# Patient Record
Sex: FEMALE | Race: WHITE | NOT HISPANIC OR LATINO | Employment: OTHER | URBAN - NONMETROPOLITAN AREA
[De-identification: names, ages, dates, MRNs, and addresses within clinical notes are randomized per-mention and may not be internally consistent; named-entity substitution may affect disease eponyms.]

---

## 2017-01-20 ENCOUNTER — AMBULATORY - GICH (OUTPATIENT)
Dept: SCHEDULING | Facility: OTHER | Age: 67
End: 2017-01-20

## 2017-02-14 ENCOUNTER — HISTORY (OUTPATIENT)
Dept: FAMILY MEDICINE | Facility: OTHER | Age: 67
End: 2017-02-14

## 2017-02-14 ENCOUNTER — OFFICE VISIT - GICH (OUTPATIENT)
Dept: FAMILY MEDICINE | Facility: OTHER | Age: 67
End: 2017-02-14

## 2017-02-14 ENCOUNTER — HOSPITAL ENCOUNTER (OUTPATIENT)
Dept: RADIOLOGY | Facility: OTHER | Age: 67
End: 2017-02-14
Attending: FAMILY MEDICINE

## 2017-02-14 DIAGNOSIS — Z13.820 ENCOUNTER FOR SCREENING FOR OSTEOPOROSIS: ICD-10-CM

## 2017-02-14 DIAGNOSIS — E55.9 VITAMIN D DEFICIENCY: ICD-10-CM

## 2017-02-14 DIAGNOSIS — L20.9 ATOPIC DERMATITIS: ICD-10-CM

## 2017-02-14 DIAGNOSIS — Z12.31 ENCOUNTER FOR SCREENING MAMMOGRAM FOR MALIGNANT NEOPLASM OF BREAST: ICD-10-CM

## 2017-02-14 DIAGNOSIS — Z13.220 ENCOUNTER FOR SCREENING FOR LIPOID DISORDERS: ICD-10-CM

## 2017-02-14 DIAGNOSIS — L65.9 NONSCARRING HAIR LOSS: ICD-10-CM

## 2017-02-14 DIAGNOSIS — Z00.00 ENCOUNTER FOR GENERAL ADULT MEDICAL EXAMINATION WITHOUT ABNORMAL FINDINGS: ICD-10-CM

## 2017-02-14 LAB
ABSOLUTE BASOPHILS - HISTORICAL: 0.1 THOU/CU MM
ABSOLUTE EOSINOPHILS - HISTORICAL: 0.1 THOU/CU MM
ABSOLUTE LYMPHOCYTES - HISTORICAL: 1.1 THOU/CU MM (ref 0.9–2.9)
ABSOLUTE MONOCYTES - HISTORICAL: 0.4 THOU/CU MM
ABSOLUTE NEUTROPHILS - HISTORICAL: 2.9 THOU/CU MM (ref 1.7–7)
ANION GAP - HISTORICAL: 6 (ref 5–18)
BASOPHILS # BLD AUTO: 1.7 %
BUN SERPL-MCNC: 17 MG/DL (ref 7–25)
BUN/CREAT RATIO - HISTORICAL: 29
CALCIUM SERPL-MCNC: 9.6 MG/DL (ref 8.6–10.3)
CHLORIDE SERPLBLD-SCNC: 104 MMOL/L (ref 98–107)
CHOL/HDL RATIO - HISTORICAL: 2
CHOLESTEROL TOTAL: 188 MG/DL
CO2 SERPL-SCNC: 28 MMOL/L (ref 21–31)
CREAT SERPL-MCNC: 0.58 MG/DL (ref 0.7–1.3)
EOSINOPHIL NFR BLD AUTO: 1.1 %
ERYTHROCYTE [DISTWIDTH] IN BLOOD BY AUTOMATED COUNT: 12.2 % (ref 11.5–15.5)
GFR IF NOT AFRICAN AMERICAN - HISTORICAL: >60 ML/MIN/1.73M2
GLUCOSE SERPL-MCNC: 90 MG/DL (ref 70–105)
HCT VFR BLD AUTO: 40.4 % (ref 33–51)
HDLC SERPL-MCNC: 94 MG/DL (ref 23–92)
HEMOGLOBIN: 13.8 G/DL (ref 12–16)
LDLC SERPL CALC-MCNC: 83 MG/DL
LYMPHOCYTES NFR BLD AUTO: 23.9 % (ref 20–44)
MCH RBC QN AUTO: 33.4 PG (ref 26–34)
MCHC RBC AUTO-ENTMCNC: 34.3 G/DL (ref 32–36)
MCV RBC AUTO: 98 FL (ref 80–100)
MONOCYTES NFR BLD AUTO: 7.9 %
NEUTROPHILS NFR BLD AUTO: 65.4 % (ref 42–72)
NON-HDL CHOLESTEROL - HISTORICAL: 94 MG/DL
PATIENT STATUS - HISTORICAL: ABNORMAL
PLATELET # BLD AUTO: 282 THOU/CU MM (ref 140–440)
PMV BLD: 5 FL (ref 6.5–11)
POTASSIUM SERPL-SCNC: 4 MMOL/L (ref 3.5–5.1)
RED BLOOD COUNT - HISTORICAL: 4.14 MIL/CU MM (ref 4–5.2)
SODIUM SERPL-SCNC: 138 MMOL/L (ref 133–143)
TRIGL SERPL-MCNC: 55 MG/DL
TSH - HISTORICAL: 1.22 UIU/ML (ref 0.34–5.6)
VITAMIN D TOTAL - HISTORICAL: 32.1 NG/ML
WHITE BLOOD COUNT - HISTORICAL: 4.5 THOU/CU MM (ref 4.5–11)

## 2017-02-14 ASSESSMENT — ANXIETY QUESTIONNAIRES
4. TROUBLE RELAXING: NOT AT ALL
GAD7 TOTAL SCORE: 0
2. NOT BEING ABLE TO STOP OR CONTROL WORRYING: NOT AT ALL
6. BECOMING EASILY ANNOYED OR IRRITABLE: NOT AT ALL
7. FEELING AFRAID AS IF SOMETHING AWFUL MIGHT HAPPEN: NOT AT ALL
3. WORRYING TOO MUCH ABOUT DIFFERENT THINGS: NOT AT ALL
1. FEELING NERVOUS, ANXIOUS, OR ON EDGE: NOT AT ALL
5. BEING SO RESTLESS THAT IT IS HARD TO SIT STILL: NOT AT ALL

## 2017-02-14 ASSESSMENT — PATIENT HEALTH QUESTIONNAIRE - PHQ9: SUM OF ALL RESPONSES TO PHQ QUESTIONS 1-9: 0

## 2017-02-16 ENCOUNTER — HOSPITAL ENCOUNTER (OUTPATIENT)
Dept: RADIOLOGY | Facility: OTHER | Age: 67
End: 2017-02-16
Attending: FAMILY MEDICINE

## 2017-02-16 DIAGNOSIS — Z13.820 ENCOUNTER FOR SCREENING FOR OSTEOPOROSIS: ICD-10-CM

## 2017-05-10 ENCOUNTER — AMBULATORY - GICH (OUTPATIENT)
Dept: SCHEDULING | Facility: OTHER | Age: 67
End: 2017-05-10

## 2017-05-15 ENCOUNTER — AMBULATORY - GICH (OUTPATIENT)
Dept: FAMILY MEDICINE | Facility: OTHER | Age: 67
End: 2017-05-15

## 2017-05-15 ENCOUNTER — HISTORY (OUTPATIENT)
Dept: FAMILY MEDICINE | Facility: OTHER | Age: 67
End: 2017-05-15

## 2017-05-15 DIAGNOSIS — Z01.818 ENCOUNTER FOR OTHER PREPROCEDURAL EXAMINATION: ICD-10-CM

## 2017-05-15 DIAGNOSIS — H35.372 PUCKERING OF LEFT MACULA: ICD-10-CM

## 2017-05-15 ASSESSMENT — PATIENT HEALTH QUESTIONNAIRE - PHQ9: SUM OF ALL RESPONSES TO PHQ QUESTIONS 1-9: 0

## 2017-05-15 ASSESSMENT — ANXIETY QUESTIONNAIRES
GAD7 TOTAL SCORE: 0
7. FEELING AFRAID AS IF SOMETHING AWFUL MIGHT HAPPEN: NOT AT ALL
2. NOT BEING ABLE TO STOP OR CONTROL WORRYING: NOT AT ALL
4. TROUBLE RELAXING: NOT AT ALL
5. BEING SO RESTLESS THAT IT IS HARD TO SIT STILL: NOT AT ALL
1. FEELING NERVOUS, ANXIOUS, OR ON EDGE: NOT AT ALL
3. WORRYING TOO MUCH ABOUT DIFFERENT THINGS: NOT AT ALL
6. BECOMING EASILY ANNOYED OR IRRITABLE: NOT AT ALL

## 2017-06-07 ENCOUNTER — AMBULATORY - GICH (OUTPATIENT)
Dept: SCHEDULING | Facility: OTHER | Age: 67
End: 2017-06-07

## 2017-06-29 ENCOUNTER — AMBULATORY - GICH (OUTPATIENT)
Dept: SCHEDULING | Facility: OTHER | Age: 67
End: 2017-06-29

## 2017-07-12 ENCOUNTER — AMBULATORY - GICH (OUTPATIENT)
Dept: SCHEDULING | Facility: OTHER | Age: 67
End: 2017-07-12

## 2017-08-23 ENCOUNTER — OFFICE VISIT - GICH (OUTPATIENT)
Dept: FAMILY MEDICINE | Facility: OTHER | Age: 67
End: 2017-08-23

## 2017-08-23 ENCOUNTER — AMBULATORY - GICH (OUTPATIENT)
Dept: FAMILY MEDICINE | Facility: OTHER | Age: 67
End: 2017-08-23

## 2017-08-23 ENCOUNTER — HISTORY (OUTPATIENT)
Dept: FAMILY MEDICINE | Facility: OTHER | Age: 67
End: 2017-08-23

## 2017-08-23 DIAGNOSIS — Z01.818 ENCOUNTER FOR OTHER PREPROCEDURAL EXAMINATION: ICD-10-CM

## 2017-08-23 DIAGNOSIS — L94.0 LOCALIZED SCLERODERMA: ICD-10-CM

## 2017-08-23 DIAGNOSIS — H25.9 AGE-RELATED CATARACT: ICD-10-CM

## 2017-08-23 DIAGNOSIS — N95.2 POSTMENOPAUSAL ATROPHIC VAGINITIS: ICD-10-CM

## 2017-12-28 NOTE — ADDENDUM NOTE
Patient Information     Patient Name MRN Sex Daly Frias [id number] Female 1950      Addendum Note by Bryan Elena at 2017 11:14 AM     Author:  Bryan Elena Service:  (none) Author Type:  (none)     Filed:  2017 11:14 AM Encounter Date:  2017 Status:  Signed     :  Bryan Elena       Addended by: BRYAN ELENA on: 2017 11:14 AM        Modules accepted: Orders

## 2017-12-28 NOTE — PATIENT INSTRUCTIONS
Patient Information     Patient Name MRN Daly Au [id number] Female 1950      Patient Instructions by Esme Khalil MD at 2017 10:23 AM     Author:  Esme Khalil MD Service:  (none) Author Type:  Physician     Filed:  2017 10:23 AM Encounter Date:  2017 Status:  Signed     :  Esme Khalil MD (Physician)               Index Kiswahili Related topics   Cataract Surgery   ________________________________________________________________________  KEY POINTS    Cataract surgery is a procedure in which a provider removes a clouded lens from the eye and replaces it with an artificial lens. A cataract is a cloudy lens inside the eye behind the iris (the colored part of the eye). This cloudy area can cause trouble seeing.    Cataract surgery is recommended when a cloudy area in the lens of the eye causes vision problems.    Ask your provider how long it will take to recover and how to take care of yourself at home.    Make sure you know what symptoms or problems you should watch for and what to do if you have them.  ________________________________________________________________________  What is a cataract surgery?   Cataract surgery is a procedure in which a provider removes a clouded lens from the eye and replaces it with an artificial lens. A cataract is a cloudy area in the lens of the eye. The lens is located inside the eye behind the iris (the colored part of the eye). It helps focus light and images on the lining of the back of your eye. If the lens gets cloudy, it can cause trouble seeing.  When is it used?   Cataract surgery is recommended when a cloudy area in the lens of the eye causes vision problems. The benefit of this procedure is that you can regain clear vision if the rest of your eye is normal.  Ask your healthcare provider about your choices for treatment and the risks.  How do I prepare for this procedure?     Plan for your care and a ride home after  the procedure.    You may or may not need to take your regular medicines the day of the procedure. Tell your healthcare provider about all medicines and supplements that you take. Some products may increase your risk of side effects. Ask your healthcare provider if you need to avoid taking any medicine or supplements before the procedure.    Your healthcare provider will tell you when to stop eating and drinking before the procedure. This helps to keep you from vomiting during the procedure.    Do not wear eye makeup on the day of the surgery.    Follow any other instructions your healthcare provider gives you.    Ask any questions you have before the procedure. You should understand what your healthcare provider is going to do. You have the right to make decisions about your healthcare and to give permission for any tests or procedures.  What happens during the procedure?   You will be given a local or general anesthetic to keep you from feeling pain during the operation. A local anesthetic numbs your eye while you remain awake. The local anesthetic can be given to you with drops or ointment or with a shot of medicine behind the eye. General anesthesia relaxes your muscles and you will be asleep. Most surgery is done with local anesthesia and a sedative to help you relax.  There are different ways to break up the lens:    Phacoemulsification: The provider will make a small cut in your eye and use sound waves (ultrasound) to break the lens into small pieces. The small pieces of the old lens are then removed with a tiny vacuum    Femtosecond laser: The provider uses a special laser to make a small cut in your eye and help break the lens into small pieces. Ultrasound may still be used.    Nuclear expression: The lens is removed in one piece. This approach is used rarely, only if your cataract can't be broken up by phacoemulsification.  After the lens is removed, the provider will put an artificial lens in your eye.  The provider may put one or more stitches in your eye to close the cut. You will have a protective shield and a patch over the eye.  What happens after the procedure?   You will be in the recovery area after surgery until you are ready to go home. It's normal to feel itching, sticky eyelids, and mild discomfort for a short time after cataract surgery. After 1 to 2 days, the discomfort should stop. Some fluid discharge is also common.  You can read and watch TV almost right away, but your vision may be blurry at first. You can do simple tasks such as ride in a car, get dressed, cook, and visit friends. You should not drive a car the day of surgery. To protect your eye from injury, cover the eye at all times with sunglasses, glasses, or a special eye shield while your eye is healing.  You need to use eye drops or pills to help healing, prevent infection, or to control the pressure in your eye. Since you may have several different drops to use, be sure you have a written schedule to follow to avoid confusion.  Ask your healthcare provider:    How long it will take to recover    If there are activities you should avoid    How to take care of yourself at home and when you can return to your normal activities    What symptoms or problems you should watch for and what to do if you have them  Make sure you know when you should come back for a checkup. Keep all appointments for provider visits or tests.  What are the risks of this procedure?   Every procedure or treatment has risks. Some possible risks of this procedure include:    Problems with anesthesia    Infection, bleeding, or blood clots    Drooping eyelid    Double vision    Glaucoma (damage to the optic nerve usually caused by high pressure inside the eye)    Retinal tear or detachment    Need for additional surgery    Decreased or loss of vision (rare)    A cloudy film that may form on the covering of the plastic lens implant. This problem is easily fixed with a  quick, painless laser procedure that is usually done in your provider s office.  Ask your healthcare provider how these risks apply to you. Be sure to discuss any other questions or concerns that you may have.  Reviewed for medical accuracy by faculty at the [facility]. Web site: [email or web address]  Developed by [employer].  Adult Advisor 2016.3 published by [employer].  Last modified: 2015-09-22  Last reviewed: 2015-09-21  This content is reviewed periodically and is subject to change as new health information becomes available. The information is intended to inform and educate and is not a replacement for medical evaluation, advice, diagnosis or treatment by a healthcare professional.  References   Adult Advisor 2016.3 Index    Copyright   2016 [employer], a division of [employer] All rights reserved.

## 2017-12-28 NOTE — PROGRESS NOTES
Patient Information     Patient Name MRN Sex Daly Frias [id number] Female 1950      Progress Notes by Esme Khalil MD at 2017 10:08 AM     Author:  Esme Khalil MD Service:  (none) Author Type:  Physician     Filed:  2017 10:37 AM Encounter Date:  2017 Status:  Signed     :  Esme Khalil MD (Physician)              PREOPERATIVE CLEARANCE  Date of Exam: 2017    Nursing Notes:   Heaven Elena  2017 10:06 AM  Signed  This patient presents today for a Preoperative exam for this procedure: Left cataract   Date of Surgery: 17   Surgeon:  Dr. Lee  Facility:  [facility]  Fax:  [phone]  Mila Elena LPN ...... 2017 9:56 AM       HPI:  Daly Perry is a 66 y.o. Female here for preoperative exam for above noted procedure.  She has no significant chronic illnesses and is in good general health. She had a physical exam in February with all labs up-to-date.  Her depression is stabilized and she sees her psychiatrist regularly.      Problem List:   Patient Active Problem List     Diagnosis  Code     Generalized anxiety disorder F41.1     FIBROCYSTIC BREAST DISEASE N60.19     LICHEN SCLEROSIS ET ATROPHICUS L94.0     NECK PAIN, CHRONIC M54.2     VAGINITIS, ATROPHIC, POSTMENOPAUSAL N95.2     ONYCHOMYCOSIS, TOENAILS B35.1     DIVERTICULOSIS, MILD K57.30     Rosacea L71.9     Severe major depression with psychotic features (HC) F32.3     Epiretinal membrane (ERM) of left eye H35.372      Histories:  Past Medical History:     Diagnosis  Date     Ankle fracture     left      Anxiety      ENDOMETRIOSIS      Generalized anxiety disorder     Dysthymia, generalized anxiety       LICHEN SCLEROSIS ET ATROPHICUS     Vulvar LSEA       Ovarian cyst     Hx of right ovarian cyst.       Past Surgical History:      Procedure  Laterality Date     ANKLE FRACTURE TREATMENT Left     ORIF       BREAST BIOPSY Right 08    stereotactic, benign  result       CARPAL TUNNEL RELEASE Bilateral 2010     COLONOSCOPY SCREENING      Normal       COLONOSCOPY SCREENING  3/14/2011    F/U        DILATION AND CURETTAGE  2003    D&C with hysteroscopy and endometrial ablation       REMOVE  2011    removal of hardware L ankle       SALPINGO-OOPHORECTOMY      RSO, D&C        VITRECTOMY POSTERIOR 23 GAUGE SYSTEM, MEMBRANE PEEL, AIR FLUID EXCHANGE,  endoLASER  Left 2017    Dr. Collins       Social History     Social History        Marital status:       Spouse name: N/A     Number of children:  N/A     Years of education:  N/A     Occupational History      Not on file.     Social History Main Topics          Smoking status:   Never Smoker      Smokeless tobacco:   Never Used      Alcohol use   Yes      Comment:  minimal        Drug use:   No      Sexual activity:   Yes      Partners:  Male      Other Topics  Concern     Not on file      Social History Narrative     Patient  to Jimenez Orlando, a retired [place] anesthetist.      She is retired, previously taught art.      Two brothers and one sister    Jimenez  Spouse    Jeremias  Son    Regino   Son    Has 3 grand daughters.       Obstetric History       T2      L2     SAB0   TAB0   Ectopic0   Multiple0   Live Births0      Family History      Problem  Relation Age of Onset     Arthritis Mother      Heart Disease Mother      Hypertension Mother      Thyroid Disease Mother      Heart Disease Father      Psychiatric illness Father      Allergies Brother      Hypertension Brother      Hypertension Sister      Cancer-colon Paternal Grandmother      Cancer-breast No Family History       Allergies: Review of patient's allergies indicates no known allergies.   Latex Allergy: no    Current Medications:  Current Outpatient Rx       Medication  Sig Dispense Refill     acetaminophen (TYLENOL) 325 mg tablet Take 325 mg by mouth every 4 hours if needed. Max acetaminophen dose: 4000mg in 24 hrs.        ARIPiprazole (ABILIFY) 10 mg tablet Take 1 tablet by mouth at bedtime.  0     calcium carbonate-vitamin D3, 500 mg-400 units, (CALCIUM 500 + D) tablet Take 1 tablet by mouth once daily.  0     clobetasol 0.05% (TEMOVATE 0.05% OINTMENT) 0.05 % ointment Apply to affected area twice daily as needed for 2 weeks only. 30 g 3     erythromycin ophthalmic ointment 0.5% Apply into inside lower lid of left eye 4 times daily 3.5 g 0     hydrocortisone valerate (WESTCORT) 0.2 % ointment Apply  topically to affected area(s) 2 times daily. 45 g 3     ibuprofen (ADVIL; MOTRIN) 200 mg tablet Take 200 mg by mouth 4 times daily if needed.       prednisoLONE acetate 1% ophthalmic (ECONOPRED PLUS, PRED FORTE, OMNIPRED) suspension Place 1 drop into left eye 4 times daily. SHAKE WELL 5 mL 0     venlafaxine (EFFEXOR XR) 37.5 mg Extended-Release capsule Take 37.5 mg by mouth once daily with a meal.       venlafaxine (EFFEXOR XR) 75 mg cp24 Extended-Release capsule 1 capsule once daily with a meal.  0     vitamin a-vitamin c-vitamin e-minerals (OCUVITE) tablet Take 1 tablet by mouth once daily.  0     Medications have been reviewed by me and are current to the best of my knowledge and ability.    Recent use of: no recent use of aspirin (ASA), NSAIDS or steroids    HABITS:   Social History       Substance Use Topics         Smoking status:   Never Smoker     Smokeless tobacco:   Never Used     Alcohol use   Yes      Comment:  minimal     Tetanus up to date yes    Proposed anesthesia: Per surgeon  Anesthesia Complications: None  History of abnormal bleeding : None  History of Blood Transfusions: No    Health Care Directive or Living Will: no    REVIEW OF SYSTEMS:  Genitourinary - female: has vaginal issues and not using clobetasol. Discussed using topical estrogen and clobetasol.  Preoperative Evaluation: Obstructive Sleep Apnea screening    S: Snore -  Do you snore loudly? (louder than talking or loud enough to be heard through closed  "doors)(NO)  T: Tired - Do you often feel tired, fatigued, or sleepy during the daytime?(NO)  O: Observed - Has anyone ever observed you stop breathing during your sleep?(NO)  P: Pressure - Do you have or are you being treated for high blood pressure?(NO)  B: BMI - BMI greater than 35kg/m2?(NO)  A: Age - Age over 50 years old?(YES)  N: Neck - Neck circumference greater than 40 cm?(NO)  G: Gender - Gender: Male?(NO)    Total number of \"YES\" responses:  1    Scoring: Low risk of KB 0-2  At Risk of KB: >3 High Risk of KB: 5-8        EXAM:   /80  Pulse 58  Ht 1.69 m (5' 6.53\")  Wt 64.4 kg (142 lb)  BMI 22.55 kg/m2 Body mass index is 22.55 kg/(m^2).  General Appearance: Pleasant, alert, appropriate appearance for age. No acute distress  Ear Exam: Normal TM's bilaterally. Normal auditory canals and external ears. Non-tender.  Nose Exam: Normal external nose, mucus membranes, and septum.  OroPharynx Exam: Dental hygiene adequate. Normal buccal mucosa. Normal pharynx.  Neck Exam: Supple, no masses or nodes.  Thyroid Exam: No nodules or enlargement.  Chest/Respiratory Exam: Normal chest wall and respirations. Clear to auscultation.  Cardiovascular Exam: Regular rate and rhythm. S1, S2, no murmur, click, gallop, or rubs.  Skin: Normal. and no rash or abnormalities  Neurologic Exam: Normal.  Psychiatric Exam: Alert and oriented, appropriate affect.    DIAGNOSTICS:   1. EKG: EKG FINDINGS - Normal sinus rhythm and normal axis, normal intervals, no acute ST/T changes c/w ischemia  2. CXR: Not indicated  3. Labs: none indicated  4. Pre-op urine for pregnancy (for 12 yrs and older or menstruating): not applicable    IMPRESSION:   1. Preoperative examination    2. Age-related cataract of left eye, unspecified age-related cataract type    3. Atrophic vaginitis    4. LICHEN SCLEROSIS ET ATROPHICUS        For above listed surgery and anesthesia:   Patient is low risk for perioperative complications.    RECOMMENDATIONS: " proceed without further diagnostic evaluation and resume all medications post-operative or per surgeon    Electronically Signed by:    Esme Khalil MD  10:36 AM 8/23/2017

## 2017-12-29 NOTE — H&P
Patient Information     Patient Name MRN Sex Daly Frias [id number] Female 1950      H&P by Esme Khalil MD at 2017  9:45 AM     Author:  Esme Khalil MD Service:  (none) Author Type:  Physician     Filed:  2017 10:37 AM Encounter Date:  2017 Status:  Signed     :  Esme Khalil MD (Physician)              PREOPERATIVE CLEARANCE  Date of Exam: 2017    Nursing Notes:   Heaven Elena  2017 10:06 AM  Signed  This patient presents today for a Preoperative exam for this procedure: Left cataract   Date of Surgery: 17   Surgeon:  Dr. Lee  Facility:  [facility]  Fax:  [phone]  Mila Elena LPN ...... 2017 9:56 AM       HPI:  Daly Perry is a 66 y.o. Female here for preoperative exam for above noted procedure.  She has no significant chronic illnesses and is in good general health. She had a physical exam in February with all labs up-to-date.  Her depression is stabilized and she sees her psychiatrist regularly.      Problem List:   Patient Active Problem List     Diagnosis  Code     Generalized anxiety disorder F41.1     FIBROCYSTIC BREAST DISEASE N60.19     LICHEN SCLEROSIS ET ATROPHICUS L94.0     NECK PAIN, CHRONIC M54.2     VAGINITIS, ATROPHIC, POSTMENOPAUSAL N95.2     ONYCHOMYCOSIS, TOENAILS B35.1     DIVERTICULOSIS, MILD K57.30     Rosacea L71.9     Severe major depression with psychotic features (HC) F32.3     Epiretinal membrane (ERM) of left eye H35.372      Histories:  Past Medical History:     Diagnosis  Date     Ankle fracture     left      Anxiety      ENDOMETRIOSIS      Generalized anxiety disorder     Dysthymia, generalized anxiety       LICHEN SCLEROSIS ET ATROPHICUS     Vulvar LSEA       Ovarian cyst     Hx of right ovarian cyst.       Past Surgical History:      Procedure  Laterality Date     ANKLE FRACTURE TREATMENT Left     ORIF       BREAST BIOPSY Right 08    stereotactic, benign result        CARPAL TUNNEL RELEASE Bilateral 2010     COLONOSCOPY SCREENING  2003    Normal       COLONOSCOPY SCREENING  3/14/2011    F/U        DILATION AND CURETTAGE  2003    D&C with hysteroscopy and endometrial ablation       REMOVE  2011    removal of hardware L ankle       SALPINGO-OOPHORECTOMY      RSO, D&C        VITRECTOMY POSTERIOR 23 GAUGE SYSTEM, MEMBRANE PEEL, AIR FLUID EXCHANGE,  endoLASER  Left 2017    Dr. Collins       Social History     Social History        Marital status:       Spouse name: N/A     Number of children:  N/A     Years of education:  N/A     Occupational History      Not on file.     Social History Main Topics          Smoking status:   Never Smoker      Smokeless tobacco:   Never Used      Alcohol use   Yes      Comment:  minimal        Drug use:   No      Sexual activity:   Yes      Partners:  Male      Other Topics  Concern     Not on file      Social History Narrative     Patient  to Jimenez Orlando, a retired [place] anesthetist.      She is retired, previously taught art.      Two brothers and one sister    Jimenez  Spouse    Jeremias  Son    Regino   Son    Has 3 grand daughters.       Obstetric History       T2      L2     SAB0   TAB0   Ectopic0   Multiple0   Live Births0      Family History      Problem  Relation Age of Onset     Arthritis Mother      Heart Disease Mother      Hypertension Mother      Thyroid Disease Mother      Heart Disease Father      Psychiatric illness Father      Allergies Brother      Hypertension Brother      Hypertension Sister      Cancer-colon Paternal Grandmother      Cancer-breast No Family History       Allergies: Review of patient's allergies indicates no known allergies.   Latex Allergy: no    Current Medications:  Current Outpatient Rx       Medication  Sig Dispense Refill     acetaminophen (TYLENOL) 325 mg tablet Take 325 mg by mouth every 4 hours if needed. Max acetaminophen dose: 4000mg in 24 hrs.        ARIPiprazole (ABILIFY) 10 mg tablet Take 1 tablet by mouth at bedtime.  0     calcium carbonate-vitamin D3, 500 mg-400 units, (CALCIUM 500 + D) tablet Take 1 tablet by mouth once daily.  0     clobetasol 0.05% (TEMOVATE 0.05% OINTMENT) 0.05 % ointment Apply to affected area twice daily as needed for 2 weeks only. 30 g 3     erythromycin ophthalmic ointment 0.5% Apply into inside lower lid of left eye 4 times daily 3.5 g 0     hydrocortisone valerate (WESTCORT) 0.2 % ointment Apply  topically to affected area(s) 2 times daily. 45 g 3     ibuprofen (ADVIL; MOTRIN) 200 mg tablet Take 200 mg by mouth 4 times daily if needed.       prednisoLONE acetate 1% ophthalmic (ECONOPRED PLUS, PRED FORTE, OMNIPRED) suspension Place 1 drop into left eye 4 times daily. SHAKE WELL 5 mL 0     venlafaxine (EFFEXOR XR) 37.5 mg Extended-Release capsule Take 37.5 mg by mouth once daily with a meal.       venlafaxine (EFFEXOR XR) 75 mg cp24 Extended-Release capsule 1 capsule once daily with a meal.  0     vitamin a-vitamin c-vitamin e-minerals (OCUVITE) tablet Take 1 tablet by mouth once daily.  0     Medications have been reviewed by me and are current to the best of my knowledge and ability.    Recent use of: no recent use of aspirin (ASA), NSAIDS or steroids    HABITS:   Social History       Substance Use Topics         Smoking status:   Never Smoker     Smokeless tobacco:   Never Used     Alcohol use   Yes      Comment:  minimal     Tetanus up to date yes    Proposed anesthesia: Per surgeon  Anesthesia Complications: None  History of abnormal bleeding : None  History of Blood Transfusions: No    Health Care Directive or Living Will: no    REVIEW OF SYSTEMS:  Genitourinary - female: has vaginal issues and not using clobetasol. Discussed using topical estrogen and clobetasol.  Preoperative Evaluation: Obstructive Sleep Apnea screening    S: Snore -  Do you snore loudly? (louder than talking or loud enough to be heard through closed  "doors)(NO)  T: Tired - Do you often feel tired, fatigued, or sleepy during the daytime?(NO)  O: Observed - Has anyone ever observed you stop breathing during your sleep?(NO)  P: Pressure - Do you have or are you being treated for high blood pressure?(NO)  B: BMI - BMI greater than 35kg/m2?(NO)  A: Age - Age over 50 years old?(YES)  N: Neck - Neck circumference greater than 40 cm?(NO)  G: Gender - Gender: Male?(NO)    Total number of \"YES\" responses:  1    Scoring: Low risk of KB 0-2  At Risk of KB: >3 High Risk of KB: 5-8        EXAM:   /80  Pulse 58  Ht 1.69 m (5' 6.53\")  Wt 64.4 kg (142 lb)  BMI 22.55 kg/m2 Body mass index is 22.55 kg/(m^2).  General Appearance: Pleasant, alert, appropriate appearance for age. No acute distress  Ear Exam: Normal TM's bilaterally. Normal auditory canals and external ears. Non-tender.  Nose Exam: Normal external nose, mucus membranes, and septum.  OroPharynx Exam: Dental hygiene adequate. Normal buccal mucosa. Normal pharynx.  Neck Exam: Supple, no masses or nodes.  Thyroid Exam: No nodules or enlargement.  Chest/Respiratory Exam: Normal chest wall and respirations. Clear to auscultation.  Cardiovascular Exam: Regular rate and rhythm. S1, S2, no murmur, click, gallop, or rubs.  Skin: Normal. and no rash or abnormalities  Neurologic Exam: Normal.  Psychiatric Exam: Alert and oriented, appropriate affect.    DIAGNOSTICS:   1. EKG: EKG FINDINGS - Normal sinus rhythm and normal axis, normal intervals, no acute ST/T changes c/w ischemia  2. CXR: Not indicated  3. Labs: none indicated  4. Pre-op urine for pregnancy (for 12 yrs and older or menstruating): not applicable    IMPRESSION:   1. Preoperative examination    2. Age-related cataract of left eye, unspecified age-related cataract type    3. Atrophic vaginitis    4. LICHEN SCLEROSIS ET ATROPHICUS        For above listed surgery and anesthesia:   Patient is low risk for perioperative complications.    RECOMMENDATIONS: " proceed without further diagnostic evaluation and resume all medications post-operative or per surgeon    Electronically Signed by:    Esme Khalil MD  10:36 AM 8/23/2017

## 2017-12-30 NOTE — NURSING NOTE
Patient Information     Patient Name MRN Sex Daly Frias [id number] Female 1950      Nursing Note by Heaven Elena at 2017  9:45 AM     Author:  Heaven Elena Service:  (none) Author Type:  (none)     Filed:  2017 10:06 AM Encounter Date:  2017 Status:  Signed     :  Heaven Elena            This patient presents today for a Preoperative exam for this procedure: Left cataract   Date of Surgery: 17   Surgeon:  Dr. Lee  Facility:  [facility]  Fax:  [phone]  Mila Elena LPN ...... 2017 9:56 AM

## 2018-01-03 NOTE — PROGRESS NOTES
Patient Information     Patient Name MRN Sex Daly Frias [id number] Female 1950      Progress Notes by Taniya Graves DO at 2017  8:15 AM     Author:  Taniya Graves DO Service:  (none) Author Type:  PHYS- Osteopathic     Filed:  2017 11:28 AM Encounter Date:  2017 Status:  Signed     :  Taniya Graves DO (PHYS- Osteopathic)              ANNUAL PHYSICAL - FEMALE    HPI: Daly Perry is a 66 y.o. female who presents for a yearly exam.  Concerns include:  1. Rash.  Red patchy.  On arms and legs.  No trigger that she is aware of.  NO new soaps, lotions, clothing.   is starting to have it also.  Hasn't tried anything besides plain lotion on it.  2. Thinning hair.  Worse after she had her kids, but noticing a lot now also.  Washes her hair 1-2 times per week; notices the most hair loss then.  No other skin/hair/nail changes.  3. Depression/Anxiety.  Hospitalized from April to August in [place], [place]; and then in the [place] until 2016. Since that time she has been following with Dr. Barroso every 3 months for medications, getting ECT done every 3 weeks in [place] and continuing on Abilify and Effexor.  Reports she is stable.  Planning a vacation in the near future; and will start to wean down the ECT treatments after that.      No LMP recorded. Patient has had an ablation.   Contraception: NA, postmenopausal  Risk for STI?: No,  in a monogamous relationship for 44 years.  Last pap: 2015, negative  Any hx of abnormal paps:  No  FH of early CA?: No  Cholesterol/DM concerns/screening: Due today.  Tobacco?: No  Calcium intake: Yes  DEXA: , normal.  Due next year.  Last mammo: 2017   Colonoscopy: 3/14/2011, normal.  Due   Immunizations: Tdap 2011; Flu 10/4/2016; Shingles 2013.  Due for pneumonia vaccines next year.    Patient Active Problem List       Diagnosis  Date Noted     Severe major depression with psychotic features (HC)  10/25/2016      Macular degeneration of both eyes  01/26/2016     Early        Rosacea  01/04/2013     ONYCHOMYCOSIS, TOENAILS  06/16/2011     VAGINITIS, ATROPHIC, POSTMENOPAUSAL       DIVERTICULOSIS, MILD  03/14/2011     Diffuse          NECK PAIN, CHRONIC       Improved after FDC        LICHEN SCLEROSIS ET ATROPHICUS       Vulvar LSEA          Generalized anxiety disorder       Dysthymia, generalized anxiety          FIBROCYSTIC BREAST DISEASE       Nonproliferative breast disease          Past Medical History      Diagnosis   Date     Ankle fracture       left      ENDOMETRIOSIS       Generalized anxiety disorder       Dysthymia, generalized anxiety       LICHEN SCLEROSIS ET ATROPHICUS       Vulvar LSEA       Ovarian cyst       Hx of right ovarian cyst.      Past Surgical History       Procedure   Laterality Date     Salpingo-oophorectomy   2/97     RSO, D&C        Ankle fracture treatment  Left 2002     ORIF       Dilation and curettage   2003     D&C with hysteroscopy and endometrial ablation       Colonoscopy screening   2003     Normal       Breast biopsy  Right 7/07/08     stereotactic, benign result       Carpal tunnel release  Bilateral 2010     Colonoscopy screening   3/14/2011     F/U 2021       Remove   06/27/2011     removal of hardware L ankle         Social History     Social History        Marital status:       Spouse name: N/A     Number of children:  N/A     Years of education:  N/A     Occupational History      Not on file.     Social History Main Topics         Smoking status:   Never Smoker     Smokeless tobacco:   Never Used     Alcohol use   No      Comment: not anymore 9/4/13      Drug use:   No     Sexual activity:   Yes     Partners:  Male     Other Topics  Concern     Not on file      Social History Narrative     Patient  to Jimenez Perry, a retired [facility] anesthetist.  She is retired, previously taught art.      Two brothers and one sister    Jimenez  Spouse    Jeremias  Son    Regino   Son  "    Family History      Problem  Relation Age of Onset     Arthritis Mother      Heart Disease Mother      Hypertension Mother      Thyroid Disease Mother      Heart Disease Father      Psychiatric illness Father      Allergies Brother      Hypertension Brother      Hypertension Sister      Cancer-colon Paternal Grandmother      Cancer-breast No Family History      Current Outpatient Prescriptions       Medication  Sig Dispense Refill     ARIPiprazole (ABILIFY) 10 mg tablet Take 1 tablet by mouth at bedtime.  0     calcium carbonate-vitamin D3, 500 mg-400 units, (CALCIUM 500 + D) tablet Take 1 tablet by mouth once daily.  0     clobetasol 0.05% (TEMOVATE 0.05% OINTMENT) 0.05 % ointment Apply to affected area twice daily as needed for 2 weeks only. 30 g 3     hydrocortisone valerate (WESTCORT) 0.2 % ointment Apply  topically to affected area(s) 2 times daily. 45 g 3     omega-3 fatty acids-vitamin E (FISH OIL) 1,000 mg cap Take  by mouth once daily.       venlafaxine (EFFEXOR XR) 37.5 mg Extended-Release capsule Take 37.5 mg by mouth once daily with a meal.       venlafaxine (EFFEXOR XR) 75 mg cp24 Extended-Release capsule 1 capsule once daily with a meal.  0     vitamin a-vitamin c-vitamin e-minerals (OCUVITE) tablet Take 1 tablet by mouth once daily.  0     No current facility-administered medications for this visit.      Medications have been reviewed by me and are current to the best of my knowledge and ability.     REVIEW OF SYSTEMS:  Refer to HPI; all other systems reviewed and negative.    PHYSICAL EXAM:  Visit Vitals       /84     Pulse 88     Ht 1.689 m (5' 6.5\")     Wt 61.2 kg (135 lb)     BMI 21.46 kg/m2     CONSTITUTIONAL:  Alert, cooperative, NAD.  EYES: No scleral icterus.  PERRLA.  Conjunctiva clear.  ENT/MOUTH: External ears and nose normal.  TMs normal.  Moist mucous membranes. Oropharynx clear.    ENDO: No thyromegaly or thyroid nodules.  LYMPH:  No cervical or supraclavicular LA.    BREASTS: " No skin abnormalities, no erythema.  No discrete masses.  No nipple discharge, no axillary, supra- or infraclavicular LA.   CARDIOVASCULAR: Regular, S1, S2.  No S3 or S4.  No murmur/gallop/rub.  No peripheral edema.  RESPIRATORY: CTA bilaterally, no wheezes, rhonchi or rales.  GI: Bowel sounds wnl.  Soft, nontender, nondistended.  No masses or HSM.  No rebound or guarding.  : Vulva: some white plaque type appearance around clitoris and at perineum  Urethral meatus: normal size and location, no lesions or discharge  Urethra: no tenderness or masses  Bladder: no fullness or tenderness  Vagina: normal appearance, no abnormal discharge, no lesions.  No evidence of cystocele or rectocele.  Uterus: normal size, retroverted, mobile, non-tender  Adnexa: no palpable masses bilaterally. No cervical motion tenderness.  Pap smear obtained: No  MSKEL: Grossly normal ROM.  No clubbing.  INTEGUMENTARY:  Warm, dry.  Erythematous, mildly indurated patches on inner R forearm.  Similar lesions on R ankle/calf.  NEUROLOGIC: Facies symmetric.  Grossly normal movement and tone.  No tremor.  PSYCHIATRIC: Affect normal.  Speech fluent.      PHQ Depression Screening 10/25/2016 2/14/2017   Date of PHQ exam (doc flow) 10/25/2016 2/14/2017   1. Lack of interest/pleasure 1 - Several days 0 - Not at all   2. Feeling down/depressed 1 - Several days 0 - Not at all   PHQ-2 TOTAL SCORE 2 0   3. Trouble sleeping 1 - Several days 0 - Not at all   4. Decreased energy 1 - Several days 0 - Not at all   5. Appetite change 1 - Several days 0 - Not at all   6. Feelings of failure 1 - Several days 0 - Not at all   7. Trouble concentrating 1 - Several days 0 - Not at all   8. Activity level 1 - Several days 0 - Not at all   9. Hurting yourself 0 - Not at all 0 - Not at all   PHQ-9 TOTAL SCORE 8 0   PHQ-9 Severity Level mild none   Functional Impairment very difficult -     ERICA-7 ANXIETY SCREENING 10/25/2016 2/14/2017   ERICA date (doc flow) 10/25/2016  2/14/2017   Nervous, anxious 1 0   Cannot stop worrying 2 0   Worry about different things 2 0   Cannot relax 2 0   Feeling restless 2 0   Easily annoyed/irritated 2 0   Afraid of awful event 2 0   Score 13 0   Severity moderate anxiety none     ASSESSMENT/PLAN:    ICD-10-CM    1. Annual physical exam Z00.00 CBC AND DIFFERENTIAL      BASIC METABOLIC PANEL      CBC AND DIFFERENTIAL      BASIC METABOLIC PANEL      CBC WITH AUTO DIFFERENTIAL   2. Vitamin D deficiency E55.9 VITAMIN D 25 (DEFICIENCY)      VITAMIN D 25 (DEFICIENCY)   3. Lipid screening Z13.220 LIPID PANEL      LIPID PANEL   4. Osteoporosis screening Z13.820 XR DXA BONE DENSITY 2 SITES AXIAL   5. Atopic dermatitis, unspecified type L20.9    6. Thinning hair L65.9 TSH      TSH     Relevant cancer screening discussed.    Counseled on healthy diet, Calcium and vitamin D intake, and exercise.  Labs obtained as above.     Atopic dermatitis: recommended topical steroid cream BID x 2 weeks on; 1 week off.    Dexa screening due next year with mammogram.  Pap smear due next year, and will likely be last one.      ROMAN MUELLER, DO

## 2018-01-03 NOTE — PATIENT INSTRUCTIONS
Patient Information     Patient Name MRN Daly Au [id number] Female 1950      Patient Instructions by Taniya Graves DO at 2017  8:15 AM     Author:  Taniya Graves DO Service:  (none) Author Type:  PHYS- Osteopathic     Filed:  2017  8:41 AM Encounter Date:  2017 Status:  Signed     :  Taniya Graves DO (PHYS- Osteopathic)               Index [place] Related topics   Routine Healthcare for Women   ________________________________________________________________________  KEY POINTS    Routine checkups can find health problems early and help prevent more serious problems. How often you should have checkups and tests depends on your age, your health problems, and your family health history.    Health checks may include weight, blood pressure, cholesterol, blood sugar, tests for sexually transmitted disease, breast, cervical, or colon and rectal cancer, and checks of your eyes, ears, skin, and mouth health.    Your healthcare provider may also recommend shots to protect against flu, tetanus, or other diseases.    If you are having any symptoms that you think may mean a problem, don t wait for your next regular checkup to see your healthcare provider. Get it taken care of right away.  ________________________________________________________________________  Routine checkups can find health problems early and help prevent more serious problems. How often you should have checkups and tests depends on your age, your health problems, and your family health history. Be sure to talk to your healthcare provider about how often you should have a physical exam and how often you need screening tests.  If you are having any symptoms that you think may mean a problem, do not wait for your next regular checkup to see your healthcare provider. Get it taken care of right away.  What needs to be checked and how often?  If you are feeling healthy and not having any symptoms of illness,  the recommended health checks include:    Weight: At least once a year, preferably each time you visit your provider    Blood pressure measurement: At least once a year for all women    Clinical breast exam by your provider: At least every 3 years if you are 20 to 39 years old. Get an exam every year if you are 40 or older.    Mammogram: As often as your healthcare provider recommends based on your personal and family history for breast cancer. Medical organizations do not agree on how often you should have a mammogram. The US Preventive Services Task Force recommends a mammogram every 2 years for women 50 to 74 years old. The American Cancer Society recommends a mammogram every year for women 40 and older. Talk to your healthcare provider about when you should start having mammograms and how often you should have them.    Pelvic exam: Every year starting when you are 21 years old. You may need a pelvic exam at other times if you are having problems.    Pap and HPV tests: As often as your provider recommends if you are 21 or older. A Pap test is usually done as part of a pelvic exam.    You should have a Pap test to check for cervical cancer and precancer changes at least every 3 years until you are 65.    If you are 30 years old or older, an HPV test may be done at the same time as the Pap test. HPV can cause abnormal cells and lead to cervical cancer. If your HPV test is negative, you may be able to have your Pap tests every 5 years.    You may need more frequent tests if there are things that put you at a higher risk for cervical cancer or if you have had abnormal test results. Ask your healthcare provider about this. If you are over 65 years old, ask your provider if you can stop having Pap and HPV tests.    Cholesterol test: At least every 5 years. You will need more frequent testing if you have abnormal results or a health problem such as diabetes or heart problem.    Blood sugar test for type 2 diabetes: You  should have this test once a year if your blood pressure, blood lipids (cholesterol), or weight are high or you have a family history of type 2 diabetes.    Colon and rectal cancer screening: You should have 1 of these 3 methods of screening if you are 50 to 75 years old and have an average risk of colon cancer:    A fecal occult blood test once a year to check for hidden blood in your bowel movements    A sigmoidoscopy exam every 5 years and fecal occult blood testing at least every 3 years between the 5-year exams    A colonoscopy every 10 years  You may need to start colorectal cancer screening earlier or be tested more often if you have a higher risk of colon cancer from a medical condition or a family history of colon cancer. Talk to your healthcare provider about this.    Chlamydia test: Every year if you are sexually active and under 25 or if you have a high risk of sexually transmitted infections (STIs). You have a higher risk if you have a history of STIs, a new sex partner, or more than 1 sex partner.    Gonorrhea and syphilis tests: You may need to be tested for these infections if you are at high risk for these sexually transmitted infections (STIs). You have a higher risk if you have a history of STIs, a new sex partner, or more than 1 sex partner.    HIV test for the AIDS virus If you are 15 to 65 years old, your healthcare provider may recommend that you be tested for HIV. Younger teens and older adults who are at increased risk should also be screened.    Tuberculosis (TB) test: Every year if you have a high risk of TB; for example, because:    You are a healthcare worker, drug user, or immigrant    You have diabetes, HIV, or another condition that weakens your immune system    You have close contact with someone infected with TB    Bone density test for osteoporosis: At age 65 years if your risk is normal and at 60 if you have a high risk (for example, because you smoke or do not get regular  exercise). Osteoporosis is a disease that thins and weakens bones to the point where they break easily.    Hearing test: If you are 65 or older, or sooner if you have hearing problems.    Eye exam: Everyone should have regular eye exams. Even if you don t have problems with your vision or other eye symptoms, you should have your eyes checked by an eye doctor:    At least once during your 20s, and twice during your 30s    Every 2 to 4 years if you are age 41 to 64    Every 1 to 2 years if you are age 65 or older    Skin: Every year, if you:    Have any moles or abnormal areas of skin    Have or have had a lot of sun exposure    Have had a sunburn with blisters    Have used tanning salons    Mouth: Every year for routine dental exam and cleaning, more often for sores of the gums, especially if you smoke, chew tobacco, wear dentures, or have a medical condition such as diabetes or heart disease that can be made worse by problems with your teeth or gums.  You may need other tests as well. You and your healthcare provider need to talk about what you may need based on your symptoms and your personal and family medical history.  What shots do I need?  Get the shots your healthcare provider recommends for you. They may include vaccines against:    Tetanus, diphtheria, and pertussis. Protecting yourself against being a pertussis (whooping cough) carrier helps protects infants and others around you.    Flu    HPV (human papillomavirus) through age 26    Measles, mumps, and rubella (MMR)    Hepatitis A    Hepatitis B    Pneumococcal disease    Chickenpox    Shingles  If you are planning to travel outside the [place], check with your healthcare provider at least 2 months before you travel to find out what other shots you might need.  What else can I do to stay healthy?    Ask about breast self-exams. Ask your healthcare provider about doing breast self-exams. These exams help you be more familiar with your body. They could help you  notice changes that need to be checked for breast cancer.    Follow a healthy lifestyle. Eat a healthy diet. Try to keep a healthy weight. If you are overweight, lose weight. Stay fit with the right kind of exercise for you. Try to get at least 7 to 9 hours of sleep each night. Limit caffeine. Learn to manage stress. Try deep breathing exercises when you feel stressed. If you smoke, try to quit. Talk to your healthcare provider about ways to quit smoking. If you want to drink alcohol, ask your healthcare provider how much is safe for you to drink.   If you are trying to get pregnant or are at risk for getting pregnant, you should take a supplement that contains folic acid every day. Folic acid is very important for spinal cord development of the baby.    Take care of your teeth. Visit your dentist regularly. Brush your teeth with fluoride toothpaste twice a day. Also floss your teeth daily. Healthy gums help prevent heart disease.    Practice safe sex. Use latex or polyurethane condoms during foreplay and every time you have vaginal, oral, or anal sex. Have just 1 sexual partner who is not having sex with anyone else.    Ask about hormone use. During or after menopause, discuss the risks and benefits of use of hormone replacement therapy with your healthcare provider.    Keep all appointments for provider visits or tests.    Contact your healthcare provider if you have new or worsening symptoms.  Developed by [employer].  Adult Advisor 2016.3 published by [employer].  Last modified: 2016-04-01  Last reviewed: 2016-03-31  This content is reviewed periodically and is subject to change as new health information becomes available. The information is intended to inform and educate and is not a replacement for medical evaluation, advice, diagnosis or treatment by a healthcare professional.  References   Adult Advisor 2016.3 Index    Copyright   2016 [employer], a division of [employer] All rights  reserved.

## 2018-01-05 NOTE — H&P
Patient Information     Patient Name MRN Daly Au [id number] Female 1950      H&P by Marshal Lima MD at 5/15/2017  8:15 AM     Author:  Marshal Lima MD Service:  (none) Author Type:  Physician     Filed:  2017 12:42 PM Encounter Date:  5/15/2017 Status:  Signed     :  Marshal Lima MD (Physician)            SUBJECTIVE:    Daly Perry is a 66 y.o. female who presents for preoperative    HPI Comments: Patient arrives here for a preop visit. She'll be undergoing surgery to her left eye. Patient states that she'll have a epiretinal membrane In preparation for cataract surgery. Patient reports she has a lot of distortion with her vision involving cataracts. Surgery is scheduled with  at the [facility].      No Known Allergies,   Family History      Problem  Relation Age of Onset     Arthritis Mother      Heart Disease Mother      Hypertension Mother      Thyroid Disease Mother      Heart Disease Father      Psychiatric illness Father      Allergies Brother      Hypertension Brother      Hypertension Sister      Cancer-colon Paternal Grandmother      Cancer-breast No Family History    ,   Current Outpatient Prescriptions on File Prior to Visit       Medication  Sig Dispense Refill     ARIPiprazole (ABILIFY) 10 mg tablet Take 1 tablet by mouth at bedtime.  0     calcium carbonate-vitamin D3, 500 mg-400 units, (CALCIUM 500 + D) tablet Take 1 tablet by mouth once daily.  0     clobetasol 0.05% (TEMOVATE 0.05% OINTMENT) 0.05 % ointment Apply to affected area twice daily as needed for 2 weeks only. 30 g 3     hydrocortisone valerate (WESTCORT) 0.2 % ointment Apply  topically to affected area(s) 2 times daily. 45 g 3     omega-3 fatty acids-vitamin E (FISH OIL) 1,000 mg cap Take  by mouth once daily.       venlafaxine (EFFEXOR XR) 37.5 mg Extended-Release capsule Take 37.5 mg by mouth once daily with a meal.       venlafaxine (EFFEXOR XR) 75 mg cp24  Extended-Release capsule 1 capsule once daily with a meal.  0     vitamin a-vitamin c-vitamin e-minerals (OCUVITE) tablet Take 1 tablet by mouth once daily.  0     No current facility-administered medications on file prior to visit.    ,   Past Surgical History:      Procedure  Laterality Date     ANKLE FRACTURE TREATMENT Left 2002    ORIF       BREAST BIOPSY Right 7/07/08    stereotactic, benign result       CARPAL TUNNEL RELEASE Bilateral 2010     COLONOSCOPY SCREENING  2003    Normal       COLONOSCOPY SCREENING  3/14/2011    F/U 2021       DILATION AND CURETTAGE  2003    D&C with hysteroscopy and endometrial ablation       REMOVE  06/27/2011    removal of hardware L ankle       SALPINGO-OOPHORECTOMY  2/97    RSO, D&C      ,   Social History       Substance Use Topics         Smoking status:   Never Smoker     Smokeless tobacco:   Never Used     Alcohol use   Yes      Comment:  minimal      and   Social History       Substance Use Topics         Smoking status:   Never Smoker     Smokeless tobacco:   Never Used     Alcohol use   Yes      Comment:  minimal         REVIEW OF SYSTEMS:  Review of Systems   Constitutional: Negative for chills and fever.        Patient reports night sweats   Psychiatric/Behavioral: Positive for depression.   All other systems reviewed and are negative.      OBJECTIVE:  /88  Pulse 60  Wt 62.8 kg (138 lb 6.4 oz)  BMI 22 kg/m2    EXAM:   Physical Exam   Constitutional: She is well-developed, well-nourished, and in no distress.   HENT:   Head: Normocephalic and atraumatic.   Right Ear: External ear normal.   Left Ear: External ear normal.   Eyes: Pupils are equal, round, and reactive to light.   Neck: Normal range of motion.   Cardiovascular: Normal rate, regular rhythm and normal heart sounds.    No murmur heard.  Pulmonary/Chest: Effort normal and breath sounds normal.   Abdominal: Soft.   Musculoskeletal: Normal range of motion.   Neurological: She is alert.   Psychiatric:  Affect normal.       ASSESSMENT/PLAN:    ICD-10-CM    1. Preop examination Z01.818    2. Epiretinal membrane (ERM) of left eye H35.372         Plan:  Satisfactory physical. Patient is medically cleared to proceed with surgery and anesthesia. Form signed. We'll also send down her preop history and physical form that she brings.

## 2018-01-05 NOTE — PROGRESS NOTES
Patient Information     Patient Name MRN Daly Au [id number] Female 1950      Progress Notes by Marshal Lima MD at 5/15/2017  8:15 AM     Author:  Marshal Lima MD Service:  (none) Author Type:  Physician     Filed:  2017 12:42 PM Encounter Date:  5/15/2017 Status:  Signed     :  Marshal Lima MD (Physician)            SUBJECTIVE:    Daly Perry is a 66 y.o. female who presents for preoperative    HPI Comments: Patient arrives here for a preop visit. She'll be undergoing surgery to her left eye. Patient states that she'll have a epiretinal membrane In preparation for cataract surgery. Patient reports she has a lot of distortion with her vision involving cataracts. Surgery is scheduled with  at the [facility] [place].      No Known Allergies,   Family History      Problem  Relation Age of Onset     Arthritis Mother      Heart Disease Mother      Hypertension Mother      Thyroid Disease Mother      Heart Disease Father      Psychiatric illness Father      Allergies Brother      Hypertension Brother      Hypertension Sister      Cancer-colon Paternal Grandmother      Cancer-breast No Family History    ,   Current Outpatient Prescriptions on File Prior to Visit       Medication  Sig Dispense Refill     ARIPiprazole (ABILIFY) 10 mg tablet Take 1 tablet by mouth at bedtime.  0     calcium carbonate-vitamin D3, 500 mg-400 units, (CALCIUM 500 + D) tablet Take 1 tablet by mouth once daily.  0     clobetasol 0.05% (TEMOVATE 0.05% OINTMENT) 0.05 % ointment Apply to affected area twice daily as needed for 2 weeks only. 30 g 3     hydrocortisone valerate (WESTCORT) 0.2 % ointment Apply  topically to affected area(s) 2 times daily. 45 g 3     omega-3 fatty acids-vitamin E (FISH OIL) 1,000 mg cap Take  by mouth once daily.       venlafaxine (EFFEXOR XR) 37.5 mg Extended-Release capsule Take 37.5 mg by mouth once daily with a meal.       venlafaxine (EFFEXOR XR) 75 mg cp24  Extended-Release capsule 1 capsule once daily with a meal.  0     vitamin a-vitamin c-vitamin e-minerals (OCUVITE) tablet Take 1 tablet by mouth once daily.  0     No current facility-administered medications on file prior to visit.    ,   Past Surgical History:      Procedure  Laterality Date     ANKLE FRACTURE TREATMENT Left 2002    ORIF       BREAST BIOPSY Right 7/07/08    stereotactic, benign result       CARPAL TUNNEL RELEASE Bilateral 2010     COLONOSCOPY SCREENING  2003    Normal       COLONOSCOPY SCREENING  3/14/2011    F/U 2021       DILATION AND CURETTAGE  2003    D&C with hysteroscopy and endometrial ablation       REMOVE  06/27/2011    removal of hardware L ankle       SALPINGO-OOPHORECTOMY  2/97    RSO, D&C      ,   Social History       Substance Use Topics         Smoking status:   Never Smoker     Smokeless tobacco:   Never Used     Alcohol use   Yes      Comment:  minimal      and   Social History       Substance Use Topics         Smoking status:   Never Smoker     Smokeless tobacco:   Never Used     Alcohol use   Yes      Comment:  minimal         REVIEW OF SYSTEMS:  Review of Systems   Constitutional: Negative for chills and fever.        Patient reports night sweats   Psychiatric/Behavioral: Positive for depression.   All other systems reviewed and are negative.      OBJECTIVE:  /88  Pulse 60  Wt 62.8 kg (138 lb 6.4 oz)  BMI 22 kg/m2    EXAM:   Physical Exam   Constitutional: She is well-developed, well-nourished, and in no distress.   HENT:   Head: Normocephalic and atraumatic.   Right Ear: External ear normal.   Left Ear: External ear normal.   Eyes: Pupils are equal, round, and reactive to light.   Neck: Normal range of motion.   Cardiovascular: Normal rate, regular rhythm and normal heart sounds.    No murmur heard.  Pulmonary/Chest: Effort normal and breath sounds normal.   Abdominal: Soft.   Musculoskeletal: Normal range of motion.   Neurological: She is alert.   Psychiatric:  Affect normal.       ASSESSMENT/PLAN:    ICD-10-CM    1. Preop examination Z01.818    2. Epiretinal membrane (ERM) of left eye H35.372         Plan:  Satisfactory physical. Patient is medically cleared to proceed with surgery and anesthesia. Form signed. We'll also send down her preop history and physical form that she brings.

## 2018-01-05 NOTE — NURSING NOTE
Patient Information     Patient Name MRN Daly Au [id number] Female 1950      Nursing Note by Nuvia Sanchez at 5/15/2017  8:15 AM     Author:  Nuvia Sanchez Service:  (none) Author Type:  (none)     Filed:  5/15/2017  8:17 AM Encounter Date:  5/15/2017 Status:  Signed     :  Nuvia Sanchez            Patient here for preop  Exam for eye surgery on 17 at the [facility] in [facility]. No concerns today.Tdap up to date. Nuvia Sanchez LPN .......................5/15/2017  8:13 AM

## 2018-01-23 ENCOUNTER — OFFICE VISIT - GICH (OUTPATIENT)
Dept: FAMILY MEDICINE | Facility: OTHER | Age: 68
End: 2018-01-23

## 2018-01-23 ENCOUNTER — HISTORY (OUTPATIENT)
Dept: FAMILY MEDICINE | Facility: OTHER | Age: 68
End: 2018-01-23

## 2018-01-23 DIAGNOSIS — M54.10 RADICULOPATHY: ICD-10-CM

## 2018-01-23 DIAGNOSIS — F41.1 GENERALIZED ANXIETY DISORDER: ICD-10-CM

## 2018-01-23 DIAGNOSIS — M54.2 CERVICALGIA: ICD-10-CM

## 2018-01-23 DIAGNOSIS — Z12.4 ENCOUNTER FOR SCREENING FOR MALIGNANT NEOPLASM OF CERVIX: ICD-10-CM

## 2018-01-23 DIAGNOSIS — F32.3 MAJOR DEPRESSIVE DISORDER, SINGLE EPISODE, SEVERE WITH PSYCHOTIC FEATURES (H): ICD-10-CM

## 2018-01-23 DIAGNOSIS — L94.0 LOCALIZED SCLERODERMA: ICD-10-CM

## 2018-01-23 DIAGNOSIS — R25.1 TREMOR: ICD-10-CM

## 2018-01-23 DIAGNOSIS — R29.898 OTHER SYMPTOMS AND SIGNS INVOLVING THE MUSCULOSKELETAL SYSTEM: ICD-10-CM

## 2018-01-23 DIAGNOSIS — Z13.220 ENCOUNTER FOR SCREENING FOR LIPOID DISORDERS: ICD-10-CM

## 2018-01-23 DIAGNOSIS — E55.9 VITAMIN D DEFICIENCY: ICD-10-CM

## 2018-01-23 LAB
A/G RATIO - HISTORICAL: 1.3 (ref 1–2)
ABSOLUTE BASOPHILS - HISTORICAL: 0 THOU/CU MM
ABSOLUTE EOSINOPHILS - HISTORICAL: 0 THOU/CU MM
ABSOLUTE IMMATURE GRANULOCYTES(METAS,MYELOS,PROS) - HISTORICAL: 0 THOU/CU MM
ABSOLUTE LYMPHOCYTES - HISTORICAL: 1.3 THOU/CU MM (ref 0.9–2.9)
ABSOLUTE MONOCYTES - HISTORICAL: 0.3 THOU/CU MM
ABSOLUTE NEUTROPHILS - HISTORICAL: 3.7 THOU/CU MM (ref 1.7–7)
ALBUMIN SERPL-MCNC: 4.1 G/DL (ref 3.5–5.7)
ALP SERPL-CCNC: 65 IU/L (ref 34–104)
ALT (SGPT) - HISTORICAL: 11 IU/L (ref 7–52)
ANION GAP - HISTORICAL: 10 (ref 5–18)
AST SERPL-CCNC: 16 IU/L (ref 13–39)
BASOPHILS # BLD AUTO: 0.6 %
BILIRUB SERPL-MCNC: 0.6 MG/DL (ref 0.3–1)
BUN SERPL-MCNC: 20 MG/DL (ref 7–25)
BUN/CREAT RATIO - HISTORICAL: 32
CALCIUM SERPL-MCNC: 9 MG/DL (ref 8.6–10.3)
CHLORIDE SERPLBLD-SCNC: 103 MMOL/L (ref 98–107)
CO2 SERPL-SCNC: 28 MMOL/L (ref 21–31)
CREAT SERPL-MCNC: 0.62 MG/DL (ref 0.7–1.3)
EOSINOPHIL NFR BLD AUTO: 0.2 %
ERYTHROCYTE [DISTWIDTH] IN BLOOD BY AUTOMATED COUNT: 12.2 % (ref 11.5–15.5)
FOLATE: >24.8 NG/ML
GFR IF NOT AFRICAN AMERICAN - HISTORICAL: >60 ML/MIN/1.73M2
GLOBULIN - HISTORICAL: 3.1 G/DL (ref 2–3.7)
GLUCOSE SERPL-MCNC: 97 MG/DL (ref 70–105)
HCT VFR BLD AUTO: 42.3 % (ref 33–51)
HEMOGLOBIN: 14.3 G/DL (ref 12–16)
IMMATURE GRANULOCYTES(METAS,MYELOS,PROS) - HISTORICAL: 0.2 %
LYMPHOCYTES NFR BLD AUTO: 24.7 % (ref 20–44)
MAGNESIUM SERPL-MCNC: 1.9 MG/DL (ref 1.9–2.7)
MCH RBC QN AUTO: 31.9 PG (ref 26–34)
MCHC RBC AUTO-ENTMCNC: 33.8 G/DL (ref 32–36)
MCV RBC AUTO: 94 FL (ref 80–100)
MONOCYTES NFR BLD AUTO: 4.8 %
NEUTROPHILS NFR BLD AUTO: 69.5 % (ref 42–72)
PLATELET # BLD AUTO: 350 THOU/CU MM (ref 140–440)
PMV BLD: 8 FL (ref 6.5–11)
POTASSIUM SERPL-SCNC: 4.2 MMOL/L (ref 3.5–5.1)
PROT SERPL-MCNC: 7.2 G/DL (ref 6.4–8.9)
RED BLOOD COUNT - HISTORICAL: 4.48 MIL/CU MM (ref 4–5.2)
SODIUM SERPL-SCNC: 141 MMOL/L (ref 133–143)
TSH - HISTORICAL: 1.53 UIU/ML (ref 0.34–5.6)
VIT B12 SERPL-MCNC: 1079 PG/ML (ref 180–914)
WHITE BLOOD COUNT - HISTORICAL: 5.3 THOU/CU MM (ref 4.5–11)

## 2018-01-23 ASSESSMENT — ANXIETY QUESTIONNAIRES
1. FEELING NERVOUS, ANXIOUS, OR ON EDGE: NOT AT ALL
5. BEING SO RESTLESS THAT IT IS HARD TO SIT STILL: NOT AT ALL
6. BECOMING EASILY ANNOYED OR IRRITABLE: NOT AT ALL
2. NOT BEING ABLE TO STOP OR CONTROL WORRYING: NOT AT ALL
GAD7 TOTAL SCORE: 0
3. WORRYING TOO MUCH ABOUT DIFFERENT THINGS: NOT AT ALL
4. TROUBLE RELAXING: NOT AT ALL
7. FEELING AFRAID AS IF SOMETHING AWFUL MIGHT HAPPEN: NOT AT ALL

## 2018-01-23 ASSESSMENT — PATIENT HEALTH QUESTIONNAIRE - PHQ9: SUM OF ALL RESPONSES TO PHQ QUESTIONS 1-9: 0

## 2018-01-26 VITALS
DIASTOLIC BLOOD PRESSURE: 84 MMHG | BODY MASS INDEX: 22 KG/M2 | HEART RATE: 88 BPM | WEIGHT: 138.4 LBS | HEART RATE: 60 BPM | WEIGHT: 135 LBS | BODY MASS INDEX: 21.19 KG/M2 | SYSTOLIC BLOOD PRESSURE: 118 MMHG | SYSTOLIC BLOOD PRESSURE: 128 MMHG | HEIGHT: 67 IN | DIASTOLIC BLOOD PRESSURE: 88 MMHG

## 2018-01-26 VITALS
DIASTOLIC BLOOD PRESSURE: 80 MMHG | WEIGHT: 142 LBS | BODY MASS INDEX: 22.29 KG/M2 | HEIGHT: 67 IN | SYSTOLIC BLOOD PRESSURE: 118 MMHG | HEART RATE: 58 BPM

## 2018-01-30 ENCOUNTER — DOCUMENTATION ONLY (OUTPATIENT)
Dept: FAMILY MEDICINE | Facility: OTHER | Age: 68
End: 2018-01-30

## 2018-01-30 PROBLEM — N60.19 FIBROCYSTIC BREAST DISEASE: Status: ACTIVE | Noted: 2018-01-30

## 2018-01-30 PROBLEM — H35.372 EPIRETINAL MEMBRANE (ERM) OF LEFT EYE: Status: ACTIVE | Noted: 2017-05-16

## 2018-01-30 PROBLEM — G89.29 NECK PAIN, CHRONIC: Status: ACTIVE | Noted: 2018-01-30

## 2018-01-30 PROBLEM — F41.1 GENERALIZED ANXIETY DISORDER: Status: ACTIVE | Noted: 2018-01-30

## 2018-01-30 PROBLEM — L94.0 CIRCUMSCRIBED SCLERODERMA: Status: ACTIVE | Noted: 2018-01-30

## 2018-01-30 PROBLEM — M54.2 NECK PAIN, CHRONIC: Status: ACTIVE | Noted: 2018-01-30

## 2018-01-30 PROBLEM — N95.2 ATROPHIC VAGINITIS: Status: ACTIVE | Noted: 2018-01-30

## 2018-01-30 RX ORDER — PREDNISOLONE ACETATE 10 MG/ML
1 SUSPENSION/ DROPS OPHTHALMIC 4 TIMES DAILY
COMMUNITY
Start: 2017-05-22 | End: 2019-07-26

## 2018-01-30 RX ORDER — IBUPROFEN 200 MG
200 TABLET ORAL 4 TIMES DAILY PRN
Status: ON HOLD | COMMUNITY
End: 2024-04-03

## 2018-01-30 RX ORDER — ARIPIPRAZOLE 10 MG/1
7.5 TABLET ORAL AT BEDTIME
COMMUNITY
Start: 2016-10-25 | End: 2018-05-09

## 2018-01-30 RX ORDER — VENLAFAXINE HYDROCHLORIDE 75 MG/1
75 CAPSULE, EXTENDED RELEASE ORAL 2 TIMES DAILY
COMMUNITY
Start: 2016-10-25

## 2018-01-30 RX ORDER — ERYTHROMYCIN 5 MG/G
OINTMENT OPHTHALMIC
COMMUNITY
Start: 2017-05-22 | End: 2019-07-26

## 2018-01-30 RX ORDER — ACETAMINOPHEN 325 MG/1
325 TABLET ORAL EVERY 4 HOURS PRN
COMMUNITY

## 2018-01-30 RX ORDER — VENLAFAXINE HYDROCHLORIDE 37.5 MG/1
37.5 CAPSULE, EXTENDED RELEASE ORAL
COMMUNITY
Start: 2017-01-24 | End: 2021-09-20

## 2018-01-30 RX ORDER — CLOBETASOL PROPIONATE 0.5 MG/G
OINTMENT TOPICAL
COMMUNITY
Start: 2017-08-23 | End: 2019-07-26

## 2018-01-31 ASSESSMENT — ANXIETY QUESTIONNAIRES
GAD7 TOTAL SCORE: 0
GAD7 TOTAL SCORE: 0

## 2018-01-31 ASSESSMENT — PATIENT HEALTH QUESTIONNAIRE - PHQ9
SUM OF ALL RESPONSES TO PHQ QUESTIONS 1-9: 0
SUM OF ALL RESPONSES TO PHQ QUESTIONS 1-9: 0

## 2018-02-01 LAB — HPV RESULTS - HISTORICAL: NEGATIVE

## 2018-02-05 ENCOUNTER — COMMUNICATION - GICH (OUTPATIENT)
Dept: FAMILY MEDICINE | Facility: OTHER | Age: 68
End: 2018-02-05

## 2018-02-06 ENCOUNTER — COMMUNICATION - GICH (OUTPATIENT)
Dept: FAMILY MEDICINE | Facility: OTHER | Age: 68
End: 2018-02-06

## 2018-02-08 ENCOUNTER — DOCUMENTATION ONLY (OUTPATIENT)
Dept: FAMILY MEDICINE | Facility: OTHER | Age: 68
End: 2018-02-08

## 2018-02-09 VITALS
BODY MASS INDEX: 22.56 KG/M2 | WEIGHT: 140.4 LBS | SYSTOLIC BLOOD PRESSURE: 120 MMHG | DIASTOLIC BLOOD PRESSURE: 80 MMHG | HEART RATE: 72 BPM | HEIGHT: 66 IN

## 2018-02-10 ASSESSMENT — ANXIETY QUESTIONNAIRES: GAD7 TOTAL SCORE: 0

## 2018-02-10 ASSESSMENT — PATIENT HEALTH QUESTIONNAIRE - PHQ9: SUM OF ALL RESPONSES TO PHQ QUESTIONS 1-9: 0

## 2018-02-13 NOTE — PATIENT INSTRUCTIONS
Patient Information     Patient Name MRN Daly Au [id number] Female 1950      Patient Instructions by Aicha Driscoll MD at 2018 12:08 PM     Author:  Aicha Driscoll MD  Service:  (none) Author Type:  Physician     Filed:  2018  8:52 PM  Encounter Date:  2018 Status:  Addendum     :  Aicha Driscoll MD (Physician)        Related Notes: Original Note by Aicha Driscoll MD (Physician) filed at 2018  8:46 PM              PATRICIA talk: Daniella Palmer: How to make stress your friend       Labs will be mailed to your home.   Work on following  a mediterranean diet; Use monosaturated fats ( olive , canola and peanut   oil; nuts, avocados), abundance of plant foods which are minimally processed, fish (salmon).  Exercise 30 minutes every day     You will need mammogram in February ;        Try to get 7-8 hours sleep each night.  Rest is important!      Please consider the following general health recommendations:    Schedule a mammogram annually. Come for a general exam once yearly.    Eat a quality diet (generally, low in simple sugars, starches, cholesterol and saturated fat.)    Please get 1500 mg of calcium in divided doses with vitamin D in your diet daily.      Stay physically active.  Regular walking or other exercise is one of the best ways to minimize pain of arthritis; maintain independence and mobility; maintain bone strength; maintain conditioning of your heart.  Find something you enjoy and a friend to do it with you.    Maintain ideal weight. Your Body mass index is 22.66 kg/(m^2).. Generally a BMI of 20-25 is considered ideal. Overweight is defined as 25-30, Obese is 30-35 and markedly obese is greater than 35.    Apply sun block (SPF 25 or greater) on exposed skin anytime you are out in the sun to prevent skin cancer.      Wear a seatbelt whenever you are in a car.    Consider a bone density study every 2-5 years to see if  you are at increased risk for fracture. If you have osteoporosis, medicine to strengthen your bones can significantly reduce your risk of fracture, back pain, and loss of height.    Colonoscopy (an exam of the colon) is recommended every 10 years after the age of 50 to screen for colon cancer.  (More often if you are at increased risk.)    Obtain a flu shot every fall.    Receive a pneumonia shot once after the age of 65 (repeat in 5 years  if you have other risk factors).  This does not prevent all types of pneumonia, but reduces the risk of the worst bacterial cause of pneumonia.    You should have a tetanus booster at least once every 10 years.    A vaccine to reduce your chances of getting shingles is available if you are over 60. Information about the vaccine is available through the clinic or at:  (http://www.nlm.nih.gov/medlineplus/druginfo/medmaster/r591386.html)       Immunization History     Administered  Date(s) Administered     AMB Influenza, IIV3 (Age >=3 years)(Flu Clinic Only) 11/13/2012, 11/06/2013     AMB Influenza, IIV4 PF (=>6 mos Flulaval;=>3 yrs Fluzone Fluarix)(Flu Clinic Only) 10/29/2014, 10/02/2015     Herpes-zoster Vaccine 01/04/2013     Influenza Virus, Unspecified 10/29/2010, 10/05/2011     Tdap 06/16/2011       Check blood sugar annually.  Cholesterol annually unless you have had a normal level when last checked within 5 years.      I recommend that you have a general physical exam every year.You should have a pap  every 3 years between the ages of 30 and 70 unless you have had previous abnormal pap smear, (in these cases the exams and PAP's should be done yearly). If you have had hysterectomy in the past, your future Pap plan may be different.         Index   Essential Tremor   ________________________________________________________________________  KEY POINTS    Essential tremor is usually a mild movement disorder that causes rhythmic shaking of your hands, arms, or head. It is not  caused by a serious medical problem.    You may not need treatment unless the tremors keep you from doing daily activities or are upsetting to you. Sometimes medicines can help control the symptoms if the tremor is severe or disabling.    Ask your healthcare provider any questions you have about how to take care of yourself at home.  ________________________________________________________________________  What is essential tremor?  Essential tremor is usually a mild movement disorder that causes rhythmic shaking of your hands, arms, or head. It is not caused by a medical problem such as Parkinson s disease or a stroke. It is also called benign essential tremor because usually it is not a serious health risk.  What is the cause?  The cause of essential tremor is not known. Tremors may result from problems with the way your brain and nerves send signals to your muscles. You are more likely to have essential tremor if one of your parents had it.  What are the symptoms?  Symptoms may include shaking of your head, hands, or arms, or a voice that sounds shaky. Unless an essential tremor is very severe, your hand or arm does not shake when it is resting. However, your hand or arm does shake when you try to do something such as eating, drinking, writing, or shaving.  The following may make your symptoms worse:    A fever    Certain medicines, such as stimulants or those used to treat depression or anxiety    Food or drinks that have caffeine in them    Exercise    Fear or anxiety    Smoking or chewing tobacco    Drugs or alcohol  How is it diagnosed?  Your healthcare provider will ask about your symptoms and medical history and examine you. Your provider will ask about the medicines you take, because some medicines can cause tremors.  There are no tests that can confirm the diagnosis. You may have tests or scans to check for other possible causes of your symptoms. Your provider may refer you to a specialist for tests and  treatment.  How is it treated?  You may not need treatment unless the tremors keep you from doing daily activities or are upsetting to you.  Your provider may prescribe medicines to reduce the tremor.  If the tremor is severe and disabling, you may have brain surgery called deep brain stimulation. In this surgery, a very thin wire (electrode) is placed into the part of your brain causing symptoms. It is attached by a wire that runs under your skin to a very small device placed under the skin in your upper chest. The device sends small electrical signals to block nerve signals that cause tremors. Surgery is not a cure and is usually only done if medicines do not help.  How can I take care of myself?  Follow the full course of treatment prescribed by your healthcare provider. In addition:    Pay attention to what makes your tremor better or worse. Are there times of day when your tremor gets worse? Does it seem to be affected by any medicines you take? Does your tremor get worse when you are under stress or when you are tired? This will help you know what you can do to help reduce your tremor, for example:    Use both hands to do tasks whenever possible. Use one hand to steady the other.    To help control head tremors, try bending your chin toward your chest or holding your head to one side.    If your tremor seems to be caused or made worse by a medicine, talk to healthcare provider. Do not change or stop medicines on your own. Don t take any medicines, including nonprescription products, without first checking with your healthcare provider.    Avoid caffeine.    Use tools to help with everyday activities, as needed, such as:    Wear clothing with Velcro instead of zipper or buttons so that it is easy to get on and off.    Use cooking or eating utensils that have a larger handle.    Use lids and straws with drinks.    Wear slip-on shoes and use shoehorns.    Avoid using power tools or other devices that can cause  injury    Take care of your health. Try to get at least 7 to 9 hours of sleep each night. If you smoke, try to quit. If you want to drink alcohol, ask your healthcare provider how much is safe for you to drink. Learn ways to manage stress. Stay physically active as advised by your provider.  Ask your healthcare provider:    How and when you will get your test results    If there are activities you should avoid and when you can return to your normal activities    How to take care of yourself at home    What symptoms or problems you should watch for and what to do if you have them  Contact your healthcare provider or therapist if you have any questions or your symptoms seem to be getting worse.  Make sure you know when you should come back for a checkup. Keep all appointments for provider visits or tests.  You can get more information from:    The International Essential Tremor Foundation (IETF)  893.890.6729  http://www.essentialtremor.org/  Developed by [employer].  Adult Advisor 2017.2 published by [employer].  Last modified: 2017-01-26  Last reviewed: 2016-06-28  This content is reviewed periodically and is subject to change as new health information becomes available. The information is intended to inform and educate and is not a replacement for medical evaluation, advice, diagnosis or treatment by a healthcare professional.  References   Adult Advisor 2017.2 Index    Copyright   2017 [employer], a division of [employer] All rights reserved.        Index Welsh   Muscle Electromyogram Test (EMG)   What is an electromyogram?   An electromyogram, or EMG, is a test that checks how your muscles respond to messages sent by your nerves. The test measures the activity of your muscles in response to small amounts of electricity passed through a needle to your muscles.  When is it used?   An electromyogram can help diagnose problems such as:    Nerve damage or injury caused by a compressed disk in your  neck or back    Nerve problems in your hand from carpal tunnel syndrome    Muscle or movement problems, such as muscle twitching that you cannot control    Muscle weakness from nerve disorders or diseases, such as muscular dystrophy or another genetic condition  How do I prepare for this procedure?     You may or may not need to take your regular medicines the day of the procedure. Tell your healthcare provider about all medicines and supplements that you take. Some products may increase your child s risk of side effects. Ask your healthcare provider if you need to avoid taking any medicine or supplements before the procedure.    Tell your healthcare provider if you have any food, medicine, or other allergies such as latex, or if you have a pacemaker.    Ask if there is anything you should avoid before the test, like smoking cigarettes or any food or drink containing caffeine.    On the day of the test, take a shower to remove oil from your skin. Don t put any lotions or creams on your skin before the test.    Follow any other instructions your healthcare provider gives you.    Ask any questions you have before the procedure. You should understand what your healthcare provider is going to do. You have the right to make decisions about your healthcare and to give permission for any tests or procedures.  What happens during the procedure?   An EMG can be done at a hospital or in a testing center. First you may be given a sedative to relax you. Your healthcare provider will insert several small needles attached to wires through your skin into the muscles where you are having symptoms. The wires record the electrical activity of the muscles. Your muscles may be tested when they are resting, when ciro gently, and when ciro forcefully. The test can be slightly painful when the electricity passes through the wires to the muscles.   The test takes 30 to 60 minutes, depending on how many of your muscles are  being tested.  What happens after the procedure?   Your muscles may feel tender or bruised for a few days after the test.  Ask your healthcare provider:    How and when you will get your test results    How long it will take to recover    If there are activities you should avoid and when you can return to your normal activities    How to take care of yourself at home    What symptoms or problems you should watch for and what to do if you have them  Make sure you know when you should come back for a checkup. Keep all appointments for provider visits or tests.  Developed by [employer].  Adult Advisor 2017.2 published by [employer].  Last modified: 2016-03-23  Last reviewed: 2015-02-17  This content is reviewed periodically and is subject to change as new health information becomes available. The information is intended to inform and educate and is not a replacement for medical evaluation, advice, diagnosis or treatment by a healthcare professional.  References   Adult Advisor 2017.2 Index    Copyright   2017 [employer], a division of [employer] All rights reserved.     [facility]: Stress Management     General Information  Stress is your body s fight-or-flight response. You may feel energy surge through your body if you are in an emergency, or if you are worried or anxious about something.  Stress can be found at home, work, school, or in traffic. A situation you find threatening may trigger stress. For example, you may feel stress in the following situations:           a co-worker who gets a promotion you thought should have been yours           divorce          financial problems           marriage          job loss or change           moving          having a baby           a disagreement with a child           a serious illness within the family           being stuck in traffic when you are running late           standing in a long line at the grocery store with a fussy child.  What Stress  Can Do  Stress can give you health problems or make a current problem worse. It can increase your breathing, heart rate and blood pressure. Feelings of anger may turn into chronic (long-lasting) irritation and feelings of fear may become anxiety. Long-term stress can cause:           depression          anxiety disorders           ulcers          high blood pressure           phobias          disturbed sleep patterns           tension headaches.  Stress affects everyone but reactions to stress vary from person to person. You cannot make stress go away, but you can manage it.  How to Manage Your Stress  To manage stress, start by learning about yourself. What do you like? What do you hate? What calms you down? What stresses you out? Know that, and you re on your way to managing stress. You can use the following tips to manage your stress.           Maintain good health habits. Eat well-balanced meals and avoid caffeine, alcohol and nicotine. A healthy body tolerates stress more effectively.           Get regular exercise. Physical activities often relieve the body of unnecessary tensions. Strenuous exercise is not necessary because even moderate exercise has health benefits.           Get plenty of rest. Your body and mind need to  re-energize  each night. Most adults do not get enough sleep each night.           Structure daily activities. Plan out your activities to make the best use of your time. Make sure to include personal time for yourself and do something you enjoy.           Set realistic goals. Ask for help if you need it.           Don t worry about things you can t change.           Identify what causes you stress and avoid those situations if possible.           Talk about stress. Talking with a close friend, spouse or doctor may help you relax.  How to Relax           Find a comfortable position on the floor. Use pillows to support your head and legs.           Keep all of your joints flexed and  supported. Do not rest one body part on another.           Take a deep breath and relax.           Contract the muscles of your forehead. Release.           Focus your eyes. Release.           Clench your teeth. Release.           Contract your jaw. Release.           Draw your shoulders up toward your ears. Release.           Make fists and straighten your elbows. Release           Take a deep breath, expand your chest and hold. Release.           Tighten your abdominal muscles. Release.           Squeeze your buttocks together. Release.           Tighten your pelvic muscles. Release.           Tighten your thighs. Release.           Tighten your calves. Release.           Point your toes toward your nose. Release.           Contract everything. Release.           Let your entire body relax.           Breathe deeply in a rhythm.           Rest this way for a few minutes.           Get up slowly and gently.  When to Get Help  Talk with your doctor if stress interferes with your everyday life or if you are having physical problems.    [employer]  AND THE [employer] ARE REGISTERED TRADEMARKS OF [employer]  OTHER TRADEMARKS USED ARE OWNED BY THEIR RESPECTIVE OWNERS  [id number] (2/03)

## 2018-02-13 NOTE — TELEPHONE ENCOUNTER
Patient Information     Patient Name MRN Daly Au [id number] Female 1950      Telephone Encounter by Juliann Adair at 2018 11:50 AM     Author:  Juliann Adair Service:  (none) Author Type:  (none)     Filed:  2018 11:51 AM Encounter Date:  2018 Status:  Signed     :  Juliann Adair            Patient tells me that Dr. Dan is no longer at the [facility]. She says she tried calling Dr. Barroso as well but has not heard an answer yet.   Juliann Adair LPN...................2018   11:51 AM

## 2018-02-13 NOTE — NURSING NOTE
Patient Information     Patient Name MRN Daly Au [id number] Female 1950      Nursing Note by Letty Gómez at 2018 10:15 AM     Author:  Letty Gómez Service:  (none) Author Type:  (none)     Filed:  2018 12:21 PM Encounter Date:  2018 Status:  Signed     :  Letty Gómez            Patient presents today for WELCOME to MEDICARE physical.      1. Have you fallen in the past 12 months? no  2. Do you currently drive an automobile? YES  3. If yes, have you had any accidents in the past 12 months? no    {Rooming staff please:           1) Do visual acuity for Welcome visits ONLY (use phrase smart-phrase below-delete if not needed.)       2) Use the PHQ Doc Flowsheet for Welcome and Annual visits       3) Use the Mini-Cog questionnaire  for Annual Wellness Visits ONLY     USE . WELLNESS FOR ANNUAL VISIT        Visual Acuity Screening - Snellen Chart   Visual acuity OD (right eye): 20/ 20   Visual acuity OS (left eye): 20/ 35   Visual acuity OU (both eyes): 20/ 20   Corrective lenses worn: Yes   Monocular vision: No    Red/green color test: PASS       Letty Gómez LPN .............2018  12:13 PM

## 2018-02-13 NOTE — ADDENDUM NOTE
Patient Information     Patient Name MRN Sex Dlay Frias [id number] Female 1950      Addendum Note by Aicha Driscoll MD at 2018  9:19 PM     Author:  Aicha Driscoll MD Service:  (none) Author Type:  Physician     Filed:  2018  9:19 PM Encounter Date:  2018 Status:  Signed     :  Aicha Driscoll MD (Physician)       Addended by: AICHA DRISCOLL on: 2018 09:19 PM        Modules accepted: Orders

## 2018-02-13 NOTE — TELEPHONE ENCOUNTER
Patient Information     Patient Name MRN Daly Au [id number] Female 1950      Telephone Encounter by Juliann Adair at 2018 10:19 AM     Author:  Juliann Adair Service:  (none) Author Type:  (none)     Filed:  2018 10:26 AM Encounter Date:  2018 Status:  Signed     :  Juliann Adair            Patient saw you 18 and had been having left hand tremors. Tells me that she called and spoke with her NP Ari Dan and it was suggested that she try Cogentin for this. Patient is wondering if you will rx this for her? Or needs to see you?   Juliann Adair LPN...................2018   10:25 AM

## 2018-02-13 NOTE — ADDENDUM NOTE
Patient Information     Patient Name MRN Sex Daly Frias [id number] Female 1950      Addendum Note by Sussy Mendoza at 2018 12:08 PM     Author:  Sussy Mendoza Service:  (none) Author Type:  (none)     Filed:  2018 12:08 PM Encounter Date:  2018 Status:  Signed     :  Sussy Mendoza       Addended by: SUSSY MENDOZA on: 2018 12:08 PM        Modules accepted: Orders

## 2018-02-13 NOTE — TELEPHONE ENCOUNTER
Patient Information     Patient Name MRN Sex Daly Frias [id number] Female 1950      Telephone Encounter by Aicha Driscoll MD at 2018 11:22 AM     Author:  Aicha Driscoll MD Service:  (none) Author Type:  Physician     Filed:  2018 11:22 AM Encounter Date:  2018 Status:  Signed     :  Aicha Driscoll MD (Physician)            The NP who recommended this will need to write for this medication.

## 2018-02-13 NOTE — NURSING NOTE
Patient Information     Patient Name MRN Daly Au [id number] Female 1950      Nursing Note by Letty Gómez at 2018 10:15 AM     Author:  Letty Gómez Service:  (none) Author Type:  (none)     Filed:  2018 11:14 AM Encounter Date:  2018 Status:  Signed     :  Letty Gómez            Patient presents in the clinic for an annual physical and medication review.  Letty Gómez LPN............................ 2018 10:58 AM

## 2018-02-13 NOTE — PROGRESS NOTES
Patient Information     Patient Name MRN Sex Daly Frias [id number] Female 1950      Progress Notes by Aicha Driscoll MD at 2018 10:15 AM     Author:  Aicha Driscoll MD  Service:  (none) Author Type:  Physician     Filed:  2018  9:19 PM  Encounter Date:  2018 Status:  Addendum     :  Aicha Driscoll MD (Physician)        Related Notes: Original Note by Aicha Driscoll MD (Physician) filed at 2018  8:57 PM            Nursing Notes:   Letty Gómez  2018 11:14 AM  Signed  Patient presents in the clinic for an annual physical and medication review.  Letty Gómez LPN............................ 2018 10:58 AM      Letty Gómez  2018 12:21 PM  Signed  Patient presents today for WELCOME to MEDICARE physical.      1. Have you fallen in the past 12 months? no  2. Do you currently drive an automobile? YES  3. If yes, have you had any accidents in the past 12 months? no    {Rooming staff please:           1) Do visual acuity for Welcome visits ONLY (use phrase smart-phrase below-delete if not needed.)       2) Use the PHQ Doc Flowsheet for Welcome and Annual visits       3) Use the Mini-Cog questionnaire  for Annual Wellness Visits ONLY     USE . WELLNESS FOR ANNUAL VISIT        Visual Acuity Screening - Snellen Chart   Visual acuity OD (right eye): 20/ 20   Visual acuity OS (left eye): 20/ 35   Visual acuity OU (both eyes): 20/ 20   Corrective lenses worn: Yes   Monocular vision: No    Red/green color test: PASS       Letty Gómez LPN .............2018  12:13 PM        Subjective:  Daly Perry is a 67 y.o. female who presents for  Physical/ medicare     Anxiety    Had a nervous breakdown in April   2 years ago.  Hospitalized for 6 months ( [facility])   .   She ECT ( 20 )  treatments;  She continues on abilify and effexor;  ;   She believes she is doing relatively well on these medications.   She denies  SI, HI or hallucinations.   Feeling well    PHQ Depression Screen  Date of PHQ exam: 18  Over the last 2 weeks, how often have you been bothered by any of the following problems?  1. Little interest or pleasure in doing things: 0 - Not at all  2. Feeling down, depressed, or hopeless: 0 - Not at all  3. Trouble falling or staying asleep, or sleeping too much: 0 - Not at all  4. Feeling tired or having little energy: 0 - Not at all  5. Poor appetite or overeatin - Not at all  6. Feeling bad about yourself - or that you are a failure or have let yourself or your family down: 0 - Not at all  7. Trouble concentrating on things, such as reading the newspaper or watching television: 0 - Not at all  8. Moving or speaking so slowly that other people could have noticed. Or the opposite - being so fidgety or restless that you have been moving around a lot more than usual: 0 - Not at all  9. Thoughts that you would be better off dead, or of hurting yourself in some way: 0 - Not at all    PHQ-9 TOTAL SCORE: 0  Depression Severity Level: none          ERICA-7 Anxiety Screening  Date of ERICA exam: 18  Over the last 2 weeks, how often have you been bothered by the following problems:  feeling nervous, anxious or on edge?: not at all  not being able to stop or control worrying?: not at all  worrying about different things?: not at all  trouble relaxing?: not at all  being so restless it is hard to sit still?: not at all  becoming easily annoyed or irritable?: not at all  feeling afraid that something awful might happen?: not at all  ERICA-7 SCORE: 0  ANXIETY SEVERITY LEVEL: none      Tremor  She notes she now has a tremor in her left hand.  It started 5 months ago but now more noticible. She denies excessive  Use of caffeine. Previous    Denies excessive thirst hunger or urination.  Tremor is worse when she is brushing her teeth or using her cell phone.     GYN  Patient has been using premarin cream;  Uses 1/3  applicator every other week.   No vaginal bleeding.  History of ablationshe is a .  Due for pap smear.        No Known Allergies  Current Outpatient Prescriptions on File Prior to Visit       Medication  Sig Dispense Refill     acetaminophen (TYLENOL) 325 mg tablet Take 325 mg by mouth every 4 hours if needed. Max acetaminophen dose: 4000mg in 24 hrs.       ARIPiprazole (ABILIFY) 10 mg tablet Take 1 tablet by mouth at bedtime.  0     clobetasol 0.05% (TEMOVATE 0.05% OINTMENT) 0.05 % ointment Apply to affected area daily as needed. 45 g 3     estrogens, conjugated (PREMARIN) 0.625 mg/gram vaginal cream Apply topically to external genital area at bedtime 3-4 times a week. 1 Tube 2     ibuprofen (ADVIL; MOTRIN) 200 mg tablet Take 200 mg by mouth 4 times daily if needed.       venlafaxine (EFFEXOR XR) 37.5 mg Extended-Release capsule Take 37.5 mg by mouth once daily with a meal.       venlafaxine (EFFEXOR XR) 75 mg cp24 Extended-Release capsule 1 capsule once daily with a meal.  0     vitamin a-vitamin c-vitamin e-minerals (OCUVITE) tablet Take 1 tablet by mouth once daily.  0     No current facility-administered medications on file prior to visit.           Patient Active Problem List     Diagnosis  Code     Generalized anxiety disorder F41.1     FIBROCYSTIC BREAST DISEASE N60.19     LICHEN SCLEROSIS ET ATROPHICUS L94.0     NECK PAIN, CHRONIC M54.2     VAGINITIS, ATROPHIC, POSTMENOPAUSAL N95.2     ONYCHOMYCOSIS, TOENAILS B35.1     DIVERTICULOSIS, MILD K57.30     Rosacea L71.9     Severe major depression with psychotic features (HC) F32.3     Epiretinal membrane (ERM) of left eye H35.372         Social Hx:  Social History       Substance Use Topics         Smoking status:   Never Smoker     Smokeless tobacco:   Never Used     Alcohol use   Yes      Comment:  minimal       Social History Narrative    Patient  to Jimenez Orlando, 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      She is retired, previously taught art.      Two  "brothers and one sister    Jimenez  Spouse    Jeremias  Son    Regino   Son    Has 3 grand daughters.         Family Hx:   Family History      Problem  Relation Age of Onset     Arthritis Mother      Heart Disease Mother      Hypertension Mother      Thyroid Disease Mother      Heart Disease Father      Psychiatric illness Father      Allergies Brother      Hypertension Brother      Hypertension Sister      Cancer-colon Paternal Grandmother      Cancer-breast No Family History        Objective:  /80 (Cuff Site: Right Arm, Position: Sitting, Cuff Size: Adult Regular)  Pulse 72  Ht 1.676 m (5' 6\")  Wt 63.7 kg (140 lb 6.4 oz)  Breastfeeding? No  BMI 22.66 kg/m2   Patient appears well, alert and oriented x 3, pleasant, cooperative. Mildly anxious   BRETT. TM's clear, Oral pharynx with good dentition, without lesion, erythema or exudate. Moist mucous membranes. Neck supple and free of adenopathy, or masses. No thyromegaly. Lungs are clear, without wheezes, rhonchi or rales. Heart sounds are normal, no murmurs, clicks, gallops or rubs. Abdomen is soft, no tenderness, masses or organomegaly. No CVAT.  Extremities are without edema. Peripheral pulses are normal. Screening neurological exam  Cranial nerves 2-12 are intact.  She has tremor noted in left hand more prominently than right.  Resting tremor.   No asterexsis.  Some cogwheel rigidity in left hand with persistent movement.   Upper and lower extremities with symmetric sensation and strength.  Neck with full ROM  Skin is normal without suspicious lesions noted.  Pap obtained using cytobrush.  Mild estrogen deficiency hairloss pattern.  Cervix appears normal.  Normal size uterus.  Adnexa not palpable     Results for orders placed or performed in visit on 01/23/18      COMP METABOLIC PANEL      Result  Value Ref Range    SODIUM 141 133 - 143 mmol/L    POTASSIUM 4.2 3.5 - 5.1 mmol/L    CHLORIDE 103 98 - 107 mmol/L    CO2,TOTAL 28 21 - 31 mmol/L    ANION GAP 10 5 - 18  "                   GLUCOSE 97 70 - 105 mg/dL    CALCIUM 9.0 8.6 - 10.3 mg/dL    BUN 20 7 - 25 mg/dL    CREATININE 0.62 (L) 0.70 - 1.30 mg/dL    BUN/CREAT RATIO           32                    GFR if African American >60 >60 ml/min/1.73m2    GFR if not African American >60 >60 ml/min/1.73m2    ALBUMIN 4.1 3.5 - 5.7 g/dL    PROTEIN,TOTAL 7.2 6.4 - 8.9 g/dL    GLOBULIN                  3.1 2.0 - 3.7 g/dL    A/G RATIO 1.3 1.0 - 2.0                    BILIRUBIN,TOTAL 0.6 0.3 - 1.0 mg/dL    ALK PHOSPHATASE 65 34 - 104 IU/L    ALT (SGPT) 11 7 - 52 IU/L    AST (SGOT) 16 13 - 39 IU/L   VITAMIN B12      Result  Value Ref Range    VITAMIN B12 1079 (H) 180 - 914 pg/mL   FOLIC ACID      Result  Value Ref Range    FOLIC ACID >24.8 >=5.2 ng/mL   MAGNESIUM      Result  Value Ref Range    MAGNESIUM 1.9 1.9 - 2.7 mg/dL   TSH      Result  Value Ref Range    TSH 1.53 0.34 - 5.60 uIU/mL   CBC WITH AUTO DIFFERENTIAL      Result  Value Ref Range    WHITE BLOOD COUNT         5.3 4.5 - 11.0 thou/cu mm    RED BLOOD COUNT           4.48 4.00 - 5.20 mil/cu mm    HEMOGLOBIN                14.3 12.0 - 16.0 g/dL    HEMATOCRIT                42.3 33.0 - 51.0 %    MCV                       94 80 - 100 fL    MCH                       31.9 26.0 - 34.0 pg    MCHC                      33.8 32.0 - 36.0 g/dL    RDW                       12.2 11.5 - 15.5 %    PLATELET COUNT            350 140 - 440 thou/cu mm    MPV                       8.0 6.5 - 11.0 fL    NEUTROPHILS               69.5 42.0 - 72.0 %    LYMPHOCYTES               24.7 20.0 - 44.0 %    MONOCYTES                 4.8 <12.0 %    EOSINOPHILS               0.2 <8.0 %    BASOPHILS                 0.6 <3.0 %    IMMATURE GRANULOCYTES(METAS,MYELOS,PROS) 0.2 %    ABSOLUTE NEUTROPHILS      3.7 1.7 - 7.0 thou/cu mm    ABSOLUTE LYMPHOCYTES      1.3 0.9 - 2.9 thou/cu mm    ABSOLUTE MONOCYTES        0.3 <0.9 thou/cu mm    ABSOLUTE EOSINOPHILS      0.0 <0.5 thou/cu mm    ABSOLUTE BASOPHILS        0.0 <0.3  thou/cu mm    ABSOLUTE IMMATURE GRANULOCYTES(METAS,MYELOS,PROS) 0.0 <=0.3 thou/cu mm               Assessment:    ICD-10-CM    1. Tremor R25.1 COMP METABOLIC PANEL      CBC AND DIFFERENTIAL      VITAMIN B12      FOLIC ACID      MAGNESIUM      TSH      COMP METABOLIC PANEL      CBC AND DIFFERENTIAL      VITAMIN B12      FOLIC ACID      MAGNESIUM      TSH      CBC WITH AUTO DIFFERENTIAL   2. Generalized anxiety disorder F41.1    3. LICHEN SCLEROSIS ET ATROPHICUS L94.0    4. NECK PAIN, CHRONIC M54.2    5. Vitamin D deficiency E55.9    6. Screening cholesterol level Z13.220 CANCELED: LIPID PANEL   7. Screening for cervical cancer Z12.4 GYN THIN PREP PAP SCREEN IMAGED      GYN THIN PREP PAP SCREEN IMAGED     Ms. Perry's Body mass index is 22.66 kg/(m^2). This is within the normal range for a 67 y.o. Normal range for ages 18+ is between 18.5 and 24.9.  Given patient's cogwheel rigidity and new tremor in left hand, and history of multiple ECT treatment will refer to neurology to see if possible early Parkinson's.  Patient prefers Dr. Barraza.      Plan:   -- Expected clinical course discussed   -- Medications and their side effects discussed  Patient Instructions       PATRICIA talk: Daniella Palmer: How to make stress your friend         Labs will be mailed to your home.   Work on following  a mediterranean diet; Use monosaturated fats ( olive , canola and peanut   oil; nuts, avocados), abundance of plant foods which are minimally processed, fish (salmon).  Exercise 30 minutes every day     You will need mammogram in February ;        Try to get 7-8 hours sleep each night.  Rest is important!      Please consider the following general health recommendations:    Schedule a mammogram annually. Come for a general exam once yearly.    Eat a quality diet (generally, low in simple sugars, starches, cholesterol and saturated fat.)    Please get 1500 mg of calcium in divided doses with vitamin D in your diet daily.      Stay  physically active.  Regular walking or other exercise is one of the best ways to minimize pain of arthritis; maintain independence and mobility; maintain bone strength; maintain conditioning of your heart.  Find something you enjoy and a friend to do it with you.    Maintain ideal weight. Your Body mass index is 22.66 kg/(m^2).. Generally a BMI of 20-25 is considered ideal. Overweight is defined as 25-30, Obese is 30-35 and markedly obese is greater than 35.    Apply sun block (SPF 25 or greater) on exposed skin anytime you are out in the sun to prevent skin cancer.      Wear a seatbelt whenever you are in a car.    Consider a bone density study every 2-5 years to see if you are at increased risk for fracture. If you have osteoporosis, medicine to strengthen your bones can significantly reduce your risk of fracture, back pain, and loss of height.    Colonoscopy (an exam of the colon) is recommended every 10 years after the age of 50 to screen for colon cancer.  (More often if you are at increased risk.)    Obtain a flu shot every fall.    Receive a pneumonia shot once after the age of 65 (repeat in 5 years  if you have other risk factors).  This does not prevent all types of pneumonia, but reduces the risk of the worst bacterial cause of pneumonia.    You should have a tetanus booster at least once every 10 years.    A vaccine to reduce your chances of getting shingles is available if you are over 60. Information about the vaccine is available through the clinic or at:  (http://www.nlm.nih.gov/medlineplus/druginfo/medmaster/f753262.html)       Immunization History     Administered  Date(s) Administered     AMB Influenza, IIV3 (Age >=3 years)(Flu Clinic Only) 11/13/2012, 11/06/2013     AMB Influenza, IIV4 PF (=>6 mos Flulaval;=>3 yrs Fluzone Fluarix)(Flu Clinic Only) 10/29/2014, 10/02/2015     Herpes-zoster Vaccine 01/04/2013     Influenza Virus, Unspecified 10/29/2010, 10/05/2011     Tdap 06/16/2011       Check  blood sugar annually.  Cholesterol annually unless you have had a normal level when last checked within 5 years.      I recommend that you have a general physical exam every year.You should have a pap  every 3 years between the ages of 30 and 70 unless you have had previous abnormal pap smear, (in these cases the exams and PAP's should be done yearly). If you have had hysterectomy in the past, your future Pap plan may be different.         Index   Essential Tremor   ________________________________________________________________________  KEY POINTS    Essential tremor is usually a mild movement disorder that causes rhythmic shaking of your hands, arms, or head. It is not caused by a serious medical problem.    You may not need treatment unless the tremors keep you from doing daily activities or are upsetting to you. Sometimes medicines can help control the symptoms if the tremor is severe or disabling.    Ask your healthcare provider any questions you have about how to take care of yourself at home.  ________________________________________________________________________  What is essential tremor?  Essential tremor is usually a mild movement disorder that causes rhythmic shaking of your hands, arms, or head. It is not caused by a medical problem such as Parkinson s disease or a stroke. It is also called benign essential tremor because usually it is not a serious health risk.  What is the cause?  The cause of essential tremor is not known. Tremors may result from problems with the way your brain and nerves send signals to your muscles. You are more likely to have essential tremor if one of your parents had it.  What are the symptoms?  Symptoms may include shaking of your head, hands, or arms, or a voice that sounds shaky. Unless an essential tremor is very severe, your hand or arm does not shake when it is resting. However, your hand or arm does shake when you try to do something such as eating, drinking, writing,  or shaving.  The following may make your symptoms worse:    A fever    Certain medicines, such as stimulants or those used to treat depression or anxiety    Food or drinks that have caffeine in them    Exercise    Fear or anxiety    Smoking or chewing tobacco    Drugs or alcohol  How is it diagnosed?  Your healthcare provider will ask about your symptoms and medical history and examine you. Your provider will ask about the medicines you take, because some medicines can cause tremors.  There are no tests that can confirm the diagnosis. You may have tests or scans to check for other possible causes of your symptoms. Your provider may refer you to a specialist for tests and treatment.  How is it treated?  You may not need treatment unless the tremors keep you from doing daily activities or are upsetting to you.  Your provider may prescribe medicines to reduce the tremor.  If the tremor is severe and disabling, you may have brain surgery called deep brain stimulation. In this surgery, a very thin wire (electrode) is placed into the part of your brain causing symptoms. It is attached by a wire that runs under your skin to a very small device placed under the skin in your upper chest. The device sends small electrical signals to block nerve signals that cause tremors. Surgery is not a cure and is usually only done if medicines do not help.  How can I take care of myself?  Follow the full course of treatment prescribed by your healthcare provider. In addition:    Pay attention to what makes your tremor better or worse. Are there times of day when your tremor gets worse? Does it seem to be affected by any medicines you take? Does your tremor get worse when you are under stress or when you are tired? This will help you know what you can do to help reduce your tremor, for example:    Use both hands to do tasks whenever possible. Use one hand to steady the other.    To help control head tremors, try bending your chin toward  your chest or holding your head to one side.    If your tremor seems to be caused or made worse by a medicine, talk to healthcare provider. Do not change or stop medicines on your own. Don t take any medicines, including nonprescription products, without first checking with your healthcare provider.    Avoid caffeine.    Use tools to help with everyday activities, as needed, such as:    Wear clothing with Velcro instead of zipper or buttons so that it is easy to get on and off.    Use cooking or eating utensils that have a larger handle.    Use lids and straws with drinks.    Wear slip-on shoes and use shoehorns.    Avoid using power tools or other devices that can cause injury    Take care of your health. Try to get at least 7 to 9 hours of sleep each night. If you smoke, try to quit. If you want to drink alcohol, ask your healthcare provider how much is safe for you to drink. Learn ways to manage stress. Stay physically active as advised by your provider.  Ask your healthcare provider:    How and when you will get your test results    If there are activities you should avoid and when you can return to your normal activities    How to take care of yourself at home    What symptoms or problems you should watch for and what to do if you have them  Contact your healthcare provider or therapist if you have any questions or your symptoms seem to be getting worse.  Make sure you know when you should come back for a checkup. Keep all appointments for provider visits or tests.  You can get more information from:    The International Essential Tremor Foundation (IETF)  484.742.8656  http://www.essentialtremor.org/  Developed by [employer].  Adult Advisor 2017.2 published by [employer].  Last modified: 2017-01-26  Last reviewed: 2016-06-28  This content is reviewed periodically and is subject to change as new health information becomes available. The information is intended to inform and educate and is not a replacement  for medical evaluation, advice, diagnosis or treatment by a healthcare professional.  References   Adult Advisor 2017.2 Index    Copyright   2017 [employer], a division of [employer] All rights reserved.        Index Korean   Muscle Electromyogram Test (EMG)   What is an electromyogram?   An electromyogram, or EMG, is a test that checks how your muscles respond to messages sent by your nerves. The test measures the activity of your muscles in response to small amounts of electricity passed through a needle to your muscles.  When is it used?   An electromyogram can help diagnose problems such as:    Nerve damage or injury caused by a compressed disk in your neck or back    Nerve problems in your hand from carpal tunnel syndrome    Muscle or movement problems, such as muscle twitching that you cannot control    Muscle weakness from nerve disorders or diseases, such as muscular dystrophy or another genetic condition  How do I prepare for this procedure?     You may or may not need to take your regular medicines the day of the procedure. Tell your healthcare provider about all medicines and supplements that you take. Some products may increase your child s risk of side effects. Ask your healthcare provider if you need to avoid taking any medicine or supplements before the procedure.    Tell your healthcare provider if you have any food, medicine, or other allergies such as latex, or if you have a pacemaker.    Ask if there is anything you should avoid before the test, like smoking cigarettes or any food or drink containing caffeine.    On the day of the test, take a shower to remove oil from your skin. Don t put any lotions or creams on your skin before the test.    Follow any other instructions your healthcare provider gives you.    Ask any questions you have before the procedure. You should understand what your healthcare provider is going to do. You have the right to make decisions about your  healthcare and to give permission for any tests or procedures.  What happens during the procedure?   An EMG can be done at a hospital or in a testing center. First you may be given a sedative to relax you. Your healthcare provider will insert several small needles attached to wires through your skin into the muscles where you are having symptoms. The wires record the electrical activity of the muscles. Your muscles may be tested when they are resting, when ciro gently, and when ciro forcefully. The test can be slightly painful when the electricity passes through the wires to the muscles.   The test takes 30 to 60 minutes, depending on how many of your muscles are being tested.  What happens after the procedure?   Your muscles may feel tender or bruised for a few days after the test.  Ask your healthcare provider:    How and when you will get your test results    How long it will take to recover    If there are activities you should avoid and when you can return to your normal activities    How to take care of yourself at home    What symptoms or problems you should watch for and what to do if you have them  Make sure you know when you should come back for a checkup. Keep all appointments for provider visits or tests.  Developed by [employer].  Adult Advisor 2017.2 published by [employer].  Last modified: 2016-03-23  Last reviewed: 2015-02-17  This content is reviewed periodically and is subject to change as new health information becomes available. The information is intended to inform and educate and is not a replacement for medical evaluation, advice, diagnosis or treatment by a healthcare professional.  References   Adult Advisor 2017.2 Index    Copyright   2017 [employer], a division of [employer] All rights reserved.     [facility]: Stress Management     General Information  Stress is your body s fight-or-flight response. You may feel energy surge through your body if you are  in an emergency, or if you are worried or anxious about something.  Stress can be found at home, work, school, or in traffic. A situation you find threatening may trigger stress. For example, you may feel stress in the following situations:           a co-worker who gets a promotion you thought should have been yours           divorce          financial problems           marriage          job loss or change           moving          having a baby           a disagreement with a child           a serious illness within the family           being stuck in traffic when you are running late           standing in a long line at the grocery store with a fussy child.  What Stress Can Do  Stress can give you health problems or make a current problem worse. It can increase your breathing, heart rate and blood pressure. Feelings of anger may turn into chronic (long-lasting) irritation and feelings of fear may become anxiety. Long-term stress can cause:           depression          anxiety disorders           ulcers          high blood pressure           phobias          disturbed sleep patterns           tension headaches.  Stress affects everyone but reactions to stress vary from person to person. You cannot make stress go away, but you can manage it.  How to Manage Your Stress  To manage stress, start by learning about yourself. What do you like? What do you hate? What calms you down? What stresses you out? Know that, and you re on your way to managing stress. You can use the following tips to manage your stress.           Maintain good health habits. Eat well-balanced meals and avoid caffeine, alcohol and nicotine. A healthy body tolerates stress more effectively.           Get regular exercise. Physical activities often relieve the body of unnecessary tensions. Strenuous exercise is not necessary because even moderate exercise has health benefits.           Get plenty of rest. Your body and mind need to  re-energize   each night. Most adults do not get enough sleep each night.           Structure daily activities. Plan out your activities to make the best use of your time. Make sure to include personal time for yourself and do something you enjoy.           Set realistic goals. Ask for help if you need it.           Don t worry about things you can t change.           Identify what causes you stress and avoid those situations if possible.           Talk about stress. Talking with a close friend, spouse or doctor may help you relax.  How to Relax           Find a comfortable position on the floor. Use pillows to support your head and legs.           Keep all of your joints flexed and supported. Do not rest one body part on another.           Take a deep breath and relax.           Contract the muscles of your forehead. Release.           Focus your eyes. Release.           Clench your teeth. Release.           Contract your jaw. Release.           Draw your shoulders up toward your ears. Release.           Make fists and straighten your elbows. Release           Take a deep breath, expand your chest and hold. Release.           Tighten your abdominal muscles. Release.           Squeeze your buttocks together. Release.           Tighten your pelvic muscles. Release.           Tighten your thighs. Release.           Tighten your calves. Release.           Point your toes toward your nose. Release.           Contract everything. Release.           Let your entire body relax.           Breathe deeply in a rhythm.           Rest this way for a few minutes.           Get up slowly and gently.  When to Get Help  Talk with your doctor if stress interferes with your everyday life or if you are having physical problems.    [employer]  AND THE [employer] ARE REGISTERED TR[employer]ARKS OF [employer]  OTHER TRADEMARKS USED ARE OWNED BY THEIR RESPECTIVE OWNERS  [id number] (2/03)        Electronically signed by  Aicha Driscoll MD

## 2018-02-15 ENCOUNTER — HOSPITAL ENCOUNTER (OUTPATIENT)
Dept: MAMMOGRAPHY | Facility: OTHER | Age: 68
Discharge: HOME | End: 2018-02-15
Attending: FAMILY MEDICINE | Admitting: FAMILY MEDICINE
Payer: MEDICARE

## 2018-02-15 DIAGNOSIS — Z12.31 VISIT FOR SCREENING MAMMOGRAM: ICD-10-CM

## 2018-02-15 PROCEDURE — 77067 SCR MAMMO BI INCL CAD: CPT

## 2018-05-09 ENCOUNTER — OFFICE VISIT (OUTPATIENT)
Dept: FAMILY MEDICINE | Facility: OTHER | Age: 68
End: 2018-05-09
Attending: FAMILY MEDICINE
Payer: MEDICARE

## 2018-05-09 VITALS
HEIGHT: 66 IN | RESPIRATION RATE: 16 BRPM | TEMPERATURE: 97 F | BODY MASS INDEX: 22.6 KG/M2 | HEART RATE: 80 BPM | OXYGEN SATURATION: 98 % | SYSTOLIC BLOOD PRESSURE: 122 MMHG | DIASTOLIC BLOOD PRESSURE: 84 MMHG | WEIGHT: 140.6 LBS

## 2018-05-09 DIAGNOSIS — Z23 NEED FOR VACCINATION WITH 13-POLYVALENT PNEUMOCOCCAL CONJUGATE VACCINE: ICD-10-CM

## 2018-05-09 DIAGNOSIS — Z01.818 PREOP GENERAL PHYSICAL EXAM: Primary | ICD-10-CM

## 2018-05-09 DIAGNOSIS — F32.3 SEVERE MAJOR DEPRESSION WITH PSYCHOTIC FEATURES (H): ICD-10-CM

## 2018-05-09 DIAGNOSIS — Z13.220 SCREENING CHOLESTEROL LEVEL: ICD-10-CM

## 2018-05-09 LAB
CHOLEST SERPL-MCNC: 212 MG/DL
HDLC SERPL-MCNC: 93 MG/DL (ref 23–92)
LDLC SERPL CALC-MCNC: 109 MG/DL
NONHDLC SERPL-MCNC: 119 MG/DL
TRIGL SERPL-MCNC: 52 MG/DL

## 2018-05-09 PROCEDURE — 36415 COLL VENOUS BLD VENIPUNCTURE: CPT | Performed by: FAMILY MEDICINE

## 2018-05-09 PROCEDURE — G0463 HOSPITAL OUTPT CLINIC VISIT: HCPCS

## 2018-05-09 PROCEDURE — G0009 ADMIN PNEUMOCOCCAL VACCINE: HCPCS

## 2018-05-09 PROCEDURE — 80061 LIPID PANEL: CPT | Performed by: FAMILY MEDICINE

## 2018-05-09 PROCEDURE — 99213 OFFICE O/P EST LOW 20 MIN: CPT | Performed by: FAMILY MEDICINE

## 2018-05-09 PROCEDURE — G0463 HOSPITAL OUTPT CLINIC VISIT: HCPCS | Mod: 25

## 2018-05-09 PROCEDURE — 90670 PCV13 VACCINE IM: CPT | Performed by: FAMILY MEDICINE

## 2018-05-09 RX ORDER — ARIPIPRAZOLE 10 MG/1
5 TABLET ORAL AT BEDTIME
Qty: 30 TABLET | Refills: 1 | Status: SHIPPED | OUTPATIENT
Start: 2018-05-09 | End: 2023-02-02

## 2018-05-09 ASSESSMENT — PAIN SCALES - GENERAL: PAINLEVEL: NO PAIN (0)

## 2018-05-09 ASSESSMENT — ANXIETY QUESTIONNAIRES
2. NOT BEING ABLE TO STOP OR CONTROL WORRYING: NOT AT ALL
6. BECOMING EASILY ANNOYED OR IRRITABLE: NOT AT ALL
1. FEELING NERVOUS, ANXIOUS, OR ON EDGE: NOT AT ALL
7. FEELING AFRAID AS IF SOMETHING AWFUL MIGHT HAPPEN: NOT AT ALL
GAD7 TOTAL SCORE: 0
5. BEING SO RESTLESS THAT IT IS HARD TO SIT STILL: NOT AT ALL
IF YOU CHECKED OFF ANY PROBLEMS ON THIS QUESTIONNAIRE, HOW DIFFICULT HAVE THESE PROBLEMS MADE IT FOR YOU TO DO YOUR WORK, TAKE CARE OF THINGS AT HOME, OR GET ALONG WITH OTHER PEOPLE: NOT DIFFICULT AT ALL
3. WORRYING TOO MUCH ABOUT DIFFERENT THINGS: NOT AT ALL

## 2018-05-09 ASSESSMENT — PATIENT HEALTH QUESTIONNAIRE - PHQ9: 5. POOR APPETITE OR OVEREATING: NOT AT ALL

## 2018-05-09 NOTE — PROGRESS NOTES
[facility] [place]  [address]  [place] [address]  [phone]    PRE-OP EVALUATION:  Today's date: 2018    Daly Perry (: 1950) presents for pre-operative evaluation assessment as requested by Dr. Lee.  She requires evaluation and anesthesia risk assessment prior to undergoing surgery/procedure for treatment of right eye cataract surgery  .    Proposed Surgery/ Procedure: Right eye cataract surgery   Date of Surgery/ Procedure: 18  Time of Surgery/ Procedure: [facility]  Hospital/Surgical Facility:[facility]  Fax number for surgical facility: na  Primary Physician: Aicha Driscoll  Type of Anesthesia Anticipated: to be determined    Patient has a Health Care Directive or Living Will:  NO    1. NO - Do you have a history of heart attack, stroke, stent, bypass or surgery on an artery in the head, neck, heart or legs?  2. NO - Do you ever have any pain or discomfort in your chest?  3. NO - Do you have a history of  Heart Failure?  4. NO - Are you troubled by shortness of breath when: walking on the level, up a slight hill or at night?  5. NO - Do you currently have a cold, bronchitis or other respiratory infection?  6. NO - Do you have a cough, shortness of breath or wheezing?  7. NO - Do you sometimes get pains in the calves of your legs when you walk?  8. NO - Do you or anyone in your family have previous history of blood clots?  9. NO - Do you or does anyone in your family have a serious bleeding problem such as prolonged bleeding following surgeries or cuts?  10. NO - Have you ever had problems with anemia or been told to take iron pills?  11. NO - Have you had any abnormal blood loss such as black, tarry or bloody stools, or abnormal vaginal bleeding?  12. NO - Have you ever had a blood transfusion?  13. NO - Have you or any of your relatives ever had problems with anesthesia?  14. NO - Do you have sleep apnea, excessive snoring or daytime  "drowsiness?  15. NO - Do you have any prosthetic heart valves?  16. NO - Do you have prosthetic joints?  17. NO - Is there any chance that you may be pregnant?      PREOPERATIVE Anesthesia Medical Evaluation  Date of Exam: 5/9/2018  Nursing Notes:   Liane Hendrix LPN  5/9/2018 11:42 AM  Unsigned  Proposed Surgery/ Procedure: Right eye cataract surgery   Date of Surgery/ Procedure: 5/30/18  Time of Surgery/ Procedure: [facility]  Hospital/Surgical Facility:[facility]  Fax number for surgical facility: na  Primary Physician: Aicha Driscoll  Type of Anesthesia Anticipated: to be determined      Fever/Chills or other infectious symptoms in past month: no  >10lb weight loss in past two months: no  O2 SAT: 98    Health Care Directive/Code status:  no  Hx of blood transfusions:   no  Td up to date:  yes  History of VRE/MRSA:  no Date: none    Preoperative Evaluation: Obstructive Sleep Apnea screening    S: Snore -  Do you snore loudly? (louder than talking or loud enough to be heard through closed doors)no  T: Tired - Do you often feel tired, fatigued, or sleepy during the daytime?no  O: Observed - Has anyone ever observed you stop breathing during your sleep?no  P: Pressure - Do you have or are you being treated for high blood pressure?no  B: BMI - BMI greater than 35kg/m2?no  A: Age - Age over 50 years old?yes  N: Neck - Neck circumference greater than 40 cm?no  G: Gender - Gender: Male?no    Total number of \"YES\" responses:  1      Scoring: Low risk of KB 0-2  At Risk of KB: >3 High Risk of KB: 5-8    Ileana Mack 5/9/2018 11:51 AM    Liane Hendrix      HPI:  Patient is here at the request of Dr. Lee prior to undergoing right cataract surgery on date either May 16 th ( waiting list ) or May 30th  .  He has had her left eye done previously.  Understands the risks of surgery and would like to have this completed.          Severe major depression with psychotic features (H)  Patient with history of " severe major depression with psychotic features recent decrease in her Abilify from 10 mg to 5 mg which is reduced her tremor.  Is also taking Cogentin and Benadryl.  She has dry mouth from time to time.  She denies any chest pain or palpitations no shortness of breath dyspnea on exertion.  No changes in bowel or bladder.  No homicidal or suicidal ideation.  No history of hallucinations.  Tolerating medications well.            Healthcare maintenance  Patient is requesting lipid panel.  She has had some changes in her diet.  Previous elevated HDL              Proposed anesthesia: local and sedation   Anesthesia Complications:  None   History of abnormal bleeding : none   History of Blood Transfusions: none          Cards risk factors:   History   Smoking Status     Never Smoker   Smokeless Tobacco     Never Used   ,   LDL Cholesterol Calculated   Date Value Ref Range Status   02/14/2017 83 <100 mg/dL    ],   No results found for: CHOL]  BP Readings from Last 1 Encounters:   05/09/18 122/84           Patient has no early heart history in family.    CPR: Yes    No Known Allergies     Latex Allergy: No      Immunization History   Administered Date(s) Administered     FLU 6-35 months 10/19/2017     Flu, Unspecified 10/15/2009, 10/29/2010, 10/05/2011     Influenza (H1N1) 11/24/2009     Influenza (IIV3) PF 11/09/2006, 10/19/2007, 10/24/2008, 10/05/2011, 11/13/2012, 11/06/2013, 10/19/2017     Influenza Vaccine IM 3yrs+ 4 Valent IIV4 10/29/2014, 10/02/2015, 10/04/2016     TDAP Vaccine (Boostrix) 06/16/2011     Zoster vaccine, live 01/04/2013       Problem List:   Patient Active Problem List   Diagnosis     Diverticulosis of large intestine     Epiretinal membrane (ERM) of left eye     Fibrocystic breast disease     Generalized anxiety disorder     Circumscribed scleroderma     Neck pain, chronic     Dermatophytosis of nail     Rosacea     Severe major depression with psychotic features (H)     Atrophic vaginitis       Histories:  Past Medical History:   Diagnosis Date     Anxiety disorder     No Comments Provided     Cyst of ovary     Hx of right ovarian cyst.     Endometriosis     No Comments Provided     Generalized anxiety disorder     Dysthymia, generalized anxiety     Localized scleroderma     Vulvar LSEA     Other fracture of unspecified lower leg, initial encounter for closed fracture     left      Past Surgical History:   Procedure Laterality Date     BIOPSY BREAST      7/07/08,stereotactic, benign result     CLOSED REDUCTION ANKLE      2002,ORIF     COLONOSCOPY      2003,Normal     COLONOSCOPY      3/14/2011,F/U 2021     DILATION AND CURETTAGE      2003,D&C with hysteroscopy and endometrial ablation     OTHER SURGICAL HISTORY      06/27/2011,774376,REMOVE,removal of hardware L ankle     OTHER SURGICAL HISTORY      05/22/2017,[id number],OTHER,Left,Dr. Collins     RELEASE CARPAL TUNNEL      2010     SALPINGO-OOPHORECTOMY BILATERAL      2/97,RSO, D&C     Social History     Social History     Marital status:      Spouse name: N/A     Number of children: N/A     Years of education: N/A     Occupational History     Not on file.     Social History Main Topics     Smoking status: Never Smoker     Smokeless tobacco: Never Used     Alcohol use Yes      Comment: Alcoholic Drinks/day:  minimal     Drug use: No     Sexual activity: Yes     Partners: Male     Other Topics Concern     Not on file     Social History Narrative    Patient  to Jimenez Orlando, a retired [place] anesthetist.    She is retired, previously taught art.    Two brothers and one sister  Jimenez  Spouse  Jeremias  Son  Regino   Son  Has 3 grand daughters.         Family History   Problem Relation Age of Onset     Arthritis Mother      Arthritis     HEART DISEASE Mother      Heart Disease     Hypertension Mother      Hypertension     Thyroid Disease Mother      Thyroid Disease     HEART DISEASE Father      Heart Disease     Other - See Comments Father       Psychiatric illness     Colon Cancer Paternal Grandmother      Cancer-colon     Allergy (Severe) Brother      Allergies     Hypertension Brother      Hypertension     Hypertension Sister      Hypertension     Breast Cancer No family hx of      Cancer-breast        Current Medications:  Current Outpatient Rx   Medication Sig Dispense Refill     acetaminophen (TYLENOL) 325 MG tablet Take 325 mg by mouth every 4 hours as needed Max acetaminophen dose: 4000 mg in 24 hours       ARIPiprazole (ABILIFY) 10 MG tablet Take 0.5 tablets (5 mg) by mouth At Bedtime 30 tablet 1     BENZTROPINE MESYLATE PO Take 0.5 mg by mouth 2 times daily       calcium carbonate-vitamin D (SM CALCIUM 500/VITAMIN D3) 500-400 MG-UNIT TABS per tablet Take 1 tablet by mouth daily       clobetasol (TEMOVATE) 0.05 % ointment Apply to affected area daily as needed.       conjugated estrogens (PREMARIN) cream Apply topically to external genital area at bedtime 3-4 times a week.       erythromycin (ROMYCIN) ophthalmic ointment Apply into inside lower lid of left eye 4 times daily       ibuprofen (ADVIL/MOTRIN) 200 MG tablet Take 200 mg by mouth 4 times daily as needed       Multiple Vitamins-Minerals (OCUVITE PO) Take 1 tablet by mouth daily       prednisoLONE acetate (PRED FORTE) 1 % ophthalmic susp Place 1 drop Into the left eye 4 times daily SHAKE WELL       venlafaxine (EFFEXOR-XR) 37.5 MG 24 hr capsule Take 37.5 mg by mouth daily with food       venlafaxine (EFFEXOR-XR) 75 MG 24 hr capsule Take 75 mg by mouth daily with food       [DISCONTINUED] ARIPiprazole (ABILIFY) 10 MG tablet Take 7.5 mg by mouth At Bedtime        Recent use of: no use of ibuprofen, flax seed, omega three or aleve or aspirin     HABITS:     Narcotic agreement:  No    Health Care Directive or Living Will: No      REVIEW OF SYSTEMS:  Fever/Chills or other infectious symptoms in past month:  No    Wt Readings from Last 3 Encounters:   05/09/18 140 lb 9.6 oz (63.8 kg)   01/23/18  "140 lb 6.4 oz (63.7 kg)   08/23/17 142 lb (64.4 kg)         EXAM:   /84 (BP Location: Right arm, Patient Position: Chair, Cuff Size: Adult Regular)  Pulse 80  Temp 97  F (36.1  C) (Tympanic)  Resp 16  Ht 5' 6\" (1.676 m)  Wt 140 lb 9.6 oz (63.8 kg)  SpO2 98%  Breastfeeding? No  BMI 22.69 kg/m2 Body mass index is 22.69 kg/(m^2).     Patient appears well, alert and oriented x 3, pleasant, cooperative.  Well groomed .  Articulate.  Eye contact is greater than 90%.  Judgment and insight are intact.  Denies SI HI or BRETT. TM's clear, Oral pharynx with good dentition, without lesion, erythema or exudate. Moist mucous membranes.  Neck supple and free of adenopathy, or masses. No thyromegaly. Lungs are clear, without wheezes, rhonchi or rales. Heart sounds are normal, no murmurs, clicks, gallops or rubs. Abdomen is soft, no tenderness, masses or organomegaly. No CVAT.  Extremities are without edema. Peripheral pulses are normal. Screening neurological exam is normal without focal findings.no tremor noted today  Skin is normal without suspicious lesions noted.      DIAGNOSTICS:   1. EKG:      Ordered    Impression   Notes Recorded by Kenneth Glover MD on 8/23/2017 at 4:56 PM Normal EKG with a rate of 70.  No previous for comparison Kenneth Glover MD       2. CXR:  Not indicated     3. LABS:  Results for orders placed or performed during the hospital encounter of 02/15/18   *MA Screening Digital Bilateral    Narrative    EXAM: MA SCREENING DIGITAL BILATERAL, 2/15/2018 11:26 AM    COMPARISONS: 2/14/2017, 1/13/2016, 1/12/2015    HISTORY: ; Visit for screening mammogram    BREAST DENSITY: Heterogeneously dense.   There is no dominant mass, developing asymmetry, or suspicious  clusters of microcalcifications.      Impression    IMPRESSION: BI-RADS CATEGORY: 2 - Benign.    RECOMMENDED FOLLOW-UP: Annual Mammography.      SIDDHARTH LIMON MD            IMPRESSION:   1. Satisfactory Preoperative Anesthesia Medical " Evaluation;    For above listed surgery and anesthesia:   Patient is lowrisk for perioperative complications.  Patient has no obstructive sleep apnea.  Patient is on chronic pain medications  No    Patient is on antiplatlet/anticoagulation No    Other medications that need adjustment perioperatively   No        Patient may proceed with surgery with appropriateanesthesia.  Labs/ Ekg to be faxed to surgeon's office.   Patient informed NPO after midnight night prior to surgery, to avoid NSAIDS, ASA, fish oil, and flax seed  over the counter ten days prior to surgery.   If  Febrile illness or productive cough, please contact the surgeon's office.       I appreciate in advance compassionate care of this patient by  and surgical staff.          2. Severe major depression with psychotic features (H)     - ARIPiprazole (ABILIFY) 10 MG tablet; Take 0.5 tablets (5 mg) by mouth At Bedtime  Dispense: 30 tablet; Refill: 1    3. Screening cholesterol level     - Lipid Panel        ElectronicallySigned by:   BERNA OWENS MD

## 2018-05-09 NOTE — MR AVS SNAPSHOT
After Visit Summary   5/9/2018    Daly Perry    MRN: [id number]           Patient Information     Date Of Birth          1950        Visit Information        Provider Department      5/9/2018 11:30 AM Aicha Driscoll MD [facility]        Today's Diagnoses     Preop general physical exam    -  1    Severe major depression with psychotic features (H)        Screening cholesterol level          Care Instructions    RECOMMENDATIONS:    Nothing to eat after midnight night prior to surgery.  Avoid Aspirin,  NSAIDS (nonsteroidal anti-inflammatory medications) like ibuprofen or aleve over the counter ten days prior to surgery.   It is okay to use tylenol with maximum tylenol use 3 grams in 24 hours.  If you have a febrile illness or productive cough, please contact your surgeon's office.    Hold benztropine one week before and after cataract surgery; resume when Dr. Lee says okay ;     Continue eye drops per Dr. Lee; change pillow case, new eye make up     Okay to use tylenol         Before Small-Incision Cataract Surgery    Like any operation, small-incision cataract surgery requires preparation.  Your health history  Your eye healthcare provider will review your health history. Based on that, he or she may order tests or talk to your other healthcare providers. Tell your eye healthcare provider about any recent health conditions and which medicines you take. That includes over-the-counter medicine, such as aspirin and any vitamins or supplements.   Your eye exam  You will have a complete eye exam. Your eye healthcare provider or a technician will use devices that measure the length and curve of your eye. These measurements let your healthcare provider select the proper new lens (IOL or intraocular lens) for you.  The night before surgery  Don t eat or drink anything after midnight the night before your surgery. This includes water, coffee, chewing gum, and mints. If you  have been told to continue your daily medicine, take it only with small sips of water. Make sure you follow any other instructions your healthcare provider gives you.  The day of surgery  Have someone you know drive you to and from the outpatient surgery clinic or hospital. Plan to be there for about 2 to 3 hours. When you arrive, you ll sign a consent form if you haven t done so already. This form explains the risks of surgery. Be sure to get answers to any questions you have before signing the consent form. Just before surgery, your healthcare provider will give you medicine that will relax you and keep you from feeling pain. You may sleep lightly.  Risks and complications    Your healthcare provider may have to shift from a small incision to a larger incision.    There is a small chance of bleeding, infection, or swelling.   Date Last Reviewed: 11/1/2017 2000-2017 [employer]. [address] [place] [address]. All rights reserved. This information is not intended as a substitute for professional medical care. Always follow your healthcare professional's instructions.        After Small-Incision Cataract Surgery: The First 24 Hours     Follow your healthcare provider's instructions for using eye drops.     After surgery, you ll rest in a recovery area for about an hour. Even though you may feel fine, you should take it easy. Your healthcare provider will let you know what you should and shouldn t do once you get home. You may need to wear eye protection the first day. Also remember to take any eye drops or other medicine your healthcare provider prescribes.  Back at home  Spend your first day relaxing at home. Be careful to:    Not rub your eye    Not lift anything that makes you strain    Not drink alcohol within the first 24 hours    Do not bend from the waist to  objects  What to expect  It s normal for your eye to be bruised or bloodshot at first. You may also feel itching or  mild discomfort. You may have some short-term (temporary) fluid discharge. These won t last long.   Getting back in action  You may be able to get back to much of your routine on the first day. But with some tasks, your healthcare provider may ask you to wait. Always follow your healthcare provider's recommendations.  Here are some general guidelines:    You will have to use eye drops after surgery. Be sure to follow your healthcare provider's directions for using these drops.     Avoid getting soap or water directly in the eye. Do not rub or press on your eye.     Your ophthalmologist may ask you to wear eyeglasses or a shield to protect your eye.     You may need to wear a protective eye shield when you sleep.    Your ophthalmologist will talk with you about how active you can be soon after surgery. He or she will tell you when you can safely. exercise, drive, or do other activities.    When to call your healthcare provider  Call your healthcare provider if:    Your pain is not relieved by over-the-counter medicine.    You have nausea or vomiting.    Your vision suddenly becomes worse.   Date Last Reviewed: 11/1/2017 2000-2017 [employer]. [address] [place] [address]. All rights reserved. This information is not intended as a substitute for professional medical care. Always follow your healthcare professional's instructions.          Before Your Surgery      Call your surgeon if there is any change in your health. This includes signs of a cold or flu (such as a sore throat, runny nose, cough, rash or fever).    Do not smoke, drink alcohol or take over the counter medicine (unless your surgeon or primary care doctor tells you to) for the 24 hours before and after surgery.    If you take prescribed drugs: Follow your doctor s orders about which medicines to take and which to stop until after surgery.    Eating and drinking prior to surgery: follow the instructions from your  "surgeon    Take a shower or bath the night before surgery. Use the soap your surgeon gave you to gently clean your skin. If you do not have soap from your surgeon, use your regular soap. Do not shave or scrub the surgery site.  Wear clean pajamas and have clean sheets on your bed.           Follow-ups after your visit        Who to contact     If you have questions or need follow up information about today's clinic visit or your schedule please contact [facility] directly at [phone].  Normal or non-critical lab and imaging results will be communicated to you by Unbxdhart, letter or phone within 4 business days after the clinic has received the results. If you do not hear from us within 7 days, please contact the clinic through [employer] or phone. If you have a critical or abnormal lab result, we will notify you by phone as soon as possible.  Submit refill requests through [employer] or call your pharmacy and they will forward the refill request to us. Please allow 3 business days for your refill to be completed.          Additional Information About Your Visit        Active Circle Information     Active Circle lets you send messages to your doctor, view your test results, renew your prescriptions, schedule appointments and more. To sign up, go to [email or web address] . Click on \"Log in\" on the left side of the screen, which will take you to the Welcome page. Then click on \"Sign up Now\" on the right side of the page.     You will be asked to enter the access code listed below, as well as some personal information. Please follow the directions to create your username and password.     Your access code is: [email or web address]  Expires: 2018 12:23 PM     Your access code will  in 90 days. If you need help or a new code, please call your [facility] clinic or [phone].        Care EveryWhere ID     This is your Care EveryWhere ID. This could be used by other organizations to access your [facility] medical " "records  [id number]        Your Vitals Were     Pulse Temperature Respirations Height Pulse Oximetry Breastfeeding?    80 97  F (36.1  C) (Tympanic) 16 5' 6\" (1.676 m) 98% No    BMI (Body Mass Index)                   22.69 kg/m2            Blood Pressure from Last 3 Encounters:   05/09/18 122/84   01/23/18 120/80   08/23/17 118/80    Weight from Last 3 Encounters:   05/09/18 140 lb 9.6 oz (63.8 kg)   01/23/18 140 lb 6.4 oz (63.7 kg)   08/23/17 142 lb (64.4 kg)              We Performed the Following     Lipid Panel          Today's Medication Changes          These changes are accurate as of 5/9/18 12:23 PM.  If you have any questions, ask your nurse or doctor.               These medicines have changed or have updated prescriptions.        Dose/Directions    ARIPiprazole 10 MG tablet   Commonly known as:  ABILIFY   This may have changed:  how much to take   Used for:  Severe major depression with psychotic features (H)   Changed by:  Aicha Driscoll MD        Dose:  5 mg   Take 0.5 tablets (5 mg) by mouth At Bedtime   Quantity:  30 tablet   Refills:  1            Where to get your medicines      Some of these will need a paper prescription and others can be bought over the counter.  Ask your nurse if you have questions.     Bring a paper prescription for each of these medications     ARIPiprazole 10 MG tablet                Primary Care Provider Office Phone # Fax #    Aicha Driscoll [phone]       [address] [facility] [address]        Equal Access to Services     [facility]: Hadii christopher grimaldoo Sonicholasali, waaxda luqadaha, qaybta kaalmada adeegyada, waxay idiin hayaan adeeg kharash la'aan . So [facility] [phone].    ATENCIÓN: Si habla español, tiene a vazquez disposición servicios gratuitos de asistencia lingüística. Llame al [phone].    We comply with applicable federal civil rights laws and [place] laws. We do not discriminate on the basis of race, color, national " origin, age, disability, sex, sexual orientation, or gender identity.            Thank you!     Thank you for choosing [facility]  for your care. Our goal is always to provide you with excellent care. Hearing back from our patients is one way we can continue to improve our services. Please take a few minutes to complete the written survey that you may receive in the mail after your visit with us. Thank you!             Your Updated Medication List - Protect others around you: Learn how to safely use, store and throw away your medicines at www.disposemymeds.org.          This list is accurate as of 5/9/18 12:23 PM.  Always use your most recent med list.                   Brand Name Dispense Instructions for use Diagnosis    acetaminophen 325 MG tablet    TYLENOL     Take 325 mg by mouth every 4 hours as needed Max acetaminophen dose: 4000 mg in 24 hours        ARIPiprazole 10 MG tablet    ABILIFY    30 tablet    Take 0.5 tablets (5 mg) by mouth At Bedtime    Severe major depression with psychotic features (H)       BENZTROPINE MESYLATE PO      Take 0.5 mg by mouth 2 times daily        clobetasol 0.05 % ointment    TEMOVATE     Apply to affected area daily as needed.        conjugated estrogens cream    PREMARIN     Apply topically to external genital area at bedtime 3-4 times a week.        erythromycin ophthalmic ointment    ROMYCIN     Apply into inside lower lid of left eye 4 times daily        ibuprofen 200 MG tablet    ADVIL/MOTRIN     Take 200 mg by mouth 4 times daily as needed        OCUVITE PO      Take 1 tablet by mouth daily        prednisoLONE acetate 1 % ophthalmic susp    PRED FORTE     Place 1 drop Into the left eye 4 times daily SHAKE WELL        SM CALCIUM 500/VITAMIN D3 500-400 MG-UNIT Tabs per tablet   Generic drug:  calcium carbonate-vitamin D      Take 1 tablet by mouth daily        * venlafaxine 75 MG 24 hr capsule    EFFEXOR-XR     Take 75 mg by mouth daily with food         * venlafaxine 37.5 MG 24 hr capsule    EFFEXOR-XR     Take 37.5 mg by mouth daily with food        * Notice:  This list has 2 medication(s) that are the same as other medications prescribed for you. Read the directions carefully, and ask your doctor or other care provider to review them with you.

## 2018-05-09 NOTE — NURSING NOTE
"Proposed Surgery/ Procedure: Right eye cataract surgery   Date of Surgery/ Procedure: 5/30/18  Time of Surgery/ Procedure: [facility]  Hospital/Surgical Facility:[facility]  Fax number for surgical facility: na  Primary Physician: Aicha Driscoll  Type of Anesthesia Anticipated: to be determined      Fever/Chills or other infectious symptoms in past month: no  >10lb weight loss in past two months: no  O2 SAT: 98    Health Care Directive/Code status:  no  Hx of blood transfusions:   no  Td up to date:  yes  History of VRE/MRSA:  no Date:      Preoperative Evaluation: Obstructive Sleep Apnea screening    S: Snore -  Do you snore loudly? (louder than talking or loud enough to be heard through closed doors)no  T: Tired - Do you often feel tired, fatigued, or sleepy during the daytime?no  O: Observed - Has anyone ever observed you stop breathing during your sleep?no  P: Pressure - Do you have or are you being treated for high blood pressure?no  B: BMI - BMI greater than 35kg/m2?no  A: Age - Age over 50 years old?yes  N: Neck - Neck circumference greater than 40 cm?no  G: Gender - Gender: Male?no    Total number of \"YES\" responses:  1      Scoring: Low risk of KB 0-2  At Risk of KB: >3 High Risk of KB: 5-8    Ileana Mack 5/9/2018 11:51 AM    Liane Hendrix    "

## 2018-05-09 NOTE — PATIENT INSTRUCTIONS
RECOMMENDATIONS:    Nothing to eat after midnight night prior to surgery.  Avoid Aspirin,  NSAIDS (nonsteroidal anti-inflammatory medications) like ibuprofen or aleve over the counter ten days prior to surgery.   It is okay to use tylenol with maximum tylenol use 3 grams in 24 hours.  If you have a febrile illness or productive cough, please contact your surgeon's office.    Hold benztropine one week before and after cataract surgery; resume when Dr. Lee says okay ;     Continue eye drops per Dr. Lee; change pillow case, new eye make up     Okay to use tylenol         Before Small-Incision Cataract Surgery    Like any operation, small-incision cataract surgery requires preparation.  Your health history  Your eye healthcare provider will review your health history. Based on that, he or she may order tests or talk to your other healthcare providers. Tell your eye healthcare provider about any recent health conditions and which medicines you take. That includes over-the-counter medicine, such as aspirin and any vitamins or supplements.   Your eye exam  You will have a complete eye exam. Your eye healthcare provider or a technician will use devices that measure the length and curve of your eye. These measurements let your healthcare provider select the proper new lens (IOL or intraocular lens) for you.  The night before surgery  Don t eat or drink anything after midnight the night before your surgery. This includes water, coffee, chewing gum, and mints. If you have been told to continue your daily medicine, take it only with small sips of water. Make sure you follow any other instructions your healthcare provider gives you.  The day of surgery  Have someone you know drive you to and from the outpatient surgery clinic or hospital. Plan to be there for about 2 to 3 hours. When you arrive, you ll sign a consent form if you haven t done so already. This form explains the risks of surgery. Be sure to get answers to  any questions you have before signing the consent form. Just before surgery, your healthcare provider will give you medicine that will relax you and keep you from feeling pain. You may sleep lightly.  Risks and complications    Your healthcare provider may have to shift from a small incision to a larger incision.    There is a small chance of bleeding, infection, or swelling.   Date Last Reviewed: 11/1/2017 2000-2017 [employer]. [address] [place] [address], [place] [address]. All rights reserved. This information is not intended as a substitute for professional medical care. Always follow your healthcare professional's instructions.        After Small-Incision Cataract Surgery: The First 24 Hours     Follow your healthcare provider's instructions for using eye drops.     After surgery, you ll rest in a recovery area for about an hour. Even though you may feel fine, you should take it easy. Your healthcare provider will let you know what you should and shouldn t do once you get home. You may need to wear eye protection the first day. Also remember to take any eye drops or other medicine your healthcare provider prescribes.  Back at home  Spend your first day relaxing at home. Be careful to:    Not rub your eye    Not lift anything that makes you strain    Not drink alcohol within the first 24 hours    Do not bend from the waist to  objects  What to expect  It s normal for your eye to be bruised or bloodshot at first. You may also feel itching or mild discomfort. You may have some short-term (temporary) fluid discharge. These won t last long.   Getting back in action  You may be able to get back to much of your routine on the first day. But with some tasks, your healthcare provider may ask you to wait. Always follow your healthcare provider's recommendations.  Here are some general guidelines:    You will have to use eye drops after surgery. Be sure to follow your healthcare provider's directions  for using these drops.     Avoid getting soap or water directly in the eye. Do not rub or press on your eye.     Your ophthalmologist may ask you to wear eyeglasses or a shield to protect your eye.     You may need to wear a protective eye shield when you sleep.    Your ophthalmologist will talk with you about how active you can be soon after surgery. He or she will tell you when you can safely. exercise, drive, or do other activities.    When to call your healthcare provider  Call your healthcare provider if:    Your pain is not relieved by over-the-counter medicine.    You have nausea or vomiting.    Your vision suddenly becomes worse.   Date Last Reviewed: 11/1/2017 2000-2017 [employer]. [address] [place] [address], [place] [address]. All rights reserved. This information is not intended as a substitute for professional medical care. Always follow your healthcare professional's instructions.          Before Your Surgery      Call your surgeon if there is any change in your health. This includes signs of a cold or flu (such as a sore throat, runny nose, cough, rash or fever).    Do not smoke, drink alcohol or take over the counter medicine (unless your surgeon or primary care doctor tells you to) for the 24 hours before and after surgery.    If you take prescribed drugs: Follow your doctor s orders about which medicines to take and which to stop until after surgery.    Eating and drinking prior to surgery: follow the instructions from your surgeon    Take a shower or bath the night before surgery. Use the soap your surgeon gave you to gently clean your skin. If you do not have soap from your surgeon, use your regular soap. Do not shave or scrub the surgery site.  Wear clean pajamas and have clean sheets on your bed.

## 2018-05-09 NOTE — LETTER
May 9, 2018      Daly [place]  [address] [facility] [address]        Dear ,     I am  writing to inform you of your test results.  Your cholesterol is mildly elevated as your good cholesterol or HDL is very high and considered protective.   You do not need your cholesterol checked for another 5 years.          Resulted Orders   Lipid Panel   Result Value Ref Range    Cholesterol 212 (H) <200 mg/dL    Triglycerides 52 <150 mg/dL    HDL Cholesterol 93 (H) 23 - 92 mg/dL    LDL Cholesterol Calculated 109 (H) <100 mg/dL      Comment:      Above desirable:  100-129 mg/dl  Borderline High:  130-159 mg/dL  High:             160-189 mg/dL  Very high:       >189 mg/dl      Non HDL Cholesterol 119 <130 mg/dL       If you have any questions or concerns, please call the clinic at the number listed above. I wish you good health and much luiz !      Sincerely,        BERNA OWENS MD

## 2018-05-10 ASSESSMENT — ANXIETY QUESTIONNAIRES: GAD7 TOTAL SCORE: 0

## 2018-05-10 ASSESSMENT — PATIENT HEALTH QUESTIONNAIRE - PHQ9: SUM OF ALL RESPONSES TO PHQ QUESTIONS 1-9: 0

## 2018-07-23 NOTE — PROGRESS NOTES
Patient Information     Patient Name  Daly Perry MRN  [id number] Sex  Female   1950      Letter by Jatin Collins MD at      Author:  Jatin Collins MD Service:  (none) Author Type:  (none)    Filed:   Date of Service:   Status:  (Other)       [facility]  [address]  [facility] [address]  [phone]         Daly Perry   [address] [facility] [address]      2017  Date of Breast Imagin2017  8:12 AM    Dear Ms. Perry:    We are pleased to inform you that the result of your recent breast imaging examination is normal/benign (not cancer).    A report of your results was sent to your health care provider(s).    Your images will become part of your medical file here at [facility] and will be available for your continuing care. You are responsible for informing any new health care provider or breast imaging facility of the date and location of this examination.    Although mammography is the most accurate method for early detection, not all cancers are found through mammography. If you notice any new changes in your breast(s) please inform your health care provider without delay.    Thank you for choosing [facility] to participate in your healthcare needs.         [facility] Recommendations for Early Breast Cancer Detection   in Women without Symptoms  When to start having mammograms to screen for breast cancer, and how often to have them, is a personal decision. It should be based on your preferences, your values and your risk for developing breast cancer. [facility] recommends that you and your health care provider together determine when mammograms are right for you.    [facility] recommends the following guidelines for women who have an average risk for breast cancer, based on American Cancer Society guidelines:    Age 40 to 44:  Mammograms are optional.     Age 45 to 54: Have a mammogram every year.           Age 55 and older: Have a mammogram every year, or transition to having one every 2 years. Continue to have mammograms as long as your health is good.    If you have a higher than average risk for breast cancer, your health care provider may recommend a different schedule.

## 2018-07-23 NOTE — PROGRESS NOTES
Patient Information     Patient Name  Daly Perry MRN  [id number] Sex  Female   1950      Letter by Aicha Driscoll MD at      Author:  Aicha Driscoll MD Service:  (none) Author Type:  (none)    Filed:   Encounter Date:  2018 Status:  (Other)           Daly Perry  [address] [facility] [address]          2018    Dear Ms. Perry:    The result from the Pap and HPV (Human Papilloma Virus) test(s) you had done at your recent clinic visit came back as normal.     We recommend that you have an adult physical exam each year.  You will not need a Pap test at these visits for 5 years.      If you have any further questions or concerns, please call [phone]. You may also contact us by using medical messaging if you have MyChart.    Thank you for choosing [facility] to participate in your healthcare needs.    Sincerely,    Aicha Driscoll MD          The [facility] is a statewide comprehensive breast and cervical cancer screening program that provides free screening and follow-up services (including colposcopy) to uninsured and underinsured women. For more information call toll free [phone] ([phone])

## 2018-07-23 NOTE — PROGRESS NOTES
Patient Information     Patient Name  Daly Perry MRN  [id number] Sex  Female   1950      Letter by Taniya Graves DO at      Author:  Taniya Graves DO Service:  (none) Author Type:  (none)    Filed:   Encounter Date:  2017 Status:  (Other)       Remember to activate your account quickly -   Your activation code expires in 45 days!    2017     Daly Perry  [address] [facility] [address]    Dear Ms. Perry,    Welcome to YourPOV[employer]TV!        With YourPOV.TV, you can quickly and easily view your health information and appointments at your clinic at any time and anywhere you have Internet access.  To learn more about YourPOV[employer], please go to [email or web address]    To get started, you will need:     the YourPOV[employer]TV web site ([email or web address])    your YourPOV[employer]TV activation code:  [email or web address]    Expires: 3/31/2017  8:56 AM    the last 4 digits of your Social Security number (SSN)    your date of birth.      Step-by-step instructions on how to set up your YourPOV.TV account are shown on the following page.  If you requested access to your child/dependent s YourPOV.TV account or you have been granted access to another adult s YourPOV[employer]TV account, instructions for viewing their information are also on the following page.     In N(i)Â²                                    Instructions For Activating Your YourPOV.TV Account    Activation Code: [email or web address]  Expires: 3/31/2017  8:56 AM  Following are step-by-step instructions for setting up your YourPOV.TV log-in ID and personal password.  In the event you have technical difficulties, please call YourPOV[employer]TV Services at [phone].    Step 1:   Go to the YourPOV.TV web page by typing [email or web address] in your Internet browser.      Step 2:   Click on the Enter your Activation Code link on the right side of the page under the  New User?  heading.     Step 3:   Enter your activation code (provided  above), the last four digits of your Social Security Number (SSN) and date of birth.  This information is only required once.  The next time you log in to [employer], you will only need your [id number] ID and the password that you will create as part of this activation process.    Step 4:   The [email or web address] ID you used previously will be listed as the MyCBright Thingst ID.   Please make a note of your MyCBright Thingst ID. If you forget it, you will need to call [employer] to go through the reactivation process again or click on the Forgot MyCBright Thingst ID link on the [employer] web page and follow the instructions.       Step 5:   Next, choose a password that is easy for you to remember, but hard for others to guess.  Your [employer] password must be a minimum of 7 characters and must contain at least one number and one letter.  No symbols are permitted.  Please keep in mind that only you will know your password and ID; [employer] Services will not have access to either of them.      Step 6:    Choose a security question. This will allow you to reset your password if you forget it or want to change it for some reason.    Step 7:    Enter the answer to your security question.  Please keep in mind that youranswer cannot be the same as your [employer] password.    Step 8:  Enter your e-mail address.  Your e-mail address is a required field.  You will receive an e-mail notification when new information is available in your [employer] account.  You are now ready to start using [employer]!    Instructions for Child/Dependent Access  To view your child s record, click on your child's name under the My Family s Records  heading.   Instructions for Adult Proxy Access  To view your adult proxy record, click on the adult's name under the My Family s   Records heading.     In the event you have technical difficulties, please call   [employer] at [phone].

## 2018-07-23 NOTE — PROGRESS NOTES
Patient Information     Patient Name  Daly Perry MRN  [id number] Sex  Female   1950      Letter by Aicha Driscoll MD at      Author:  Aicha Driscoll MD Service:  (none) Author Type:  (none)    Filed:   Encounter Date:  2018 Status:  (Other)           Daly Perry  [address] [facility] [address]          2018    Dear Ms. Perry:    Following are the tests completed during your last clinic visit.  The results of these tests are normal and require no further attention unless otherwise noted.    Results for orders placed or performed in visit on 18      COMP METABOLIC PANEL      Result  Value Ref Range    SODIUM 141 133 - 143 mmol/L    POTASSIUM 4.2 3.5 - 5.1 mmol/L    CHLORIDE 103 98 - 107 mmol/L    CO2,TOTAL 28 21 - 31 mmol/L    ANION GAP 10 5 - 18                    GLUCOSE 97 70 - 105 mg/dL    CALCIUM 9.0 8.6 - 10.3 mg/dL    BUN 20 7 - 25 mg/dL    CREATININE 0.62 (L) 0.70 - 1.30 mg/dL    BUN/CREAT RATIO           32                    GFR if African American >60 >60 ml/min/1.73m2    GFR if not African American >60 >60 ml/min/1.73m2    ALBUMIN 4.1 3.5 - 5.7 g/dL    PROTEIN,TOTAL 7.2 6.4 - 8.9 g/dL    GLOBULIN                  3.1 2.0 - 3.7 g/dL    A/G RATIO 1.3 1.0 - 2.0                    BILIRUBIN,TOTAL 0.6 0.3 - 1.0 mg/dL    ALK PHOSPHATASE 65 34 - 104 IU/L    ALT (SGPT) 11 7 - 52 IU/L    AST (SGOT) 16 13 - 39 IU/L   VITAMIN B12      Result  Value Ref Range    VITAMIN B12 1079 (H) 180 - 914 pg/mL   FOLIC ACID      Result  Value Ref Range    FOLIC ACID >24.8 >=5.2 ng/mL   MAGNESIUM      Result  Value Ref Range    MAGNESIUM 1.9 1.9 - 2.7 mg/dL   TSH      Result  Value Ref Range    TSH 1.53 0.34 - 5.60 uIU/mL   CBC WITH AUTO DIFFERENTIAL      Result  Value Ref Range    WHITE BLOOD COUNT         5.3 4.5 - 11.0 thou/cu mm    RED BLOOD COUNT           4.48 4.00 - 5.20 mil/cu mm    HEMOGLOBIN                14.3 12.0 - 16.0 g/dL    HEMATOCRIT                 42.3 33.0 - 51.0 %    MCV                       94 80 - 100 fL    MCH                       31.9 26.0 - 34.0 pg    MCHC                      33.8 32.0 - 36.0 g/dL    RDW                       12.2 11.5 - 15.5 %    PLATELET COUNT            350 140 - 440 thou/cu mm    MPV                       8.0 6.5 - 11.0 fL    NEUTROPHILS               69.5 42.0 - 72.0 %    LYMPHOCYTES               24.7 20.0 - 44.0 %    MONOCYTES                 4.8 <12.0 %    EOSINOPHILS               0.2 <8.0 %    BASOPHILS                 0.6 <3.0 %    IMMATURE GRANULOCYTES(METAS,MYELOS,PROS) 0.2 %    ABSOLUTE NEUTROPHILS      3.7 1.7 - 7.0 thou/cu mm    ABSOLUTE LYMPHOCYTES      1.3 0.9 - 2.9 thou/cu mm    ABSOLUTE MONOCYTES        0.3 <0.9 thou/cu mm    ABSOLUTE EOSINOPHILS      0.0 <0.5 thou/cu mm    ABSOLUTE BASOPHILS        0.0 <0.3 thou/cu mm    ABSOLUTE IMMATURE GRANULOCYTES(METAS,MYELOS,PROS) 0.0 <=0.3 thou/cu mm         If you have any further questions or problems contact my office at the above number.  I trust this finds you in good health and spirits.      Sincerely,         Electronically signed by Aicha Driscoll MD

## 2018-07-24 NOTE — PROGRESS NOTES
Patient Information     Patient Name  Daly Perry MRN  [id number] Sex  Female   1950      Letter by Taniya Graves DO at      Author:  Taniya Graves DO Service:  (none) Author Type:  (none)    Filed:   Encounter Date:  2017 Status:  (Other)           Daly Perry  [address] [facility] [address]          2017    Dear Ms. Perry:    Following are the tests completed during your last clinic visit.  The results of these tests are normal and require no further attention unless otherwise noted.    Your Bone Density testing also returned with mild thinning of the bones, called osteopenia.  However your 10 year risk of any fracture is less than 20% so treatment is not recommended besides continuing calcium and vitamin D supplements.  Weight bearing exercise can also increase your bone mass slightly, and is recommended.    Results for orders placed or performed in visit on 17      BASIC METABOLIC PANEL      Result  Value Ref Range    SODIUM 138 133 - 143 mmol/L    POTASSIUM 4.0 3.5 - 5.1 mmol/L    CHLORIDE 104 98 - 107 mmol/L    CO2,TOTAL 28 21 - 31 mmol/L    ANION GAP 6 5 - 18                    GLUCOSE 90 70 - 105 mg/dL    CALCIUM 9.6 8.6 - 10.3 mg/dL    BUN 17 7 - 25 mg/dL    CREATININE 0.58 (L) 0.70 - 1.30 mg/dL    BUN/CREAT RATIO           29                    GFR if African American >60 >60 ml/min/1.73m2    GFR if not African American >60 >60 ml/min/1.73m2   LIPID PANEL      Result  Value Ref Range    CHOLESTEROL,TOTAL 188 <200 mg/dL    TRIGLYCERIDES 55 <150 mg/dL    HDL CHOLESTEROL 94 (H) 23 - 92 mg/dL    NON-HDL CHOLESTEROL 94 <145 mg/dl    CHOL/HDL RATIO            2.00 <4.50                    LDL CHOLESTEROL 83 <100 mg/dL    PATIENT STATUS            NOT GIVEN                   VITAMIN D 25 (DEFICIENCY)      Result  Value Ref Range    VITAMIN D TOTAL AFL 32.1   ng/mL   TSH      Result  Value Ref Range    TSH 1.22 0.34 - 5.60 uIU/mL   CBC WITH AUTO DIFFERENTIAL       Result  Value Ref Range    WHITE BLOOD COUNT         4.5 4.5 - 11.0 thou/cu mm    RED BLOOD COUNT           4.14 4.00 - 5.20 mil/cu mm    HEMOGLOBIN                13.8 12.0 - 16.0 g/dL    HEMATOCRIT                40.4 33.0 - 51.0 %    MCV                       98 80 - 100 fL    MCH                       33.4 26.0 - 34.0 pg    MCHC                      34.3 32.0 - 36.0 g/dL    RDW                       12.2 11.5 - 15.5 %    PLATELET COUNT            282 140 - 440 thou/cu mm    MPV                       5.0 (L) 6.5 - 11.0 fL    NEUTROPHILS               65.4 42.0 - 72.0 %    LYMPHOCYTES               23.9 20.0 - 44.0 %    MONOCYTES                 7.9 <12.0 %    EOSINOPHILS               1.1 <8.0 %    BASOPHILS                 1.7 <3.0 %    ABSOLUTE NEUTROPHILS      2.9 1.7 - 7.0 thou/cu mm    ABSOLUTE LYMPHOCYTES      1.1 0.9 - 2.9 thou/cu mm    ABSOLUTE MONOCYTES        0.4 <0.9 thou/cu mm    ABSOLUTE EOSINOPHILS      0.1 <0.5 thou/cu mm    ABSOLUTE BASOPHILS        0.1 <0.3 thou/cu mm       If you have any further questions or problems contact my office at  [phone].    Thank you,    ROMAN MUELLER, DO

## 2018-10-08 ENCOUNTER — ALLIED HEALTH/NURSE VISIT (OUTPATIENT)
Dept: FAMILY MEDICINE | Facility: OTHER | Age: 68
End: 2018-10-08
Attending: FAMILY MEDICINE
Payer: COMMERCIAL

## 2018-10-08 VITALS — DIASTOLIC BLOOD PRESSURE: 80 MMHG | HEART RATE: 102 BPM | SYSTOLIC BLOOD PRESSURE: 122 MMHG

## 2018-10-08 DIAGNOSIS — Z01.30 BLOOD PRESSURE CHECK: Primary | ICD-10-CM

## 2018-10-08 PROCEDURE — 99207 ZZC NO CHARGE NURSE ONLY: CPT

## 2018-10-08 NOTE — MR AVS SNAPSHOT
"              After Visit Summary   10/8/2018    Daly Perry    MRN: [id number]           Patient Information     Date Of Birth          1950        Visit Information        Provider Department      10/8/2018 11:15 AM Nurse, Efrem Pond [facility]        Today's Diagnoses     Blood pressure check    -  1       Follow-ups after your visit        Who to contact     If you have questions or need follow up information about today's clinic visit or your schedule please contact [facility] directly at [phone].  Normal or non-critical lab and imaging results will be communicated to you by Sensorflare PChart, letter or phone within 4 business days after the clinic has received the results. If you do not hear from us within 7 days, please contact the clinic through Sensorflare PChart or phone. If you have a critical or abnormal lab result, we will notify you by phone as soon as possible.  Submit refill requests through KOTURA or call your pharmacy and they will forward the refill request to us. Please allow 3 business days for your refill to be completed.          Additional Information About Your Visit        MyChart Information     KOTURA lets you send messages to your doctor, view your test results, renew your prescriptions, schedule appointments and more. To sign up, go to [email or web address] . Click on \"Log in\" on the left side of the screen, which will take you to the Welcome page. Then click on \"Sign up Now\" on the right side of the page.     You will be asked to enter the access code listed below, as well as some personal information. Please follow the directions to create your username and password.     Your access code is: [email or web address]  Expires: 2019 11:23 AM     Your access code will  in 90 days. If you need help or a new code, please call your [place] clinic or [phone].        Care EveryWhere ID     This is your Care EveryWhere ID. This could be used by other organizations to " access your [place] medical records  [id number]        Your Vitals Were     Pulse                   102            Blood Pressure from Last 3 Encounters:   10/08/18 122/80   05/09/18 122/84   01/23/18 120/80    Weight from Last 3 Encounters:   05/09/18 140 lb 9.6 oz (63.8 kg)   01/23/18 140 lb 6.4 oz (63.7 kg)   08/23/17 142 lb (64.4 kg)              Today, you had the following     No orders found for display       Primary Care Provider Fax #    Physician No Ref-Primary [phone]       No address on file        Equal Access to Services     [facility] : Hadii christopher Elizabeth, francisca jaimes, alena allen, saeid rogers . [facility] [phone].    ATENCIÓN: Si habla español, tiene a vazquez disposición servicios gratuitos de asistencia lingüística. Llame al [phone].    We comply with applicable federal civil rights laws and [place] laws. We do not discriminate on the basis of race, color, national origin, age, disability, sex, sexual orientation, or gender identity.            Thank you!     Thank you for choosing [facility]  for your care. Our goal is always to provide you with excellent care. Hearing back from our patients is one way we can continue to improve our services. Please take a few minutes to complete the written survey that you may receive in the mail after your visit with us. Thank you!             Your Updated Medication List - Protect others around you: Learn how to safely use, store and throw away your medicines at www.disposemymeds.org.          This list is accurate as of 10/8/18 11:23 AM.  Always use your most recent med list.                   Brand Name Dispense Instructions for use Diagnosis    acetaminophen 325 MG tablet    TYLENOL     Take 325 mg by mouth every 4 hours as needed Max acetaminophen dose: 4000 mg in 24 hours        ARIPiprazole 10 MG tablet    ABILIFY    30 tablet    Take 0.5 tablets (5 mg) by mouth At  Bedtime    Severe major depression with psychotic features (H)       BENZTROPINE MESYLATE PO      Take 0.5 mg by mouth 2 times daily        clobetasol 0.05 % ointment    TEMOVATE     Apply to affected area daily as needed.        conjugated estrogens cream    PREMARIN     Apply topically to external genital area at bedtime 3-4 times a week.        erythromycin ophthalmic ointment    ROMYCIN     Apply into inside lower lid of left eye 4 times daily        ibuprofen 200 MG tablet    ADVIL/MOTRIN     Take 200 mg by mouth 4 times daily as needed        OCUVITE PO      Take 1 tablet by mouth daily        prednisoLONE acetate 1 % ophthalmic susp    PRED FORTE     Place 1 drop Into the left eye 4 times daily SHAKE WELL        SM CALCIUM 500/VITAMIN D3 500-400 MG-UNIT Tabs per tablet   Generic drug:  calcium carbonate-vitamin D      Take 1 tablet by mouth daily        * venlafaxine 75 MG 24 hr capsule    EFFEXOR-XR     Take 75 mg by mouth daily with food        * venlafaxine 37.5 MG 24 hr capsule    EFFEXOR-XR     Take 37.5 mg by mouth daily with food        * Notice:  This list has 2 medication(s) that are the same as other medications prescribed for you. Read the directions carefully, and ask your doctor or other care provider to review them with you.

## 2019-04-23 ENCOUNTER — HOSPITAL ENCOUNTER (OUTPATIENT)
Dept: MAMMOGRAPHY | Facility: OTHER | Age: 69
Discharge: HOME | End: 2019-04-23
Attending: INTERNAL MEDICINE | Admitting: INTERNAL MEDICINE
Payer: MEDICARE

## 2019-04-23 DIAGNOSIS — Z12.39 SCREENING BREAST EXAMINATION: ICD-10-CM

## 2019-04-23 PROCEDURE — 77063 BREAST TOMOSYNTHESIS BI: CPT

## 2019-05-29 ENCOUNTER — TELEPHONE (OUTPATIENT)
Dept: FAMILY MEDICINE | Facility: OTHER | Age: 69
End: 2019-05-29

## 2019-05-29 ENCOUNTER — OFFICE VISIT (OUTPATIENT)
Dept: FAMILY MEDICINE | Facility: OTHER | Age: 69
End: 2019-05-29
Attending: NURSE PRACTITIONER
Payer: COMMERCIAL

## 2019-05-29 VITALS
HEART RATE: 64 BPM | SYSTOLIC BLOOD PRESSURE: 122 MMHG | WEIGHT: 143 LBS | BODY MASS INDEX: 22.44 KG/M2 | TEMPERATURE: 97 F | HEIGHT: 67 IN | DIASTOLIC BLOOD PRESSURE: 82 MMHG | RESPIRATION RATE: 16 BRPM

## 2019-05-29 DIAGNOSIS — G47.09 OTHER INSOMNIA: ICD-10-CM

## 2019-05-29 DIAGNOSIS — G47.33 OSA (OBSTRUCTIVE SLEEP APNEA): Primary | ICD-10-CM

## 2019-05-29 PROCEDURE — G0463 HOSPITAL OUTPT CLINIC VISIT: HCPCS

## 2019-05-29 PROCEDURE — 99213 OFFICE O/P EST LOW 20 MIN: CPT | Performed by: NURSE PRACTITIONER

## 2019-05-29 RX ORDER — ZOLPIDEM TARTRATE 5 MG/1
5 TABLET ORAL
Qty: 2 TABLET | Refills: 0 | Status: SHIPPED | OUTPATIENT
Start: 2019-05-29 | End: 2019-07-26

## 2019-05-29 ASSESSMENT — ANXIETY QUESTIONNAIRES
3. WORRYING TOO MUCH ABOUT DIFFERENT THINGS: NOT AT ALL
1. FEELING NERVOUS, ANXIOUS, OR ON EDGE: NOT AT ALL
7. FEELING AFRAID AS IF SOMETHING AWFUL MIGHT HAPPEN: NOT AT ALL
5. BEING SO RESTLESS THAT IT IS HARD TO SIT STILL: NOT AT ALL
6. BECOMING EASILY ANNOYED OR IRRITABLE: NOT AT ALL
2. NOT BEING ABLE TO STOP OR CONTROL WORRYING: NOT AT ALL
GAD7 TOTAL SCORE: 0
IF YOU CHECKED OFF ANY PROBLEMS ON THIS QUESTIONNAIRE, HOW DIFFICULT HAVE THESE PROBLEMS MADE IT FOR YOU TO DO YOUR WORK, TAKE CARE OF THINGS AT HOME, OR GET ALONG WITH OTHER PEOPLE: NOT DIFFICULT AT ALL

## 2019-05-29 ASSESSMENT — PATIENT HEALTH QUESTIONNAIRE - PHQ9
SUM OF ALL RESPONSES TO PHQ QUESTIONS 1-9: 0
5. POOR APPETITE OR OVEREATING: NOT AT ALL

## 2019-05-29 ASSESSMENT — MIFFLIN-ST. JEOR: SCORE: 1220.14

## 2019-05-29 ASSESSMENT — PAIN SCALES - GENERAL: PAINLEVEL: NO PAIN (0)

## 2019-05-29 NOTE — NURSING NOTE
"Patient presents to the clinic today for a referral for a sleep study.  Charlotte Reno LPN 5/29/2019   8:58 AM    Chief Complaint   Patient presents with     Referral       Initial /82 (BP Location: Right arm, Patient Position: Sitting, Cuff Size: Adult Regular)   Pulse 64   Temp 97  F (36.1  C) (Tympanic)   Resp 16   Ht 1.7 m (5' 6.93\")   Wt 64.9 kg (143 lb)   Breastfeeding? No   BMI 22.44 kg/m   Estimated body mass index is 22.44 kg/m  as calculated from the following:    Height as of this encounter: 1.7 m (5' 6.93\").    Weight as of this encounter: 64.9 kg (143 lb).  Medication Reconciliation: complete    Charlotte Reno LPN    "

## 2019-05-29 NOTE — TELEPHONE ENCOUNTER
Spoke to the sleep study at [place].  They would like the referring provider to prescribe the medication and have the patient bring that with them.  Charlotte Reno LPN 5/29/2019   9:56 AM

## 2019-05-29 NOTE — PROGRESS NOTES
SUBJECTIVE:   Daly Perry is a 68 year old female who presents to clinic today for the following health issues:    HPI  Patient presents for evaluation of sleep study referral. Her spouse requests her to be evaluated as he reports she is obstructing during sleep. (He is a retired anesthesiologist.) She has been sleeping in the guest bedroom, because her  is not able to sleep next to her due to her snoring. She feels rested in the morning, but becomes more fatigued throughout the day. She had a previous sleep study in 2013, but it was not conclusive because she was never fell into a deep sleep. She was given a CPAP mask, but it never fit properly and she stopped using the device. She is requesting to be seen in [place] as the wait list here in [place] is full until July. She was told to have sleeping pill available as needed.     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     Priority: Medium     Overview:   Vulvar LSEA       Neck pain, chronic 01/30/2018     Priority: Medium     Overview:   Improved after [facility]       Atrophic vaginitis 01/30/2018     Priority: Medium     Epiretinal membrane (ERM) of left eye 05/16/2017     Priority: Medium     Severe major depression with psychotic features (H) 10/25/2016     Priority: Medium     Rosacea 01/04/2013     Priority: Medium     Dermatophytosis of nail 06/16/2011     Priority: Medium     Diverticulosis of large intestine 03/14/2011     Priority: Medium     Overview:   Diffuse       Past Medical History:   Diagnosis Date     Anxiety disorder     No Comments Provided     Cyst of ovary     Hx of right ovarian cyst.     Endometriosis     No Comments Provided     Generalized anxiety  "disorder     Dysthymia, generalized anxiety     Localized scleroderma     Vulvar LSEA     Other fracture of unspecified lower leg, initial encounter for closed fracture     left      Past Surgical History:   Procedure Laterality Date     BIOPSY BREAST      7/07/08,stereotactic, benign result     CLOSED REDUCTION ANKLE      2002,ORIF     COLONOSCOPY      2003,Normal     COLONOSCOPY  03/14/2011    3/14/2011,F/U 2021     DILATION AND CURETTAGE      2003,D&C with hysteroscopy and endometrial ablation     OTHER SURGICAL HISTORY      06/27/2011,536829,REMOVE,removal of hardware L ankle     OTHER SURGICAL HISTORY      05/22/2017,974037,OTHER,Left,Dr. Collins     RELEASE CARPAL TUNNEL      2010     SALPINGO-OOPHORECTOMY BILATERAL      2/97,RSO, D&C       Review of Systems   Positive for sleep disturbance    OBJECTIVE:     /82 (BP Location: Right arm, Patient Position: Sitting, Cuff Size: Adult Regular)   Pulse 64   Temp 97  F (36.1  C) (Tympanic)   Resp 16   Ht 1.7 m (5' 6.93\")   Wt 64.9 kg (143 lb)   Breastfeeding? No   BMI 22.44 kg/m    Body mass index is 22.44 kg/m .  Physical Exam   Constitutional: She is oriented to person, place, and time. She appears well-developed and well-nourished. No distress.   HENT:   Head: Normocephalic.   Mouth/Throat: Oropharynx is clear and moist.   Cardiovascular: Regular rhythm and normal heart sounds.   Pulmonary/Chest: Effort normal and breath sounds normal.   Neurological: She is alert and oriented to person, place, and time.   Psychiatric: She has a normal mood and affect.     Diagnostic Test Results:  none     ASSESSMENT/PLAN:   1. KB (obstructive sleep apnea)  We will have her evaluated in [place] for sleep study. We discussed briefly complications of undiagnosed sleep apnea.   - SLEEP EVALUATION & MANAGEMENT REFERRAL - ADULT -Other (Respond in commments); Future    2. Other insomnia  Plan to give Ambien (Qty. 2 doses) to be used for sleep study as needed. She will bring " prescription with her to the study once it is scheduled.   - zolpidem (AMBIEN) 5 MG tablet; Take 1 tablet (5 mg) by mouth nightly as needed for sleep (to be used prior to sleep study.)  Dispense: 2 tablet; Refill: 0    Sabina Pradhan [facility]

## 2019-05-30 ASSESSMENT — ANXIETY QUESTIONNAIRES: GAD7 TOTAL SCORE: 0

## 2019-07-26 ENCOUNTER — OFFICE VISIT (OUTPATIENT)
Dept: INTERNAL MEDICINE | Facility: OTHER | Age: 69
End: 2019-07-26
Attending: INTERNAL MEDICINE
Payer: COMMERCIAL

## 2019-07-26 VITALS
OXYGEN SATURATION: 99 % | DIASTOLIC BLOOD PRESSURE: 60 MMHG | RESPIRATION RATE: 18 BRPM | TEMPERATURE: 96.2 F | WEIGHT: 142.2 LBS | HEART RATE: 71 BPM | HEIGHT: 66 IN | BODY MASS INDEX: 22.85 KG/M2 | SYSTOLIC BLOOD PRESSURE: 112 MMHG

## 2019-07-26 DIAGNOSIS — Z23 NEED FOR PNEUMOCOCCAL VACCINATION: ICD-10-CM

## 2019-07-26 DIAGNOSIS — Z23 NEED FOR SHINGLES VACCINE: ICD-10-CM

## 2019-07-26 DIAGNOSIS — N95.2 ATROPHIC VAGINITIS: ICD-10-CM

## 2019-07-26 DIAGNOSIS — F32.3 SEVERE MAJOR DEPRESSION WITH PSYCHOTIC FEATURES (H): ICD-10-CM

## 2019-07-26 DIAGNOSIS — L90.0 LICHEN SCLEROSUS: Primary | ICD-10-CM

## 2019-07-26 PROBLEM — H35.372 EPIRETINAL MEMBRANE (ERM) OF LEFT EYE: Status: RESOLVED | Noted: 2017-05-16 | Resolved: 2019-07-26

## 2019-07-26 PROBLEM — M54.2 NECK PAIN, CHRONIC: Status: RESOLVED | Noted: 2018-01-30 | Resolved: 2019-07-26

## 2019-07-26 PROBLEM — L94.0 CIRCUMSCRIBED SCLERODERMA: Status: RESOLVED | Noted: 2018-01-30 | Resolved: 2019-07-26

## 2019-07-26 PROBLEM — G89.29 NECK PAIN, CHRONIC: Status: RESOLVED | Noted: 2018-01-30 | Resolved: 2019-07-26

## 2019-07-26 PROCEDURE — 99214 OFFICE O/P EST MOD 30 MIN: CPT | Performed by: INTERNAL MEDICINE

## 2019-07-26 PROCEDURE — G0463 HOSPITAL OUTPT CLINIC VISIT: HCPCS | Mod: 25 | Performed by: INTERNAL MEDICINE

## 2019-07-26 PROCEDURE — 90732 PPSV23 VACC 2 YRS+ SUBQ/IM: CPT

## 2019-07-26 PROCEDURE — G0009 ADMIN PNEUMOCOCCAL VACCINE: HCPCS | Performed by: INTERNAL MEDICINE

## 2019-07-26 RX ORDER — CLOBETASOL PROPIONATE 0.5 MG/G
OINTMENT TOPICAL 2 TIMES DAILY
Qty: 30 G | Refills: 3 | Status: SHIPPED | OUTPATIENT
Start: 2019-07-26 | End: 2022-01-19

## 2019-07-26 ASSESSMENT — PAIN SCALES - GENERAL: PAINLEVEL: NO PAIN (0)

## 2019-07-26 ASSESSMENT — MIFFLIN-ST. JEOR: SCORE: 1201.76

## 2019-07-26 NOTE — PROGRESS NOTES
Chief Complaint   Patient presents with     [facility]       HPI: Ms. Perry is a 68 year old female who presents today to [facility].  She overall is feeling good.      She does need a couple of her topical medications renewed.  She uses Premarin cream for atrophic vaginitis.  She also uses clobetasol for lichen sclerosus.  She has not had any recent symptoms from these.  She does report a decreased interest in sexual activity which we discussed is likely related to menopause and medications.  She was given some ideas on how to help this.    She has a history of depression.  She was hospitalized for this for 3 months in 2016.  She does follow with mental health.  She is on Effexor and Abilify.  She feels at this time that things are stable although does have some off-and-on depression throughout the year.  [place].    She is up-to-date on mammogram, colonoscopy, Pap smear.  She is due for pneumonia vaccine.  She is due for shingles vaccine.    History is discussed on 7/26/2019 with patient and reviewed in previous available records by myself.  It is current to the best of my knowledge as below.    Past Medical History:   Diagnosis Date     Closed left ankle fracture      Cyst of ovary     Hx of right ovarian cyst.     Diverticulosis of large intestine 03/14/2011     Endometriosis     growth on ovary s/p oophrectomy     Fibrocystic breast disease 01/30/2018     Generalized anxiety disorder     Dysthymia, generalized anxiety     Lichen sclerosus     Vulvar LSEA; asymptomatic     Neck pain, chronic 01/30/2018     Improved after group home     Rosacea 1/4/2013     Severe major depression with psychotic features (H) 10/25/2016    inpatient for several months; she does not have complete memory of that time        Past Surgical History:   Procedure Laterality Date     BIOPSY BREAST Right 07/07/2008    stereotactic, benign result     CATARACT IOL, RT/LT Bilateral      2017, 2018     COLONOSCOPY  03/14/2011    Normal; F/U 2021     DILATION AND CURETTAGE  2003    D&C with hysteroscopy and endometrial ablation     OOPHORECTOMY Right 02/1997     OPEN REDUCTION INTERNAL FIXATION ANKLE Left 2002     RELEASE CARPAL TUNNEL  2010     REMOVE HARDWARE ANKLE Left 06/27/2011     VITRECTOMY ANTERIOR Left 05/22/2017    Dr. Collins         Current Outpatient Medications   Medication Sig Dispense Refill     acetaminophen (TYLENOL) 325 MG tablet Take 325 mg by mouth every 4 hours as needed Max acetaminophen dose: 4000 mg in 24 hours       ARIPiprazole (ABILIFY) 10 MG tablet Take 0.5 tablets (5 mg) by mouth At Bedtime (Patient taking differently: Take 5 mg by mouth every evening ) 30 tablet 1     BENZTROPINE MESYLATE PO Take 0.5 mg by mouth 2 times daily       calcium carbonate-vitamin D (SM CALCIUM 500/VITAMIN D3) 500-400 MG-UNIT TABS per tablet Take 1 tablet by mouth daily       clobetasol (TEMOVATE) 0.05 % external ointment Apply topically 2 times daily 30 g 3     conjugated estrogens (PREMARIN) 0.625 MG/GM vaginal cream Use 0.5g nightly for 1-2 weeks and then twice weekly ongoing 30 g 3     ibuprofen (ADVIL/MOTRIN) 200 MG tablet Take 200 mg by mouth 4 times daily as needed       Multiple Vitamins-Minerals (OCUVITE PO) Take 1 tablet by mouth daily       venlafaxine (EFFEXOR-XR) 37.5 MG 24 hr capsule Take 37.5 mg by mouth daily with food       venlafaxine (EFFEXOR-XR) 75 MG 24 hr capsule Take 75 mg by mouth daily with food       zoster vaccine recombinant adjuvanted (SHINGRIX) injection Inject 0.5 mLs into the muscle once for 1 dose Repeat in 6 months 0.5 mL 0        No Known Allergies     Family History   Problem Relation Age of Onset     Arthritis Mother      Heart Disease Mother      Hypertension Mother      Thyroid Disease Mother      Dementia Mother      Pulmonary Embolism Mother      Heart Disease Father      Other - See Comments Father         Psychiatric illness     Chronic Obstructive  "Pulmonary Disease Father      Dementia Brother      Colon Cancer Paternal Grandmother         Cancer-colon     No Known Problems Brother      Hypertension Sister      Allergies Sister         food     Breast Cancer No family hx of         Cancer-breast       Family Status   Relation Name Status     Mo   at age 87        Dementia     Fa   at age 80        COPD     PGFa          Colon cancer; lived into 80's     Bro Riley Alive     PGMo  (Not Specified)     Bro Landen Alive     Sis Aicha Alive     Neg  (Not Specified)         Social History     Tobacco Use     Smoking status: Never Smoker     Smokeless tobacco: Never Used   Substance Use Topics     Alcohol use: Yes     Comment: couple weekly       Social History     Social History Narrative    Patient  to Jimenez Perry, 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  2 sons, Shiraz Perry ([place]); Regino ([place])  3 grand daughters; 1 on the way due in 2019            ROS  GEN:-fevers/-chills/-night sweats/-significant wt change  NEURO: -headaches/-vision changes  EARS: -hearing changes/-tinnitus  NOSE: -drainage/-congestion  MOUTH/THROAT: - sore throat/-dysphagia/-sores  LUNGS: -sob/-cough  CARDIOVASCULAR: -cp/-palpitations  GI: -pain/-change in bowels/-bloody stools  : -change in bladder/-vaginal discharge or bleeding  ENDOCRINE: -skin or hair changes  HEMATOLOGIC/LYMPHATIC: -swollen nodes  SKIN: -rashes/-lesions  MSK/RHEUM: +rare joint pain/-swelling  NEURO: -weakness/+occasional parasthesias  PSYCH:-anhedonia/+depression/+anxiety       EXAM:   /60 (BP Location: Right arm, Patient Position: Sitting, Cuff Size: Adult Regular)   Pulse 71   Temp 96.2  F (35.7  C) (Tympanic)   Resp 18   Ht 1.676 m (5' 6\")   Wt 64.5 kg (142 lb 3.2 oz)   SpO2 99%   Breastfeeding? No   BMI 22.95 kg/m      Estimated body mass index is 22.95 kg/m  as calculated from the following:    Height as of this encounter: 1.676 m " "(5' 6\").    Weight as of this encounter: 64.5 kg (142 lb 3.2 oz).   GEN: Vitals reviewed.  Healthy appearing. Patient isin no acute distress. Cooperative with exam.  HEENT: Normocephalic atraumatic.  Normal external eye, conjunctiva, lids, cornea with no scleral icterus or conjunctival erythema. Pupils equally round. Oropharynx with no erythema or exudates. Dentition adequate.    NECK: Supple; no thyromegaly or masses noted.  No cervical or supraclavicular lymphadenopathy.  CV:Heart regular in rate and rhythm with no murmur.    LUNGS: Lungs clear to auscultation bilaterally.  Chest rise equal bilaterally.  No accessory muscle use.  ABD:  Soft, nontender, and nondistended.  No rebound. Bowel sounds positive.  SKIN: Warm and dry to touch.  No rash on face, arms and legs.  EXT: No clubbing or cyanosis.  No peripheral edema.  NEURO: Alert and oriented to person, place, and time.  Cranial nerves II-XII grossly intact with no focal or lateralizing deficits.  Muscle tone normal.  Gait normal. No tremor.   MSK: ROM of upper and lower ext symmetric and full.  PSYCH: Mood is good. Affect appropriate. Speech fluent. Answers questions appropriately and thought process normal.       ASSESSMENT AND PLAN:    Lichen sclerosus  -Medication updated at the time.  If she has any changes she is to let us know and we may need to consider rebiopsy.  - clobetasol (TEMOVATE) 0.05 % external ointment  Dispense: 30 g; Refill: 3    Atrophic vaginitis  -We discussed use of this medication.  She will try this along with coconut oil to help with vaginal dryness.  - conjugated estrogens (PREMARIN) 0.625 MG/GM vaginal cream  Dispense: 30 g; Refill: 3    Severe major depression with psychotic features (H)  Continue to follow with mental health and current medication.  She is to call with any issues.  She was encouraged to increase her light exposure in the winter.    Need for shingles vaccine  Rx given  - zoster vaccine recombinant adjuvanted " (SHINGRIX) injection  Dispense: 0.5 mL; Refill: 0    Need for pneumococcal vaccination  Vaccine given today.  - [facility] IMM-  PNEUMOCOCCAL VACCINE,ADULT,SQ OR IM    Follow-up in 8 to 12 months for Medicare annual wellness visit.      BANDAR HELLER DO   7/26/2019 8:46 AM    This document was prepared using voice generated softwear. While every attempt was made for accuracy, grammatical errors may exist.

## 2019-07-26 NOTE — NURSING NOTE
Immunization Documentation    Prior to Immunization administration, verified patients identity using patient's name and date of birth. Please see IMMUNIZATIONS  and order for additional information.  Patient / Parent instructed to remain in clinic for 15 minutes and report any adverse reaction to staff immediately.    Was entire vial of medication used? Yes  Vial/Syringe: Single dose vial    Ngoc Quiroz LPN  7/26/2019   9:05 AM

## 2019-07-26 NOTE — NURSING NOTE
Patient presents to the clinic to establish care.     Medication Reconciliation: complete   Ngoc Quiroz LPN............. July 26, 2019 8:01 AM

## 2019-08-12 ENCOUNTER — OFFICE VISIT (OUTPATIENT)
Dept: PULMONOLOGY | Facility: OTHER | Age: 69
End: 2019-08-12
Attending: INTERNAL MEDICINE
Payer: MEDICARE

## 2019-08-12 VITALS
HEIGHT: 66 IN | HEART RATE: 64 BPM | DIASTOLIC BLOOD PRESSURE: 82 MMHG | BODY MASS INDEX: 23.27 KG/M2 | WEIGHT: 144.8 LBS | SYSTOLIC BLOOD PRESSURE: 126 MMHG

## 2019-08-12 DIAGNOSIS — G47.33 OSA ON CPAP: Primary | ICD-10-CM

## 2019-08-12 PROCEDURE — G0463 HOSPITAL OUTPT CLINIC VISIT: HCPCS

## 2019-08-12 ASSESSMENT — MIFFLIN-ST. JEOR: SCORE: 1205.62

## 2019-08-12 NOTE — PROGRESS NOTES
Sleep Medicine Progress Note  Daly Perry  August 12, 2019  [id number]    Chief Complaint: CPAP follow-up visit    History of Present Illness: Daly Perry is a 68 year old female presenting for above complaint.  He has a history of mild sleep apnea diagnosed September 9, 2013.  At that time her apnea hypopnea index is 5.5/h with a respiratory disturbance index of 12.7/h.  She continues to wear her CPAP regularly.  From June 5 through August 1 her download shows she wore it 66 out of 68 nights and averages 8 hours 19 minutes.  Wiht auto titrate mode 6.2 to 10.2 cm water pressure her 95th percentile pressure is 10.1.  Her apnea hypopnea index is 2.5/h with 1.3 central apneas per hour.  She is pleased with how she is doing.  She is scoring only 5 on the [place] Sleepiness Scale.  Has an S9 CPAP machine which is working well for her.  She would like to get a new mask of a different style.  She uses a nasal pillow mask currently.  On this her 95th percentile leak is only 8.3 L/min.        Past Medical History:  Past Medical History:   Diagnosis Date     Closed left ankle fracture      Cyst of ovary     Hx of right ovarian cyst.     Diverticulosis of large intestine 03/14/2011     Endometriosis     growth on ovary s/p oophrectomy     Fibrocystic breast disease 01/30/2018     Generalized anxiety disorder     Dysthymia, generalized anxiety     Lichen sclerosus     Vulvar LSEA; asymptomatic     Neck pain, chronic 01/30/2018     Improved after USP     Rosacea 1/4/2013     Severe major depression with psychotic features (H) 10/25/2016    inpatient for several months; she does not have complete memory of that time       Medications:  Current Outpatient Medications   Medication     acetaminophen (TYLENOL) 325 MG tablet     ARIPiprazole (ABILIFY) 10 MG tablet     BENZTROPINE MESYLATE PO     calcium carbonate-vitamin D (SM CALCIUM 500/VITAMIN D3) 500-400 MG-UNIT TABS per tablet     clobetasol (TEMOVATE) 0.05 % external  "ointment     conjugated estrogens (PREMARIN) 0.625 MG/GM vaginal cream     ibuprofen (ADVIL/MOTRIN) 200 MG tablet     Multiple Vitamins-Minerals (OCUVITE PO)     venlafaxine (EFFEXOR-XR) 37.5 MG 24 hr capsule     venlafaxine (EFFEXOR-XR) 75 MG 24 hr capsule     No current facility-administered medications for this visit.        Physical Exam:  /82   Pulse 64   Ht 5' 5.5\" (1.664 m)   Wt 144 lb 12.8 oz (65.7 kg)   BMI 23.73 kg/m    limitted to vitals with neck circumference 13-1/2 inches    Assessment and Plan:  68 year old female presenting for mild sleep apnea.  She is compliant with CPAP.  She is benefiting from CPAP.  The plan is to continue CPAP.  An order will be written for new CPAP mask tubing and supplies.  We will continue with current settings.  Durable medical equipment options were discussed with her.  Plan will be to order supplies at [facility] in [place].  Greater than 18 minutes of total time was spent with greater than 2% of time spent counseling and coordinating care.  We reviewed and discussed her data card download in detail.    CC: [facility]    Her CPAP unit is wearing out. An order will be sent for a replacement unit set from 6-10.2 cm water pressure.  "

## 2019-08-12 NOTE — NURSING NOTE
"Chief Complaint   Patient presents with     RECHECK     Sleep study          Initial /82   Pulse 64   Ht 5' 5.5\" (1.664 m)   Wt 144 lb 12.8 oz (65.7 kg)   BMI 23.73 kg/m   Estimated body mass index is 23.73 kg/m  as calculated from the following:    Height as of this encounter: 5' 5.5\" (1.664 m).    Weight as of this encounter: 144 lb 12.8 oz (65.7 kg).    Medication Reconciliation: complete      Norma J. Gosselin, LPN  "

## 2019-09-05 DIAGNOSIS — G47.33 OSA ON CPAP: Primary | ICD-10-CM

## 2019-09-20 ENCOUNTER — DOCUMENTATION ONLY (OUTPATIENT)
Dept: SLEEP MEDICINE | Facility: HOSPITAL | Age: 69
End: 2019-09-20

## 2019-09-23 NOTE — PROGRESS NOTES
Patient was offered choice of vendor and chose [facility].  Patient Daly Perry was set up at [place] on September 20, 2019. Patient received a Resmed AirSense 10 Auto. Pressures were set at 6-10.2 cm H2O.   Patient s ramp is  Off and FLEX/EPR is 3.  Patient received a [employer] Respironics Mask name: Dreamwear  Nasal mask size Small, heated tubing and heated humidifier.  Patient is not enrolled in the STM Program and does need to meet compliance. Patient has a follow up on 11/12/2019 with Dr. Barreto.    Aram Amin

## 2019-10-23 ENCOUNTER — OFFICE VISIT (OUTPATIENT)
Dept: PULMONOLOGY | Facility: OTHER | Age: 69
End: 2019-10-23
Attending: INTERNAL MEDICINE
Payer: MEDICARE

## 2019-10-23 VITALS
WEIGHT: 149.2 LBS | HEIGHT: 66 IN | SYSTOLIC BLOOD PRESSURE: 128 MMHG | BODY MASS INDEX: 23.98 KG/M2 | DIASTOLIC BLOOD PRESSURE: 82 MMHG | HEART RATE: 82 BPM

## 2019-10-23 DIAGNOSIS — G47.33 OSA ON CPAP: Primary | ICD-10-CM

## 2019-10-23 PROCEDURE — G0463 HOSPITAL OUTPT CLINIC VISIT: HCPCS

## 2019-10-23 ASSESSMENT — PAIN SCALES - GENERAL: PAINLEVEL: NO PAIN (0)

## 2019-10-23 ASSESSMENT — MIFFLIN-ST. JEOR: SCORE: 1233.52

## 2019-10-23 NOTE — PROGRESS NOTES
Sleep Medicine Progress Note  Daly Perry  October 23, 2019  [id number]    Chief Complaint: Compliance visit for a new CPAP machine    History of Present Illness: Daly Perry is a 68 year old female presenting for above complaint.  She has a history of mild sleep apnea diagnosed in 2013.  An order was written for new supplies and CPAP machine this summer.  She has her new CPAP machine.  She likes it.she wears it every night.  In the last 30 nights she wore it 29 out of 30 nights more than 4 hours averaging 7 hours 49 minutes.  On this her obstructive events are mostly resolved.  Her apnea hypopnea index is 4.8/h with 3.6 central apneas per hour.  She likes the CPAP machine.  Its working well for her.  She is scoring 7 on the [place] Sleepiness Scale.         Past Medical History:  Past Medical History:   Diagnosis Date     Closed left ankle fracture      Cyst of ovary     Hx of right ovarian cyst.     Diverticulosis of large intestine 03/14/2011     Endometriosis     growth on ovary s/p oophrectomy     Fibrocystic breast disease 01/30/2018     Generalized anxiety disorder     Dysthymia, generalized anxiety     Lichen sclerosus     Vulvar LSEA; asymptomatic     Neck pain, chronic 01/30/2018     Improved after care home     Rosacea 1/4/2013     Severe major depression with psychotic features (H) 10/25/2016    inpatient for several months; she does not have complete memory of that time       Medications:  Current Outpatient Medications   Medication     acetaminophen (TYLENOL) 325 MG tablet     ARIPiprazole (ABILIFY) 10 MG tablet     BENZTROPINE MESYLATE PO     calcium carbonate-vitamin D (SM CALCIUM 500/VITAMIN D3) 500-400 MG-UNIT TABS per tablet     clobetasol (TEMOVATE) 0.05 % external ointment     conjugated estrogens (PREMARIN) 0.625 MG/GM vaginal cream     ibuprofen (ADVIL/MOTRIN) 200 MG tablet     Multiple Vitamins-Minerals (OCUVITE PO)     venlafaxine (EFFEXOR-XR) 37.5 MG 24 hr capsule     venlafaxine  "(EFFEXOR-XR) 75 MG 24 hr capsule     No current facility-administered medications for this visit.        Physical Exam:  /82 (BP Location: Right arm)   Pulse 82   Ht 5' 6\" (1.676 m)   Wt 149 lb 3.2 oz (67.7 kg)   BMI 24.08 kg/m    Neck circumference 13-1/2 inches, exam was limited to vitals    Assessment and Plan:  68 year old female presenting forCPAP compliance visit.  She is compliant with CPAP.  She is benefiting from CPAP.  The plan is to continue CPAP.  Sleep follow-up will be on a as needed a yearly visit..    CC: [facility]  "

## 2020-05-05 ENCOUNTER — HOSPITAL ENCOUNTER (OUTPATIENT)
Dept: MAMMOGRAPHY | Facility: OTHER | Age: 70
Discharge: HOME | End: 2020-05-05
Attending: INTERNAL MEDICINE | Admitting: INTERNAL MEDICINE
Payer: MEDICARE

## 2020-05-05 DIAGNOSIS — Z12.31 VISIT FOR SCREENING MAMMOGRAM: ICD-10-CM

## 2020-05-05 PROCEDURE — 77063 BREAST TOMOSYNTHESIS BI: CPT

## 2020-09-02 ENCOUNTER — OFFICE VISIT (OUTPATIENT)
Dept: PEDIATRICS | Facility: OTHER | Age: 70
End: 2020-09-02
Attending: INTERNAL MEDICINE
Payer: COMMERCIAL

## 2020-09-02 VITALS
HEIGHT: 66 IN | WEIGHT: 153 LBS | SYSTOLIC BLOOD PRESSURE: 136 MMHG | TEMPERATURE: 97.1 F | DIASTOLIC BLOOD PRESSURE: 80 MMHG | RESPIRATION RATE: 16 BRPM | HEART RATE: 77 BPM | OXYGEN SATURATION: 96 % | BODY MASS INDEX: 24.59 KG/M2

## 2020-09-02 DIAGNOSIS — M54.41 CHRONIC BILATERAL LOW BACK PAIN WITH BILATERAL SCIATICA: Primary | ICD-10-CM

## 2020-09-02 DIAGNOSIS — G89.29 CHRONIC BILATERAL LOW BACK PAIN WITH BILATERAL SCIATICA: Primary | ICD-10-CM

## 2020-09-02 DIAGNOSIS — M54.42 CHRONIC BILATERAL LOW BACK PAIN WITH BILATERAL SCIATICA: Primary | ICD-10-CM

## 2020-09-02 PROCEDURE — G0463 HOSPITAL OUTPT CLINIC VISIT: HCPCS

## 2020-09-02 PROCEDURE — 99213 OFFICE O/P EST LOW 20 MIN: CPT | Performed by: INTERNAL MEDICINE

## 2020-09-02 ASSESSMENT — ANXIETY QUESTIONNAIRES
2. NOT BEING ABLE TO STOP OR CONTROL WORRYING: NOT AT ALL
1. FEELING NERVOUS, ANXIOUS, OR ON EDGE: NOT AT ALL
7. FEELING AFRAID AS IF SOMETHING AWFUL MIGHT HAPPEN: NOT AT ALL
5. BEING SO RESTLESS THAT IT IS HARD TO SIT STILL: NOT AT ALL
GAD7 TOTAL SCORE: 1
6. BECOMING EASILY ANNOYED OR IRRITABLE: NOT AT ALL
3. WORRYING TOO MUCH ABOUT DIFFERENT THINGS: NOT AT ALL
IF YOU CHECKED OFF ANY PROBLEMS ON THIS QUESTIONNAIRE, HOW DIFFICULT HAVE THESE PROBLEMS MADE IT FOR YOU TO DO YOUR WORK, TAKE CARE OF THINGS AT HOME, OR GET ALONG WITH OTHER PEOPLE: NOT DIFFICULT AT ALL

## 2020-09-02 ASSESSMENT — MIFFLIN-ST. JEOR: SCORE: 1237.81

## 2020-09-02 ASSESSMENT — PATIENT HEALTH QUESTIONNAIRE - PHQ9
SUM OF ALL RESPONSES TO PHQ QUESTIONS 1-9: 1
5. POOR APPETITE OR OVEREATING: SEVERAL DAYS

## 2020-09-02 NOTE — PROGRESS NOTES
Subjective  Daly Perry is a 69 year old female who presents for low back pain.  Is been bothering her about a year and a half or so.  She first noticed it when she was doing yoga.  It was difficult for her to hold the downward dog pose.  She would feel pain behind the right buttock.  Lately she is noticed the symptoms have accelerated to being present behind the buttocks bilaterally rating down the back of the thighs.  It is been exacerbated by playing pickle ball recently.  It feels like a nerve based pain.  No foot drop.  No saddle anesthesia.  No fecal or urinary incontinence.  No trauma or injury.  No lower extremity edema.  She has not seen physical therapy or chiropractor.    Problem List/PMH: reviewed in EMR, and made relevant updates today.  Medications: reviewed in EMR, and made relevant updates today.  Allergies: reviewed in EMR, and made relevant updates today.    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  2 sons, Shiraz Perry (); Regino ([place])  3 grand daughters; 1 on the way due in Sept 2019     I reviewed social history and made relevant updates today.    Family Hx:   Family History   Problem Relation Age of Onset     Arthritis Mother      Heart Disease Mother      Hypertension Mother      Thyroid Disease Mother      Dementia Mother      Pulmonary Embolism Mother      Heart Disease Father      Other - See Comments Father         Psychiatric illness     Chronic Obstructive Pulmonary Disease Father      Dementia Brother      Colon Cancer Paternal Grandmother         Cancer-colon     No Known Problems Brother      Hypertension Sister      Allergies Sister         food     Breast Cancer No  "family hx of         Cancer-breast       Objective  Vitals: reviewed in EMR.  /80 (BP Location: Right arm, Patient Position: Sitting, Cuff Size: Adult Regular)   Pulse 77   Temp 97.1  F (36.2  C) (Tympanic)   Resp 16   Ht 1.664 m (5' 5.5\")   Wt 69.4 kg (153 lb)   LMP  (LMP Unknown)   SpO2 96%   Breastfeeding No   BMI 25.07 kg/m      Gen: Pleasant female, NAD.  HEENT: MMM  Neck: Supple  Pulm: Breathing easily  Neuro: Grossly intact  Skin: No concerning lesions.  Psychiatric: Normal affect and insight. Does not appear anxious or depressed.  Back: No midline tenderness to palpation or percussion.  No tenderness over piriformis areas.  Negative straight leg raise bilaterally.  Negative Cornell sign bilaterally.  Musculoskeletal: Negative logroll bilaterally.  No pain elicited in hip with internal or external rotation.    Assessment    ICD-10-CM    1. Chronic bilateral low back pain with bilateral sciatica  M54.42 PHYSICAL THERAPY REFERRAL    M54.41     G89.29      Orders Placed This Encounter   Procedures     PHYSICAL THERAPY REFERRAL       Differential diagnosis includes lumbar disc disease, lumbar foraminal stenoses, lumbar facet arthropathy, lumbar central spinal canal stenosis, lumbar paraspinous muscle strain or sprain, lumbar compression fracture, occult malignancy, others.  We had a lengthy discussion today with regards to this differential.  A conservative approach was taken.  We will initiate physical therapy and have a low threshold for imaging and expert consultation if symptoms persist or worsen.    Plan   -- Expected clinical course discussed   -- Medications and their side effects discussed  Patient Instructions   Back Pain    Modifiable Risk Factors    Maintain a healthy weight. Losing 5% can be very helpful.  A loss of 10 lbs, will result in 80 lbs of force lifted off of your back.    Core muscle strength. Keeping up with a physical therapy home exercise program, and/or regular yoga practice " keeps your back healthy.    Don't use tobacco products.  People that smoke have more bulging discs, and they take longer to heal.    Try not to injure it. Be smart when doing activities that require bending, twisting and lifting such as gardening.     Treatments   -- Ibuprofen 600 mg (3 tablets) 3 times per day for 7 days, then as needed   Shortest duration of NSAIDs is best as they can hurt your stomach and kidneys.   Take ibuprofen with food, as can be hard on the stomach   -- Rest   -- Ice/heat whichever feels better   -- Topicals: Aspercreme/IcyHot, lidocaine, capsaicin   -- Schedule visit for physical therapy   -- Call or come back if concerns, else as needed   -- Monitor for warning signs including foot drop, groin numbness, new incontinence or other concerns and return same day for evaluation   -- Otherwise return if you are not improving        Relieving Back Pain  Back pain is a common problem. You can strain back muscles by lifting too much weight or just by moving the wrong way. Back strain can be uncomfortable, even painful. And it can take weeks or months to improve. To help yourself feel better and prevent future back strains, try these tips.  Important: Don't give aspirin to children or teens without first discussing it with your child's healthcare provider.  Ice    Ice reduces muscle pain and swelling. It helps most during the first 24 to 48 hours after an injury.    Wrap an ice pack or a bag of frozen peas in a thin towel. Never put ice directly on your skin.    Place the ice where your back hurts the most.    Don t ice for more than 20 minutes at a time.    You can use ice several times a day.  Medicines  Over-the-counter pain relievers include acetaminophen and anti-inflammatory medicines, which includes aspirin, naproxen, or ibuprofen. They can help ease discomfort. Some also reduce swelling.    Tell your healthcare provider about any medicines you are already taking.    Take medicines only as  directed.  Manipulation and massage  Having manipulation by an osteopathic doctor or chiropractor may be helpful. Getting a massage also may help.   Heat  After the first 48 hours, heat can relax sore muscles and improve blood flow.    Try a warm bath or shower. Or use a heating pad set on low. To prevent a burn, keep a cloth between you and the heating pad.    Don t use a heating pad for more than 15 minutes at a time. Never sleep on a heating pad.  Date Last Reviewed: 6/1/2018 2000-2019 [employer]. [address] [place] [address]. All rights reserved. This information is not intended as a substitute for professional medical care. Always follow your healthcare professional's instructions.                Return if symptoms worsen or fail to improve.    Signed, Ken Marsh MD, FAAP, FACP  Internal Medicine & Pediatrics

## 2020-09-02 NOTE — PATIENT INSTRUCTIONS
Back Pain    Modifiable Risk Factors    Maintain a healthy weight. Losing 5% can be very helpful.  A loss of 10 lbs, will result in 80 lbs of force lifted off of your back.    Core muscle strength. Keeping up with a physical therapy home exercise program, and/or regular yoga practice keeps your back healthy.    Don't use tobacco products.  People that smoke have more bulging discs, and they take longer to heal.    Try not to injure it. Be smart when doing activities that require bending, twisting and lifting such as gardening.     Treatments   -- Ibuprofen 600 mg (3 tablets) 3 times per day for 7 days, then as needed   Shortest duration of NSAIDs is best as they can hurt your stomach and kidneys.   Take ibuprofen with food, as can be hard on the stomach   -- Rest   -- Ice/heat whichever feels better   -- Topicals: Aspercreme/IcyHot, lidocaine, capsaicin   -- Schedule visit for physical therapy   -- Call or come back if concerns, else as needed   -- Monitor for warning signs including foot drop, groin numbness, new incontinence or other concerns and return same day for evaluation   -- Otherwise return if you are not improving        Relieving Back Pain  Back pain is a common problem. You can strain back muscles by lifting too much weight or just by moving the wrong way. Back strain can be uncomfortable, even painful. And it can take weeks or months to improve. To help yourself feel better and prevent future back strains, try these tips.  Important: Don't give aspirin to children or teens without first discussing it with your child's healthcare provider.  Ice    Ice reduces muscle pain and swelling. It helps most during the first 24 to 48 hours after an injury.    Wrap an ice pack or a bag of frozen peas in a thin towel. Never put ice directly on your skin.    Place the ice where your back hurts the most.    Don t ice for more than 20 minutes at a time.    You can use ice several times a  day.  Medicines  Over-the-counter pain relievers include acetaminophen and anti-inflammatory medicines, which includes aspirin, naproxen, or ibuprofen. They can help ease discomfort. Some also reduce swelling.    Tell your healthcare provider about any medicines you are already taking.    Take medicines only as directed.  Manipulation and massage  Having manipulation by an osteopathic doctor or chiropractor may be helpful. Getting a massage also may help.   Heat  After the first 48 hours, heat can relax sore muscles and improve blood flow.    Try a warm bath or shower. Or use a heating pad set on low. To prevent a burn, keep a cloth between you and the heating pad.    Don t use a heating pad for more than 15 minutes at a time. Never sleep on a heating pad.  Date Last Reviewed: 6/1/2018 2000-2019 [employer]. [address] [place] [address], [place] [address]. All rights reserved. This information is not intended as a substitute for professional medical care. Always follow your healthcare professional's instructions.

## 2020-09-02 NOTE — NURSING NOTE
"Chief Complaint   Patient presents with     Back Pain     Patient is here for low back pain. Pain sometimes radiates down right leg. She noticed it about 1 year ago while doing yoga. It has been worsening recently. She has tried Tylenol and Ibuprofen without relief.     Initial /80 (BP Location: Right arm, Patient Position: Sitting, Cuff Size: Adult Regular)   Pulse 77   Temp 97.1  F (36.2  C) (Tympanic)   Resp 16   Ht 1.664 m (5' 5.5\")   Wt 69.4 kg (153 lb)   LMP  (LMP Unknown)   SpO2 96%   Breastfeeding No   BMI 25.07 kg/m   Estimated body mass index is 25.07 kg/m  as calculated from the following:    Height as of this encounter: 1.664 m (5' 5.5\").    Weight as of this encounter: 69.4 kg (153 lb).  Medication Reconciliation: complete    Janey Meadows MA  "

## 2020-09-03 ASSESSMENT — ANXIETY QUESTIONNAIRES: GAD7 TOTAL SCORE: 1

## 2020-09-29 ENCOUNTER — HOSPITAL ENCOUNTER (OUTPATIENT)
Dept: PHYSICAL THERAPY | Facility: OTHER | Age: 70
Setting detail: THERAPIES SERIES
End: 2020-09-29
Attending: INTERNAL MEDICINE
Payer: MEDICARE

## 2020-09-29 DIAGNOSIS — M54.41 CHRONIC BILATERAL LOW BACK PAIN WITH BILATERAL SCIATICA: ICD-10-CM

## 2020-09-29 DIAGNOSIS — M54.42 CHRONIC BILATERAL LOW BACK PAIN WITH BILATERAL SCIATICA: ICD-10-CM

## 2020-09-29 DIAGNOSIS — G89.29 CHRONIC BILATERAL LOW BACK PAIN WITH BILATERAL SCIATICA: ICD-10-CM

## 2020-09-29 PROCEDURE — 97161 PT EVAL LOW COMPLEX 20 MIN: CPT | Mod: GP | Performed by: PHYSICAL THERAPIST

## 2020-09-29 PROCEDURE — 97110 THERAPEUTIC EXERCISES: CPT | Mod: GP | Performed by: PHYSICAL THERAPIST

## 2020-09-29 NOTE — INITIAL ASSESSMENTS
09/29/20 1600   General Information   Type of Visit Initial OP Ortho PT Evaluation   Start of Care Date 09/29/20   Referring Physician Ken Marsh   Patient/Family Goals Statement exercise with less pain, morning pain less.   Orders Evaluate and Treat   Certification Required? Yes   Medical Diagnosis Chronic bilateral low back pain with bilateral sciatica M54.42, M54.41, G89.29    Surgical/Medical history reviewed Yes   Body Part(s)   Body Part(s) Lumbar Spine/SI   Presentation and Etiology   Pertinent history of current problem (include personal factors and/or comorbidities that impact the POC) 1.5 years ago has started to notice some scitic type buttock pain during yoga downward dog stretch, now more recently has increased knee pain with pickleball, and has anterior thigh pain radicular type pain. worse pain is in the morning, stiffness and radicualr increased. states that she feels weaker in her legs that she used to, as well as weaker core.    Impairments A. Pain;J. Burning   Functional Limitations perform activities of daily living;perform desired leisure / sports activities   Symptom Location bialteral anterior thigh nerve pain, bialteral knee pain lateral/inner knee. and at times lumbar spine stiffenss   Chronicity Chronic   Pain rating (0-10 point scale) Best (/10);Worst (/10)   Best (/10) 0   Worst (/10) 6   Pain quality E. Shooting;C. Aching;D. Burning   Frequency of pain/symptoms B. Intermittent   Pain/symptoms are: Worse in the morning   Pain/symptoms exacerbated by B. Walking;G. Certain positions   Pain/symptoms eased by E. Changing positions;C. Rest;G. Heat;H. Cold   Progression of symptoms since onset: Unchanged   Prior Level of Function   Prior Level of Function-Mobility no limites prior to injury, no issues with bending and upright sitting, and bending.   Prior Level of Function-ADLs no issues prior, now has trouble bending and showering in the morning due to pain.   Current Level of Function    Current Community Support Family/friend caregiver   Patient role/employment history F. Retired   Living environment House/[facility]   Fall Risk Screen   Fall screen completed by PT   Is patient a fall risk? No   Abuse Screen (yes response referral indicated)   Feels Unsafe at Home or Work/School no   Feels Threatened by Someone no   Does Anyone Try to Keep You From Having Contact with Others or Doing Things Outside Your Home? no   Physical Signs of Abuse Present no   Lumbar Spine/SI Objective Findings   Posture good   Balance/Proprioception (Single Leg Stance) wobbly single leg balance, but able to sustain 10+ sec.   Flexion ROM good ROM   Extension ROM fair to good   Right Side Bending ROM fair   Left Side Bending ROM fair   Repeated Extension-Standing ROM neg   Repeated Flexion-Standing ROM neg   Pelvic Screen clear   Hip Screen slight sore with hip scour and YAA, bilaterally   Transversus Abdominus Strength (Nain Leg Lowering-deg) fair   Hip Flexion (L2) Strength 4-   Hip Abduction Strength 4   Hip Adduction Strength 4+   Hip Extension Strength 4   Knee Flexion Strength 4+   Knee Extension (L3) Strength 4   Ankle Dorsiflexion (L4) Strength 4+   Great Toe Extension (L5) Strength 4+   Ankle Plantar Flexion (S1) Strength 4+   Hamstring Flexibility mild tigh   Hip Flexor Flexibility mild   Quadricep Flexibility moderate tigh   Obers Flexibility mild tight   Piriformis Flexibility mild tight   SLR neg   Stalin Test nerg   Prone Instability Test neg   Crossover SLR neg   Repeated Extension Prone neg   Slump Test pain/radic with sitting upright   Segmental Mobility good motion   Sensation Testing intact   Palpation mild tight paraspinals/ longisismus   Planned Therapy Interventions   Planned Therapy Interventions joint mobilization;manual therapy;neuromuscular re-education;ROM;strengthening;stretching   Planned Modality Interventions   Planned Modality Interventions Cryotherapy;Electrical stimulation;Hot  packs;Ultrasound;Traction   Planned Modality Interventions Comments per PT judgement.   Clinical Impression   Criteria for Skilled Therapeutic Interventions Met yes, treatment indicated   PT Diagnosis DDD/DJD of spine casuing nerve irritation, knee OA lateral knee pain   Functional limitations due to impairments sitting/standing, recreation activities. bending some ADLs limited   Clinical Presentation Evolving/Changing   Clinical Decision Making (Complexity) Low complexity   Predicted Duration of Therapy Intervention (days/wks) 1-2x/wk 8-12 weeks   Risk & Benefits of therapy have been explained Yes   Patient, Family & other staff in agreement with plan of care Yes   Clinical Impression Comments need to improve core and hip strength to support lumbar spine and knees.    Total Evaluation Time   PT Nikhil, Low Complexity Minutes (09541) 30   Therapy Certification   Certification date from 09/29/20   Certification date to 11/27/20   Medical Diagnosis Chronic bilateral low back pain with bilateral sciatica M54.42, M54.41, G89.29

## 2020-09-29 NOTE — PROGRESS NOTES
[facility]          OUTPATIENT PHYSICAL THERAPY ORTHOPEDIC EVALUATION  PLAN OF TREATMENT FOR OUTPATIENT REHABILITATION  (COMPLETE FOR INITIAL CLAIMS ONLY)  Patient's Last Name, First Name, M.I.  YOB: 1950  Daly Perry    Provider s Name:  [facility]   Medical Record No.  [id number]   Start of Care Date:  09/29/20   Onset Date:      Type:     _X__PT   ___OT   ___SLP Medical Diagnosis:  Chronic bilateral low back pain with bilateral sciatica M54.42, M54.41, G89.29      PT Diagnosis:  DDD/DJD of spine casuing nerve irritation, knee OA lateral knee pain   Visits from SOC:  1      _________________________________________________________________________________  Plan of Treatment/Functional Goals:  joint mobilization, manual therapy, neuromuscular re-education, ROM, strengthening, stretching     Cryotherapy, Electrical stimulation, Hot packs, Ultrasound, Traction  per PT judgement.  Goals                          Therapy Frequency:     Predicted Duration of Therapy Intervention:  1-2x/wk 8-12 weeks    Thai Roberson, PT                 I CERTIFY THE NEED FOR THESE SERVICES FURNISHED UNDER        THIS PLAN OF TREATMENT AND WHILE UNDER MY CARE     (Physician co-signature of this document indicates review and certification of the therapy plan).                       Certification Date From:  09/29/20   Certification Date To:  11/27/20    Referring Provider:  Ken Marsh    Initial Assessment        See Epic Evaluation Start of Care Date: 09/29/20

## 2020-10-06 ENCOUNTER — HOSPITAL ENCOUNTER (OUTPATIENT)
Dept: PHYSICAL THERAPY | Facility: OTHER | Age: 70
Setting detail: THERAPIES SERIES
End: 2020-10-06
Attending: INTERNAL MEDICINE
Payer: MEDICARE

## 2020-10-06 PROCEDURE — 97110 THERAPEUTIC EXERCISES: CPT | Mod: GP | Performed by: PHYSICAL THERAPIST

## 2020-10-06 PROCEDURE — 97140 MANUAL THERAPY 1/> REGIONS: CPT | Mod: GP | Performed by: PHYSICAL THERAPIST

## 2020-10-07 ENCOUNTER — HOSPITAL ENCOUNTER (OUTPATIENT)
Dept: GENERAL RADIOLOGY | Facility: OTHER | Age: 70
End: 2020-10-07
Attending: INTERNAL MEDICINE
Payer: MEDICARE

## 2020-10-07 ENCOUNTER — OFFICE VISIT (OUTPATIENT)
Dept: INTERNAL MEDICINE | Facility: OTHER | Age: 70
End: 2020-10-07
Attending: INTERNAL MEDICINE
Payer: COMMERCIAL

## 2020-10-07 VITALS
RESPIRATION RATE: 16 BRPM | HEIGHT: 66 IN | DIASTOLIC BLOOD PRESSURE: 80 MMHG | HEART RATE: 72 BPM | WEIGHT: 157 LBS | SYSTOLIC BLOOD PRESSURE: 118 MMHG | BODY MASS INDEX: 25.23 KG/M2 | TEMPERATURE: 97.3 F | OXYGEN SATURATION: 98 %

## 2020-10-07 DIAGNOSIS — Z13.6 SCREENING FOR AAA (ABDOMINAL AORTIC ANEURYSM): ICD-10-CM

## 2020-10-07 DIAGNOSIS — R29.898 WEAKNESS OF BOTH LOWER EXTREMITIES: ICD-10-CM

## 2020-10-07 DIAGNOSIS — Z23 NEED FOR INFLUENZA VACCINATION: ICD-10-CM

## 2020-10-07 DIAGNOSIS — Z00.00 ENCOUNTER FOR MEDICARE ANNUAL WELLNESS EXAM: Primary | ICD-10-CM

## 2020-10-07 DIAGNOSIS — Z13.220 SCREENING FOR HYPERLIPIDEMIA: ICD-10-CM

## 2020-10-07 DIAGNOSIS — Z13.1 SCREENING FOR DIABETES MELLITUS: ICD-10-CM

## 2020-10-07 DIAGNOSIS — R20.2 PARESTHESIA OF BOTH LOWER EXTREMITIES: ICD-10-CM

## 2020-10-07 DIAGNOSIS — R10.32 LEFT LOWER QUADRANT PAIN: ICD-10-CM

## 2020-10-07 LAB
ALBUMIN SERPL-MCNC: 4.3 G/DL (ref 3.5–5.7)
ALP SERPL-CCNC: 67 U/L (ref 34–104)
ALT SERPL W P-5'-P-CCNC: 19 U/L (ref 7–52)
ANION GAP SERPL CALCULATED.3IONS-SCNC: 5 MMOL/L (ref 3–14)
AST SERPL W P-5'-P-CCNC: 22 U/L (ref 13–39)
BILIRUB SERPL-MCNC: 0.4 MG/DL (ref 0.3–1)
BUN SERPL-MCNC: 23 MG/DL (ref 7–25)
CALCIUM SERPL-MCNC: 9.6 MG/DL (ref 8.6–10.3)
CHLORIDE SERPL-SCNC: 103 MMOL/L (ref 98–107)
CHOLEST SERPL-MCNC: 228 MG/DL
CO2 SERPL-SCNC: 31 MMOL/L (ref 21–31)
CREAT SERPL-MCNC: 0.67 MG/DL (ref 0.6–1.2)
ERYTHROCYTE [DISTWIDTH] IN BLOOD BY AUTOMATED COUNT: 12.8 % (ref 10–15)
GFR SERPL CREATININE-BSD FRML MDRD: 88 ML/MIN/{1.73_M2}
GLUCOSE SERPL-MCNC: 95 MG/DL (ref 70–105)
HCT VFR BLD AUTO: 41.1 % (ref 35–47)
HDLC SERPL-MCNC: 104 MG/DL (ref 23–92)
HGB BLD-MCNC: 13.3 G/DL (ref 11.7–15.7)
LDLC SERPL CALC-MCNC: 115 MG/DL
MCH RBC QN AUTO: 31.4 PG (ref 26.5–33)
MCHC RBC AUTO-ENTMCNC: 32.4 G/DL (ref 31.5–36.5)
MCV RBC AUTO: 97 FL (ref 78–100)
NONHDLC SERPL-MCNC: 124 MG/DL
PLATELET # BLD AUTO: 270 10E9/L (ref 150–450)
POTASSIUM SERPL-SCNC: 4 MMOL/L (ref 3.5–5.1)
PROT SERPL-MCNC: 6.9 G/DL (ref 6.4–8.9)
RBC # BLD AUTO: 4.23 10E12/L (ref 3.8–5.2)
SODIUM SERPL-SCNC: 139 MMOL/L (ref 134–144)
TRIGL SERPL-MCNC: 44 MG/DL
TSH SERPL DL<=0.05 MIU/L-ACNC: 1.32 IU/ML (ref 0.34–5.6)
VIT B12 SERPL-MCNC: 614 PG/ML (ref 180–914)
WBC # BLD AUTO: 4.6 10E9/L (ref 4–11)

## 2020-10-07 PROCEDURE — 36415 COLL VENOUS BLD VENIPUNCTURE: CPT | Mod: ZL | Performed by: INTERNAL MEDICINE

## 2020-10-07 PROCEDURE — 80053 COMPREHEN METABOLIC PANEL: CPT | Mod: ZL | Performed by: INTERNAL MEDICINE

## 2020-10-07 PROCEDURE — 85027 COMPLETE CBC AUTOMATED: CPT | Mod: ZL | Performed by: INTERNAL MEDICINE

## 2020-10-07 PROCEDURE — G0439 PPPS, SUBSEQ VISIT: HCPCS | Performed by: INTERNAL MEDICINE

## 2020-10-07 PROCEDURE — 72100 X-RAY EXAM L-S SPINE 2/3 VWS: CPT

## 2020-10-07 PROCEDURE — G0463 HOSPITAL OUTPT CLINIC VISIT: HCPCS | Mod: 25 | Performed by: INTERNAL MEDICINE

## 2020-10-07 PROCEDURE — 80061 LIPID PANEL: CPT | Mod: ZL | Performed by: INTERNAL MEDICINE

## 2020-10-07 PROCEDURE — 99214 OFFICE O/P EST MOD 30 MIN: CPT | Mod: 25 | Performed by: INTERNAL MEDICINE

## 2020-10-07 PROCEDURE — G0463 HOSPITAL OUTPT CLINIC VISIT: HCPCS | Performed by: INTERNAL MEDICINE

## 2020-10-07 PROCEDURE — 86039 ANTINUCLEAR ANTIBODIES (ANA): CPT | Mod: ZL | Performed by: INTERNAL MEDICINE

## 2020-10-07 PROCEDURE — 86038 ANTINUCLEAR ANTIBODIES: CPT | Mod: ZL | Performed by: INTERNAL MEDICINE

## 2020-10-07 PROCEDURE — 84443 ASSAY THYROID STIM HORMONE: CPT | Mod: ZL | Performed by: INTERNAL MEDICINE

## 2020-10-07 PROCEDURE — 82607 VITAMIN B-12: CPT | Mod: ZL | Performed by: INTERNAL MEDICINE

## 2020-10-07 RX ORDER — IBUPROFEN 200 MG
1 CAPSULE ORAL DAILY
COMMUNITY
End: 2022-01-19 | Stop reason: ALTCHOICE

## 2020-10-07 RX ORDER — LUTEIN/ZEAXANTHIN 25 MG-5 MG
1 CAPSULE ORAL DAILY
Status: ON HOLD | COMMUNITY
End: 2024-04-02

## 2020-10-07 SDOH — HEALTH STABILITY: MENTAL HEALTH: HOW OFTEN DO YOU HAVE 6 OR MORE DRINKS ON ONE OCCASION?: NOT ASKED

## 2020-10-07 SDOH — HEALTH STABILITY: MENTAL HEALTH: HOW OFTEN DO YOU HAVE A DRINK CONTAINING ALCOHOL?: NOT ASKED

## 2020-10-07 SDOH — HEALTH STABILITY: MENTAL HEALTH: HOW MANY STANDARD DRINKS CONTAINING ALCOHOL DO YOU HAVE ON A TYPICAL DAY?: NOT ASKED

## 2020-10-07 ASSESSMENT — PAIN SCALES - GENERAL: PAINLEVEL: MODERATE PAIN (5)

## 2020-10-07 ASSESSMENT — PATIENT HEALTH QUESTIONNAIRE - PHQ9: SUM OF ALL RESPONSES TO PHQ QUESTIONS 1-9: 1

## 2020-10-07 ASSESSMENT — MIFFLIN-ST. JEOR: SCORE: 1265.49

## 2020-10-07 NOTE — PROGRESS NOTES
"Medicare Wellness Visit   Ms. Perry is a 69 year old female who presents today who presents for Preventive Visit.      Are you in the first 12 months of your Medicare coverage?  No    Health Risk Assessment     Do you feel safe in your environment? Yes    Physical Health:    In general, how would you rate your overall physical health? good    Outside of work, how many days during the week do you exercise? 6-7 days/week    Outside of work, approximately how many minutes a day do you exercise?greater than 60 minutes    If you drink alcohol do you typically have >3 drinks per day or >7 drinks per week? No    Do you usually eat at least 4 servings of fruit and vegetables a day, include whole grains & fiber and avoid regularly eating high fat or \"junk\" foods? Yes    Do you have any problems taking medications regularly?  No    Do you have any side effects from medications? none    Needs assistance for the following daily activities: no assistance needed    Which of the following safety concerns are present in your home?  throw rugs in the hallway and lack of grab bars in the bathroom     Hearing impairment: Yes, Difficulty following a conversation in a noisy restaurant or crowded room.    Feel that people are mumbling or not speaking clearly.    Difficulty following dialogue in the theater.    Need to ask people to speak up or repeat themselves.    Difficulty understanding soft or whispered speech.    In the past 6 months, have you been bothered by leaking of urine? no      Screening     Fall risk  Fallen 2 or more times in the past year?: No  Any fall with injury in the past year?: No    Cognitive Screening   1) Repeat 3 items (Leader, Season, Table)    2) Clock draw: ABNORMAL/Borderline  3) 3 item recall: Recalls 3 objects  Results: 3 items recalled: COGNITIVE IMPAIRMENT LESS LIKELY    Mini-CogTM Copyright S [employer]. Licensed by the author for use in [facility]; reprinted with permission (rishi@.[facility]). All " "rights reserved.      Do you have sleep apnea, excessive snoring or daytime drowsiness?: yes, has a CPAP    Breast cancer screenin20    Regular dental visits: annually, due in January    Eye exam with in 2 years: annually, done in 2020      End of Life Planning     Have you ever done Advance Care Planning? (For example, a Health Directive, POLST, or a discussion with a medical provider or your loved ones about your wishes): No, advance care planning information given to patient to review.  Advanced care planning was discussed at today's visit.      End of Life Planning:  Patient currently has an advanced directive: No.  I have verified the patient's ablity to prepare an advanced directive/make health care decisions.  Literature was provided to assist patient in preparing an advanced directive.        Medical Record Update     Reviewed and updated as needed this visit by clinical staff  Tobacco  Allergies  Meds  Med Hx  Surg Hx  Fam Hx  Soc Hx      Reviewed and updated as needed this visit by Provider  Tobacco  Allergies  Meds  Problems  Med Hx  Surg Hx  Fam Hx            Current providers sharing in care for this patient include:   Patient Care Team:  Nidhi Baer DO as PCP - General (Internal Medicine)  Ken Marsh MD as Assigned PCP     Subjective:   She has been having some pain in the right buttock which was noted 1.5 years ago with yoga.  Over the summer she feels this worsened over the summer.  The pain she has had is bilateral and fairly symmetric.  She also has had some \"shocking\" sensations in the bilateral legs from hip through knee posterior and anterior.  She noted this about 6 weeks ago.  She feels she has had some decreased strength in legs.  She denies any saddle anesthesia or bowel or bladder changes.  She has been using ice which hurts.  No arm symptoms and no specific injury.  She was seen in early September for this.  She was set up with physical therapy " "although is unsure how much this is helping.  She feels that her symptoms are gradually worsened overall.     She has had some left lower abdominal pain.  This seems to come on episodically.  And his significant pain that it keeps her from doing other activities.   She reports that the pain overall feels somewhat like menstrual cramps.  She has a history of ovarian cysts.  She had a previous right oophorectomy but believes the left ovary is in place.      She is due for several screening studies.      Review of Systems     GEN: -fevers/-chills/+occasional night sweats, better in the past year/+wt change - 10 -15 lb gain  NEURO: + chronic headaches/-vision changes  EARS: +gradual hearing changes/-tinnitus  NOSE: -drainage/-congestion  MOUTH/THROAT: - sore throat/-dysphagia/-sores  LUNGS: -sob/-cough  CARDIOVASCULAR: -cp/-palpitations  ABD: -pain/-change in bowels/-bloody stools  : -change in bladder/-sores/-vaginal discharge or bleeding  HEMATOLOGIC/LYMPHATIC: -swollen nodes  SKIN: -rashes/-lesions  MSK/RHEUM: -joint pain/-joint swelling  NEURO: +weakness/+parasthesias  PSYCH: +depression/+anxiety - controlled        OBJECTIVE:     Vitals:    10/07/20 0837   BP: 118/80   BP Location: Right arm   Patient Position: Sitting   Cuff Size: Adult Regular   Pulse: 72   Resp: 16   Temp: 97.3  F (36.3  C)   TempSrc: Tympanic   SpO2: 98%   Weight: 71.2 kg (157 lb)   Height: 1.679 m (5' 6.1\")        Estimated body mass index is 25.26 kg/m  as calculated from the following:    Height as of this encounter: 1.679 m (5' 6.1\").    Weight as of this encounter: 71.2 kg (157 lb).     Physical Exam  GEN: Vitals reviewed. Healthy appearing. Patient is in no acute distress. Cooperative with exam.  HEENT: Normocephalic atraumatic.  Eyes grossly normal to inspection.  No discharge or erythema, or obvious scleral/conjunctival abnormalities. EACs clear bilaterally, TM gray with normal landmarks.  NECK: Supple; no thyromegaly or masses noted. "  No cervical or supraclavicular lymphadenopathy.  CV: Heart regular in rate and rhythm with no murmur.    LUNGS: No audible wheeze, cough, or visible cyanosis.  No visible retractions or increased work of breathing.  Lungs clear to auscultation bilaterally.    ABD:  Soft, nontender at thist lisset, and nondistended.  No rebound. Bowel sounds positive.  SKIN: Warm and dry to touch.  Visible skin clear. No significant rash, abnormal pigmentation or lesions.  EXT: No clubbing or cyanosis.  No peripheral edema.  NEURO: Alert and oriented to person, place, and time.    Cranial nerves: Pupils equal, round, reactive to light and accomodation. Visual fields full. Extraocular muscles full. Facial sensation to light touch normal. Facial strength symmetric. Hearing normal. Shoulder shrug normal and symmetric.  SCM muscle tone normal.   Motor: Tone and strength normal and symmetric in distal and proximal BUE and BLE. No tremor.    Sensory: Normal and symmetric to light touch.  Cerebellar: Rapid alternating movements of hands and heel-tap intact are normal. Normal gait.   PSYCH: Mood is good.  Mentation appears normal, affect normal/bright, judgement and insight intact, normal speech and appearance well-groomed.      Labs reviewed in EPIC    ASSESSMENT / PLAN:     Weakness of both lower extremities  - Exam is normal today however symptoms are concerning for neurologic involvement.  Basic labs are obtained and show no significant abnormality.  PATRICIA is borderline abnormal however likely a normal variant.  X-ray shows degenerative disease and scoliosis.  At this time given her symptoms and progression it is recommended that we pursue MRI.  She was encouraged to be cautious and possibly hold physical therapy in the meantime.  If MRI does not show any obvious cause of her symptoms it would be recommended to consider EMG followed by possible additional imaging or referral to neurology.  - MR Lumbar Spine w/o Contrast  - XR Lumbar Spine  2/3 Views  - Anti Nuclear Esther IgG by IFA with Reflex  - Vitamin B12  - TSH Reflex GH  - CBC with platelets    Paresthesia of both lower extremities  See above  - MR Lumbar Spine w/o Contrast  - XR Lumbar Spine 2/3 Views  - Anti Nuclear Esther IgG by IFA with Reflex  - Vitamin B12  - TSH Reflex GH  - CBC with platelets    Screening for diabetes mellitus  - Comprehensive metabolic panel    Screening for hyperlipidemia  - Lipid Profile    Screening for AAA (abdominal aortic aneurysm)  - US Aorta Medicare AAA Screening    Left lower quadrant pain  -Etiology is unknown however with possible compressive symptoms in her legs, history of ovarian cysts and current pain we will pursue ultrasound of the abdomen.    - US Pelvic Complete with Transvaginal    Encounter for Medicare annual wellness exam        Preventative Care Guidelines and Health Counseling     COUNSELING:  Reviewed preventive health counseling  Special attention given to:   Preventative screening  Regular exercise  Healthy diet/nutrition  Bladder control   Immunizations   Reviewed preventive health counseling, as reflected in patient instructions    118/80       Body mass index is 25.26 kg/m .         Appropriate preventive services were discussed with this patient, including applicable screening as appropriate for cardiovascular disease, diabetes, osteopenia/osteoporosis, and glaucoma.  As appropriate for age/gender, discussed screening for colorectal cancer, prostate cancer, breast cancer, and cervical cancer. Checklist reviewing preventive services available has been given to the patient.    Reviewed patients plan of care and provided an AVS. The Basic Care Plan (routine screening as documented in Health Maintenance) for patient meets the Care Plan requirement. This Care Plan has been established and reviewed with the Patient and any available family members.    Counseling Resources:  ATP IV Guidelines  Pooled Cohorts Equation Calculator  Breast Cancer Risk  Calculator  FRAX Risk Assessment  ICSI Preventive Guidelines  Dietary Guidelines for Americans, 2010  USDA's MyPlate  ASA Prophylaxis  Lung CA Screening    Nidhi Baer DO  10/7/2020  8:39 AM  [facility]

## 2020-10-07 NOTE — PATIENT INSTRUCTIONS
Patient Education   Personalized Prevention Plan  You are due for the preventive services outlined below.  Your care team is available to assist you in scheduling these services.  If you have already completed any of these items, please share that information with your care team to update in your medical record.  Health Maintenance Due   Topic Date Due     Depression Action Plan  1950     Annual Wellness Visit  11/10/2017     Flu Vaccine (1) 09/01/2020

## 2020-10-07 NOTE — NURSING NOTE
Patient presents to clinic today for annual Medicare visit and would like to discuss possible back X-ray. She is doing PT for bilateral leg pain.  Medication reconciliation completed.  Sabina Chaudhry CMA([facility])..................10/7/2020   8:33 AM

## 2020-10-08 ENCOUNTER — HOSPITAL ENCOUNTER (OUTPATIENT)
Dept: PHYSICAL THERAPY | Facility: OTHER | Age: 70
Setting detail: THERAPIES SERIES
End: 2020-10-08
Attending: INTERNAL MEDICINE
Payer: MEDICARE

## 2020-10-08 LAB
ANA PAT SER IF-IMP: ABNORMAL
ANA SER QL IF: ABNORMAL
ANA TITR SER IF: ABNORMAL {TITER}

## 2020-10-08 PROCEDURE — 97110 THERAPEUTIC EXERCISES: CPT | Mod: GP | Performed by: PHYSICAL THERAPIST

## 2020-10-08 PROCEDURE — 97140 MANUAL THERAPY 1/> REGIONS: CPT | Mod: GP | Performed by: PHYSICAL THERAPIST

## 2020-10-09 NOTE — RESULT ENCOUNTER NOTE
Please let patient know her results have returned. Xray from earlier this week shows scoliosis with arthritis/degenerative changes.  The MRI will be more revealing and I will contact her with results when available.  Call if she has any questions.

## 2020-10-13 ENCOUNTER — HOSPITAL ENCOUNTER (OUTPATIENT)
Dept: MRI IMAGING | Facility: OTHER | Age: 70
Discharge: HOME | End: 2020-10-13
Attending: INTERNAL MEDICINE | Admitting: INTERNAL MEDICINE
Payer: MEDICARE

## 2020-10-13 ENCOUNTER — HOSPITAL ENCOUNTER (OUTPATIENT)
Dept: PHYSICAL THERAPY | Facility: OTHER | Age: 70
Setting detail: THERAPIES SERIES
End: 2020-10-13
Attending: INTERNAL MEDICINE
Payer: MEDICARE

## 2020-10-13 DIAGNOSIS — R20.2 PARESTHESIA OF BOTH LOWER EXTREMITIES: ICD-10-CM

## 2020-10-13 DIAGNOSIS — R29.898 WEAKNESS OF BOTH LOWER EXTREMITIES: ICD-10-CM

## 2020-10-13 PROCEDURE — 97140 MANUAL THERAPY 1/> REGIONS: CPT | Mod: GP | Performed by: PHYSICAL THERAPIST

## 2020-10-13 PROCEDURE — 97110 THERAPEUTIC EXERCISES: CPT | Mod: GP | Performed by: PHYSICAL THERAPIST

## 2020-10-13 PROCEDURE — 72148 MRI LUMBAR SPINE W/O DYE: CPT

## 2020-10-16 ENCOUNTER — HOSPITAL ENCOUNTER (OUTPATIENT)
Dept: ULTRASOUND IMAGING | Facility: OTHER | Age: 70
End: 2020-10-16
Attending: INTERNAL MEDICINE
Payer: MEDICARE

## 2020-10-16 DIAGNOSIS — R10.32 LEFT LOWER QUADRANT PAIN: ICD-10-CM

## 2020-10-16 DIAGNOSIS — Z13.6 SCREENING FOR AAA (ABDOMINAL AORTIC ANEURYSM): ICD-10-CM

## 2020-10-16 PROCEDURE — 76856 US EXAM PELVIC COMPLETE: CPT

## 2020-10-16 PROCEDURE — 76706 US ABDL AORTA SCREEN AAA: CPT

## 2020-10-19 ENCOUNTER — TELEPHONE (OUTPATIENT)
Dept: INTERNAL MEDICINE | Facility: OTHER | Age: 70
End: 2020-10-19

## 2020-10-19 DIAGNOSIS — R29.898 WEAKNESS OF BOTH LOWER EXTREMITIES: Primary | ICD-10-CM

## 2020-10-19 DIAGNOSIS — R20.2 PARESTHESIA OF BOTH LOWER EXTREMITIES: ICD-10-CM

## 2020-10-19 NOTE — TELEPHONE ENCOUNTER
Please call patient with results.    US of the pelvis is negative for any concerns with left ovary or uterus.  Pain/discomfort is likely musculoskeletal and if she has ongoing issues/concerns she should let us know.    US of the aorta does not show any aneurysm which is reassuring.  No further follow up is needed.    I talked with radiology about her MRI lumbar spine and it shows multiple areas of degenerative disease (arthritis),  Scoliosis and multiple areas of nerve root compression along with area of spinal stenosis.  Because of these findings and her symptoms I would recommend a referral to a neurosurgeon in [place] or the [facility].  She should let me know if she has a preference.

## 2020-10-20 ENCOUNTER — HOSPITAL ENCOUNTER (OUTPATIENT)
Dept: PHYSICAL THERAPY | Facility: OTHER | Age: 70
Setting detail: THERAPIES SERIES
End: 2020-10-20
Attending: INTERNAL MEDICINE
Payer: MEDICARE

## 2020-10-20 PROCEDURE — 97110 THERAPEUTIC EXERCISES: CPT | Mod: GP | Performed by: PHYSICAL THERAPIST

## 2020-10-20 PROCEDURE — 97140 MANUAL THERAPY 1/> REGIONS: CPT | Mod: GP | Performed by: PHYSICAL THERAPIST

## 2020-10-21 NOTE — TELEPHONE ENCOUNTER
Please attempt to call patient again with results.  She is scheduled to follow-up with me in early November and we can discuss all of it then further if wanted.

## 2020-10-21 NOTE — TELEPHONE ENCOUNTER
She may rather go to [place] versus the [facility]. Please place referral. She will write down any and all questions from now until she sees you.  Sabina Chaudhry CMA([facility])..................10/21/2020   3:39 PM

## 2020-10-22 ENCOUNTER — HOSPITAL ENCOUNTER (OUTPATIENT)
Dept: PHYSICAL THERAPY | Facility: OTHER | Age: 70
Setting detail: THERAPIES SERIES
End: 2020-10-22
Attending: INTERNAL MEDICINE
Payer: MEDICARE

## 2020-10-22 PROCEDURE — 97140 MANUAL THERAPY 1/> REGIONS: CPT | Mod: GP | Performed by: PHYSICAL THERAPIST

## 2020-10-22 PROCEDURE — 97110 THERAPEUTIC EXERCISES: CPT | Mod: GP | Performed by: PHYSICAL THERAPIST

## 2020-10-26 ENCOUNTER — THERAPY VISIT (OUTPATIENT)
Dept: CHIROPRACTIC MEDICINE | Facility: OTHER | Age: 70
End: 2020-10-26
Attending: CHIROPRACTOR
Payer: COMMERCIAL

## 2020-10-26 DIAGNOSIS — M99.03 SEGMENTAL AND SOMATIC DYSFUNCTION OF LUMBAR REGION: Primary | ICD-10-CM

## 2020-10-26 DIAGNOSIS — M51.369 DDD (DEGENERATIVE DISC DISEASE), LUMBAR: ICD-10-CM

## 2020-10-26 DIAGNOSIS — M99.05 SEGMENTAL AND SOMATIC DYSFUNCTION OF PELVIC REGION: ICD-10-CM

## 2020-10-26 DIAGNOSIS — M47.27 LUMBOSACRAL RADICULOPATHY DUE TO DEGENERATIVE JOINT DISEASE OF SPINE: ICD-10-CM

## 2020-10-26 PROCEDURE — G0463 HOSPITAL OUTPT CLINIC VISIT: HCPCS

## 2020-10-26 PROCEDURE — 99213 OFFICE O/P EST LOW 20 MIN: CPT | Mod: GY | Performed by: CHIROPRACTOR

## 2020-10-26 PROCEDURE — 98940 CHIROPRACT MANJ 1-2 REGIONS: CPT | Performed by: CHIROPRACTOR

## 2020-10-26 NOTE — PROGRESS NOTES
"Oct 26, 2020  PATIENT:  Daly Perry is a 69 year old  female presenting for chiropractic evaluation for: Lumbar spine DDD/DJD with bilateral leg pain.  Referred by: Thai Roberson Physical Therapist    Date of Initial Visit for this Episode:  Oct 26, 2020   Visit #1/8-12    SUBJECTIVE / HPI:   Primary complaint: Low back and bilateral leg pain    This Summer she started having low back pain and \"nerve\" shooting pains from hips to knees. Describes an achey band across lower lumbar and fatigued by midafternoon.  Sharp pains are mostly above knees, but sometimes radiate to outer aspects of both legs.  Patient notes weakness, Hanging onto rail to help ascend stairs and pushing up with arms to stand from sitting in a chair.   She is an avid  and has difficulty bending gardening due to symptoms. She slowed down from exercise routine of doing yoga this past spring and summer with cancellation of classes due to covid19 pandemic.  She has gained 10-15 lbs with less exercise.  She has been walking 2 miles about 2xweek and pain or weakness does not worsen nor improve with walking.  She has had xray, MRI, and pelvic and abdominal ultrasound imaging.  Multilevel lumbar disc and joint degeneration with multilevel nerve compression seen on MRI.  See details in objective.    She took ibuprofen and tylenol q 4-6 hrs. And topical voltaren cream and didn't help. She was referred to Physical Therapy with Thai Roberson, PT for past month has given her exercises to strengthen and she applies ice. She has noticed some improvement.  Therapist discussed her case with my colleague, Shad Yeboah D.C. and both agreed patient could likely benefit from other conservative chiropractic treatments, specifically manual flexion/distraction technique to decompress discs and restore lumbar spine motion.     She has been referred to see a Neurosurgeon scheduled for November.     Description and onset: gradual, months  Location: lumbar "   Radiation of pain: yes, bilateral anterior thighs, occasional bilateral outer legs to ankles  Quality: frequent diffuse ache/fatigued;  intermittent sharp shooting  Pain rated currently:  4/10  Pain rated at it's worst: 6/10  Worse with:  stairs and bending  Improved by:  Ice  Previous treatment: ice, Physical Therapy, ibuprofen and tylenol q 4-6 hrs. didn't help, voltaren cream.    Other Health Care Providers seen for this: PCP, PT, Radiology  Previous injury:  No trauma; patient worked many years as a     See flowsheets in chart for details.  Oswestry (KERRY) Questionnaire    OSWESTRY DISABILITY INDEX 10/26/2020   Count 10   Sum 11   Oswestry Score (%) 22   Some recent data might be hidden      Functional limitations:  Lifting/bending, travel    ROS:  The patient denies any fevers, chills, nausea, vomiting, diarrhea, constipation,dysuria, hematuria, or urinary hesitancy or incontinence.  No shortness of breath, chest pain, or rashes.    Patient Active Problem List   Diagnosis     Diverticulosis of large intestine     Fibrocystic breast disease     Generalized anxiety disorder     Rosacea     Severe major depression with psychotic features (H)       ALLERGIES:  No Known Allergies    CURRENT MEDICATIONS:  Current Outpatient Medications   Medication     acetaminophen (TYLENOL) 325 MG tablet     ARIPiprazole (ABILIFY) 10 MG tablet     BENZTROPINE MESYLATE PO     calcium 600 MG tablet     clobetasol (TEMOVATE) 0.05 % external ointment     conjugated estrogens (PREMARIN) 0.625 MG/GM vaginal cream     ibuprofen (ADVIL/MOTRIN) 200 MG tablet     Lutein-Zeaxanthin (OCUVITE LUTEIN 25) 25-5 MG CAPS     Multiple Vitamins-Minerals (OCUVITE PO)     venlafaxine (EFFEXOR-XR) 37.5 MG 24 hr capsule     venlafaxine (EFFEXOR-XR) 75 MG 24 hr capsule     No current facility-administered medications for this visit.        PAST MEDICAL HISTORY:  Past Medical History:   Diagnosis Date     Closed left ankle fracture      Cyst of  ovary     Hx of right ovarian cyst.     Diverticulosis of large intestine 03/14/2011     Endometriosis     growth on ovary s/p oophrectomy     Fibrocystic breast disease 01/30/2018     Generalized anxiety disorder     Dysthymia, generalized anxiety     Lichen sclerosus     Vulvar LSEA; asymptomatic     Neck pain, chronic 01/30/2018     Improved after senior care     Rosacea 1/4/2013     Severe major depression with psychotic features (H) 10/25/2016    inpatient for several months; she does not have complete memory of that time       PAST SURGICAL HISTORY:  Past Surgical History:   Procedure Laterality Date     BIOPSY BREAST Right 07/07/2008    stereotactic, benign result     CATARACT IOL, RT/LT Bilateral     2017, 2018     COLONOSCOPY  03/14/2011    Normal; F/U 2021     DILATION AND CURETTAGE  2003    D&C with hysteroscopy and endometrial ablation     OOPHORECTOMY Right 02/1997     OPEN REDUCTION INTERNAL FIXATION ANKLE Left 2002     RELEASE CARPAL TUNNEL  2010     REMOVE HARDWARE ANKLE Left 06/27/2011     VITRECTOMY ANTERIOR Left 05/22/2017    Dr. Collins       FAMILY HISTORY:  Family History   Problem Relation Age of Onset     Arthritis Mother      Heart Disease Mother      Hypertension Mother      Thyroid Disease Mother      Dementia Mother      Pulmonary Embolism Mother      Heart Disease Father      Other - See Comments Father         Psychiatric illness     Chronic Obstructive Pulmonary Disease Father      Dementia Brother      Colon Cancer Paternal Grandmother         Cancer-colon     No Known Problems Brother      Hypertension Sister      Allergies Sister         food     Breast Cancer No family hx of         Cancer-breast       SOCIAL HISTORY:    Social History     Social History Narrative    Patient  to Jimenez Perry, 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 She is retired, previously taught art.  2 sons, Shirazmanpreet Perry ([place]); Regino ([place])  4 grand daughters       OBJECTIVE:    DIAGNOSTIC TESTS:  Reviewed  current spinal imaging taken.   Recent Results (from the past 744 hour(s))   XR Lumbar Spine 2/3 Views    Narrative    PROCEDURE: XR LUMBAR SPINE 2-3 VIEWS 10/7/2020 9:45 AM    HISTORY: low back pain, paresthesia, leg pain; Weakness of both lower  extremities; Paresthesia of both lower extremities    COMPARISONS: None.    TECHNIQUE: 3 views.    FINDINGS: There is a fairly severe right convex scoliosis with grade 1  anterolisthesis of L4 on L5 likely due to facet degenerative change.  No definite compression fracture is seen but there is multilevel  degenerative disc disease with narrowing most severe along the cavity  of the scoliosis. There is some sparing of the L5-S1 level.    Sacroiliac joints appear patent.         Impression    IMPRESSION: Right convex scoliosis with multilevel degenerative  change.    MARIAM GONZALES MD   MR Lumbar Spine w/o Contrast    Narrative    PROCEDURE: MR LUMBAR SPINE W/O CONTRAST 10/13/2020 7:39 AM    HISTORY: Back pain, > 6wks conservative tx, persistent sx; subjective  weakness in legs, nerve sensation and pain into bilateral legs;  Weakness of both lower extremities; Paresthesia of both lower  extremities    COMPARISONS: Plain film dated 10/7/2020.    Meds/Dose Given: None.    TECHNIQUE: Sagittal T1, T2 and STIR sequences and axial T2-weighted  images.    FINDINGS: There is a mild right convex scoliosis with grade 1  anterolisthesis of L4 on L5 secondary to facet degenerative change.  Marrow signal intensity is somewhat heterogeneous with endplate  reactive changes at multiple levels. There is no acute compression  fracture or focal destructive lesion.    At the L5-S1 level there is a central and right paramedian disc  protrusion with elevation and some compression of the right S1 nerve  root. Disc protrudes into the right neural foramina and there is mild  mass effect on the undersurface of the L5 nerve root ganglion. Small  bilateral facet effusions are seen.    At the L4-5  level there is grade 1 anterolisthesis secondary to severe  facet degenerative change. There is some hypertrophy of ligamentum  flavum and there are bilateral facet effusions. There is some minimal  bulging of the disc with high signal intensity annular fissure  dorsally. There is moderate central and lateral recess stenosis with  crowding of nerve roots and some effacement of CSF. This protrudes  into the inferior neural foramina bilaterally and on the left there is  at least moderate compression of the L4 nerve root ganglion.    At the L3-4 level there is moderate narrowing and dehydration of the  disc with a bilobed disc protrusion which causes mild mass effect on  L4 nerve roots. There is mild facet degenerative change with bilateral  facet effusions. Disc protrudes into the inferior neural foramina but  there is not significant nerve root ganglion compression.    At the L2-3 level there is moderate narrowing and dehydration of the  disc with a broad-based disc bulge eccentric to the right. This  protrudes into the inferior neural foramina on the right but there is  not significant nerve root ganglion compression. There is facet  degenerative change with bilateral facet effusions.    At the L1-2 level there is a mild broad-based disc bulge.    There is either bilateral enlargement of the intrarenal collecting  systems or there or peripelvic cysts. Cortical cyst is seen  posteriorly in the right kidney as well.         Impression    IMPRESSION:   1. Multilevel degenerative disc disease with scoliosis and grade 1  anterolisthesis of L4 on L5.  2. Moderate central and lateral recess stenosis at L4-5 with at least  moderate left L4 nerve root ganglion compression.  3. Central and right paramedian disc protrusion at L5-S1 with  elevation and compression of the right S1 nerve root.    MARIAM GONAZLES MD    Aorta Medicare AAA Screening    Narrative    PROCEDURE:  AORTA MEDICARE AAA SCREENING 10/16/2020 8:09  "AM    HISTORY: Screening for AAA (abdominal aortic aneurysm)    COMPARISONS: None.    TECHNIQUE: Routine ultrasound of the abdominal aorta.    FINDINGS: There is no aneurysm of the abdominal aorta. Proximal aorta  measures 2.2 x 2.0 cm, mid aorta 2.0 x 1.9 cm and distal aorta 1.7 x  2.2 cm.    Right iliac artery measures 10 x 12 mm and the left 10 x 12 mm.         Impression    IMPRESSION: No abdominal aortic aneurysm.    MARIAM GONZALES MD   US Pelvic Complete with Transvaginal    Narrative    US PELVIC COMPLETE WITH TRANSVAGINAL    HISTORY: 69 years Female Left lower quadrant pain    TECHNIQUE: Transabdominal and transvaginal grayscale and color duplex  ultrasonography of the pelvis was performed.    COMPARISON: None    FINDINGS:    The uterus measures 4.8 x 2.2 x 3.7 cm. The uterus is retroverted.    Endometrial thickness is 2 mm.    The right ovary is surgically absent.    The left ovary is not visualized.    No adnexal masses or abnormal fluid collections are seen.        Impression    IMPRESSION: Negative study. No pelvic mass or abnormal fluid  collection is seen.    DENG VALDOVINOS MD         PHYSICAL EXAM:     GENERAL APPEARANCE: healthy, alert, no distress, cooperative and \"affect normal/bright\"  SKIN: no suspicious lesions or rashes  NEURO: cranial nerves 2-12 intact, normal gait.    lower extremity DTRs equal and hyporeflexive   normal strength, able to toe raise and heel walk   Intact light touch sensory      MUSCULOSKELETAL:   Posture and Gait: mild forward drawn at hips, not antalgic    Lumbar/Pelvic  PSIS/Iliac Crests:  Low L- Posterior inferior ilium    AROM:   halting arc of motion   Flexion and extension within normal range    RLF mild restriction,  +right low back pain, not radiating       PROM: passive extension +low back pain    + Kemps: right and left lumbar, posterior pelvic  +Gillets: +L upper sacroiliac, R lower sacroiliac motion restriction.  +Trendelenbergs: mild right hip drop with " "left bent knee raising  - Straight leg raise: limited AROM less than 60 degrees, focal low back \"pulling\"   - YAA:    Prone, patient noted audible and palpable release with negative orthopedic testing during Nachlas, Yeomans, Gui and Elys testing.    Tenderness: Left lumbosacral, left sacroiliac, right lumbar  Muscle spasm:  Right QL, bilateral extensor paraspinals, gluteals  Motion restriction: lumbar and thoracolumbar into flexion, L sacroiliac into anterior and superior and R sacroiliac into posterior and inferior    +Joint asymmetry and restriction: lumbar, L sacroiliac, R sacroiliac       ASSESSMENT: Daly Perry with physical exam findings of lumbar and pelvic segmental and somatic dysfunction. MRI imaging reads multi-levels of Disc degeneration and joint degeneration with nerve compression.  She does report symptoms of nerve compression with shooting pain and weakness. She has diminished patella and achilles reflexes, yet symmetric.  Today, we cannot reproduce radiating pain into either leg with nerve compressive or stretching maneuvers. Her primary symptoms are focal to lumbar and pelvis. Findings, clinical impression and treatment options were discussed with patient and patient agreed to proceed with conservative manual flexion/distraction of the lumbar and lumbosacral spine to improve motion and reduce muscle hypertonicity.        Segmental and somatic dysfunction of lumbar region  Multilevel degenerative disc disease  Segmental and somatic dysfunction of pelvic region  Bilateral low back pain with right-sided sciatica  Bilateral low back pain with left-sided sciatica    PLAN    Evaluation and Management    Modalities:  None performed this visit    CMT:  14579 Chiropractic manipulative treatment 1-2 regions performed   Lumbar: Vásquez Distraction technique with intersegmental flexion and lateral flexion, L1, L2, L3, L4, L5, Prone  Pelvis: Mobilization, PSIS Left , PSIS Right , Prone, patient noted audible " "and palpable release with orthopedic testing during Nachlas, Yeomans, Gui and Elys testing.    Therapeutic procedures:  None    Response to Treatment:  \"it felt good, more movement\"  Prognosis: Guarded    Oct 26, 2020 Chiropractic Plan of Care:   Impression, treatment options, risks and benefits discussed and patient agreeable to plan of care.     Chiropractic Care including Spinal Adjustments, physiotherapy modalities (Manual Therapy and  Manual or Instrument Soft tissue massage techniques, Ultrasound, Electronic Muscle Stimulation), Neuromuscular Re-education, Self care and Active care instruction(in-office exercise focused toward patient establishing a progressive home based exercise program).  Plan of care includes shared decision making with patient to meet their individual subjective and established criteria of rehabilitative objective goals to reduce symptoms affecting function.       Frequency: 2xweek   Length: 4 weeks  Progress Evaluation (approx date):  at 2 - 4 weeks         Oct 26, 2020 Goals:   short term  2 - 4 weeks     Patient will demonstrate an improved ability to complete Activities of Daily Living  as shown by a reported 10% reduced score on OAT.      Long term  4 - 6  weeks   Patient will demonstrate an improved ability to complete Activities of Daily Living  as shown by a reported 10-30% reduced score on neck and/or back index.    Patient will demonstrate improved ROM to be able to do household and outdoor work without severe increase in symptoms.     Patient will report reduced symptoms by 2 points on 0-10 pt. Verbal or Visual rating.       INSTRUCTIONS     Patient Instructions   Ice  Continue home exercises  Return 2x weekly for up to 4 weeks, Re-eval 11/23/20.      Return in about 4 days (around 10/30/2020) for Active Care.     Disclaimer: This note consists of symbols derived from keyboarding, dictation and/or voice recognition software. As a result, there may be errors in the script that " have gone undetected. Please consider this when interpreting information found in this chart.

## 2020-10-29 ENCOUNTER — HOSPITAL ENCOUNTER (OUTPATIENT)
Dept: PHYSICAL THERAPY | Facility: OTHER | Age: 70
Setting detail: THERAPIES SERIES
End: 2020-10-29
Attending: INTERNAL MEDICINE
Payer: MEDICARE

## 2020-10-29 PROCEDURE — 97110 THERAPEUTIC EXERCISES: CPT | Mod: GP | Performed by: PHYSICAL THERAPIST

## 2020-10-29 PROCEDURE — 97140 MANUAL THERAPY 1/> REGIONS: CPT | Mod: GP | Performed by: PHYSICAL THERAPIST

## 2020-10-30 ENCOUNTER — THERAPY VISIT (OUTPATIENT)
Dept: CHIROPRACTIC MEDICINE | Facility: OTHER | Age: 70
End: 2020-10-30
Attending: CHIROPRACTOR
Payer: COMMERCIAL

## 2020-10-30 DIAGNOSIS — M47.27 LUMBOSACRAL RADICULOPATHY DUE TO DEGENERATIVE JOINT DISEASE OF SPINE: ICD-10-CM

## 2020-10-30 DIAGNOSIS — M99.03 SEGMENTAL AND SOMATIC DYSFUNCTION OF LUMBAR REGION: Primary | ICD-10-CM

## 2020-10-30 DIAGNOSIS — M51.369 DDD (DEGENERATIVE DISC DISEASE), LUMBAR: ICD-10-CM

## 2020-10-30 DIAGNOSIS — M99.05 SEGMENTAL AND SOMATIC DYSFUNCTION OF PELVIC REGION: ICD-10-CM

## 2020-10-30 PROCEDURE — 98940 CHIROPRACT MANJ 1-2 REGIONS: CPT | Performed by: CHIROPRACTOR

## 2020-10-30 PROCEDURE — G0463 HOSPITAL OUTPT CLINIC VISIT: HCPCS

## 2020-10-30 NOTE — PROGRESS NOTES
"  Oct 30, 2020  Visit #:  2 of 8-12, based on treatment plan for this episode from Initial Evaluation.    Subjective:  Daly Perry is a 69 year old female who is seen in f/u up for:    Segmental and somatic dysfunction of lumbar region  Lumbosacral radiculopathy due to degenerative joint disease of spine  Segmental and somatic dysfunction of pelvic region  DDD (degenerative disc disease), lumbar.     Since last visit on 10/26/2020,  Daly Perry reports the following changes:  After initial treatment she reported feeler looser and less achey pain in lumbar.  However she reports she did experience \"shocky\" pain, lasting about 5 min.  She would have rated pain at that time at 8/10.  This was like she has had during some exercise and with physical Therapy.   She has not had this usual \"shocking pain\" that starts lateral hips to knees and back up and less frequently goes down lateral legs to outer ankles since last visit.  She feels care helped.     Today describes pain as \"fatigued\" and \"you know it's there\". It is intermittent and increases with certain movements, turning or twisting. Trying to log roll to turn over at night.   Using heat and found both ice and heat helpful..    Lumbar :  Symptoms are graded at 2/10.   The quality is stiff, achey, dull.  A few twinges into thighs, not below knees.    Motion has increased, but is still not normal.      Objective:        P: palpatory tenderness:  Gluteal and Lumbar erector spine and sacroiliacs Bilaterally    A: static palpation demonstrates intersegmental asymmetry lumbar, pelvis    R: Motion restriction: lumbar and thoracolumbar into flexion, L sacroiliac into anterior and superior and R sacroiliac into posterior and inferior    T: Muscle spasm:  Right QL, bilateral extensor paraspinals, gluteals      S: +Joint asymmetry and restriction: lumbar, L sacroiliac, R sacroiliac    Assessment:  A positive initial response to conservative manual technique. Continue " plan.  Diagnoses:      1. Segmental and somatic dysfunction of lumbar region    2. Lumbosacral radiculopathy due to degenerative joint disease of spine    3. Segmental and somatic dysfunction of pelvic region    4. DDD (degenerative disc disease), lumbar        Patient's condition:  Patient had restrictions pre-manipulation and Patient is noticing a decrease in their symptoms      Procedures:    CMT: 57056 Chiropractic manipulative treatment 1-2 regions performed   Lumbar: Vásquez Distraction technique with intersegmental flexion and lateral flexion, L1, L2, L3, L4, L5, Prone  Pelvis: Mobilization, PSIS Left , PSIS Right , Prone     Modalities: 30239: MSTM:  To Gluteal and Lumbar erector spine  for 5 min    Therapeutic procedures: None    Treatment effectiveness today following care:  Post manipulation there is better intersegmental movement, Patient claims to feel looser post manipulation and Symptoms appear to be decreasing    Response to Treatment: Patient reports feeling looser    Progress towards Goals: Patient is making progress towards the goal      Patient Instructions   Ice or heat as needed.   Continue home exercises per PT.  Return 2x weekly for up to 4 weeks, Re-eval 11/23/20.    Justina Sung DC

## 2020-10-30 NOTE — PATIENT INSTRUCTIONS
Ice or heat as needed.   Continue home exercises per PT.  Return 2x weekly for up to 4 weeks, Re-eval 11/23/20.

## 2020-11-02 ENCOUNTER — THERAPY VISIT (OUTPATIENT)
Dept: CHIROPRACTIC MEDICINE | Facility: OTHER | Age: 70
End: 2020-11-02
Attending: CHIROPRACTOR
Payer: COMMERCIAL

## 2020-11-02 DIAGNOSIS — M53.9 MULTILEVEL DEGENERATIVE DISC DISEASE: ICD-10-CM

## 2020-11-02 DIAGNOSIS — M99.02 SEGMENTAL AND SOMATIC DYSFUNCTION OF THORACIC REGION: ICD-10-CM

## 2020-11-02 DIAGNOSIS — M47.27 LUMBOSACRAL RADICULOPATHY DUE TO DEGENERATIVE JOINT DISEASE OF SPINE: ICD-10-CM

## 2020-11-02 DIAGNOSIS — M99.03 SEGMENTAL AND SOMATIC DYSFUNCTION OF LUMBAR REGION: Primary | ICD-10-CM

## 2020-11-02 DIAGNOSIS — M99.05 SEGMENTAL AND SOMATIC DYSFUNCTION OF PELVIC REGION: ICD-10-CM

## 2020-11-02 PROCEDURE — G0463 HOSPITAL OUTPT CLINIC VISIT: HCPCS

## 2020-11-02 PROCEDURE — 98941 CHIROPRACT MANJ 3-4 REGIONS: CPT | Performed by: CHIROPRACTOR

## 2020-11-02 NOTE — PATIENT INSTRUCTIONS
Continue plan, you have shown great progress with Lumbar Flexion Distraction technique and I think we can defer use of  Mechanical traction since responding so well.   Discussed best bending over body mechanics for gardening.  Ask PT re: Pelvic Clock exercise instruction.  Patient Education     Back Safety: Bending  Bending can strain or even injure your back. Follow the tips below to move safely and protect your back as you perform everyday activities.  Bending over      Keep your feet shoulder-width apart.    Move your whole body as one unit.    Bend at your hips and knees, not at your waist.    Flatten your stomach and tighten your leg muscles.    To keep your spine straight, let your buttocks move out behind you. Don t try to tuck them under.    If you need to, place one hand on a sturdy object for support.  Bending to the floor    Lower yourself to one knee. If you can, rest one hand on a sturdy object to help lower yourself.    Rest one arm on your raised knee.    Don t bend at the waist.    Do not hunch your back or neck to reach to the floor. Instead, bend more at your hips and knees to get closer.  Shareholder [employer] last reviewed this educational content on 1/1/2018 2000-2020 [employer]. [address] [place] [address]. All rights reserved. This information is not intended as a substitute for professional medical care. Always follow your healthcare professional's instructions.           Patient Education     Back Safety: Lifting  Lifting can strain or even injure your back. Follow these tips to keep your back safe while you bend, lift, and carry.      Protect Your Back While Lifting       Step 1:    Face the object.    With your back straight, get down on one knee.    If you can, tilt the object so one side lifts off the ground.    Keep the object close to you. Step 2:    Tighten your stomach muscles.    Use your legs, arms, and buttocks to lift, not your back.    Avoid twisting.    Lift  the object to your knee.    Grasp the object firmly. Step 3:    Lift with your arms and legs, not your back.    Move quickly to help make this easier.   To carry an object:    Hold it close to your body.    Bend your knees slightly as you walk. The heavier the object, the more you should bend your knees.    Get help with heavy or unbalanced objects.  [employer] last reviewed this educational content on 1/1/2018 2000-2020 [employer]. [address] [place] [address]. All rights reserved. This information is not intended as a substitute for professional medical care. Always follow your healthcare professional's instructions.           Patient Education     Back Safety: Sitting  Sitting can strain your back if you don t do it properly. Learn the right moves to protect your back.      Sitting down  Follow these steps to sit down. Reverse them to get back up.    Make sure the chair is behind you.    Place one foot slightly behind the other.    Tighten your stomach muscles. Bend forward from the hips, keeping your back straight.    Hold the armrests or sides of the seat for support.    Bend your knees. Use your leg muscles to lower yourself onto the seat.    Scoot back in the seat until you are comfortable.    Sitting safely    Keep your feet flat. Don t cross your legs.    A low footrest (no higher than 8 inches) may help.    A support behind your lower back or at your shoulder blades can help make you more comfortable.    When sitting for long periods, change your position from time to time. Also, get up every half hour and move around.    [employer] last reviewed this educational content on 10/1/2017    6407-8646 [employer]. [address] [place] [address]. All rights reserved. This information is not intended as a substitute for professional medical care. Always follow your healthcare professional's instructions.

## 2020-11-02 NOTE — PROGRESS NOTES
"Nov 2, 2020  Visit #:  3 of 8-12, based on treatment plan for this episode from Initial Evaluation.    Subjective:  Since last visit on 10/30/2020,  Daly Perry reports the following changes:  Happy with improvement.     Lumbar :  Symptoms are graded at 1/10. Bilateral lower lumbar. Describes quality as \"there\". When twisting wrong feels brief twinge in anterior thigh(s).  Not having shocking or shooting pain traveling into legs otherwise.  Motion has improved. She is compliant with PT HEP Stretching and walking. Using cold and heat.  She does feel she needs more strengthening of her core as she is using railing to help pull herself ascending stairs and chair armrests to push up from sitting.     Scheduled for Neurosurgeon consult on Nov. 19. Next Physical Therapy on 11/4 and fup with dr. Baer, PCP on 11/5.       Objective:    P: palpatory tenderness:  Gluteal and Lumbar erector spinea     A: static palpation demonstrates intersegmental asymmetry thoracic, lumbar, pelvis     R: Motion restriction: lumbar into flexion, thoracolumbar T12 into right rotation and into flexion  Minimal restriction L sacroiliac into anterior and superior and R sacroiliac into posterior and inferior     T: Muscle spasm:  mid to moderate, reducing:  Right QL, bilateral extensor paraspinals, gluteals      S: +Joint asymmetry and restriction: T12 L, lumbar, L sacroiliac, R sacroiliac    Assessment:  Responding better than anticipated with improved motion and reduced radiating symptoms. Discussed case with PT. Coaching beneficial for core stabilization.  Patient Education on safe bending to garden provided today. She will see PT tomorrow and Primary the next day before next chiropractic visit.    Diagnoses:      1. Segmental and somatic dysfunction of lumbar region    2. Lumbosacral radiculopathy due to degenerative joint disease of spine    3. Multilevel degenerative disc disease    4. Segmental and somatic dysfunction of pelvic region    5. " Segmental and somatic dysfunction of thoracic region        Patient's condition:  Patient had restrictions pre-manipulation and Patient is noticing a decrease in their symptoms    Procedures:  CMT: 32378 Chiropractic manipulative treatment 3-4 regions performed   Thoracic: Diversified, T11, Prone  Lumbar: Vásquez Distraction technique with intersegmental flexion and lateral flexion, L1, L2, L3, L4, L5, Prone  Pelvis: Mobilization, PSIS Left , PSIS Right , Prone     Modalities: 73511: MSTM:  To Gluteal and Lumbar erector spine  for 5 min     Therapeutic procedures: None     Treatment effectiveness today following care:  Post manipulation there is better intersegmental movement, Patient claims to feel looser post manipulation and Symptoms appear to be decreasing     Response to Treatment: Patient reports feeling looser     Progress towards Goals: Patient is making progress towards the goal        Patient Instructions     Continue plan, you have shown great progress with Lumbar Flexion Distraction technique and I think we can defer use of  Mechanical traction since responding so well.   Discussed best bending over body mechanics for gardening.  Ask PT re: Pelvic Clock exercise instruction.  Patient Education     Back Safety: Bending  Bending can strain or even injure your back. Follow the tips below to move safely and protect your back as you perform everyday activities.  Bending over      Keep your feet shoulder-width apart.    Move your whole body as one unit.    Bend at your hips and knees, not at your waist.    Flatten your stomach and tighten your leg muscles.    To keep your spine straight, let your buttocks move out behind you. Don t try to tuck them under.    If you need to, place one hand on a sturdy object for support.  Bending to the floor    Lower yourself to one knee. If you can, rest one hand on a sturdy object to help lower yourself.    Rest one arm on your raised knee.    Don t bend at the waist.    Do not  hunch your back or neck to reach to the floor. Instead, bend more at your hips and knees to get closer.  [employer] last reviewed this educational content on 1/1/2018 2000-2020 [employer]. [address] [place] [address]. All rights reserved. This information is not intended as a substitute for professional medical care. Always follow your healthcare professional's instructions.           Patient Education     Back Safety: Lifting  Lifting can strain or even injure your back. Follow these tips to keep your back safe while you bend, lift, and carry.      Protect Your Back While Lifting       Step 1:    Face the object.    With your back straight, get down on one knee.    If you can, tilt the object so one side lifts off the ground.    Keep the object close to you. Step 2:    Tighten your stomach muscles.    Use your legs, arms, and buttocks to lift, not your back.    Avoid twisting.    Lift the object to your knee.    Grasp the object firmly. Step 3:    Lift with your arms and legs, not your back.    Move quickly to help make this easier.   To carry an object:    Hold it close to your body.    Bend your knees slightly as you walk. The heavier the object, the more you should bend your knees.    Get help with heavy or unbalanced objects.  [employer] last reviewed this educational content on 1/1/2018 2000-2020 [employer]. [address] [place] [address]. All rights reserved. This information is not intended as a substitute for professional medical care. Always follow your healthcare professional's instructions.           Patient Education     Back Safety: Sitting  Sitting can strain your back if you don t do it properly. Learn the right moves to protect your back.      Sitting down  Follow these steps to sit down. Reverse them to get back up.    Make sure the chair is behind you.    Place one foot slightly behind the other.    Tighten your stomach muscles. Bend forward  from the hips, keeping your back straight.    Hold the armrests or sides of the seat for support.    Bend your knees. Use your leg muscles to lower yourself onto the seat.    Scoot back in the seat until you are comfortable.    Sitting safely    Keep your feet flat. Don t cross your legs.    A low footrest (no higher than 8 inches) may help.    A support behind your lower back or at your shoulder blades can help make you more comfortable.    When sitting for long periods, change your position from time to time. Also, get up every half hour and move around.    [employer] last reviewed this educational content on 10/1/2017    9024-1618 [employer] [address] [place] [address], [place] [address]. All rights reserved. This information is not intended as a substitute for professional medical care. Always follow your healthcare professional's instructions.             Justina Sung DC on 11/2/2020

## 2020-11-04 ENCOUNTER — HOSPITAL ENCOUNTER (OUTPATIENT)
Dept: PHYSICAL THERAPY | Facility: OTHER | Age: 70
Setting detail: THERAPIES SERIES
End: 2020-11-04
Attending: PHYSICAL THERAPIST
Payer: MEDICARE

## 2020-11-04 PROCEDURE — 97110 THERAPEUTIC EXERCISES: CPT | Mod: GP | Performed by: PHYSICAL THERAPIST

## 2020-11-05 ENCOUNTER — OFFICE VISIT (OUTPATIENT)
Dept: INTERNAL MEDICINE | Facility: OTHER | Age: 70
End: 2020-11-05
Attending: INTERNAL MEDICINE
Payer: COMMERCIAL

## 2020-11-05 VITALS
TEMPERATURE: 97.1 F | HEART RATE: 85 BPM | OXYGEN SATURATION: 97 % | SYSTOLIC BLOOD PRESSURE: 124 MMHG | BODY MASS INDEX: 25.55 KG/M2 | RESPIRATION RATE: 14 BRPM | DIASTOLIC BLOOD PRESSURE: 76 MMHG | WEIGHT: 158.8 LBS

## 2020-11-05 DIAGNOSIS — M54.50 BILATERAL LOW BACK PAIN WITHOUT SCIATICA, UNSPECIFIED CHRONICITY: ICD-10-CM

## 2020-11-05 DIAGNOSIS — M65.331 TRIGGER MIDDLE FINGER OF RIGHT HAND: ICD-10-CM

## 2020-11-05 DIAGNOSIS — R76.8 ELEVATED ANTINUCLEAR ANTIBODY (ANA) LEVEL: Primary | ICD-10-CM

## 2020-11-05 PROCEDURE — 99214 OFFICE O/P EST MOD 30 MIN: CPT | Performed by: INTERNAL MEDICINE

## 2020-11-05 PROCEDURE — G0463 HOSPITAL OUTPT CLINIC VISIT: HCPCS | Performed by: INTERNAL MEDICINE

## 2020-11-05 ASSESSMENT — PAIN SCALES - GENERAL: PAINLEVEL: NO PAIN (0)

## 2020-11-05 NOTE — PROGRESS NOTES
Chief Complaint   Patient presents with     RECHECK         HPI: Ms. Perry is a 69 year old female who presents today for follow up of recent testing.    She was noted on testing to have a borderline PATRICIA.  Her titer was 1: 80.  We discussed the significance of this and the pros and cons of additional testing.  Other blood work done in October was normal other than a borderline elevated cholesterol.    She has been seen physical therapy and stated chiropractic treatment.  She has found that this has been beneficial.  Imaging of her low back that was done in October showed multiple levels of degenerative disease, scoliosis and some nerve root compression.  She is scheduled to have a visit with neurosurgery coming up in November.    She has been having some issues with trigger finger on her right hand.  It is progressively worsened.  She is not sure at this time that she is interested in any significant interventions but wanted to mention it.    She  reports that she has never smoked. She has never used smokeless tobacco.    Past medical history reviewed as below:     Past Medical History:   Diagnosis Date     Closed left ankle fracture      Cyst of ovary     Hx of right ovarian cyst.     Diverticulosis of large intestine 03/14/2011     Endometriosis     growth on ovary s/p oophrectomy     Fibrocystic breast disease 01/30/2018     Generalized anxiety disorder     Dysthymia, generalized anxiety     Lichen sclerosus     Vulvar LSEA; asymptomatic     Neck pain, chronic 01/30/2018     Improved after snf     Rosacea 1/4/2013     Severe major depression with psychotic features (H) 10/25/2016    inpatient for several months; she does not have complete memory of that time   .      ROS  Pertinent ROS was performed and was negative, including for fever, chills, chest pain, shortness of breath, increased lower extremity edema, changes in bowel or bladder, blood in the stool, difficulty swallowing, sores in the mouth. No  "other concerns, with exception of HPI above.      EXAM:   /76 (BP Location: Right arm, Patient Position: Sitting, Cuff Size: Adult Regular)   Pulse 85   Temp 97.1  F (36.2  C) (Tympanic)   Resp 14   Wt 72 kg (158 lb 12.8 oz)   SpO2 97%   BMI 25.55 kg/m      Estimated body mass index is 25.55 kg/m  as calculated from the following:    Height as of 10/7/20: 1.679 m (5' 6.1\").    Weight as of this encounter: 72 kg (158 lb 12.8 oz).      GEN: Vitals reviewed. Healthy appearing. Patient is in no acute distress. Cooperative with exam.  HEENT: Normocephalic atraumatic.  Eyes grossly normal to inspection.  No discharge or erythema, or obvious scleral/conjunctival abnormalities.   LUNGS: No audible wheeze, cough, or visible cyanosis.  No visible retractions or increased work of breathing.   ABD:  nondistended  SKIN: Warm and dry to touch.  Visible skin clear. No significant rash, abnormal pigmentation or lesions.  PSYCH: Mood is good.  Mentation appears normal, affect normal/bright, judgement and insight intact, normal speech and appearance well-groomed.     ASSESSMENT AND PLAN:    Elevated antinuclear antibody (PATRICIA) level  -At this time given the low level of elevation and no further evaluation is recommended however she is to let me know should she have any significant changes in symptoms or new symptoms develop.  She was offered referral to rheumatology and can let me know if at any point she is interested in this.    Trigger middle finger of right hand  Offered referral for injections versus orthopedics for surgical intervention.  She is to let me know at what point she would like these done.    Bilateral low back pain without sciatica, unspecified chronicity  Encouraged to continue with chiropractics and physical therapy.  She was encouraged to keep her appointment with neurosurgery just to see what they would say about her MRI.  She is to call with any new or changing symptoms.      Follow-up as " needed.    A total of 33 minutes spent with in face-to-face consultation of this patient with greater than 50% spent in counseling and care coordination of above listed medical problems including diagnosis, treatment options with emphasis on risks and benefits of each,prognosis and importance of compliance for each.      BANDAR HELLER DO   11/5/2020 2:49 PM    This document was prepared using voice generated softwear. While every attempt was made for accuracy, grammatical errors may exist.

## 2020-11-05 NOTE — NURSING NOTE
Patient presents to clinic today for follow up on tests, and other questions. She is seeing the neurosurgeon in [place] on 11/9/20.  Medication reconciliation completed.  Sabina Chaudhry CMA([facility])..................11/5/2020   2:21 PM

## 2020-11-06 ENCOUNTER — THERAPY VISIT (OUTPATIENT)
Dept: CHIROPRACTIC MEDICINE | Facility: OTHER | Age: 70
End: 2020-11-06
Attending: CHIROPRACTOR
Payer: COMMERCIAL

## 2020-11-06 DIAGNOSIS — M51.369 DDD (DEGENERATIVE DISC DISEASE), LUMBAR: ICD-10-CM

## 2020-11-06 DIAGNOSIS — M47.27 LUMBOSACRAL RADICULOPATHY DUE TO DEGENERATIVE JOINT DISEASE OF SPINE: ICD-10-CM

## 2020-11-06 DIAGNOSIS — M99.03 SEGMENTAL AND SOMATIC DYSFUNCTION OF LUMBAR REGION: Primary | ICD-10-CM

## 2020-11-06 PROCEDURE — 98940 CHIROPRACT MANJ 1-2 REGIONS: CPT | Performed by: CHIROPRACTOR

## 2020-11-06 PROCEDURE — G0463 HOSPITAL OUTPT CLINIC VISIT: HCPCS

## 2020-11-13 ENCOUNTER — THERAPY VISIT (OUTPATIENT)
Dept: CHIROPRACTIC MEDICINE | Facility: OTHER | Age: 70
End: 2020-11-13
Attending: CHIROPRACTOR
Payer: COMMERCIAL

## 2020-11-13 DIAGNOSIS — M47.27 LUMBOSACRAL RADICULOPATHY DUE TO DEGENERATIVE JOINT DISEASE OF SPINE: ICD-10-CM

## 2020-11-13 DIAGNOSIS — M51.369 DDD (DEGENERATIVE DISC DISEASE), LUMBAR: ICD-10-CM

## 2020-11-13 DIAGNOSIS — M99.03 SEGMENTAL AND SOMATIC DYSFUNCTION OF LUMBAR REGION: Primary | ICD-10-CM

## 2020-11-13 DIAGNOSIS — M53.9 MULTILEVEL DEGENERATIVE DISC DISEASE: ICD-10-CM

## 2020-11-13 DIAGNOSIS — M99.05 SEGMENTAL AND SOMATIC DYSFUNCTION OF PELVIC REGION: ICD-10-CM

## 2020-11-13 PROCEDURE — 98940 CHIROPRACT MANJ 1-2 REGIONS: CPT | Performed by: CHIROPRACTOR

## 2020-11-13 PROCEDURE — G0463 HOSPITAL OUTPT CLINIC VISIT: HCPCS

## 2020-11-13 NOTE — PROGRESS NOTES
"Nov 13, 2020  Visit #:  5 of 8-12, based on treatment plan for this episode from Initial Evaluation.    Subjective:  Since last visit on 11/6/2020,  Daly Perry reports the following changes:  reported improved Oswestry score from 10/26/20 initial 22% to 11% today.   Able to work in her garden and paint remodeling without symptoms worsening and really happy with progress. Easier going both up and downstairs. She does need to pull with her arms ascending stairs and lean against wall to balance putting on pants.  Occasional \"nerve pain about to take off\" with some movements and standing. Notes it with extending back in \"cow\" stretch.    Lumbar :  Symptoms are graded at 1/10.     Discharged from scheduled visits with Physical Therapy.  Scheduled for Neurosurgeon consult on Nov. 19 to establish care.     Oswestry (KERRY) Questionnaire    OSWESTRY DISABILITY INDEX 11/13/2020   Count 9   Sum 5   Oswestry Score (%) 11.11   Some recent data might be hidden        Objective:    P: palpatory tenderness:  left glute medius, no spine/pelvis     A: static palpation demonstrates intersegmental asymmetry  lumbar, pelvis     R: Motion restriction: lumbar into flexion  Minimal restriction L sacroiliac into anterior and superior and R sacroiliac into posterior and inferior     T: Muscle spasm:  mild bilateral extensor paraspinals, mild to moderate left gluteals      S: +Joint asymmetry and restriction:  lumbar, L sacroiliac, R sacroiliac    Assessment:  Spinal dysfunction and soft tissue spasm responding better than anticipated with ADLs. Patient fully compliant with conservative care recommendations.  Interested to see what Neurosurgery advises after November 19 consultation. Follow up in 1 week.    Diagnoses:      1. Segmental and somatic dysfunction of lumbar region    2. Lumbosacral radiculopathy due to degenerative joint disease of spine    3. DDD (degenerative disc disease), lumbar    4. Multilevel degenerative disc disease    5. " Segmental and somatic dysfunction of pelvic region        Patient's condition:  Patient had restrictions pre-manipulation, reduced muscle spasm and interference with ADLs and Patient is noticing a decrease in their symptoms    Procedures:  CMT: 95881 Chiropractic manipulative treatment 1-2 regions performed   Thoracic: clear   Lumbar: Vásquez Distraction technique with intersegmental flexion and lateral flexion, L1, L2, L3, L4, L5, Prone  Pelvis: Prone with light force Reed Drop technique, PSIS Left , PSIS Right , Prone. R LE LAD supine.     Modalities: 85389: MSTM:  To Left Gluteal for 2 min prone hip capsule elongation technique.     Therapeutic procedures: None     Treatment effectiveness today following care:  Post manipulation there is better intersegmental movement, Patient claims to feel looser post manipulation and Symptoms appear to be decreasing     Response to Treatment: Patient reports feeling better.  Standing pelvic motion on [place] test is minimally restricted R sacral base into nutation.     Progress towards Goals: Patient is making progress towards the goal, able to reduce frequency    Patient Instructions   You reported improved Oswestry score from 22% to 11% with conservative management from initial visit 10/26/20 in our office. Good work!    Continue recommendations.     F/u on 11/20 after neurosurgeon visit.    Likely re-eval 3 weeks if maintaining improvement, or sooner if worsens.    Justina Sung, DC

## 2020-11-13 NOTE — PATIENT INSTRUCTIONS
You reported improved Oswestry score from 22% to 11% with conservative management from initial visit 10/26/20 in our office. Good work!    Continue recommendations.     F/u on 11/20 after neurosurgeon visit.    Likely re-eval 3 weeks if maintaining improvement, or sooner if worsens.

## 2020-11-20 ENCOUNTER — THERAPY VISIT (OUTPATIENT)
Dept: CHIROPRACTIC MEDICINE | Facility: OTHER | Age: 70
End: 2020-11-20
Attending: CHIROPRACTOR
Payer: MEDICARE

## 2020-11-20 DIAGNOSIS — M51.369 DDD (DEGENERATIVE DISC DISEASE), LUMBAR: ICD-10-CM

## 2020-11-20 DIAGNOSIS — M53.9 MULTILEVEL DEGENERATIVE DISC DISEASE: ICD-10-CM

## 2020-11-20 DIAGNOSIS — M99.05 SEGMENTAL AND SOMATIC DYSFUNCTION OF PELVIC REGION: ICD-10-CM

## 2020-11-20 DIAGNOSIS — M99.03 SEGMENTAL AND SOMATIC DYSFUNCTION OF LUMBAR REGION: Primary | ICD-10-CM

## 2020-11-20 PROCEDURE — G0463 HOSPITAL OUTPT CLINIC VISIT: HCPCS

## 2020-11-20 PROCEDURE — 99212 OFFICE O/P EST SF 10 MIN: CPT | Mod: GY | Performed by: CHIROPRACTOR

## 2020-11-20 PROCEDURE — 98940 CHIROPRACT MANJ 1-2 REGIONS: CPT | Mod: AT | Performed by: CHIROPRACTOR

## 2020-11-20 NOTE — PROGRESS NOTES
"Nov 20, 2020  PATIENT:  Daly Perry is a 70 year old  female presenting for re- evaluation for: Lumbar spine DDD/DJD with bilateral leg pain.  Date of Initial Visit for this Episode:  Oct 26, 2020   Visit #6/8-12    SUBJECTIVE / HPI: Doing really well. Occasional aching (fatigued, tired) in lower back only, rated 1/10.  She has limited hyperextending lumbar spine on cow stretch and stretch feels better.  She has a mild \"Nerve\" pain into her posterior thighs infrequently now. She is being smarter about how much and how she does tasks.   Her reported Oswestry score of ADL interference was similar to a week ago, maintaining significant progress with care. She is not delaying her shower until she feels better later in the day.   She's been able to do bending and lifting with yardwork and gardening  She has some balance difficulty putting on the legs of her paints standing.    Surgeon consult was positive, and she is not requiring surgery at this time.  She was encouraged to be active and return to see surgeon in 6 months and bending lumbar xrays will be taken if having problems.    See flowsheets in chart for details.  Oswestry (KERRY) Questionnaire    OSWESTRY DISABILITY INDEX 11/20/2020   Count 9   Sum 5   Oswestry Score (%) 11.11   Some recent data might be hidden          OBJECTIVE:    DIAGNOSTIC TESTS:  Reviewed current spinal imaging taken.   Recent Results (from the past 744 hour(s))   XR Lumbar Spine 2/3 Views    Narrative    PROCEDURE: XR LUMBAR SPINE 2-3 VIEWS 10/7/2020 9:45 AM    HISTORY: low back pain, paresthesia, leg pain; Weakness of both lower  extremities; Paresthesia of both lower extremities    COMPARISONS: None.    TECHNIQUE: 3 views.    FINDINGS: There is a fairly severe right convex scoliosis with grade 1  anterolisthesis of L4 on L5 likely due to facet degenerative change.  No definite compression fracture is seen but there is multilevel  degenerative disc disease with narrowing most severe along " the cavity  of the scoliosis. There is some sparing of the L5-S1 level.    Sacroiliac joints appear patent.         Impression    IMPRESSION: Right convex scoliosis with multilevel degenerative  change.    MARIAM GONZALES MD   MR Lumbar Spine w/o Contrast    Narrative    PROCEDURE: MR LUMBAR SPINE W/O CONTRAST 10/13/2020 7:39 AM    HISTORY: Back pain, > 6wks conservative tx, persistent sx; subjective  weakness in legs, nerve sensation and pain into bilateral legs;  Weakness of both lower extremities; Paresthesia of both lower  extremities    COMPARISONS: Plain film dated 10/7/2020.    Meds/Dose Given: None.    TECHNIQUE: Sagittal T1, T2 and STIR sequences and axial T2-weighted  images.    FINDINGS: There is a mild right convex scoliosis with grade 1  anterolisthesis of L4 on L5 secondary to facet degenerative change.  Marrow signal intensity is somewhat heterogeneous with endplate  reactive changes at multiple levels. There is no acute compression  fracture or focal destructive lesion.    At the L5-S1 level there is a central and right paramedian disc  protrusion with elevation and some compression of the right S1 nerve  root. Disc protrudes into the right neural foramina and there is mild  mass effect on the undersurface of the L5 nerve root ganglion. Small  bilateral facet effusions are seen.    At the L4-5 level there is grade 1 anterolisthesis secondary to severe  facet degenerative change. There is some hypertrophy of ligamentum  flavum and there are bilateral facet effusions. There is some minimal  bulging of the disc with high signal intensity annular fissure  dorsally. There is moderate central and lateral recess stenosis with  crowding of nerve roots and some effacement of CSF. This protrudes  into the inferior neural foramina bilaterally and on the left there is  at least moderate compression of the L4 nerve root ganglion.    At the L3-4 level there is moderate narrowing and dehydration of the  disc with  a bilobed disc protrusion which causes mild mass effect on  L4 nerve roots. There is mild facet degenerative change with bilateral  facet effusions. Disc protrudes into the inferior neural foramina but  there is not significant nerve root ganglion compression.    At the L2-3 level there is moderate narrowing and dehydration of the  disc with a broad-based disc bulge eccentric to the right. This  protrudes into the inferior neural foramina on the right but there is  not significant nerve root ganglion compression. There is facet  degenerative change with bilateral facet effusions.    At the L1-2 level there is a mild broad-based disc bulge.    There is either bilateral enlargement of the intrarenal collecting  systems or there or peripelvic cysts. Cortical cyst is seen  posteriorly in the right kidney as well.         Impression    IMPRESSION:   1. Multilevel degenerative disc disease with scoliosis and grade 1  anterolisthesis of L4 on L5.  2. Moderate central and lateral recess stenosis at L4-5 with at least  moderate left L4 nerve root ganglion compression.  3. Central and right paramedian disc protrusion at L5-S1 with  elevation and compression of the right S1 nerve root.    MARIAM GONZALES MD   US Aorta Medicare AAA Screening    Narrative    PROCEDURE: US AORTA MEDICARE AAA SCREENING 10/16/2020 8:09 AM    HISTORY: Screening for AAA (abdominal aortic aneurysm)    COMPARISONS: None.    TECHNIQUE: Routine ultrasound of the abdominal aorta.    FINDINGS: There is no aneurysm of the abdominal aorta. Proximal aorta  measures 2.2 x 2.0 cm, mid aorta 2.0 x 1.9 cm and distal aorta 1.7 x  2.2 cm.    Right iliac artery measures 10 x 12 mm and the left 10 x 12 mm.         Impression    IMPRESSION: No abdominal aortic aneurysm.    MARIAM GONZALES MD   US Pelvic Complete with Transvaginal    Narrative    US PELVIC COMPLETE WITH TRANSVAGINAL    HISTORY: 69 years Female Left lower quadrant pain    TECHNIQUE: Transabdominal  "and transvaginal grayscale and color duplex  ultrasonography of the pelvis was performed.    COMPARISON: None    FINDINGS:    The uterus measures 4.8 x 2.2 x 3.7 cm. The uterus is retroverted.    Endometrial thickness is 2 mm.    The right ovary is surgically absent.    The left ovary is not visualized.    No adnexal masses or abnormal fluid collections are seen.        Impression    IMPRESSION: Negative study. No pelvic mass or abnormal fluid  collection is seen.    DENG VALDOVINOS MD         PHYSICAL EXAM:     GENERAL APPEARANCE: healthy, alert, no distress, cooperative and \"affect normal/bright\"  NEURO: cranial nerves 2-12 intact, normal gait.    lower extremity DTRs equal and hyporeflexive   normal strength, able to toe raise and heel walk   Intact light touch sensory      MUSCULOSKELETAL:   Posture and Gait: improved hip/spine extension upright        Lumbar/Pelvic  PSIS/Iliac Crests:  Low L- Posterior inferior ilium    AROM:   fluid motion, full and non-painful.    Slight +White sign on flexion with raised left paraspinals and R pelvis raised , slight RLF restriction.   -Kemps:    +Gillets: +L upper sacroiliac, R lower sacroiliac motion restriction.  +Trendelenbergs: minimal right hip drop with left bent knee raising  - Straight leg raise: AROM approximately right leg to 85 degrees and left leg to 80 degrees  negative orthopedic testing during Nachlas, Yeomans, Gui and Elys testing.    Tenderness: minimal Left lumbosacral, left sacroiliac, right lumbar  Muscle spasm:  Minimal Right QL, bilateral extensor paraspinals, gluteals  Motion restriction: lumbar and thoracolumbar into flexion, L sacroiliac into anterior and superior and R sacroiliac into posterior and inferior    +Joint asymmetry and restriction: lumbar, L sacroiliac, R sacroiliac       ASSESSMENT: Daly Perry has made good progress with functional ability and reduced radiating symptoms into her legs with conservative care and educaiton for self " "management.  She is discharged to continue home exercise program and can return if symptoms or function worsens.       Segmental and somatic dysfunction of lumbar region  DDD (degenerative disc disease), lumbar  Multilevel degenerative disc disease  Segmental and somatic dysfunction of pelvic region    PLAN    Evaluation and Management    Modalities:  None performed this visit    CMT:  15390 Chiropractic manipulative treatment 1-2 regions performed   Lumbar: Vásquez Distraction technique with intersegmental flexion and lateral flexion, L1, L2, L3, L4, L5, Prone  Pelvis: Reed Drop, PSIS Left , PSIS Right , Prone    Therapeutic procedures:  None    Response to Treatment:  \"it felt good, more movement\"       Long term  4 - 6  Weeks 11/20/2020 All goals met and exceeded.   Patient will demonstrate an improved ability to complete Activities of Daily Living  as shown by a reported 10-30% reduced score on neck and/or back index.    Patient will demonstrate improved ROM to be able to do household and outdoor work without severe increase in symptoms.     Patient will report reduced symptoms by 2 points on 0-10 pt. Verbal or Visual rating.       INSTRUCTIONS     Patient Instructions   Discharged to continue home exercise.  Return if worsens.      Return for Discharged from this Active Episode.     Disclaimer: This note consists of symbols derived from keyboarding, dictation and/or voice recognition software. As a result, there may be errors in the script that have gone undetected. Please consider this when interpreting information found in this chart.    "

## 2021-04-26 ENCOUNTER — TELEPHONE (OUTPATIENT)
Dept: INTERNAL MEDICINE | Facility: OTHER | Age: 71
End: 2021-04-26

## 2021-04-26 NOTE — TELEPHONE ENCOUNTER
Patient just wanted  to know that there has been health care directive sent to medical records.  Macey Mack on 4/26/2021 at 10:58 AM

## 2021-05-03 ENCOUNTER — DOCUMENTATION ONLY (OUTPATIENT)
Dept: OTHER | Facility: CLINIC | Age: 71
End: 2021-05-03

## 2021-05-06 ENCOUNTER — HOSPITAL ENCOUNTER (OUTPATIENT)
Dept: MAMMOGRAPHY | Facility: OTHER | Age: 71
Discharge: HOME | End: 2021-05-06
Attending: INTERNAL MEDICINE | Admitting: INTERNAL MEDICINE
Payer: MEDICARE

## 2021-05-06 DIAGNOSIS — Z12.31 VISIT FOR SCREENING MAMMOGRAM: ICD-10-CM

## 2021-05-06 PROCEDURE — 77063 BREAST TOMOSYNTHESIS BI: CPT

## 2021-05-18 ENCOUNTER — TELEPHONE (OUTPATIENT)
Dept: INTERNAL MEDICINE | Facility: OTHER | Age: 71
End: 2021-05-18

## 2021-05-18 DIAGNOSIS — G47.33 OSA (OBSTRUCTIVE SLEEP APNEA): Primary | ICD-10-CM

## 2021-05-18 NOTE — TELEPHONE ENCOUNTER
Patient is asking for a cpap renewal. She sees Dr. Coker. She has had the cpap for 2 years and has some questions. Please call      Va Vences on 5/18/2021 at 1:29 PM

## 2021-05-19 NOTE — TELEPHONE ENCOUNTER
Patient calling and states that she is needing a new Rx for CPAP.  Patient states she is wondering if per Medicare that she needs a follow up with Dr. Barreto or can she just get new Rx from her primary care provider Dr. Baer  Informed patient that Dr. Baer is back on Monday and will route to have message addressed then.    Alma Blanchard RN on 5/19/2021 at 10:55 AM

## 2021-05-24 NOTE — TELEPHONE ENCOUNTER
I am okay placing the prescription for her CPAP if she feels that her current settings are working.  Insurance may require a face-to-face visit for this regardless of who does it.  If she is having any issues with the CPAP it would be recommended that she keep her appointment with Dr. Barreto to talk about any adjustments that may be needed.    If she would like me to put the prescription in I would recommend that she call or send a YouTernt message with her settings.

## 2021-05-25 ENCOUNTER — TELEPHONE (OUTPATIENT)
Dept: INTERNAL MEDICINE | Facility: OTHER | Age: 71
End: 2021-05-25

## 2021-05-25 NOTE — TELEPHONE ENCOUNTER
Pressure 6.0 to 10.2  For her CPAP machine.   And medicare needs a new script.    Please call patient back.  Thank you   Sneha Lazcano on 5/25/2021 at 1:54 PM

## 2021-05-25 NOTE — TELEPHONE ENCOUNTER
After proper verification, patient was relayed message from below. Patient stated that she was feeling like her settings were ok and would call back with her settings to place order.  Jerrica Chavez LPN on 5/25/2021 at 1:08 PM

## 2021-05-26 NOTE — TELEPHONE ENCOUNTER
Patient called she states that Medicare needs statement that she is still using her Cpap. She is also requesting to get confirmation that this was sent to them.  She said she didn't need a new order however.   Ileana Nguyen LPN ..........5/26/2021 9:26 AM

## 2021-05-26 NOTE — TELEPHONE ENCOUNTER
I would recommend that patient be seen for a brief visit to review her CPAP use and create this statement.  This could be done through a video visit with myself or LIANG Gay.

## 2021-05-26 NOTE — TELEPHONE ENCOUNTER
Left message to call back.     Patient needs to schedule a face to face appt for her Cpap. Once the appointment happens, her insurance can request the note.   Ileana Nguyen LPN ..........5/26/2021 2:29 PM

## 2021-05-27 ENCOUNTER — VIRTUAL VISIT (OUTPATIENT)
Dept: INTERNAL MEDICINE | Facility: OTHER | Age: 71
End: 2021-05-27
Attending: NURSE PRACTITIONER
Payer: COMMERCIAL

## 2021-05-27 DIAGNOSIS — G47.33 OSA ON CPAP: ICD-10-CM

## 2021-05-27 PROBLEM — F41.1 GENERALIZED ANXIETY DISORDER: Chronic | Status: ACTIVE | Noted: 2018-01-30

## 2021-05-27 PROCEDURE — G0463 HOSPITAL OUTPT CLINIC VISIT: HCPCS | Mod: TEL

## 2021-05-27 PROCEDURE — 99213 OFFICE O/P EST LOW 20 MIN: CPT | Mod: 95 | Performed by: NURSE PRACTITIONER

## 2021-05-27 ASSESSMENT — PAIN SCALES - GENERAL: PAINLEVEL: NO PAIN (0)

## 2021-05-27 NOTE — PROGRESS NOTES
"Nursing Notes:   Janey Beebe LPN  5/27/2021  3:10 PM  Signed  Chief Complaint   Patient presents with     Refill Request     CPAP      Needs a script for CPAP machine, tubing and face mask. Already has had machine for about 2 years. [facility] is where she gets her supplies.     Medication Reconciliation: complete    LAUREL Garza is a 70 year old female who is being evaluated via a billable telephone visit.      The patient has been notified of following:    \"This telephone visit will be conducted via a call between you and your physician/provider. We have found that certain health care needs can be provided without the need for a physical exam.  This service lets us provide the care you need with a short phone conversation.  If a prescription is necessary we can send it directly to your pharmacy.  If lab work is needed we can place an order for that and you can then stop by our lab to have the test done at a later time. Telephone visits are billed at different rates depending on your insurance coverage. During this emergency period, for some insurers they may be billed the same as an in-person visit.  Please reach out to your insurance provider with any questions. If during the course of the call the physician/provider feels a telephone visit is not appropriate, you will not be charged for this service.\"    Patient has given verbal consent for Telephone visit?  Yes    How would you like to obtain your AVS? Rustam    Daly Perry is being assessed via telephone visit with the following concerns:  Chief Complaint   Patient presents with     Refill Request     CPAP      No Known Allergies      Patient Active Problem List   Diagnosis     Diverticulosis of large intestine     Fibrocystic breast disease     Generalized anxiety disorder     Rosacea     KB on CPAP     Past Surgical History:   Procedure Laterality Date     BIOPSY BREAST Right 07/07/2008    stereotactic, " benign result     CATARACT IOL, RT/LT Bilateral     2017, 2018     COLONOSCOPY  03/14/2011    Normal; F/U 2021     DILATION AND CURETTAGE  2003    D&C with hysteroscopy and endometrial ablation     OOPHORECTOMY Right 02/1997     OPEN REDUCTION INTERNAL FIXATION ANKLE Left 2002     RELEASE CARPAL TUNNEL  2010     REMOVE HARDWARE ANKLE Left 06/27/2011     VITRECTOMY ANTERIOR Left 05/22/2017    Dr. Collins       Social History     Tobacco Use     Smoking status: Never Smoker     Smokeless tobacco: Never Used   Substance Use Topics     Alcohol use: Yes     Comment: 1-2 per week     Family History   Problem Relation Age of Onset     Arthritis Mother      Heart Disease Mother      Hypertension Mother      Thyroid Disease Mother      Dementia Mother      Pulmonary Embolism Mother      Heart Disease Father      Other - See Comments Father         Psychiatric illness     Chronic Obstructive Pulmonary Disease Father      Dementia Brother      Colon Cancer Paternal Grandmother         Cancer-colon     No Known Problems Brother      Hypertension Sister      Allergies Sister         food     Breast Cancer No family hx of         Cancer-breast         Current Outpatient Medications   Medication Sig Dispense Refill     acetaminophen (TYLENOL) 325 MG tablet Take 325 mg by mouth every 4 hours as needed Max acetaminophen dose: 4000 mg in 24 hours       ARIPiprazole (ABILIFY) 10 MG tablet Take 0.5 tablets (5 mg) by mouth At Bedtime (Patient taking differently: Take 5 mg by mouth every evening ) 30 tablet 1     BENZTROPINE MESYLATE PO Take 0.5 mg by mouth 2 times daily       calcium 600 MG tablet Take 1 tablet by mouth daily       clobetasol (TEMOVATE) 0.05 % external ointment Apply topically 2 times daily 30 g 3     conjugated estrogens (PREMARIN) 0.625 MG/GM vaginal cream Use 0.5g nightly for 1-2 weeks and then twice weekly ongoing 30 g 3     ibuprofen (ADVIL/MOTRIN) 200 MG tablet Take 200 mg by mouth 4 times daily as needed        Lutein-Zeaxanthin (OCUVITE LUTEIN 25) 25-5 MG CAPS Take 1 capsule by mouth daily        Multiple Vitamins-Minerals (OCUVITE PO) Take 1 tablet by mouth daily       venlafaxine (EFFEXOR-XR) 37.5 MG 24 hr capsule Take 37.5 mg by mouth daily with food       venlafaxine (EFFEXOR-XR) 75 MG 24 hr capsule Take 75 mg by mouth daily with food       No Known Allergies  Recent Labs   Lab Test 10/07/20  0927 05/09/18  1230 01/23/18  1307 02/14/17  0945   * 109*  --  83   * 93*  --  94*   TRIG 44 52  --  55   ALT 19  --   --   --    CR 0.67  --  0.62*  --    GFRESTIMATED 88  --   --   --    GFRESTBLACK >90  --  >60  --    POTASSIUM 4.0  --  4.2  --       BP Readings from Last 3 Encounters:   11/05/20 124/76   10/07/20 118/80   09/02/20 136/80    Wt Readings from Last 3 Encounters:   11/05/20 72 kg (158 lb 12.8 oz)   10/07/20 71.2 kg (157 lb)   09/02/20 69.4 kg (153 lb)         Reviewed and updated as needed this visit by Provider.    HPI:   Patient reports she needs documentation completed for Medicare that she uses CPAP nightly for KB. She gets her supplies from [facility]. Has had [facility] machine for about 2-3 years. Setting is 6.0-10.0. Uses nasal cushion mask, heated tubing.    ROS:  Denies any fever, chills, chest pain, SOB, lower extremity edema, changes in bowel or bladder.    Objective   Reported vitals:  LMP  (LMP Unknown)    Psych: Alert and oriented times 3; coherent speech, normal rate and volume, able to articulate logical thoughts, able to abstract reason, no tangential thoughts, no hallucinations or delusions  Affect is appropriate, pleasant.  Unable to complete examination due to telephone visit.    Assessment/Plan:  KB on CPAP  Continue use of CPAP nightly.           Documentation of Face-to-Face and Certification for Medical Necessity:    Patient: Daly Perry  YOB: 1950  MR Number: [id number]  Today's Date: 5/27/2021    Daly Perry is being evaluated today for a FACE TO FACE  Examination for medical necessity of a CPAP machine and supplies.    Daly Perry has been noted to have:  [] witnessed sleep apnea  [] excessive snoring, habitual snoring, gasping/choking episodes associated with awakenings  [] excessive daytime sleepiness  [] documented hypertension OR ischemic heart disease  [] obesity (BMI greater than or equal to 35)  [] other: (CHF, atrial fibrillation, history of stroke/TIA, CAD, Type II diabetes, depression)  [x] Diagnosed KB    I certify that this patient, Daly Perry has been under my care (or a nurse practitioner or physican's assistant working with me). This is the face-to-face encounter for CPAP medical necessity.      Daly Perry is now in a chronic stable state and continues to require nightly use of CPAP machine. Patient uses settings of 6.0-10.0 with nasal cushion mask and heated tubing.     LIANG Gay, AGNP-C  Internal Medicine  05/27/2021 3:59 PM      Return in 3 months (on 8/27/2021) for Keep Scheduled Appointment Already Made on 7/27/21.    Patient verbalizes understanding and is agreeable to plan of care.    Phone call duration: 6 minutes    Loren Todd NP  05/27/2021 3:58 PM     Total time spent with this patient was 20 minutes which included chart review, visualization and interpretation of labs/images, time spent with the patient and documentation.

## 2021-05-27 NOTE — NURSING NOTE
Chief Complaint   Patient presents with     Refill Request     CPAP      Needs a script for CPAP machine, tubing and face mask. Already has had machine for about 2 years. [facility] is where she gets her supplies.     Medication Reconciliation: complete    Janey Beebe, LPN

## 2021-05-27 NOTE — TELEPHONE ENCOUNTER
Notified patient of providers note and transferred to scheduling.  Ivette Dockery LPN ....................  5/27/2021   11:52 AM

## 2021-05-27 NOTE — PROGRESS NOTES
"Daly is a 70 year old who is being evaluated via a billable telephone visit.      What phone number would you like to be contacted at? [phone]  How would you like to obtain your AVS? MyChart    {PROVIDER CHARTING PREFERENCE:729907}    Subjective   Daly is a 70 year old who presents for the following health issues {ACCOMPANIED BY STATEMENT (Optional):017868}    HPI     {SUPERLIST (Optional):834607}  {additonal problems for provider to add (Optional):710628}    Review of Systems   {ROS COMP (Optional):259534}      Objective    Vitals - Patient Reported  Pain Score: No Pain (0)        Physical Exam   {GENERAL APPEARANCE:50::\"healthy\",\"alert\",\"no distress\"}  PSYCH: Alert and oriented times 3; coherent speech, normal   rate and volume, able to articulate logical thoughts, able   to abstract reason, no tangential thoughts, no hallucinations   or delusions  Her affect is { :2177339::\"normal\"}  RESP: No cough, no audible wheezing, able to talk in full sentences  Remainder of exam unable to be completed due to telephone visits    {Diagnostic Test Results (Optional):919846}    {AMBULATORY ATTESTATION (Optional):790063}        Phone call duration: *** minutes    "

## 2021-05-28 NOTE — TELEPHONE ENCOUNTER
Patient had a virtual visit with Loren Todd NP yesterday. Lalitha Iqbal LPN .............5/28/2021  8:45 AM

## 2021-07-13 ENCOUNTER — TRANSFERRED RECORDS (OUTPATIENT)
Dept: HEALTH INFORMATION MANAGEMENT | Facility: OTHER | Age: 71
End: 2021-07-13

## 2021-07-16 ENCOUNTER — TRANSFERRED RECORDS (OUTPATIENT)
Dept: HEALTH INFORMATION MANAGEMENT | Facility: OTHER | Age: 71
End: 2021-07-16
Payer: COMMERCIAL

## 2021-07-19 ENCOUNTER — TRANSFERRED RECORDS (OUTPATIENT)
Dept: HEALTH INFORMATION MANAGEMENT | Facility: OTHER | Age: 71
End: 2021-07-19

## 2021-07-21 ENCOUNTER — TRANSFERRED RECORDS (OUTPATIENT)
Dept: HEALTH INFORMATION MANAGEMENT | Facility: OTHER | Age: 71
End: 2021-07-21
Payer: COMMERCIAL

## 2021-07-23 ENCOUNTER — TRANSFERRED RECORDS (OUTPATIENT)
Dept: HEALTH INFORMATION MANAGEMENT | Facility: OTHER | Age: 71
End: 2021-07-23
Payer: COMMERCIAL

## 2021-07-26 ENCOUNTER — TRANSFERRED RECORDS (OUTPATIENT)
Dept: HEALTH INFORMATION MANAGEMENT | Facility: OTHER | Age: 71
End: 2021-07-26
Payer: COMMERCIAL

## 2021-07-28 ENCOUNTER — TRANSFERRED RECORDS (OUTPATIENT)
Dept: HEALTH INFORMATION MANAGEMENT | Facility: OTHER | Age: 71
End: 2021-07-28
Payer: COMMERCIAL

## 2021-07-30 ENCOUNTER — TRANSFERRED RECORDS (OUTPATIENT)
Dept: HEALTH INFORMATION MANAGEMENT | Facility: OTHER | Age: 71
End: 2021-07-30
Payer: COMMERCIAL

## 2021-08-20 ENCOUNTER — TRANSFERRED RECORDS (OUTPATIENT)
Dept: HEALTH INFORMATION MANAGEMENT | Facility: OTHER | Age: 71
End: 2021-08-20
Payer: COMMERCIAL

## 2021-09-07 DIAGNOSIS — N95.1 VAGINAL DRYNESS, MENOPAUSAL: Primary | ICD-10-CM

## 2021-09-13 RX ORDER — ESTRADIOL 0.1 MG/G
CREAM VAGINAL
Qty: 43 G | Refills: 0 | Status: SHIPPED | OUTPATIENT
Start: 2021-09-13 | End: 2022-01-19

## 2021-09-13 NOTE — TELEPHONE ENCOUNTER
Routing refill request to provider for review/approval because:  Drug not active on patient's medication list    LOV; 11/5/2020    Called and left message for patient to return call    Last Fill 2013    Patient states uses for vaginal dryness.  Patient is looking for a refill.    Alma Blanchard RN on 9/13/2021 at 9:47 AM

## 2021-09-14 ENCOUNTER — TELEPHONE (OUTPATIENT)
Dept: INTERNAL MEDICINE | Facility: OTHER | Age: 71
End: 2021-09-14

## 2021-09-14 NOTE — TELEPHONE ENCOUNTER
When patient went to  her estradiol (ESTRACE) 0.1 MG/GM vaginal cream and her copay was over $200.00 is there something else she can get?     Please call back. Thank you   Sneha Lazcano on 9/14/2021 at 8:13 AM

## 2021-09-15 NOTE — TELEPHONE ENCOUNTER
Patient notified and will do some checking on her own and call us back with decisions.  Sabina Chaudhry CMA([facility])..................9/15/2021   4:45 PM

## 2021-09-15 NOTE — TELEPHONE ENCOUNTER
Typically Estrace estradiol vaginal cream is cheaper than Premarin however she can check with her insurance if there is one that is better covered.  Alternatively she can look into options to get it cheaper through [facility].

## 2021-09-17 ENCOUNTER — TRANSFERRED RECORDS (OUTPATIENT)
Dept: HEALTH INFORMATION MANAGEMENT | Facility: OTHER | Age: 71
End: 2021-09-17

## 2021-09-20 ENCOUNTER — OFFICE VISIT (OUTPATIENT)
Dept: PULMONOLOGY | Facility: OTHER | Age: 71
End: 2021-09-20
Attending: INTERNAL MEDICINE
Payer: MEDICARE

## 2021-09-20 VITALS
RESPIRATION RATE: 16 BRPM | WEIGHT: 155 LBS | BODY MASS INDEX: 24.91 KG/M2 | HEART RATE: 73 BPM | SYSTOLIC BLOOD PRESSURE: 110 MMHG | HEIGHT: 66 IN | OXYGEN SATURATION: 100 % | DIASTOLIC BLOOD PRESSURE: 72 MMHG | TEMPERATURE: 97.1 F

## 2021-09-20 DIAGNOSIS — G47.33 OSA ON CPAP: Primary | ICD-10-CM

## 2021-09-20 PROCEDURE — G0463 HOSPITAL OUTPT CLINIC VISIT: HCPCS

## 2021-09-20 ASSESSMENT — PAIN SCALES - GENERAL: PAINLEVEL: NO PAIN (0)

## 2021-09-20 ASSESSMENT — MIFFLIN-ST. JEOR: SCORE: 1249.83

## 2021-09-20 NOTE — PROGRESS NOTES
Sleep Medicine Note  Daly Perry  September 20, 2021  [id number]    Chief Complaint: KB on CPAP    History of Present Illness: Daly Perry is a 70 year old female presenting for above complaint.  Has a history of mild obstructive sleep apnea.  She replaced her CPAP machine in 2019.  She is wearing it every night.  She is worn it 100% of the nights more than 4 hours averaging 8 hours 10 minutes over the last 30 nights.  In auto titrate mode 6 to 10.2 cm water pressure 95th percentile pressure is 9.8.  She does have some trouble apneas but are not bothered by them.  Her AHI is 3.9 with 2.8 central apneas per hour.  2 years ago it was 4.6 so it is improved to stable.  She is not aware of the central apneas.  She needs a new order for supplies.  She has been having some leak from her nasal pillows.  This is because she does not replace them regularly.  She plans on replacing them regularly.        Past Medical History:  Past Medical History:   Diagnosis Date     Closed left ankle fracture      Cyst of ovary     Hx of right ovarian cyst.     Diverticulosis of large intestine 03/14/2011     Endometriosis     growth on ovary s/p oophrectomy     Fibrocystic breast disease 01/30/2018     Generalized anxiety disorder     Dysthymia, generalized anxiety     Lichen sclerosus     Vulvar LSEA; asymptomatic     Neck pain, chronic 01/30/2018     Improved after alf     Rosacea 1/4/2013     Severe major depression with psychotic features (H) 10/25/2016    inpatient for several months; she does not have complete memory of that time       Medications:  Current Outpatient Medications   Medication     acetaminophen (TYLENOL) 325 MG tablet     ARIPiprazole (ABILIFY) 10 MG tablet     calcium 600 MG tablet     estradiol (ESTRACE) 0.1 MG/GM vaginal cream     ibuprofen (ADVIL/MOTRIN) 200 MG tablet     Lutein-Zeaxanthin (OCUVITE LUTEIN 25) 25-5 MG CAPS     Multiple Vitamins-Minerals (OCUVITE PO)     venlafaxine (EFFEXOR-XR) 75 MG 24 hr  "capsule     BENZTROPINE MESYLATE PO     clobetasol (TEMOVATE) 0.05 % external ointment     conjugated estrogens (PREMARIN) 0.625 MG/GM vaginal cream     No current facility-administered medications for this visit.       Physical Exam:  /72   Pulse 73   Temp 97.1  F (36.2  C) (Tympanic)   Resp 16   Ht 5' 6\" (1.676 m)   Wt 155 lb (70.3 kg)   SpO2 100%   BMI 25.02 kg/m    Tiago is limited to vitals    Assessment and Plan:  70 year old female presenting for mild sleep apnea.  She is benefiting from CPAP.  She is compliant with CPAP.  The plan is to continue CPAP and order is written for new supplies.  She will replace her pillows regularly.  Sleep follow-up will be on a as needed basis.    C: [facility]      "

## 2021-10-09 ENCOUNTER — HEALTH MAINTENANCE LETTER (OUTPATIENT)
Age: 71
End: 2021-10-09

## 2021-10-29 ENCOUNTER — TRANSFERRED RECORDS (OUTPATIENT)
Dept: HEALTH INFORMATION MANAGEMENT | Facility: OTHER | Age: 71
End: 2021-10-29

## 2021-12-04 ENCOUNTER — HEALTH MAINTENANCE LETTER (OUTPATIENT)
Age: 71
End: 2021-12-04

## 2022-01-10 ENCOUNTER — ALLIED HEALTH/NURSE VISIT (OUTPATIENT)
Dept: FAMILY MEDICINE | Facility: OTHER | Age: 72
End: 2022-01-10
Attending: FAMILY MEDICINE
Payer: MEDICARE

## 2022-01-10 DIAGNOSIS — Z20.822 COVID-19 RULED OUT: Primary | ICD-10-CM

## 2022-01-10 PROCEDURE — C9803 HOPD COVID-19 SPEC COLLECT: HCPCS

## 2022-01-10 PROCEDURE — U0003 INFECTIOUS AGENT DETECTION BY NUCLEIC ACID (DNA OR RNA); SEVERE ACUTE RESPIRATORY SYNDROME CORONAVIRUS 2 (SARS-COV-2) (CORONAVIRUS DISEASE [COVID-19]), AMPLIFIED PROBE TECHNIQUE, MAKING USE OF HIGH THROUGHPUT TECHNOLOGIES AS DESCRIBED BY CMS-2020-01-R: HCPCS | Mod: ZL

## 2022-01-10 NOTE — PROGRESS NOTES
Patient swabbed for COVID-19 testing.  No exposure no symptoms  Nurys Yuan MA on 1/10/2022 at 1:22 PM

## 2022-01-12 LAB — SARS-COV-2 RNA RESP QL NAA+PROBE: NEGATIVE

## 2022-01-19 ENCOUNTER — HOSPITAL ENCOUNTER (OUTPATIENT)
Dept: GENERAL RADIOLOGY | Facility: OTHER | Age: 72
End: 2022-01-19
Attending: INTERNAL MEDICINE
Payer: MEDICARE

## 2022-01-19 ENCOUNTER — OFFICE VISIT (OUTPATIENT)
Dept: INTERNAL MEDICINE | Facility: OTHER | Age: 72
End: 2022-01-19
Attending: INTERNAL MEDICINE
Payer: MEDICARE

## 2022-01-19 VITALS
DIASTOLIC BLOOD PRESSURE: 72 MMHG | WEIGHT: 176 LBS | HEIGHT: 65 IN | OXYGEN SATURATION: 97 % | SYSTOLIC BLOOD PRESSURE: 138 MMHG | TEMPERATURE: 97.1 F | HEART RATE: 80 BPM | BODY MASS INDEX: 29.32 KG/M2 | RESPIRATION RATE: 12 BRPM

## 2022-01-19 DIAGNOSIS — Z13.220 SCREENING FOR HYPERLIPIDEMIA: ICD-10-CM

## 2022-01-19 DIAGNOSIS — M25.562 CHRONIC PAIN OF BOTH KNEES: ICD-10-CM

## 2022-01-19 DIAGNOSIS — R63.5 WEIGHT GAIN: ICD-10-CM

## 2022-01-19 DIAGNOSIS — Z13.1 SCREENING FOR DIABETES MELLITUS: ICD-10-CM

## 2022-01-19 DIAGNOSIS — L90.0 LICHEN SCLEROSUS: ICD-10-CM

## 2022-01-19 DIAGNOSIS — G89.29 CHRONIC PAIN OF BOTH KNEES: ICD-10-CM

## 2022-01-19 DIAGNOSIS — M25.561 CHRONIC PAIN OF BOTH KNEES: ICD-10-CM

## 2022-01-19 DIAGNOSIS — R20.2 PARESTHESIAS: ICD-10-CM

## 2022-01-19 DIAGNOSIS — M54.50 BILATERAL LOW BACK PAIN WITHOUT SCIATICA, UNSPECIFIED CHRONICITY: ICD-10-CM

## 2022-01-19 DIAGNOSIS — G47.33 OSA ON CPAP: Chronic | ICD-10-CM

## 2022-01-19 DIAGNOSIS — Z12.11 SCREENING FOR COLON CANCER: ICD-10-CM

## 2022-01-19 DIAGNOSIS — Z00.00 ENCOUNTER FOR MEDICARE ANNUAL WELLNESS EXAM: Primary | ICD-10-CM

## 2022-01-19 LAB
ALBUMIN SERPL-MCNC: 4.1 G/DL (ref 3.5–5.7)
ALP SERPL-CCNC: 61 U/L (ref 34–104)
ALT SERPL W P-5'-P-CCNC: 16 U/L (ref 7–52)
ANION GAP SERPL CALCULATED.3IONS-SCNC: 5 MMOL/L (ref 3–14)
AST SERPL W P-5'-P-CCNC: 18 U/L (ref 13–39)
BILIRUB SERPL-MCNC: 0.3 MG/DL (ref 0.3–1)
BUN SERPL-MCNC: 23 MG/DL (ref 7–25)
CALCIUM SERPL-MCNC: 9.7 MG/DL (ref 8.6–10.3)
CHLORIDE BLD-SCNC: 104 MMOL/L (ref 98–107)
CHOLEST SERPL-MCNC: 218 MG/DL
CO2 SERPL-SCNC: 30 MMOL/L (ref 21–31)
CREAT SERPL-MCNC: 0.66 MG/DL (ref 0.6–1.2)
CRP SERPL-MCNC: <1 MG/L
ERYTHROCYTE [DISTWIDTH] IN BLOOD BY AUTOMATED COUNT: 13 % (ref 10–15)
ERYTHROCYTE [SEDIMENTATION RATE] IN BLOOD BY WESTERGREN METHOD: 7 MM/HR (ref 0–30)
FASTING STATUS PATIENT QL REPORTED: ABNORMAL
GFR SERPL CREATININE-BSD FRML MDRD: >90 ML/MIN/1.73M2
GLUCOSE BLD-MCNC: 99 MG/DL (ref 70–105)
HCT VFR BLD AUTO: 38.6 % (ref 35–47)
HDLC SERPL-MCNC: 100 MG/DL (ref 23–92)
HGB BLD-MCNC: 12.7 G/DL (ref 11.7–15.7)
LDLC SERPL CALC-MCNC: 102 MG/DL
MCH RBC QN AUTO: 31.8 PG (ref 26.5–33)
MCHC RBC AUTO-ENTMCNC: 32.9 G/DL (ref 31.5–36.5)
MCV RBC AUTO: 97 FL (ref 78–100)
NONHDLC SERPL-MCNC: 118 MG/DL
PLATELET # BLD AUTO: 287 10E3/UL (ref 150–450)
POTASSIUM BLD-SCNC: 4.2 MMOL/L (ref 3.5–5.1)
PROT SERPL-MCNC: 6.1 G/DL (ref 6.4–8.9)
RBC # BLD AUTO: 3.99 10E6/UL (ref 3.8–5.2)
SODIUM SERPL-SCNC: 139 MMOL/L (ref 134–144)
TRIGL SERPL-MCNC: 78 MG/DL
TSH SERPL DL<=0.005 MIU/L-ACNC: 1.65 MU/L (ref 0.4–4)
WBC # BLD AUTO: 5.6 10E3/UL (ref 4–11)

## 2022-01-19 PROCEDURE — 85027 COMPLETE CBC AUTOMATED: CPT | Mod: ZL | Performed by: INTERNAL MEDICINE

## 2022-01-19 PROCEDURE — 80061 LIPID PANEL: CPT | Mod: ZL | Performed by: INTERNAL MEDICINE

## 2022-01-19 PROCEDURE — 84443 ASSAY THYROID STIM HORMONE: CPT | Mod: ZL | Performed by: INTERNAL MEDICINE

## 2022-01-19 PROCEDURE — 99214 OFFICE O/P EST MOD 30 MIN: CPT | Mod: 25 | Performed by: INTERNAL MEDICINE

## 2022-01-19 PROCEDURE — 86140 C-REACTIVE PROTEIN: CPT | Mod: ZL | Performed by: INTERNAL MEDICINE

## 2022-01-19 PROCEDURE — 73560 X-RAY EXAM OF KNEE 1 OR 2: CPT | Mod: LT

## 2022-01-19 PROCEDURE — G0439 PPPS, SUBSEQ VISIT: HCPCS | Performed by: INTERNAL MEDICINE

## 2022-01-19 PROCEDURE — 36415 COLL VENOUS BLD VENIPUNCTURE: CPT | Mod: ZL | Performed by: INTERNAL MEDICINE

## 2022-01-19 PROCEDURE — 86038 ANTINUCLEAR ANTIBODIES: CPT | Mod: ZL | Performed by: INTERNAL MEDICINE

## 2022-01-19 PROCEDURE — 80053 COMPREHEN METABOLIC PANEL: CPT | Mod: ZL | Performed by: INTERNAL MEDICINE

## 2022-01-19 PROCEDURE — 85652 RBC SED RATE AUTOMATED: CPT | Mod: ZL | Performed by: INTERNAL MEDICINE

## 2022-01-19 PROCEDURE — G0463 HOSPITAL OUTPT CLINIC VISIT: HCPCS | Mod: 25

## 2022-01-19 RX ORDER — VIT C/B6/B5/MAGNESIUM/HERB 173 50-5-6-5MG
1 CAPSULE ORAL DAILY
COMMUNITY
End: 2022-04-11

## 2022-01-19 RX ORDER — CLOBETASOL PROPIONATE 0.5 MG/G
OINTMENT TOPICAL 2 TIMES DAILY
Qty: 30 G | Refills: 3 | Status: SHIPPED | OUTPATIENT
Start: 2022-01-19 | End: 2023-02-02

## 2022-01-19 RX ORDER — PROPRANOLOL HYDROCHLORIDE 20 MG/1
20 TABLET ORAL 2 TIMES DAILY PRN
COMMUNITY
Start: 2021-12-29 | End: 2023-02-02

## 2022-01-19 ASSESSMENT — ANXIETY QUESTIONNAIRES
7. FEELING AFRAID AS IF SOMETHING AWFUL MIGHT HAPPEN: NOT AT ALL
7. FEELING AFRAID AS IF SOMETHING AWFUL MIGHT HAPPEN: NOT AT ALL
3. WORRYING TOO MUCH ABOUT DIFFERENT THINGS: NOT AT ALL
GAD7 TOTAL SCORE: 0
1. FEELING NERVOUS, ANXIOUS, OR ON EDGE: NOT AT ALL
5. BEING SO RESTLESS THAT IT IS HARD TO SIT STILL: NOT AT ALL
GAD7 TOTAL SCORE: 0
GAD7 TOTAL SCORE: 0
6. BECOMING EASILY ANNOYED OR IRRITABLE: NOT AT ALL
2. NOT BEING ABLE TO STOP OR CONTROL WORRYING: NOT AT ALL
4. TROUBLE RELAXING: NOT AT ALL

## 2022-01-19 ASSESSMENT — PATIENT HEALTH QUESTIONNAIRE - PHQ9
SUM OF ALL RESPONSES TO PHQ QUESTIONS 1-9: 2
SUM OF ALL RESPONSES TO PHQ QUESTIONS 1-9: 2
10. IF YOU CHECKED OFF ANY PROBLEMS, HOW DIFFICULT HAVE THESE PROBLEMS MADE IT FOR YOU TO DO YOUR WORK, TAKE CARE OF THINGS AT HOME, OR GET ALONG WITH OTHER PEOPLE: NOT DIFFICULT AT ALL

## 2022-01-19 ASSESSMENT — ACTIVITIES OF DAILY LIVING (ADL): CURRENT_FUNCTION: NO ASSISTANCE NEEDED

## 2022-01-19 ASSESSMENT — PAIN SCALES - GENERAL: PAINLEVEL: MILD PAIN (3)

## 2022-01-19 ASSESSMENT — MIFFLIN-ST. JEOR: SCORE: 1327.06

## 2022-01-19 NOTE — NURSING NOTE
Patient presents to clinic today for annual Medicare wellness visit.  Medication reconciliation completed.    ACP on file? yes    FOOD SECURITY SCREENING QUESTIONS    The next two questions are to help us understand your food security.  If you are feeling you need any assistance in this area, we have resources available to support you today.    Hunger Vital Signs:   Within the past 12 months we worried whether our food would run out before we got money to buy more. Never  Within the past 12 months the food we bought just didn't last and we didn't have money to get more. Never    Sabina Chaudhry CMA(AAMA)..................1/19/2022   1:17 PM

## 2022-01-19 NOTE — PROGRESS NOTES
"Medicare Wellness Visit   Ms. Perry is a 71 year old female who presents today who presents for Preventive Visit.    Are you in the first 12 months of your Medicare coverage?  No    Health Risk Assessment     Do you feel safe in your environment? Yes    Physical Health:  Answers for HPI/ROS submitted by the patient on 2022  If you checked off any problems, how difficult have these problems made it for you to do your work, take care of things at home, or get along with other people?: Not difficult at all  PHQ9 TOTAL SCORE: 2  ERICA 7 TOTAL SCORE: 0  In general, how would you rate your overall physical health?: fair  Frequency of exercise:: 2-3 days/week  Do you usually eat at least 4 servings of fruit and vegetables a day, include whole grains & fiber, and avoid regularly eating high fat or \"junk\" foods? : No  Taking medications regularly:: Yes  Medication side effects:: None  Activities of Daily Living: no assistance needed  Home safety: no safety concerns identified  Hearing Impairment:: difficulty understanding soft or whispered speech  In the past 6 months, have you been bothered by leaking of urine?: Yes  In general, how would you rate your overall mental or emotional health?: good  Additional concerns today:: No  Duration of exercise:: 15-30 minutes        Screening     Fall risk  Fallen 2 or more times in the past year?: (P) Yes  Any fall with injury in the past year?: (P) No    Cognitive Screening   1) Repeat 3 items (Banana, Sunrise, Table)    2) Clock draw: ABNORMAL couldn't place the minute hand  3) 3 item recall: Recalls 3 objects  Results: 3 items recalled: COGNITIVE IMPAIRMENT LESS LIKELY    Mini-CogTM Copyright JOSE Choudhary. Licensed by the author for use in [facility]; reprinted with permission ([email or web address]). All rights reserved.      Do you have sleep apnea, excessive snoring or daytime drowsiness?: yes, uses CPAP    Breast cancer screenin21    Regular dental visits: " annually    Eye exam with in 2 years: annually      End of Life Planning     Have you ever done Advance Care Planning? (For example, a Health Directive, POLST, or a discussion with a medical provider or your loved ones about your wishes): Yes, advance care planning is on file.      End of Life Planning:  Patient currently has an advanced directive: Yes.  Practitioner is supportive of decision.        Medical Record Update     Reviewed and updated as needed this visit by clinical staff  Tobacco  Allergies  Meds  Med Hx  Surg Hx  Fam Hx  Soc Hx      Reviewed and updated as needed this visit by Provider  Tobacco  Allergies  Meds  Problems  Med Hx  Surg Hx  Fam Hx         Current Outpatient Medications   Medication     acetaminophen (TYLENOL) 325 MG tablet     ARIPiprazole (ABILIFY) 10 MG tablet     Calcium Carb-Cholecalciferol (CALCIUM 600+D3) 600-400 MG-UNIT TABS per tablet     Cholecalciferol (VITAMIN D3) 25 MCG (1000 UT) CAPS     clobetasol (TEMOVATE) 0.05 % external ointment     ibuprofen (ADVIL/MOTRIN) 200 MG tablet     Lutein-Zeaxanthin (OCUVITE LUTEIN 25) 25-5 MG CAPS     Multiple Vitamins-Minerals (OCUVITE PO)     Turmeric 500 MG CAPS     venlafaxine (EFFEXOR-XR) 75 MG 24 hr capsule     propranolol (INDERAL) 10 MG tablet     No current facility-administered medications for this visit.          Current providers sharing in care for this patient include:   Patient Care Team:  Nidhi Baer DO as PCP - General (Internal Medicine)  Loren Todd NP as Assigned PCP     Subjective:   She has a history of lichen sclerosis.  She continues on clobetasol cream.  She has not had any issues with this and does need a refill.  She has tried topical estrogen however has found that it is very expensive.  She is unable to afford it at this time.    She has a history of obstructive sleep apnea.  She has been using her CPAP regularly.  She typically uses it for 8 hours nightly.  She feels that her symptoms of  "fatigue and daytime somnolence have improved and are controlled.  She denies any issues with her CPAP.    She has had some increase in paresthesias from her low back.  This radiates into her legs.  She has noted some instability in her knees.  This is slightly worse on the right than it is on the left.  She has had some pain with this off and on.  She has tried multiple modalities for pain control including ice, heat, ibuprofen and Tylenol.    She is due for colon cancer screening.  She is due for diabetes and cholesterol screening.    She has had some weight gain.  Her weight is approximately 20 pounds heavier since September.  We discussed multiple options for treatment of this.    Review of Systems     CONSTITUTIONAL: Negative for fever, chills, night sweats  INTEGUMENTARY/SKIN/LYMPH: Negative for worrisome rashes, moles or lesions; swollen lymph nodes  EYES: Negative for vision changes or irritation  ENT: Negative for additional ear, mouth and throat problems  RESP: Negative for significant cough or SOB  CV: Negative for chest pain, palpitations or peripheral edema  GI: Negative for abdominal pain, or change in bowel habits or blood in the stools/black stools  : Negative for unusual urinary or vaginal symptoms. No vaginal bleeding.  MUSCULOSKELETAL: Negative for joint pain or significant swelling  NEURO: Negative for new headaches; some weakness and parethesias in legs due to back and knees  PSYCHIATRIC: Negative for changes in mood or affect - controlled currently      OBJECTIVE:     Vitals:    01/19/22 1318   BP: 138/72   BP Location: Right arm   Patient Position: Sitting   Cuff Size: Adult Regular   Pulse: 80   Resp: 12   Temp: 97.1  F (36.2  C)   TempSrc: Tympanic   SpO2: 97%   Weight: 79.8 kg (176 lb)   Height: 1.656 m (5' 5.18\")        Estimated body mass index is 29.13 kg/m  as calculated from the following:    Height as of this encounter: 1.656 m (5' 5.18\").    Weight as of this encounter: 79.8 kg (176 " lb).     Physical Exam  GEN: Vitals reviewed. Healthy appearing. Patient is in no acute distress. Cooperative with exam.  HEENT: Normocephalic atraumatic.  Eyes grossly normal to inspection.  No discharge or erythema, or obvious scleral/conjunctival abnormalities. EACs clear bilaterally, TM gray with normal landmarks.  NECK: Supple; no thyromegaly or masses noted.  No cervical or supraclavicular lymphadenopathy.  CV: Heart regular in rate and rhythm with no murmur.    LUNGS: No audible wheeze, cough, or visible cyanosis.  No visible retractions or increased work of breathing.  Lungs clear to auscultation bilaterally.    ABD:  Soft, nontender, and nondistended.  No rebound. Bowel sounds positive.  SKIN: Warm and dry to touch.  Visible skin clear. No significant rash, abnormal pigmentation or lesions.  EXT: No clubbing or cyanosis.  No peripheral edema.  NEURO: Alert and oriented to person, place, and time.  Cranial nerves II-XII grossly intact with no focal or lateralizing deficits.  Muscle tone normal.  Gait normal. No tremor.   MSK: ROM of upper and lower ext symmetric and full.  PSYCH: Mood is good.  Mentation appears normal, affect normal/bright, judgement and insight intact, normal speech and appearance well-groomed.      Labs reviewed in EPIC    ASSESSMENT / PLAN:     Lichen sclerosus  - Controlled.  Continue current regimen.    - clobetasol (TEMOVATE) 0.05 % external ointment  Dispense: 30 g; Refill: 3    KB on CPAP  -patient was encouraged to continue using CPAP/BIPAP  - we discussed the health benefits of treating sleep apnea including improvement in lung and cardiac function and blood pressure along with tangible changes including feeling less tired and less short of breath   - she is to call if he has any issues with mask and we can try symptomatic treatment as needed for congestion, difficulty falling asleep or other issues    Screening for colon cancer  - GONZALO([employer])    Chronic pain of  both knees  - Ice, elevation, gentle movement/rest as tolerated  - Ibuprofen, Naproxen or Tylenol as needed  - offered PT, patient is interested at this time  - patient is to call if she has additional problems with this or if new symptoms develop  - XR Knee Standing 2v  Bilateral & 2v Right  - Physical Therapy Referral  - Sedimentation Rate (ESR)  - CRP inflammation    Bilateral low back pain without sciatica, unspecified chronicity  - Chiropractic Referral    Paresthesias  -Exact etiology is unknown although likely related to degenerative disc disease.  Continue to monitor with treatment and chiropractics and physical therapy and if no improvement can consider further evaluation with injections or referral to spine surgery.  - CBC with platelets  - TSH with free T4 reflex  - Anti Nuclear Esther IgG by IFA with Reflex    Screening for hyperlipidemia  - Lipid Profile    Screening for diabetes mellitus  - Comprehensive metabolic panel    Weight gain  -We had a long discussion today regarding her weight.  Thyroid is checked and normal.  She will work on diet and exercise.  She will plan to follow-up in approximately 4 months with a weight loss goal of 8 to 10 pounds.    Encounter for Medicare annual wellness exam        Preventative Care Guidelines and Health Counseling     COUNSELING:  Reviewed preventive health counseling  Special attention given to:   Preventative screening  Regular exercise  Healthy diet/nutrition  Immunizations   Reviewed preventive health counseling, as reflected in patient instructions    138/72   BP Screening:   Last 3 BP Readings:    BP Readings from Last 3 Encounters:   01/24/22 (!) 132/90   01/19/22 138/72   09/20/21 110/72       The following was recommended to the patient:  Re-screen BP within a year and recommended lifestyle modifications    Body mass index is 29.13 kg/m . Weight management plan: Discussed healthy diet and exercise guidelines        Appropriate preventive services were  discussed with this patient, including applicable screening as appropriate for cardiovascular disease, diabetes, osteopenia/osteoporosis, and glaucoma.  As appropriate for age/gender, discussed screening for colorectal cancer, prostate cancer, breast cancer, and cervical cancer. Checklist reviewing preventive services available has been given to the patient.    Reviewed patients plan of care and provided an AVS. The Basic Care Plan (routine screening as documented in Health Maintenance) for patient meets the Care Plan requirement. This Care Plan has been established and reviewed with the Patient and any available family members.    Counseling Resources:  ATP IV Guidelines  Pooled Cohorts Equation Calculator  Breast Cancer Risk Calculator  FRAX Risk Assessment  ICSI Preventive Guidelines  Dietary Guidelines for Americans, 2010  [employer]-Box - [employer]'s MyPlate  ASA Prophylaxis  Lung CA Screening    Nidhi Baer DO  1/19/2022  1:21 PM  [facility]    She is at risk for falling and has been provided with information to reduce the risk of falling at home.

## 2022-01-20 ENCOUNTER — TELEPHONE (OUTPATIENT)
Dept: INTERNAL MEDICINE | Facility: OTHER | Age: 72
End: 2022-01-20
Payer: COMMERCIAL

## 2022-01-20 ASSESSMENT — PATIENT HEALTH QUESTIONNAIRE - PHQ9: SUM OF ALL RESPONSES TO PHQ QUESTIONS 1-9: 2

## 2022-01-20 ASSESSMENT — ANXIETY QUESTIONNAIRES: GAD7 TOTAL SCORE: 0

## 2022-01-20 NOTE — TELEPHONE ENCOUNTER
Patient states to call and give dates of her COVID Pizer vaccines:  03/06/2021 and 03/27/2021    Sabina Osei on 1/20/2022 at 12:36 PM

## 2022-01-20 NOTE — TELEPHONE ENCOUNTER
Dated record.  Sabina Chaudhry CMA([facility])..................1/20/2022   2:13 PM

## 2022-01-21 LAB
ANA PAT SER IF-IMP: ABNORMAL
ANA SER QL IF: ABNORMAL
ANA TITR SER IF: ABNORMAL {TITER}

## 2022-01-24 ENCOUNTER — THERAPY VISIT (OUTPATIENT)
Dept: CHIROPRACTIC MEDICINE | Facility: OTHER | Age: 72
End: 2022-01-24
Attending: INTERNAL MEDICINE
Payer: COMMERCIAL

## 2022-01-24 VITALS
SYSTOLIC BLOOD PRESSURE: 132 MMHG | DIASTOLIC BLOOD PRESSURE: 90 MMHG | RESPIRATION RATE: 16 BRPM | HEART RATE: 69 BPM | OXYGEN SATURATION: 98 % | TEMPERATURE: 96.6 F

## 2022-01-24 DIAGNOSIS — M99.04 SEGMENTAL AND SOMATIC DYSFUNCTION OF SACRAL REGION: ICD-10-CM

## 2022-01-24 DIAGNOSIS — M54.41 BILATERAL LOW BACK PAIN WITH BILATERAL SCIATICA, UNSPECIFIED CHRONICITY: ICD-10-CM

## 2022-01-24 DIAGNOSIS — M54.42 BILATERAL LOW BACK PAIN WITH BILATERAL SCIATICA, UNSPECIFIED CHRONICITY: ICD-10-CM

## 2022-01-24 DIAGNOSIS — M51.379 DEGENERATION OF LUMBAR OR LUMBOSACRAL INTERVERTEBRAL DISC: Primary | ICD-10-CM

## 2022-01-24 PROCEDURE — 99213 OFFICE O/P EST LOW 20 MIN: CPT | Mod: GY | Performed by: CHIROPRACTOR

## 2022-01-24 PROCEDURE — 98940 CHIROPRACT MANJ 1-2 REGIONS: CPT | Mod: AT | Performed by: CHIROPRACTOR

## 2022-01-24 PROCEDURE — G0463 HOSPITAL OUTPT CLINIC VISIT: HCPCS

## 2022-01-24 NOTE — PROGRESS NOTES
Low bilateral back pain radiating down both legs. Started 2 months ago. Rates  4/10 W24 5/10. Intermittent deep-dull. Using cold-heat-Ibuprofen-Tylenol with decrease in pain.  Shad Yeboah DC on 1/24/2022 at 11:04 AM    PATIENT:  Daly Perry is a 71 year old female presenting for lower back pain    PROBLEM:   Date of Initial Visit for this Episode:  1/24/2022     Visit #1    SUBJECTIVE / HPI: Patient presents with primary complaints of bilateral low back pain with radiculopathy into the lower extremities bilaterally.  Reports that symptoms began approximately 2 months ago to due to unknown reasons.  Has noticed that her lower extremities have gotten more weak which she believes may be contributing to onset.  Denies any recent traumatic events.  Reports some slight dribbling when urinating but no loss of bowel or bladder control.  States that the symptoms are very similar to her experience with Dr. Justina Sung DC which is why she is presenting to our office at this time.  Had also cotreated with physical therapy under the direction of Thai Han with beneficial results. Has been trying to perform recommended home exercises which seem to aggravate symptoms.    Description and onset:  Duration and Frequency of Pain: 2 months  and intermittently deep and dull  Radiation of pain: bilaterally posterior legs into ankles  Pain rated at it's worst: 5/10  Pain rated currently:  4/10  Pain course: not improving  Worse with:  Standing, lifting or walking  Improved by:  Tylenol  Additional Features: pain in knees  Other Health Care Providers seen for this: Dr. Buffy PAYAN, Dr. Justina Sung D.C, Thai Han  Previous treatment: chiropractic, PT    See flowsheets in chart for details.  1/24/2022    Oswestry (KERRY) Questionnaire    OSWESTRY DISABILITY INDEX 1/24/2022   Count 9   Sum 12   Oswestry Score (%) 26.67   Some recent data might be hidden      LEFS  ( Adelia Moreira: Used with Permission) 1/24/2022   a. Any of your  usual work, housework, or school activities. 2-Moderate Difficulty   b. Your usual hobbies, recreational or sporting activities.  1-Quite a Bit of Difficulty   c. Getting into or out of the bath. 1-Quite a Bit of Difficulty   d. Walking between rooms. 4-No Difficulty   e. Putting on your shoes or socks. 2-Moderate Difficulty   f. Squatting. 1-Quite a Bit of Difficulty   g. Lifting an object, like a bag of groceries from the floor.  1-Quite a Bit of Difficulty   h. Performing light activities around your home.  4-No Difficulty   i. Performing heavy activities around your home. 1-Quite a Bit of Difficulty   j. Getting into or out of a car. 2-Moderate Difficulty   k. Walking 2 blocks. 4-No Difficulty   l. Walking a mile. 3-A Little Bit of Difficulty   m. Going up or down 10 stairs (about 1 flight of stairs). 2-Moderate Difficulty   n. Standing for 1 hour. 2-Moderate Difficulty   o. Sitting for 1 hour. 4-No Difficulty   p. Running on even ground. 0-Extreme Difficulty or Unable to Perform Activity   q. Running on uneven ground. 0-Extreme Difficulty or Unable to Perform Activity   r. Making sharp turns while running fast. 0-Extreme Difficulty or Unable to Perform Activity   s. Hopping. 0-Extreme Difficulty or Unable to Perform Activity   t. Rolling over in bed. 4-No Difficulty   Column Totals: /80 38     Past D.C. Care: yes, helpful    PAST MEDICAL HISTORY:  Past Medical History:   Diagnosis Date     Closed left ankle fracture      Cyst of ovary     Hx of right ovarian cyst.     Diverticulosis of large intestine 03/14/2011     Endometriosis     growth on ovary s/p oophrectomy     Fibrocystic breast disease 01/30/2018     Generalized anxiety disorder     Dysthymia, generalized anxiety     Lichen sclerosus     Vulvar LSEA; asymptomatic     Neck pain, chronic 01/30/2018     Improved after [facility]     Rosacea 01/04/2013     Severe major depression with psychotic features (H) 10/25/2016    history of ECT; inpatient for  several months; she does not have complete memory of that time       PAST SURGICAL HISTORY:  Past Surgical History:   Procedure Laterality Date     BIOPSY BREAST Right 07/07/2008    stereotactic, benign result     CATARACT IOL, RT/LT Bilateral     2017, 2018     COLONOSCOPY  03/14/2011    Normal; F/U 2021     DILATION AND CURETTAGE  2003    D&C with hysteroscopy and endometrial ablation     OOPHORECTOMY Right 02/1997     OPEN REDUCTION INTERNAL FIXATION ANKLE Left 2002     RELEASE CARPAL TUNNEL  2010     REMOVE HARDWARE ANKLE Left 06/27/2011     VITRECTOMY ANTERIOR Left 05/22/2017    Dr. Collins       ALLERGIES:  No Known Allergies    CURRENT MEDICATIONS:  Current Outpatient Medications   Medication Sig Dispense Refill     acetaminophen (TYLENOL) 325 MG tablet Take 325 mg by mouth every 4 hours as needed Max acetaminophen dose: 4000 mg in 24 hours       ARIPiprazole (ABILIFY) 10 MG tablet Take 0.5 tablets (5 mg) by mouth At Bedtime (Patient taking differently: Take 10 mg by mouth every evening ) 30 tablet 1     Calcium Carb-Cholecalciferol (CALCIUM 600+D3) 600-400 MG-UNIT TABS per tablet Take 1 tablet by mouth daily       Cholecalciferol (VITAMIN D3) 25 MCG (1000 UT) CAPS Take 1 capsule by mouth daily       clobetasol (TEMOVATE) 0.05 % external ointment Apply topically 2 times daily 30 g 3     ibuprofen (ADVIL/MOTRIN) 200 MG tablet Take 200 mg by mouth 4 times daily as needed       Lutein-Zeaxanthin (OCUVITE LUTEIN 25) 25-5 MG CAPS Take 1 capsule by mouth daily        Multiple Vitamins-Minerals (OCUVITE PO) Take 1 tablet by mouth daily       propranolol (INDERAL) 10 MG tablet Take 1 tablet by mouth 2 times daily as needed for anxiety       Turmeric 500 MG CAPS Take 1 capsule by mouth daily       venlafaxine (EFFEXOR-XR) 75 MG 24 hr capsule Take 75 mg by mouth 2 times daily          SOCIAL HISTORY:  Marital Status: .  Children: yes.  Occupation: Retired.  Alcohol use:yes.  Tobacco use: Smoker: no.      FAMILY  HISTORY:  Family History   Problem Relation Age of Onset     Arthritis Mother      Heart Disease Mother      Hypertension Mother      Thyroid Disease Mother      Dementia Mother      Pulmonary Embolism Mother      Heart Disease Father      Other - See Comments Father         Psychiatric illness     Chronic Obstructive Pulmonary Disease Father      Dementia Brother      Colon Cancer Paternal Grandmother         Cancer-colon     No Known Problems Brother      Hypertension Sister      Allergies Sister         food     Breast Cancer No family hx of         Cancer-breast       Patient Active Problem List   Diagnosis     Diverticulosis of large intestine     Fibrocystic breast disease     Generalized anxiety disorder     Rosacea     KB on CPAP         ROS:  The patient denies any fevers, chills, nausea, vomiting, diarrhea, constipation,dysuria, hematuria, or urinary hesitancy or incontinence.  No shortness of breath, chest pain, or rashes.    OBJECTIVE:    DIAGNOSTICS:  No current spinal imaging taken.   Study Result    Narrative & Impression   PROCEDURE: XR LUMBAR SPINE 2-3 VIEWS 10/7/2020 9:45 AM     HISTORY: low back pain, paresthesia, leg pain; Weakness of both lower  extremities; Paresthesia of both lower extremities     COMPARISONS: None.     TECHNIQUE: 3 views.     FINDINGS: There is a fairly severe right convex scoliosis with grade 1  anterolisthesis of L4 on L5 likely due to facet degenerative change.  No definite compression fracture is seen but there is multilevel  degenerative disc disease with narrowing most severe along the cavity  of the scoliosis. There is some sparing of the L5-S1 level.     Sacroiliac joints appear patent.                                                                        IMPRESSION: Right convex scoliosis with multilevel degenerative  change.     MARIAM GONZALES MD     Study Result    Narrative & Impression   PROCEDURE: MR LUMBAR SPINE W/O CONTRAST 10/13/2020 7:39 AM     HISTORY:  Back pain, > 6wks conservative tx, persistent sx; subjective  weakness in legs, nerve sensation and pain into bilateral legs;  Weakness of both lower extremities; Paresthesia of both lower  extremities     COMPARISONS: Plain film dated 10/7/2020.     Meds/Dose Given: None.     TECHNIQUE: Sagittal T1, T2 and STIR sequences and axial T2-weighted  images.     FINDINGS: There is a mild right convex scoliosis with grade 1  anterolisthesis of L4 on L5 secondary to facet degenerative change.  Marrow signal intensity is somewhat heterogeneous with endplate  reactive changes at multiple levels. There is no acute compression  fracture or focal destructive lesion.     At the L5-S1 level there is a central and right paramedian disc  protrusion with elevation and some compression of the right S1 nerve  root. Disc protrudes into the right neural foramina and there is mild  mass effect on the undersurface of the L5 nerve root ganglion. Small  bilateral facet effusions are seen.     At the L4-5 level there is grade 1 anterolisthesis secondary to severe  facet degenerative change. There is some hypertrophy of ligamentum  flavum and there are bilateral facet effusions. There is some minimal  bulging of the disc with high signal intensity annular fissure  dorsally. There is moderate central and lateral recess stenosis with  crowding of nerve roots and some effacement of CSF. This protrudes  into the inferior neural foramina bilaterally and on the left there is  at least moderate compression of the L4 nerve root ganglion.     At the L3-4 level there is moderate narrowing and dehydration of the  disc with a bilobed disc protrusion which causes mild mass effect on  L4 nerve roots. There is mild facet degenerative change with bilateral  facet effusions. Disc protrudes into the inferior neural foramina but  there is not significant nerve root ganglion compression.     At the L2-3 level there is moderate narrowing and dehydration of  the  disc with a broad-based disc bulge eccentric to the right. This  protrudes into the inferior neural foramina on the right but there is  not significant nerve root ganglion compression. There is facet  degenerative change with bilateral facet effusions.     At the L1-2 level there is a mild broad-based disc bulge.     There is either bilateral enlargement of the intrarenal collecting  systems or there or peripelvic cysts. Cortical cyst is seen  posteriorly in the right kidney as well.                                                                        IMPRESSION:   1. Multilevel degenerative disc disease with scoliosis and grade 1  anterolisthesis of L4 on L5.  2. Moderate central and lateral recess stenosis at L4-5 with at least  moderate left L4 nerve root ganglion compression.  3. Central and right paramedian disc protrusion at L5-S1 with  elevation and compression of the right S1 nerve root.     MARIAM GONZALES MD       PHYSICAL EXAM:     GENERAL APPEARANCE: healthy, alert, active, no distress and cooperative   GAIT: NORMAL      MUSCULOSKELETAL:   Posture: Anterior head carriage, rounded shoulders.  Low left iliac crest,    Thoracic and Lumbar performed actively, measured approximately  ROM:  60/60 flexion 55/60 extension    40/45 RLF    35/45 LLF       +Kemps: SI joints   - Straight leg raise  Other:  Ely's: -right (with hamstring cramping), -left(with hamstring cramping)    +Tenderness: PSIS bilaterally  +Muscle spasm: lumbar paraspinals and quadratus lumborum bilaterally  +Joint asymmetry and restriction: extension and right lateral flexion restriction    ASSESSMENT: Daly Perry is a 71 year old female presenting with complaints of low back pain with bilateral sciatica.  Segmental/somatic dysfunction present of the sacrum.  Review of patient's chart did show beneficial results with course of chiropractic care at the direction of Dr. Justina Sung.  Believe that to be the case at this time.  Patient  informs me that she will also be working with physical therapy.  Cotreatment likely to bring patient best results.  Consider possible acupuncture treatment if symptoms slow to improve.     1. Degeneration of lumbar or lumbosacral intervertebral disc    2. Segmental and somatic dysfunction of sacral region    3. Bilateral low back pain with bilateral sciatica, unspecified chronicity        PLAN    Evaluation and Management:  26777 Moderate exam established patient 20 min    Procedures:  Modalities:  None performed this visit    CMT:  49739 Chiropractic manipulative treatment 1-2 regions performed   Pelvis: Drop Table, Sacrum , Prone    Therapeutic procedures:  Resisted knee adduction    Response to Treatment  Reduction in symptoms as reported by patient    Prognosis: Good    1/24/2022 Plan of Care:  6-8 visits of Chiropractic Care including Spinal Adjustments and/or physiotherapy and active rehabilitation, to include exercises in the office and/or at home to meet care plan goals.     Frequency: 2xweek for up to 4 weeks. A reevaluation would be clinically appropriate in 6-8 visits, to determine progress and further course of care.    POC discussed and patient agreeable to plan of care.      1/24/2022 Goals:      Patient will report improved pain.   Patient will report able to stand without painful limits.   Patient will report able to perform home exercises without painful limits.   Patient will demonstrate an improved ability to complete Activities of Daily Living  as shown by a reported 10-30% reduced score on  back index.    Patient will demonstrate improved ROM.        INSTRUCTIONS   perform home exercise as discussed    Follow-up:  Return to care in 4 days.

## 2022-01-25 NOTE — PATIENT INSTRUCTIONS
Patient Education   Personalized Prevention Plan  You are due for the preventive services outlined below.  Your care team is available to assist you in scheduling these services.  If you have already completed any of these items, please share that information with your care team to update in your medical record.  Health Maintenance Due   Topic Date Due     Depression Action Plan  Never done     Colorectal Cancer Screening  03/14/2021     Annual Wellness Visit  10/07/2021       Preventing Falls at Home  A person can fall for many reasons. Older adults may fall because reaction time slows down as we age. Your muscles and joints may get stiff, weak, or less flexible because of illness, medicines, or a physical condition.   Other health problems that make falls more likely include:     Arthritis    Dizziness or lightheadedness when you stand up (orthostatic hypotension)    History of a stroke    Dizziness    Anemia    Certain medicines taken for mental illness or to control blood pressure.    Problems with balance or gait    Bladder or urinary problems    History of falling    Changes in vision (vision impairment)    Changes in thinking skills and memory (cognitive impairment)  Injuries from a fall can include serious injuries such as broken bones, dislocated joints, internal bleeding and cuts. Injuries like these can limit your independence.   Prevention tips  To help prevent falls and fall-related injuries, follow the tips below.    Floors  To make floors safer:     Put nonskid pads under area rugs.    Remove small rugs.    Replace worn floor coverings.    Tack carpets firmly to each step on carpeted stairs. Put nonskid strips on the edges of uncarpeted stairs.    Keep floors and stairs free of clutter and cords.    Arrange furniture so there are clear pathways.    Clean up any spills right away.  Bathrooms    To make bathrooms safer:     Install grab bars in the tub or shower.    Apply nonskid strips or put a  nonskid rubber mat in the tub or shower.    Sit on a bath chair to bathe.    Use bathmats with nonskid backing.  Lighting  To improve visibility in your home:      Keep a flashlight in each room. Or put a lamp next to the bed within easy reach.    Put nightlights in the bedrooms, hallways, kitchen, and bathrooms.    Make sure all stairways have good lighting.    Take your time when going up and down stairs.    Put handrails on both sides of stairs and in walkways for more support. To prevent injury to your wrist or arm, don t use handrails to pull yourself up.    Install grab bars to pull yourself up.    Move or rearrange items that you use often. This will make them easier to find or reach.    Look at your home to find any safety hazards. Especially look at doorways, walkways, and the driveway. Remove or repair any safety problems that you find.  Other changes to make    Look around to find any safety hazards. Look closely at doorways, walkways, and the driveway. Remove or repair any safety problems that you find.    Wear shoes that fit well.    Take your time when going up and down stairs.    Put handrails on both sides of stairs and in walkways for more support. To prevent injury to your wrist or arm, don t use handrails to pull yourself up.    Install grab bars wherever needed to pull yourself up.    Arrange items that you use often. This will make them easier to find or reach.    [employer] last reviewed this educational content on 3/1/2020    3048-4434 [employer]. All rights reserved. This information is not intended as a substitute for professional medical care. Always follow your healthcare professional's instructions.

## 2022-01-31 ENCOUNTER — THERAPY VISIT (OUTPATIENT)
Dept: CHIROPRACTIC MEDICINE | Facility: OTHER | Age: 72
End: 2022-01-31
Attending: CHIROPRACTOR
Payer: COMMERCIAL

## 2022-01-31 VITALS
HEART RATE: 73 BPM | SYSTOLIC BLOOD PRESSURE: 132 MMHG | OXYGEN SATURATION: 97 % | DIASTOLIC BLOOD PRESSURE: 88 MMHG | TEMPERATURE: 96.9 F | RESPIRATION RATE: 16 BRPM

## 2022-01-31 DIAGNOSIS — M54.41 BILATERAL LOW BACK PAIN WITH BILATERAL SCIATICA, UNSPECIFIED CHRONICITY: ICD-10-CM

## 2022-01-31 DIAGNOSIS — M51.379 DEGENERATION OF LUMBAR OR LUMBOSACRAL INTERVERTEBRAL DISC: Primary | ICD-10-CM

## 2022-01-31 DIAGNOSIS — M99.04 SEGMENTAL AND SOMATIC DYSFUNCTION OF SACRAL REGION: ICD-10-CM

## 2022-01-31 DIAGNOSIS — M51.369 DDD (DEGENERATIVE DISC DISEASE), LUMBAR: ICD-10-CM

## 2022-01-31 DIAGNOSIS — M54.42 BILATERAL LOW BACK PAIN WITH BILATERAL SCIATICA, UNSPECIFIED CHRONICITY: ICD-10-CM

## 2022-01-31 LAB — COLOGUARD-ABSTRACT: NEGATIVE

## 2022-01-31 PROCEDURE — G0463 HOSPITAL OUTPT CLINIC VISIT: HCPCS

## 2022-01-31 PROCEDURE — 98940 CHIROPRACT MANJ 1-2 REGIONS: CPT | Mod: AT | Performed by: CHIROPRACTOR

## 2022-01-31 NOTE — PROGRESS NOTES
Frequent bilateral lower back is having shocks down both legs. 2/10 W24 4/10. Using stretches and unsure if it is helping.  Peggy Hook on 1/31/2022 at 10:23 AM    Visit #:  2/6-8    Subjective:  Daly Perry is a 71 year old female who is seen in f/u up for:        Degeneration of lumbar or lumbosacral intervertebral disc  Segmental and somatic dysfunction of sacral region  Bilateral low back pain with bilateral sciatica, unspecified chronicity  DDD (degenerative disc disease), lumbar.     Since last visit on 1/24/2022,  Daly Perry reports: feels about the same. PT begins tomorrow. Not doing home exercises as these seem to continue to aggravate symptoms.    Area of chief complaint:  Lumbar :  Symptoms are graded at 2-4/10. The quality is described as shocking into her legs bilaterally.      (DVPRS) Pain Rating Score : Notice pain, does not interfere with activities (W24 5/10) (01/31/22 1018)     Objective:  The following was observed:  /88 (BP Location: Right arm, Patient Position: Sitting)   Pulse 73   Temp 96.9  F (36.1  C) (Tympanic)   Resp 16   SpO2 97%      P: palpatory tenderness left PSIS:    A: static palpation demonstrates intersegmental asymmetry , pelvis  R: motion palpation notes restricted motion, Sacrum   T: muscle spasm at level(s): Lumbar erector spine and Quad lumb Bilaterally    Segmental spinal dysfunction/restrictions found at:  :  Sacrum Extension restriction.      Assessment: Objectively patient is showing signs of progress.  Restrictions of the SI joints are decreasing.  Spasms still present but seem to be decreasing as well.  Believe that once patient is able to begin physical therapy symptoms will respond more quickly.  Patient voiced concerns of insurance coverage regarding care.  Provided patient reassurance that if needed she will still find relief and benefit with care at decreased frequency.  No decisions made regarding this going forward at this time.    Diagnoses:      1.  Degeneration of lumbar or lumbosacral intervertebral disc    2. Segmental and somatic dysfunction of sacral region    3. Bilateral low back pain with bilateral sciatica, unspecified chronicity    4. DDD (degenerative disc disease), lumbar        Patient's condition:  Patient had restrictions pre-manipulation    Treatment effectiveness:  Post manipulation there is better intersegmental movement      Procedures:  CMT:  76042 Chiropractic manipulative treatment 1-2 regions performed   Pelvis: Drop Table, Sacrum , Prone    Modalities:  74876: MSTM:  To Lumbar erector spine and Quad lumb  for 4 min    Therapeutic procedures:  None  Deferred to physical therapy    Response to Treatment  Reduction in symptoms as reported by patient    Prognosis: Good    Progress towards Goals: Patient is making progress towards the goal.   Patient will report improved pain.              Patient will report able to stand without painful limits.              Patient will report able to perform home exercises without painful limits.              Patient will demonstrate an improved ability to complete Activities of Daily Living   as shown by a reported 10-30% reduced score on  back index.               Patient will demonstrate improved ROM.    Recommendations:    Instructions:ice 20 minutes every other hour as needed and heat 15 minutes every other hour as needed    Follow-up:  Return to care in 3 days.

## 2022-02-04 ENCOUNTER — HOSPITAL ENCOUNTER (OUTPATIENT)
Dept: PHYSICAL THERAPY | Facility: OTHER | Age: 72
Setting detail: THERAPIES SERIES
End: 2022-02-04
Attending: INTERNAL MEDICINE
Payer: MEDICARE

## 2022-02-04 DIAGNOSIS — M25.561 CHRONIC PAIN OF BOTH KNEES: ICD-10-CM

## 2022-02-04 DIAGNOSIS — M25.562 CHRONIC PAIN OF BOTH KNEES: ICD-10-CM

## 2022-02-04 DIAGNOSIS — G89.29 CHRONIC PAIN OF BOTH KNEES: ICD-10-CM

## 2022-02-04 PROCEDURE — 97161 PT EVAL LOW COMPLEX 20 MIN: CPT | Mod: GP | Performed by: PHYSICAL THERAPIST

## 2022-02-04 PROCEDURE — 97110 THERAPEUTIC EXERCISES: CPT | Mod: GP | Performed by: PHYSICAL THERAPIST

## 2022-02-04 NOTE — INITIAL ASSESSMENTS
02/04/22 1600   General Information   Type of Visit Initial OP Ortho PT Evaluation   Start of Care Date 02/04/22   Referring Physician JAMARI Baer   Patient/Family Goals Statement Improve her leg strength and reduce her knee pain.   Orders Evaluate and Treat   Certification Required? Yes   Medical Diagnosis Chronic pain of both knees M25.561, M25.562, G89.29    Surgical/Medical history reviewed Yes   Body Part(s)   Body Part(s) Knee   Presentation and Etiology   Pertinent history of current problem (include personal factors and/or comorbidities that impact the POC) Patient presents to physical therapy for both knee pain and leg pain/weakness and back pain.  She has been working with chiropractor recently with much improvement of her back pain.  More recently though she has had increasing knee pain loss of movement stiff and loss of strength trouble with balance trouble with walking and occasional locking catching in her right knee.  The right knee is worse than the left however both are painful.  She has noticed some swelling she is has to do stairs differently with a step to pattern.  She is fallen onto her knees a couple times in the last year while snowshoeing and another time last fall.  Pain is rated between 2 and 5 out of 10 described as dull aching and shooting worse with activities especially when getting up from sitting lifting and carrying bending self-cares as well.  Patient reports that she also had some radicular pain that is increased lately but improved with chiropractic care.  She has physical therapy exercises that she had been doing from a couple years past but is not sure if she is doing them correctly.  She noticed that when she was a kid she had some knock kneed position valgus positioning but she thinks it is actually getting worse as of late.  She also noticed that she has some fallen arches in her feet and would like that checked out.  She likes to stay active wants to increase her leg  strength and decrease her knee pain.   Fall Risk Screen   Fall screen completed by PT   Is patient a fall risk? No   Abuse Screen (yes response referral indicated)   Feels Unsafe at Home or Work/School no   Feels Threatened by Someone no   Does Anyone Try to Keep You From Having Contact with Others or Doing Things Outside Your Home? no   Physical Signs of Abuse Present no   Knee Objective Findings   Observation Bilateral knee valgus and active pronation of bilateral feet in stance and during gait.   Gait/Locomotion Noticeable valgus positioning of knees during gait.   Balance/Proprioception (Single Leg Stance) Difficulty single-leg balance as well as increasing pronation of foot   Knee ROM Comment Range of motion is within normal limits   Knee/Hip Strength Comments Both knees are 4+ to 5 out of 5 strength for flexion extension hip extension hip abduction are weaker with 4 out of 5.   Varus Stress Test Negative   Valgus Stress Test Negative   Caridad's Test Mild sore   Apley's Test Negative   Palpation Mild soreness along IT band and glutamine tendon.   Planned Therapy Interventions   Planned Therapy Interventions joint mobilization;manual therapy;ROM;strengthening;stretching;orthotic fitting/training   Planned Modality Interventions   Planned Modality Interventions Cryotherapy;Hot packs;Ultrasound   Planned Modality Interventions Comments Per patient need and physical therapy clinical judgment   Clinical Impression   Criteria for Skilled Therapeutic Interventions Met yes, treatment indicated   PT Diagnosis Weakened hips and performing home exercise program sent are not correctly performed targeting specific muscles.  Bilateral dynamic and stance foot pronation increasing valgus of the knees and lateral compartment compression.  Core weakness and hip weakness limiting overall strength and stability of lumbar spine as well.   Influenced by the following impairments Pain weakness   Functional limitations due to  impairments See above   Clinical Presentation Stable/Uncomplicated   Clinical Decision Making (Complexity) Low complexity   Predicted Duration of Therapy Intervention (days/wks) 1 time a week for 2 to 6 weeks pending patient progress   Risk & Benefits of therapy have been explained Yes   Patient, Family & other staff in agreement with plan of care Yes   Clinical Impression Comments PT will address strength work with home exercise program patient is suggested to discuss with orthotist and/or off-the-shelf arch inserts.   ORTHO GOALS   PT Ortho Eval Goals 1;2;3   Ortho Goal 1   Goal Identifier Home exercise program   Goal Description Patient will perform exercise correctly and at least 3 times a week to improve strength and reduce pain   Target Date 03/25/22   Ortho Goal 2   Goal Identifier Hip strength   Goal Description Hip strength improved to 4+ out of 5 for extension and abduction bilaterally to allow improved femur control and decrease knee pain   Target Date 03/25/22   Ortho Goal 3   Goal Identifier Stairs   Goal Description Patient will tolerate stairs with reciprocal pattern up and down her stairs at home   Target Date 03/25/22   Total Evaluation Time   PT Eval, Low Complexity Minutes (75469) 30   Therapy Certification   Certification date from 02/04/22   Certification date to 04/29/22   Medical Diagnosis Chronic pain of both knees M25.561, M25.562, G89.29

## 2022-02-04 NOTE — PROGRESS NOTES
[facility]    OUTPATIENT PHYSICAL THERAPY ORTHOPEDIC EVALUATION  PLAN OF TREATMENT FOR OUTPATIENT REHABILITATION  (COMPLETE FOR INITIAL CLAIMS ONLY)  Patient's Last Name, First Name, M.I.  YOB: 1950  Daly Perry    Provider s Name:  [facility]   Medical Record No.  [id number]   Start of Care Date:  02/04/22   Onset Date:      Type:     _X__PT   ___OT   ___SLP Medical Diagnosis:  Chronic pain of both knees M25.561, M25.562, G89.29      PT Diagnosis:  Weakened hips and performing home exercise program sent are not correctly performed targeting specific muscles.  Bilateral dynamic and stance foot pronation increasing valgus of the knees and lateral compartment compression.  Core weakness and hip weakness limiting overall strength and stability of lumbar spine as well.   Visits from SOC:  1      _________________________________________________________________________________  Plan of Treatment/Functional Goals:  joint mobilization,manual therapy,ROM,strengthening,stretching,orthotic fitting/training     Cryotherapy,Hot packs,Ultrasound  Per patient need and physical therapy clinical judgment  Goals  Goal Identifier: Home exercise program  Goal Description: Patient will perform exercise correctly and at least 3 times a week to improve strength and reduce pain  Target Date: 03/25/22    Goal Identifier: Hip strength  Goal Description: Hip strength improved to 4+ out of 5 for extension and abduction bilaterally to allow improved femur control and decrease knee pain  Target Date: 03/25/22    Goal Identifier: Stairs  Goal Description: Patient will tolerate stairs with reciprocal pattern up and down her stairs at home  Target Date: 03/25/22                                               Therapy Frequency:     Predicted Duration of Therapy Intervention:  1 time a week for 2  to 6 weeks pending patient progress    Thai Roberson, PT                 I CERTIFY THE NEED FOR THESE SERVICES FURNISHED UNDER        THIS PLAN OF TREATMENT AND WHILE UNDER MY CARE     (Physician co-signature of this document indicates review and certification of the therapy plan).                       Certification Date From:  02/04/22   Certification Date To:  04/29/22    Referring Provider:  JAMARI Baer    Initial Assessment        See Epic Evaluation Start of Care Date: 02/04/22

## 2022-02-09 ENCOUNTER — HOSPITAL ENCOUNTER (OUTPATIENT)
Dept: PHYSICAL THERAPY | Facility: OTHER | Age: 72
Setting detail: THERAPIES SERIES
End: 2022-02-09
Attending: INTERNAL MEDICINE
Payer: MEDICARE

## 2022-02-09 PROCEDURE — 97140 MANUAL THERAPY 1/> REGIONS: CPT | Mod: GP | Performed by: PHYSICAL THERAPIST

## 2022-02-09 PROCEDURE — 97110 THERAPEUTIC EXERCISES: CPT | Mod: GP | Performed by: PHYSICAL THERAPIST

## 2022-02-16 ENCOUNTER — HOSPITAL ENCOUNTER (OUTPATIENT)
Dept: PHYSICAL THERAPY | Facility: OTHER | Age: 72
Setting detail: THERAPIES SERIES
End: 2022-02-16
Attending: INTERNAL MEDICINE
Payer: MEDICARE

## 2022-02-16 PROCEDURE — 97110 THERAPEUTIC EXERCISES: CPT | Mod: GP | Performed by: PHYSICAL THERAPIST

## 2022-02-16 PROCEDURE — 97140 MANUAL THERAPY 1/> REGIONS: CPT | Mod: GP | Performed by: PHYSICAL THERAPIST

## 2022-03-02 ENCOUNTER — HOSPITAL ENCOUNTER (OUTPATIENT)
Dept: PHYSICAL THERAPY | Facility: OTHER | Age: 72
Setting detail: THERAPIES SERIES
End: 2022-03-02
Attending: INTERNAL MEDICINE
Payer: MEDICARE

## 2022-03-02 PROCEDURE — 97110 THERAPEUTIC EXERCISES: CPT | Mod: GP | Performed by: PHYSICAL THERAPIST

## 2022-03-02 PROCEDURE — 97140 MANUAL THERAPY 1/> REGIONS: CPT | Mod: GP | Performed by: PHYSICAL THERAPIST

## 2022-03-03 ENCOUNTER — TELEPHONE (OUTPATIENT)
Dept: INTERNAL MEDICINE | Facility: OTHER | Age: 72
End: 2022-03-03
Payer: COMMERCIAL

## 2022-03-03 DIAGNOSIS — M25.561 CHRONIC PAIN OF BOTH KNEES: Primary | ICD-10-CM

## 2022-03-03 DIAGNOSIS — M25.562 CHRONIC PAIN OF BOTH KNEES: Primary | ICD-10-CM

## 2022-03-03 DIAGNOSIS — G89.29 CHRONIC PAIN OF BOTH KNEES: Primary | ICD-10-CM

## 2022-03-03 NOTE — TELEPHONE ENCOUNTER
Patient was already called by [facility] and has an appointment made.  Sabina Chaudhry CMA([facility])..................3/3/2022   12:23 PM

## 2022-03-03 NOTE — TELEPHONE ENCOUNTER
Saw P/Tfor knee and she has been going to him for that for awhile, P/T suggested that she could use an MRI on the knee since what they are doing isnt helping her.  Please call, thank you!    Martine Simeon on 3/3/2022 at 9:45 AM

## 2022-03-03 NOTE — TELEPHONE ENCOUNTER
She is most concerned with right knee.  Sabina Chaudhry CMA([facility])..................3/3/2022   9:57 AM

## 2022-03-10 ENCOUNTER — HOSPITAL ENCOUNTER (OUTPATIENT)
Dept: MRI IMAGING | Facility: OTHER | Age: 72
Discharge: HOME | End: 2022-03-10
Attending: NURSE PRACTITIONER | Admitting: NURSE PRACTITIONER
Payer: MEDICARE

## 2022-03-10 DIAGNOSIS — S83.281A TEAR OF LATERAL CARTILAGE OR MENISCUS OF KNEE, CURRENT, RIGHT, INITIAL ENCOUNTER: Primary | ICD-10-CM

## 2022-03-10 DIAGNOSIS — M25.561 CHRONIC PAIN OF BOTH KNEES: ICD-10-CM

## 2022-03-10 DIAGNOSIS — G89.29 CHRONIC PAIN OF BOTH KNEES: ICD-10-CM

## 2022-03-10 DIAGNOSIS — M25.562 CHRONIC PAIN OF BOTH KNEES: ICD-10-CM

## 2022-03-10 PROCEDURE — 73721 MRI JNT OF LWR EXTRE W/O DYE: CPT | Mod: RT

## 2022-03-16 ENCOUNTER — TELEPHONE (OUTPATIENT)
Dept: INTERNAL MEDICINE | Facility: OTHER | Age: 72
End: 2022-03-16
Payer: COMMERCIAL

## 2022-03-16 NOTE — TELEPHONE ENCOUNTER
Dr. Baer.    Mrs. Perry has has some good results with her back and knee pain with PT.    I suggested a trial of an off- knee brace to try over last weekend, she reported that it helped greatly, and that she may want to pursue a off  knee brace pending her visit with Dr. Hansen.    Joel HOLDER.    Thank you,  Daniel

## 2022-03-29 ENCOUNTER — OFFICE VISIT (OUTPATIENT)
Dept: ORTHOPEDICS | Facility: OTHER | Age: 72
End: 2022-03-29
Attending: NURSE PRACTITIONER
Payer: COMMERCIAL

## 2022-03-29 VITALS
OXYGEN SATURATION: 100 % | WEIGHT: 178 LBS | DIASTOLIC BLOOD PRESSURE: 70 MMHG | BODY MASS INDEX: 29.46 KG/M2 | HEART RATE: 54 BPM | SYSTOLIC BLOOD PRESSURE: 128 MMHG

## 2022-03-29 DIAGNOSIS — S83.281A TEAR OF LATERAL CARTILAGE OR MENISCUS OF KNEE, CURRENT, RIGHT, INITIAL ENCOUNTER: ICD-10-CM

## 2022-03-29 PROCEDURE — G0463 HOSPITAL OUTPT CLINIC VISIT: HCPCS

## 2022-03-29 PROCEDURE — 99203 OFFICE O/P NEW LOW 30 MIN: CPT | Performed by: ORTHOPAEDIC SURGERY

## 2022-03-29 ASSESSMENT — PAIN SCALES - GENERAL: PAINLEVEL: MILD PAIN (3)

## 2022-03-29 NOTE — PROGRESS NOTES
Patient is here for consult on her right knee pain.  Yolanda Tesfaye LPN .....................3/29/2022 9:38 AM

## 2022-03-29 NOTE — PROGRESS NOTES
Visit Date: 03/29/2022    REASON FOR EVALUATION:  Right knee pain.    HISTORY OF PRESENT ILLNESS:  Daly comes in with regard to right knee.  Her pain kind of started in August.  She had several falls over the wintertime and has exacerbated things.  Pain is mostly on the lateral side.  I wanted to look at options here at this point.  She has been getting pain mostly with twisting and rotating pain with walking.  Does not seem like it is necessarily getting better here with time at this point.  Had x-rays and MRI done.  X-rays do reveal moderate arthrosis.  MRI shows significant lateral meniscus tear plus a small medial meniscal tear plus underlying chondromalacia of a significant nature.    MEDICATIONS:  Reviewed.    ALLERGIES:  ALSO REVIEWED.    REVIEW OF SYSTEMS:  A 12-point otherwise negative with the exception as stated above.    PHYSICAL EXAMINATION:  The patient is 178 pounds, 5 feet 6 inches.  She is alert and oriented x3, cooperative with exam, in no acute distress.  Does ambulate with some gait antalgia.  Affect is appropriate here as well.  MUSCULOSKELETAL:  Examination of the right knee shows tenderness across the lateral joint line.  Valgus deformity.  range of motion.  Trace effusion.  1+ noted at this time.  Pain with Caridad testing.  Stable to varus and valgus.  Kneecap tracking is acceptable.  Hip range of motion is without significant pain with straight leg raise, also without significant pain.  Dorsalis pedis pulse 2+.    IMAGING:  X-rays were reviewed, does show moderate arthrosis.  MRI also reviewed, shows a significantly displaced medial or lateral meniscus tear with small medial meniscal tear at this time.    IMPRESSION:  Symptomatic right knee lateral meniscal tear with underlying moderate arthrosis.    PLAN:  At this time, given her mechanical symptoms plus MRI findings and clinical examination.  Recommendation was for knee arthroscopy, partial lateral meniscectomy and chondroplasty.   Certainly long-term will need some addressed since there are issues as it pertains to her knee arthritis, but I do not think at this point, surgery is the right answer for knee replacement at this time.  We will plan to proceed forward as discussed with arthroscopic intervention.  Anticipate about 2-3 weeks to recover.  She is going to work on some exercise in the interim prior to surgery as well.    Parth Hansen MD        D: 2022   T: 2022   MT: sd    Name:     LORRAINE DOYLERishabh  MRN:      [id number]        Account:    [id number]   :      1950           Visit Date: 2022     Document: [id number]

## 2022-04-01 ENCOUNTER — APPOINTMENT (OUTPATIENT)
Dept: URGENT CARE | Facility: CLINIC | Age: 72
End: 2022-04-01
Payer: COMMERCIAL

## 2022-04-04 ENCOUNTER — TELEPHONE (OUTPATIENT)
Dept: ORTHOPEDICS | Facility: OTHER | Age: 72
End: 2022-04-04
Payer: COMMERCIAL

## 2022-04-04 PROBLEM — N60.19 FIBROCYSTIC BREAST DISEASE: Chronic | Status: ACTIVE | Noted: 2018-01-30

## 2022-04-04 NOTE — TELEPHONE ENCOUNTER
Sabine could you send Physical Therapy order over to [facility] PT dept. Daly called to schedule her PT ahead of time and they  had no orders yet.  Surgery is 4/15/22 right knee scope with Dr Hansen.    Thank You!

## 2022-04-04 NOTE — PATIENT INSTRUCTIONS
Preparing for Your Surgery  Getting started  A nurse will call you to review your health history and instructions. They will give you an arrival time based on your scheduled surgery time. Please be ready to share:    Your doctor's clinic name and phone number    Your medical, surgical and anesthesia history    A list of allergies and sensitivities    A list of medicines, including herbal treatments and over-the-counter drugs    Whether the patient has a legal guardian (ask how to send us the papers in advance)  Please tell us if you're pregnant--or if there's any chance you might be pregnant. Some surgeries may injure a fetus (unborn baby), so they require a pregnancy test. Surgeries that are safe for a fetus don't always need a test, and you can choose whether to have one.   If you have a child who's having surgery, please ask for a copy of Preparing for Your Child's Surgery.    Preparing for surgery    Within 30 days of surgery: Have a pre-op exam (sometimes called an H&P, or History and Physical). This can be done at a clinic or pre-operative center.  ? If you're having a , you may not need this exam. Talk to your care team.    At your pre-op exam, talk to your care team about all medicines you take. If you need to stop any medicines before surgery, ask when to start taking them again.  ? We do this for your safety. Many medicines can make you bleed too much during surgery. Some change how well surgery (anesthesia) drugs work.    Call your insurance company to let them know you're having surgery. (If you don't have insurance, call [phone].)    Call your clinic if there's any change in your health. This includes signs of a cold or flu (sore throat, runny nose, cough, rash, fever). It also includes a scrape or scratch near the surgery site.    If you have questions on the day of surgery, call your hospital or surgery center.  COVID testing  You may need to be tested for COVID-19 before having  surgery. If so, your surgical team will give you instructions for scheduling this test, separate from your preoperative history and physical.  Eating and drinking guidelines  For your safety: Unless your surgeon tells you otherwise, follow the guidelines below.    Eat and drink as usual until 8 hours before surgery. After that, no food or milk.    Drink clear liquids until 2 hours before surgery. These are liquids you can see through, like water, Gatorade and Propel Water. You may also have black coffee and tea (no cream or milk).    Nothing by mouth within 2 hours of surgery. This includes gum, candy and breath mints.    If you drink alcohol: Stop drinking it the night before surgery.    If your care team tells you to take medicine on the morning of surgery, it's okay to take it with a sip of water.  Preventing infection    Shower or bathe the night before and morning of your surgery. Follow the instructions your clinic gave you. (If no instructions, use regular soap.)    Don't shave or clip hair near your surgery site. We'll remove the hair if needed.    Don't smoke or vape the morning of surgery. You may chew nicotine gum up to 2 hours before surgery. A nicotine patch is okay.  ? Note: Some surgeries require you to completely quit smoking and nicotine. Check with your surgeon.    Your care team will make every effort to keep you safe from infection. We will:  ? Clean our hands often with soap and water (or an alcohol-based hand rub).  ? Clean the skin at your surgery site with a special soap that kills germs.  ? Give you a special gown to keep you warm. (Cold raises the risk of infection.)  ? Wear special hair covers, masks, gowns and gloves during surgery.  ? Give antibiotic medicine, if prescribed. Not all surgeries need antibiotics.  What to bring on the day of surgery    Photo ID and insurance card    Copy of your health care directive, if you have one    Glasses and hearing aides (bring cases)  ? You can't  wear contacts during surgery    Inhaler and eye drops, if you use them (tell us about these when you arrive)    CPAP machine or breathing device, if you use them    A few personal items, if spending the night    If you have . . .  ? A pacemaker, ICD (cardiac defibrillator) or other implant: Bring the ID card.  ? An implanted stimulator: Bring the remote control.  ? A legal guardian: Bring a copy of the certified (court-stamped) guardianship papers.  Please remove any jewelry, including body piercings. Leave jewelry and other valuables at home.  If you're going home the day of surgery    You must have a responsible adult drive you home. They should stay with you overnight as well.    If you don't have someone to stay with you, and you aren't safe to go home alone, we may keep you overnight. Insurance often won't pay for this.  After surgery  If it's hard to control your pain or you need more pain medicine, please call your surgeon's office.  Questions?   If you have any questions for your care team, list them here: _________________________________________________________________________________________________________________________________________________________________________ ____________________________________ ____________________________________ ____________________________________  For informational purposes only. Not to replace the advice of your health care provider. Copyright   2003, 2019 [facility]. All rights reserved. Clinically reviewed by Vivienne Lozano MD. [employer]ounds 112462 - REV 07/21.

## 2022-04-04 NOTE — PROGRESS NOTES
[facility] AND [facility]  [address]  [place] [address]  Phone: [phone]  Primary Provider: Nidhi Baer  Pre-op Performing Provider: PAMELA GARCIA    PREOPERATIVE EVALUATION:  Today's date: 4/5/2022    Daly Perry is a 71 year old female who presents for a preoperative evaluation.    Surgical Information:  Surgery/Procedure:  Right Knee Arthoscopy with Partial Lateral Meniscectomy and  chondroplasty  Surgery Location:  [facility]  Surgeon:   Jdae  Surgery Date:   4/15/22  Time of Surgery:  TBD  Where patient plans to recover: At home with family  Fax number for surgical facility: Note does not need to be faxed, will be available electronically in Epic.    Type of Anesthesia Anticipated: General    Assessment & Plan     The proposed surgical procedure is considered LOW risk.    Chronic pain of right knee  Preop general physical exam  - EKG 12-lead (E and [facility] Only)  - CBC with Platelets & Differential ([facility] Only)  - Comprehensive Metabolic Panel    Severe major depression with psychotic features (H)    Risks and Recommendations:  The patient has the following additional risks and recommendations for perioperative complications:   - History of delirium or dementia    Medication Instructions:  Patient is to take all scheduled medications on the day of surgery EXCEPT for modifications listed below:   - ibuprofen (Advil, Motrin): HOLD 1 day before surgery.    - naproxen (Aleve, Naprosyn): HOLD 4 days before surgery.    - SSRIs, SNRIs, TCAs, Antipsychotics: Continue without modification.     RECOMMENDATION:  APPROVAL GIVEN to proceed with proposed procedure, without further diagnostic evaluation.    53 minutes spent on the date of the encounter doing chart review, history and exam, documentation and further activities per the note    Subjective     HPI related to upcoming procedure: right knee pain    Preop Questions 4/1/2022   1. Have you ever had a heart attack or stroke? No    2. Have you ever had surgery on your heart or blood vessels, such as a stent placement, a coronary artery bypass, or surgery on an artery in your head, neck, heart, or legs? No   3. Do you have chest pain with activity? No   4. Do you have a history of  heart failure? No   5. Do you currently have a cold, bronchitis or symptoms of other infection? No   6. Do you have a cough, shortness of breath, or wheezing? No   7. Do you or anyone in your family have previous history of blood clots? No   8. Do you or does anyone in your family have a serious bleeding problem such as prolonged bleeding following surgeries or cuts? No   9. Have you ever had problems with anemia or been told to take iron pills? No   10. Have you had any abnormal blood loss such as black, tarry or bloody stools, or abnormal vaginal bleeding? No   11. Have you ever had a blood transfusion? No   12. Are you willing to have a blood transfusion if it is medically needed before, during, or after your surgery? Yes   13. Have you or any of your relatives ever had problems with anesthesia? No   14. Do you have sleep apnea, excessive snoring or daytime drowsiness? No   15. Do you have any artifical heart valves or other implanted medical devices like a pacemaker, defibrillator, or continuous glucose monitor? No   16. Do you have artificial joints? No   17. Are you allergic to latex? No     Health Care Directive:  Patient has a Health Care Directive on file    Preoperative Review of :   reviewed - no record of controlled substances prescribed.    Status of Chronic Conditions:  See problem list for active medical problems.  Problems all longstanding and stable, except as noted/documented.  See ROS for pertinent symptoms related to these conditions.      Review of Systems  Constitutional, neuro, ENT, endocrine, pulmonary, cardiac, gastrointestinal, genitourinary, musculoskeletal, integument and psychiatric systems are negative, except as otherwise  noted.    Patient Active Problem List    Diagnosis Date Noted     KB on CPAP 05/27/2021     Priority: Medium     Fibrocystic breast disease 01/30/2018     Priority: Medium     Overview:   Nonproliferative breast disease       Generalized anxiety disorder 01/30/2018     Priority: Medium     Overview:   Dysthymia, generalized anxiety       Rosacea 01/04/2013     Priority: Medium     Diverticulosis of large intestine 03/14/2011     Priority: Medium     Overview:   Diffuse        Past Medical History:   Diagnosis Date     Closed left ankle fracture      Cyst of ovary     Hx of right ovarian cyst.     Diverticulosis of large intestine 03/14/2011     Endometriosis     growth on ovary s/p oophrectomy     Fibrocystic breast disease 01/30/2018     Generalized anxiety disorder     Dysthymia, generalized anxiety     Lichen sclerosus     Vulvar LSEA; asymptomatic     Neck pain, chronic 01/30/2018     Improved after [facility]     Rosacea 01/04/2013     Severe major depression with psychotic features (H) 10/25/2016    history of ECT; inpatient for several months; she does not have complete memory of that time     Past Surgical History:   Procedure Laterality Date     BIOPSY BREAST Right 07/07/2008    stereotactic, benign result     CATARACT IOL, RT/LT Bilateral     2017, 2018     COLONOSCOPY  03/14/2011    Normal; F/U 2021     DILATION AND CURETTAGE  2003    D&C with hysteroscopy and endometrial ablation     OOPHORECTOMY Right 02/1997     OPEN REDUCTION INTERNAL FIXATION ANKLE Left 2002     RELEASE CARPAL TUNNEL  2010     REMOVE HARDWARE ANKLE Left 06/27/2011     VITRECTOMY ANTERIOR Left 05/22/2017    Dr. Collins     Current Outpatient Medications   Medication Sig Dispense Refill     acetaminophen (TYLENOL) 325 MG tablet Take 325 mg by mouth every 4 hours as needed Max acetaminophen dose: 4000 mg in 24 hours       ARIPiprazole (ABILIFY) 10 MG tablet Take 0.5 tablets (5 mg) by mouth At Bedtime (Patient taking differently: Take 10  mg by mouth every evening ) 30 tablet 1     Calcium Carb-Cholecalciferol (CALCIUM 600+D3) 600-400 MG-UNIT TABS per tablet Take 1 tablet by mouth daily       Cholecalciferol (VITAMIN D3) 25 MCG (1000 UT) CAPS Take 1 capsule by mouth daily       clobetasol (TEMOVATE) 0.05 % external ointment Apply topically 2 times daily 30 g 3     ibuprofen (ADVIL/MOTRIN) 200 MG tablet Take 200 mg by mouth 4 times daily as needed       Lutein-Zeaxanthin (OCUVITE LUTEIN 25) 25-5 MG CAPS Take 1 capsule by mouth daily        Multiple Vitamins-Minerals (OCUVITE PO) Take 1 tablet by mouth daily       propranolol (INDERAL) 10 MG tablet Take 1 tablet by mouth 2 times daily as needed for anxiety       Turmeric 500 MG CAPS Take 1 capsule by mouth daily       venlafaxine (EFFEXOR-XR) 75 MG 24 hr capsule Take 75 mg by mouth 2 times daily          No Known Allergies     Social History     Tobacco Use     Smoking status: Never Smoker     Smokeless tobacco: Never Used   Substance Use Topics     Alcohol use: Yes     Comment: 1-2 per week     Family History   Problem Relation Age of Onset     Arthritis Mother      Heart Disease Mother      Hypertension Mother      Thyroid Disease Mother      Dementia Mother      Pulmonary Embolism Mother      Heart Disease Father      Other - See Comments Father         Psychiatric illness     Chronic Obstructive Pulmonary Disease Father      Dementia Brother      Colon Cancer Paternal Grandmother         Cancer-colon     No Known Problems Brother      Hypertension Sister      Allergies Sister         food     Breast Cancer No family hx of         Cancer-breast     History   Drug Use No         Objective     /80 (BP Location: Right arm, Patient Position: Sitting, Cuff Size: Adult Regular)   Temp 97.5  F (36.4  C) (Temporal)   Resp 16   Wt 80.2 kg (176 lb 12.8 oz)   LMP  (LMP Unknown)   SpO2 98%   Breastfeeding No   BMI 29.26 kg/m      Physical Exam    GENERAL APPEARANCE: healthy, alert and no distress      EYES: EOMI, PERRL     RESP: lungs clear to auscultation - no rales, rhonchi or wheezes     CV: regular rates and rhythm and no irregular beats     : normal cervix, adnexae, and uterus without masses or discharge and rectal exam normal without masses-guaiac negative stool     MS: extremities normal- no gross deformities noted, no evidence of inflammation in joints, FROM in all extremities.     SKIN: no suspicious lesions or rashes     NEURO: Normal strength and tone, sensory exam grossly normal, mentation intact and speech normal     PSYCH: mentation appears normal and affect normal/bright    Recent Labs   Lab Test 01/19/22  1410 10/07/20  0927   HGB 12.7 13.3    270    139   POTASSIUM 4.2 4.0   CR 0.66 0.67        Diagnostics:  Recent Results (from the past 24 hour(s))   Comprehensive Metabolic Panel    Collection Time: 04/05/22  8:28 AM   Result Value Ref Range    Sodium 140 134 - 144 mmol/L    Potassium 4.4 3.5 - 5.1 mmol/L    Chloride 105 98 - 107 mmol/L    Carbon Dioxide (CO2) 31 21 - 31 mmol/L    Anion Gap 4 3 - 14 mmol/L    Urea Nitrogen 19 7 - 25 mg/dL    Creatinine 0.68 0.60 - 1.20 mg/dL    Calcium 9.7 8.6 - 10.3 mg/dL    Glucose 92 70 - 105 mg/dL    Alkaline Phosphatase 70 34 - 104 U/L    AST 17 13 - 39 U/L    ALT 15 7 - 52 U/L    Protein Total 6.7 6.4 - 8.9 g/dL    Albumin 4.2 3.5 - 5.7 g/dL    Bilirubin Total 0.4 0.3 - 1.0 mg/dL    GFR Estimate >90 >60 mL/min/1.73m2   CBC with platelets and differential    Collection Time: 04/05/22  8:28 AM   Result Value Ref Range    WBC Count 4.9 4.0 - 11.0 10e3/uL    RBC Count 4.11 3.80 - 5.20 10e6/uL    Hemoglobin 13.3 11.7 - 15.7 g/dL    Hematocrit 39.7 35.0 - 47.0 %    MCV 97 78 - 100 fL    MCH 32.4 26.5 - 33.0 pg    MCHC 33.5 31.5 - 36.5 g/dL    RDW 13.0 10.0 - 15.0 %    Platelet Count 283 150 - 450 10e3/uL    % Neutrophils 62 %    % Lymphocytes 26 %    % Monocytes 9 %    % Eosinophils 2 %    % Basophils 1 %    % Immature Granulocytes 0 %    NRBCs  per 100 WBC 0 <1 /100    Absolute Neutrophils 3.0 1.6 - 8.3 10e3/uL    Absolute Lymphocytes 1.3 0.8 - 5.3 10e3/uL    Absolute Monocytes 0.4 0.0 - 1.3 10e3/uL    Absolute Eosinophils 0.1 0.0 - 0.7 10e3/uL    Absolute Basophils 0.1 0.0 - 0.2 10e3/uL    Absolute Immature Granulocytes 0.0 <=0.4 10e3/uL    Absolute NRBCs 0.0 10e3/uL      EKG: appears normal, NSR    Revised Cardiac Risk Index (RCRI):  The patient has the following serious cardiovascular risks for perioperative complications:   - No serious cardiac risks = 0 points     RCRI Interpretation: 0 points: Class I (very low risk - 0.4% complication rate)     Signed Electronically by: Loren Todd NP  Copy of this evaluation report is provided to requesting physician.

## 2022-04-04 NOTE — H&P (VIEW-ONLY)
[facility] AND [facility]  [address]  [place] [address]  Phone: [phone]  Primary Provider: Nidhi Baer  Pre-op Performing Provider: PAMELA GARCIA    PREOPERATIVE EVALUATION:  Today's date: 4/5/2022    Daly Perry is a 71 year old female who presents for a preoperative evaluation.    Surgical Information:  Surgery/Procedure:  Right Knee Arthoscopy with Partial Lateral Meniscectomy and  chondroplasty  Surgery Location:  [facility]  Surgeon:   Jade  Surgery Date:   4/15/22  Time of Surgery:  TBD  Where patient plans to recover: At home with family  Fax number for surgical facility: Note does not need to be faxed, will be available electronically in Epic.    Type of Anesthesia Anticipated: General    Assessment & Plan     The proposed surgical procedure is considered LOW risk.    Chronic pain of right knee  Preop general physical exam  - EKG 12-lead (E and [facility] Only)  - CBC with Platelets & Differential ([facility] Only)  - Comprehensive Metabolic Panel    Severe major depression with psychotic features (H)    Risks and Recommendations:  The patient has the following additional risks and recommendations for perioperative complications:   - History of delirium or dementia    Medication Instructions:  Patient is to take all scheduled medications on the day of surgery EXCEPT for modifications listed below:   - ibuprofen (Advil, Motrin): HOLD 1 day before surgery.    - naproxen (Aleve, Naprosyn): HOLD 4 days before surgery.    - SSRIs, SNRIs, TCAs, Antipsychotics: Continue without modification.     RECOMMENDATION:  APPROVAL GIVEN to proceed with proposed procedure, without further diagnostic evaluation.    53 minutes spent on the date of the encounter doing chart review, history and exam, documentation and further activities per the note    Subjective     HPI related to upcoming procedure: right knee pain    Preop Questions 4/1/2022   1. Have you ever had a heart attack or stroke? No    2. Have you ever had surgery on your heart or blood vessels, such as a stent placement, a coronary artery bypass, or surgery on an artery in your head, neck, heart, or legs? No   3. Do you have chest pain with activity? No   4. Do you have a history of  heart failure? No   5. Do you currently have a cold, bronchitis or symptoms of other infection? No   6. Do you have a cough, shortness of breath, or wheezing? No   7. Do you or anyone in your family have previous history of blood clots? No   8. Do you or does anyone in your family have a serious bleeding problem such as prolonged bleeding following surgeries or cuts? No   9. Have you ever had problems with anemia or been told to take iron pills? No   10. Have you had any abnormal blood loss such as black, tarry or bloody stools, or abnormal vaginal bleeding? No   11. Have you ever had a blood transfusion? No   12. Are you willing to have a blood transfusion if it is medically needed before, during, or after your surgery? Yes   13. Have you or any of your relatives ever had problems with anesthesia? No   14. Do you have sleep apnea, excessive snoring or daytime drowsiness? No   15. Do you have any artifical heart valves or other implanted medical devices like a pacemaker, defibrillator, or continuous glucose monitor? No   16. Do you have artificial joints? No   17. Are you allergic to latex? No     Health Care Directive:  Patient has a Health Care Directive on file    Preoperative Review of :   reviewed - no record of controlled substances prescribed.    Status of Chronic Conditions:  See problem list for active medical problems.  Problems all longstanding and stable, except as noted/documented.  See ROS for pertinent symptoms related to these conditions.      Review of Systems  Constitutional, neuro, ENT, endocrine, pulmonary, cardiac, gastrointestinal, genitourinary, musculoskeletal, integument and psychiatric systems are negative, except as otherwise  noted.    Patient Active Problem List    Diagnosis Date Noted     KB on CPAP 05/27/2021     Priority: Medium     Fibrocystic breast disease 01/30/2018     Priority: Medium     Overview:   Nonproliferative breast disease       Generalized anxiety disorder 01/30/2018     Priority: Medium     Overview:   Dysthymia, generalized anxiety       Rosacea 01/04/2013     Priority: Medium     Diverticulosis of large intestine 03/14/2011     Priority: Medium     Overview:   Diffuse        Past Medical History:   Diagnosis Date     Closed left ankle fracture      Cyst of ovary     Hx of right ovarian cyst.     Diverticulosis of large intestine 03/14/2011     Endometriosis     growth on ovary s/p oophrectomy     Fibrocystic breast disease 01/30/2018     Generalized anxiety disorder     Dysthymia, generalized anxiety     Lichen sclerosus     Vulvar LSEA; asymptomatic     Neck pain, chronic 01/30/2018     Improved after [facility]     Rosacea 01/04/2013     Severe major depression with psychotic features (H) 10/25/2016    history of ECT; inpatient for several months; she does not have complete memory of that time     Past Surgical History:   Procedure Laterality Date     BIOPSY BREAST Right 07/07/2008    stereotactic, benign result     CATARACT IOL, RT/LT Bilateral     2017, 2018     COLONOSCOPY  03/14/2011    Normal; F/U 2021     DILATION AND CURETTAGE  2003    D&C with hysteroscopy and endometrial ablation     OOPHORECTOMY Right 02/1997     OPEN REDUCTION INTERNAL FIXATION ANKLE Left 2002     RELEASE CARPAL TUNNEL  2010     REMOVE HARDWARE ANKLE Left 06/27/2011     VITRECTOMY ANTERIOR Left 05/22/2017    Dr. Collins     Current Outpatient Medications   Medication Sig Dispense Refill     acetaminophen (TYLENOL) 325 MG tablet Take 325 mg by mouth every 4 hours as needed Max acetaminophen dose: 4000 mg in 24 hours       ARIPiprazole (ABILIFY) 10 MG tablet Take 0.5 tablets (5 mg) by mouth At Bedtime (Patient taking differently: Take 10  mg by mouth every evening ) 30 tablet 1     Calcium Carb-Cholecalciferol (CALCIUM 600+D3) 600-400 MG-UNIT TABS per tablet Take 1 tablet by mouth daily       Cholecalciferol (VITAMIN D3) 25 MCG (1000 UT) CAPS Take 1 capsule by mouth daily       clobetasol (TEMOVATE) 0.05 % external ointment Apply topically 2 times daily 30 g 3     ibuprofen (ADVIL/MOTRIN) 200 MG tablet Take 200 mg by mouth 4 times daily as needed       Lutein-Zeaxanthin (OCUVITE LUTEIN 25) 25-5 MG CAPS Take 1 capsule by mouth daily        Multiple Vitamins-Minerals (OCUVITE PO) Take 1 tablet by mouth daily       propranolol (INDERAL) 10 MG tablet Take 1 tablet by mouth 2 times daily as needed for anxiety       Turmeric 500 MG CAPS Take 1 capsule by mouth daily       venlafaxine (EFFEXOR-XR) 75 MG 24 hr capsule Take 75 mg by mouth 2 times daily          No Known Allergies     Social History     Tobacco Use     Smoking status: Never Smoker     Smokeless tobacco: Never Used   Substance Use Topics     Alcohol use: Yes     Comment: 1-2 per week     Family History   Problem Relation Age of Onset     Arthritis Mother      Heart Disease Mother      Hypertension Mother      Thyroid Disease Mother      Dementia Mother      Pulmonary Embolism Mother      Heart Disease Father      Other - See Comments Father         Psychiatric illness     Chronic Obstructive Pulmonary Disease Father      Dementia Brother      Colon Cancer Paternal Grandmother         Cancer-colon     No Known Problems Brother      Hypertension Sister      Allergies Sister         food     Breast Cancer No family hx of         Cancer-breast     History   Drug Use No         Objective     /80 (BP Location: Right arm, Patient Position: Sitting, Cuff Size: Adult Regular)   Temp 97.5  F (36.4  C) (Temporal)   Resp 16   Wt 80.2 kg (176 lb 12.8 oz)   LMP  (LMP Unknown)   SpO2 98%   Breastfeeding No   BMI 29.26 kg/m      Physical Exam    GENERAL APPEARANCE: healthy, alert and no distress      EYES: EOMI, PERRL     RESP: lungs clear to auscultation - no rales, rhonchi or wheezes     CV: regular rates and rhythm and no irregular beats     : normal cervix, adnexae, and uterus without masses or discharge and rectal exam normal without masses-guaiac negative stool     MS: extremities normal- no gross deformities noted, no evidence of inflammation in joints, FROM in all extremities.     SKIN: no suspicious lesions or rashes     NEURO: Normal strength and tone, sensory exam grossly normal, mentation intact and speech normal     PSYCH: mentation appears normal and affect normal/bright    Recent Labs   Lab Test 01/19/22  1410 10/07/20  0927   HGB 12.7 13.3    270    139   POTASSIUM 4.2 4.0   CR 0.66 0.67        Diagnostics:  Recent Results (from the past 24 hour(s))   Comprehensive Metabolic Panel    Collection Time: 04/05/22  8:28 AM   Result Value Ref Range    Sodium 140 134 - 144 mmol/L    Potassium 4.4 3.5 - 5.1 mmol/L    Chloride 105 98 - 107 mmol/L    Carbon Dioxide (CO2) 31 21 - 31 mmol/L    Anion Gap 4 3 - 14 mmol/L    Urea Nitrogen 19 7 - 25 mg/dL    Creatinine 0.68 0.60 - 1.20 mg/dL    Calcium 9.7 8.6 - 10.3 mg/dL    Glucose 92 70 - 105 mg/dL    Alkaline Phosphatase 70 34 - 104 U/L    AST 17 13 - 39 U/L    ALT 15 7 - 52 U/L    Protein Total 6.7 6.4 - 8.9 g/dL    Albumin 4.2 3.5 - 5.7 g/dL    Bilirubin Total 0.4 0.3 - 1.0 mg/dL    GFR Estimate >90 >60 mL/min/1.73m2   CBC with platelets and differential    Collection Time: 04/05/22  8:28 AM   Result Value Ref Range    WBC Count 4.9 4.0 - 11.0 10e3/uL    RBC Count 4.11 3.80 - 5.20 10e6/uL    Hemoglobin 13.3 11.7 - 15.7 g/dL    Hematocrit 39.7 35.0 - 47.0 %    MCV 97 78 - 100 fL    MCH 32.4 26.5 - 33.0 pg    MCHC 33.5 31.5 - 36.5 g/dL    RDW 13.0 10.0 - 15.0 %    Platelet Count 283 150 - 450 10e3/uL    % Neutrophils 62 %    % Lymphocytes 26 %    % Monocytes 9 %    % Eosinophils 2 %    % Basophils 1 %    % Immature Granulocytes 0 %    NRBCs  per 100 WBC 0 <1 /100    Absolute Neutrophils 3.0 1.6 - 8.3 10e3/uL    Absolute Lymphocytes 1.3 0.8 - 5.3 10e3/uL    Absolute Monocytes 0.4 0.0 - 1.3 10e3/uL    Absolute Eosinophils 0.1 0.0 - 0.7 10e3/uL    Absolute Basophils 0.1 0.0 - 0.2 10e3/uL    Absolute Immature Granulocytes 0.0 <=0.4 10e3/uL    Absolute NRBCs 0.0 10e3/uL      EKG: appears normal, NSR    Revised Cardiac Risk Index (RCRI):  The patient has the following serious cardiovascular risks for perioperative complications:   - No serious cardiac risks = 0 points     RCRI Interpretation: 0 points: Class I (very low risk - 0.4% complication rate)     Signed Electronically by: Loren Todd NP  Copy of this evaluation report is provided to requesting physician.

## 2022-04-05 ENCOUNTER — OFFICE VISIT (OUTPATIENT)
Dept: INTERNAL MEDICINE | Facility: OTHER | Age: 72
End: 2022-04-05
Attending: NURSE PRACTITIONER
Payer: COMMERCIAL

## 2022-04-05 VITALS
BODY MASS INDEX: 29.26 KG/M2 | RESPIRATION RATE: 16 BRPM | DIASTOLIC BLOOD PRESSURE: 80 MMHG | SYSTOLIC BLOOD PRESSURE: 122 MMHG | WEIGHT: 176.8 LBS | OXYGEN SATURATION: 98 % | TEMPERATURE: 97.5 F

## 2022-04-05 DIAGNOSIS — M25.561 CHRONIC PAIN OF RIGHT KNEE: ICD-10-CM

## 2022-04-05 DIAGNOSIS — G89.29 CHRONIC PAIN OF RIGHT KNEE: ICD-10-CM

## 2022-04-05 DIAGNOSIS — F32.3 SEVERE MAJOR DEPRESSION WITH PSYCHOTIC FEATURES (H): ICD-10-CM

## 2022-04-05 DIAGNOSIS — Z01.818 PREOP GENERAL PHYSICAL EXAM: Primary | ICD-10-CM

## 2022-04-05 LAB
ALBUMIN SERPL-MCNC: 4.2 G/DL (ref 3.5–5.7)
ALP SERPL-CCNC: 70 U/L (ref 34–104)
ALT SERPL W P-5'-P-CCNC: 15 U/L (ref 7–52)
ANION GAP SERPL CALCULATED.3IONS-SCNC: 4 MMOL/L (ref 3–14)
AST SERPL W P-5'-P-CCNC: 17 U/L (ref 13–39)
BASOPHILS # BLD AUTO: 0.1 10E3/UL (ref 0–0.2)
BASOPHILS NFR BLD AUTO: 1 %
BILIRUB SERPL-MCNC: 0.4 MG/DL (ref 0.3–1)
BUN SERPL-MCNC: 19 MG/DL (ref 7–25)
CALCIUM SERPL-MCNC: 9.7 MG/DL (ref 8.6–10.3)
CHLORIDE BLD-SCNC: 105 MMOL/L (ref 98–107)
CO2 SERPL-SCNC: 31 MMOL/L (ref 21–31)
CREAT SERPL-MCNC: 0.68 MG/DL (ref 0.6–1.2)
EOSINOPHIL # BLD AUTO: 0.1 10E3/UL (ref 0–0.7)
EOSINOPHIL NFR BLD AUTO: 2 %
ERYTHROCYTE [DISTWIDTH] IN BLOOD BY AUTOMATED COUNT: 13 % (ref 10–15)
GFR SERPL CREATININE-BSD FRML MDRD: >90 ML/MIN/1.73M2
GLUCOSE BLD-MCNC: 92 MG/DL (ref 70–105)
HCT VFR BLD AUTO: 39.7 % (ref 35–47)
HGB BLD-MCNC: 13.3 G/DL (ref 11.7–15.7)
IMM GRANULOCYTES # BLD: 0 10E3/UL
IMM GRANULOCYTES NFR BLD: 0 %
LYMPHOCYTES # BLD AUTO: 1.3 10E3/UL (ref 0.8–5.3)
LYMPHOCYTES NFR BLD AUTO: 26 %
MCH RBC QN AUTO: 32.4 PG (ref 26.5–33)
MCHC RBC AUTO-ENTMCNC: 33.5 G/DL (ref 31.5–36.5)
MCV RBC AUTO: 97 FL (ref 78–100)
MONOCYTES # BLD AUTO: 0.4 10E3/UL (ref 0–1.3)
MONOCYTES NFR BLD AUTO: 9 %
NEUTROPHILS # BLD AUTO: 3 10E3/UL (ref 1.6–8.3)
NEUTROPHILS NFR BLD AUTO: 62 %
NRBC # BLD AUTO: 0 10E3/UL
NRBC BLD AUTO-RTO: 0 /100
PLATELET # BLD AUTO: 283 10E3/UL (ref 150–450)
POTASSIUM BLD-SCNC: 4.4 MMOL/L (ref 3.5–5.1)
PROT SERPL-MCNC: 6.7 G/DL (ref 6.4–8.9)
RBC # BLD AUTO: 4.11 10E6/UL (ref 3.8–5.2)
SODIUM SERPL-SCNC: 140 MMOL/L (ref 134–144)
WBC # BLD AUTO: 4.9 10E3/UL (ref 4–11)

## 2022-04-05 PROCEDURE — 93010 ELECTROCARDIOGRAM REPORT: CPT | Performed by: INTERNAL MEDICINE

## 2022-04-05 PROCEDURE — 93005 ELECTROCARDIOGRAM TRACING: CPT | Performed by: NURSE PRACTITIONER

## 2022-04-05 PROCEDURE — G0463 HOSPITAL OUTPT CLINIC VISIT: HCPCS

## 2022-04-05 PROCEDURE — 80053 COMPREHEN METABOLIC PANEL: CPT | Mod: ZL | Performed by: NURSE PRACTITIONER

## 2022-04-05 PROCEDURE — 99215 OFFICE O/P EST HI 40 MIN: CPT | Performed by: NURSE PRACTITIONER

## 2022-04-05 PROCEDURE — 85025 COMPLETE CBC W/AUTO DIFF WBC: CPT | Mod: ZL | Performed by: NURSE PRACTITIONER

## 2022-04-05 PROCEDURE — 36415 COLL VENOUS BLD VENIPUNCTURE: CPT | Mod: ZL | Performed by: NURSE PRACTITIONER

## 2022-04-05 ASSESSMENT — PAIN SCALES - GENERAL: PAINLEVEL: NO PAIN (0)

## 2022-04-05 NOTE — LETTER
My Depression Action Plan  Name: Daly Perry   Date of Birth 1950  Date: 4/5/2022    My doctor: Nidhi Baer   My clinic: [facility]  [address]  [place] [id number] [phone]          GREEN    ZONE   Good Control    What it looks like:     Things are going generally well. You have normal ups and downs. You may even feel depressed from time to time, but bad moods usually last less than a day.   What you need to do:  1. Continue to care for yourself (see self care plan)  2. Check your depression survival kit and update it as needed  3. Follow your physician s recommendations including any medication.  4. Do not stop taking medication unless you consult with your physician first.           YELLOW         ZONE Getting Worse    What it looks like:     Depression is starting to interfere with your life.     It may be hard to get out of bed; you may be starting to isolate yourself from others.    Symptoms of depression are starting to last most all day and this has happened for several days.     You may have suicidal thoughts but they are not constant.   What you need to do:     1. Call your care team. Your response to treatment will improve if you keep your care team informed of your progress. Yellow periods are signs an adjustment may need to be made.     2. Continue your self-care.  Just get dressed and ready for the day.  Don't give yourself time to talk yourself out of it.    3. Talk to someone in your support network.    4. Open up your Depression Self-Care Plan/Wellness Kit.           RED    ZONE Medical Alert - Get Help    What it looks like:     Depression is seriously interfering with your life.     You may experience these or other symptoms: You can t get out of bed most days, can t work or engage in other necessary activities, you have trouble taking care of basic hygiene, or basic responsibilities, thoughts of suicide or death that will not go  away, self-injurious behavior.     What you need to do:  1. Call your care team and request a same-day appointment. If they are not available (weekends or after hours) call your local crisis line, emergency room or 911.          Depression Self-Care Plan / Wellness Kit    Many people find that medication and therapy are helpful treatments for managing depression. In addition, making small changes to your everyday life can help to boost your mood and improve your wellbeing. Below are some tips for you to consider. Be sure to talk with your medical provider and/or behavioral health consultant if your symptoms are worsening or not improving.     Sleep   Sleep hygiene  means all of the habits that support good, restful sleep. It includes maintaining a consistent bedtime and wake time, using your bedroom only for sleeping or sex, and keeping the bedroom dark and free of distractions like a computer, smartphone, or television.     Develop a Healthy Routine  Maintain good hygiene. Get out of bed in the morning, make your bed, brush your teeth, take a shower, and get dressed. Don t spend too much time viewing media that makes you feel stressed. Find time to relax each day.    Exercise  Get some form of exercise every day. This will help reduce pain and release endorphins, the  feel good  chemicals in your brain. It can be as simple as just going for a walk or doing some gardening, anything that will get you moving.      Diet  Strive to eat healthy foods, including fruits and vegetables. Drink plenty of water. Avoid excessive sugar, caffeine, alcohol, and other mood-altering substances.     Stay Connected with Others  Stay in touch with friends and family members.    Manage Your Mood  Try deep breathing, massage therapy, biofeedback, or meditation. Take part in fun activities when you can. Try to find something to smile about each day.     Psychotherapy  Be open to working with a therapist if your provider recommends it.      Medication  Be sure to take your medication as prescribed. Most anti-depressants need to be taken every day. It usually takes several weeks for medications to work. Not all medicines work for all people. It is important to follow-up with your provider to make sure you have a treatment plan that is working for you. Do not stop your medication abruptly without first discussing it with your provider.    Crisis Resources   These hotlines are for both adults and children. They and are open 24 hours a day, 7 days a week unless noted otherwise.      National Suicide Prevention Lifeline   3-496-166-TALK (5800)      Crisis Text Line    www.crisistextline.org  Text HOME to 672786 from anywhere in the [place], anytime, about any type of crisis. A live, trained crisis counselor will receive the text and respond quickly.      [place] Lifeline for LGBTQ Youth  A national crisis intervention and suicide lifeline for LGBTQ youth under 25. Provides a safe place to talk without judgement. Call 1-918.275.7200; text START to 301494 or visit www.thetrevorproject.org to talk to a trained counselor.      For [facility] crisis numbers, visit the [facility] website at:  https://[place].gov/dhs/people-we-serve/adults/health-care/mental-health/resources/crisis-contacts.jsp

## 2022-04-05 NOTE — NURSING NOTE
"Chief Complaint   Patient presents with     Pre-Op Exam     R knee scope, 4/15/22, CON Mehta       Initial /80 (BP Location: Right arm, Patient Position: Sitting, Cuff Size: Adult Regular)   Temp 97.5  F (36.4  C) (Temporal)   Resp 16   Wt 80.2 kg (176 lb 12.8 oz)   LMP  (LMP Unknown)   SpO2 98%   Breastfeeding No   BMI 29.26 kg/m   Estimated body mass index is 29.26 kg/m  as calculated from the following:    Height as of 1/19/22: 1.656 m (5' 5.18\").    Weight as of this encounter: 80.2 kg (176 lb 12.8 oz).     Medication Reconciliation: complete      FOOD SECURITY SCREENING QUESTIONS:    The next two questions are to help us understand your food security.  If you are feeling you need any assistance in this area, we have resources available to support you today.    Hunger Vital Signs:  Within the past 12 months we worried whether our food would run out before we got money to buy more. Never  Within the past 12 months the food we bought just didn't last and we didn't have money to get more. Never     Nereyda Pierre LPN,LPN on 4/5/2022 at 8:37 AM        Advance care plan reviewed      Nereyda Pierre LPN    "

## 2022-04-06 LAB
ATRIAL RATE - MUSE: 60 BPM
DIASTOLIC BLOOD PRESSURE - MUSE: NORMAL MMHG
INTERPRETATION ECG - MUSE: NORMAL
P AXIS - MUSE: 57 DEGREES
PR INTERVAL - MUSE: 140 MS
QRS DURATION - MUSE: 92 MS
QT - MUSE: 418 MS
QTC - MUSE: 418 MS
R AXIS - MUSE: 32 DEGREES
SYSTOLIC BLOOD PRESSURE - MUSE: NORMAL MMHG
T AXIS - MUSE: 47 DEGREES
VENTRICULAR RATE- MUSE: 60 BPM

## 2022-04-06 NOTE — TELEPHONE ENCOUNTER
I talked to Dr. Hansen. Usually we don't do therapy after knee scopes unless patient needs it. We can order a few sessions if she wants to. Patient is refusing at this time.   Yolanda Tesfaye LPN .....................4/6/2022 8:40 AM

## 2022-04-11 ENCOUNTER — ALLIED HEALTH/NURSE VISIT (OUTPATIENT)
Dept: FAMILY MEDICINE | Facility: OTHER | Age: 72
End: 2022-04-11
Attending: NURSE PRACTITIONER
Payer: MEDICARE

## 2022-04-11 DIAGNOSIS — Z20.822 COVID-19 RULED OUT: Primary | ICD-10-CM

## 2022-04-11 PROCEDURE — U0003 INFECTIOUS AGENT DETECTION BY NUCLEIC ACID (DNA OR RNA); SEVERE ACUTE RESPIRATORY SYNDROME CORONAVIRUS 2 (SARS-COV-2) (CORONAVIRUS DISEASE [COVID-19]), AMPLIFIED PROBE TECHNIQUE, MAKING USE OF HIGH THROUGHPUT TECHNOLOGIES AS DESCRIBED BY CMS-2020-01-R: HCPCS | Mod: ZL

## 2022-04-11 PROCEDURE — C9803 HOPD COVID-19 SPEC COLLECT: HCPCS

## 2022-04-11 NOTE — PROGRESS NOTES
Patient here today for Covid test.     Milena Vargas CNA .............................on 4/11/2022 at 9:35 AM

## 2022-04-12 LAB — SARS-COV-2 RNA RESP QL NAA+PROBE: NEGATIVE

## 2022-04-14 ENCOUNTER — ANESTHESIA EVENT (OUTPATIENT)
Dept: SURGERY | Facility: OTHER | Age: 72
End: 2022-04-14
Payer: MEDICARE

## 2022-04-15 ENCOUNTER — ANESTHESIA (OUTPATIENT)
Dept: SURGERY | Facility: OTHER | Age: 72
End: 2022-04-15
Payer: MEDICARE

## 2022-04-15 ENCOUNTER — HOSPITAL ENCOUNTER (OUTPATIENT)
Facility: OTHER | Age: 72
Discharge: HOME | End: 2022-04-15
Attending: ORTHOPAEDIC SURGERY | Admitting: ORTHOPAEDIC SURGERY
Payer: MEDICARE

## 2022-04-15 VITALS
TEMPERATURE: 97.4 F | HEART RATE: 65 BPM | RESPIRATION RATE: 10 BRPM | SYSTOLIC BLOOD PRESSURE: 149 MMHG | OXYGEN SATURATION: 99 % | BODY MASS INDEX: 29.32 KG/M2 | DIASTOLIC BLOOD PRESSURE: 79 MMHG | WEIGHT: 176 LBS | HEIGHT: 65 IN

## 2022-04-15 DIAGNOSIS — G89.29 CHRONIC PAIN OF RIGHT KNEE: Primary | ICD-10-CM

## 2022-04-15 DIAGNOSIS — M25.561 CHRONIC PAIN OF RIGHT KNEE: Primary | ICD-10-CM

## 2022-04-15 LAB — GLUCOSE BLDC GLUCOMTR-MCNC: 90 MG/DL (ref 70–99)

## 2022-04-15 PROCEDURE — 250N000009 HC RX 250: Performed by: NURSE ANESTHETIST, CERTIFIED REGISTERED

## 2022-04-15 PROCEDURE — 999N000141 HC STATISTIC PRE-PROCEDURE NURSING ASSESSMENT: Performed by: ORTHOPAEDIC SURGERY

## 2022-04-15 PROCEDURE — 29880 ARTHRS KNE SRG MNISECTMY M&L: CPT | Mod: LT | Performed by: ORTHOPAEDIC SURGERY

## 2022-04-15 PROCEDURE — 360N000076 HC SURGERY LEVEL 3, PER MIN: Performed by: ORTHOPAEDIC SURGERY

## 2022-04-15 PROCEDURE — 272N000001 HC OR GENERAL SUPPLY STERILE: Performed by: ORTHOPAEDIC SURGERY

## 2022-04-15 PROCEDURE — 710N000010 HC RECOVERY PHASE 1, LEVEL 2, PER MIN: Performed by: ORTHOPAEDIC SURGERY

## 2022-04-15 PROCEDURE — 710N000012 HC RECOVERY PHASE 2, PER MINUTE: Performed by: ORTHOPAEDIC SURGERY

## 2022-04-15 PROCEDURE — 250N000011 HC RX IP 250 OP 636: Performed by: ORTHOPAEDIC SURGERY

## 2022-04-15 PROCEDURE — 370N000017 HC ANESTHESIA TECHNICAL FEE, PER MIN: Performed by: ORTHOPAEDIC SURGERY

## 2022-04-15 PROCEDURE — 29880 ARTHRS KNE SRG MNISECTMY M&L: CPT | Performed by: NURSE ANESTHETIST, CERTIFIED REGISTERED

## 2022-04-15 PROCEDURE — 250N000013 HC RX MED GY IP 250 OP 250 PS 637: Performed by: ORTHOPAEDIC SURGERY

## 2022-04-15 PROCEDURE — 258N000003 HC RX IP 258 OP 636: Performed by: NURSE ANESTHETIST, CERTIFIED REGISTERED

## 2022-04-15 PROCEDURE — 258N000001 HC RX 258: Performed by: ORTHOPAEDIC SURGERY

## 2022-04-15 PROCEDURE — 250N000011 HC RX IP 250 OP 636: Performed by: NURSE ANESTHETIST, CERTIFIED REGISTERED

## 2022-04-15 PROCEDURE — 82962 GLUCOSE BLOOD TEST: CPT

## 2022-04-15 RX ORDER — ACETAMINOPHEN 325 MG/1
975 TABLET ORAL ONCE
Status: COMPLETED | OUTPATIENT
Start: 2022-04-15 | End: 2022-04-15

## 2022-04-15 RX ORDER — NALOXONE HYDROCHLORIDE 0.4 MG/ML
0.4 INJECTION, SOLUTION INTRAMUSCULAR; INTRAVENOUS; SUBCUTANEOUS
Status: DISCONTINUED | OUTPATIENT
Start: 2022-04-15 | End: 2022-04-15 | Stop reason: HOSPADM

## 2022-04-15 RX ORDER — CEFAZOLIN SODIUM/WATER 2 G/20 ML
2 SYRINGE (ML) INTRAVENOUS
Status: COMPLETED | OUTPATIENT
Start: 2022-04-15 | End: 2022-04-15

## 2022-04-15 RX ORDER — FENTANYL CITRATE 50 UG/ML
25 INJECTION, SOLUTION INTRAMUSCULAR; INTRAVENOUS
Status: DISCONTINUED | OUTPATIENT
Start: 2022-04-15 | End: 2022-04-15 | Stop reason: HOSPADM

## 2022-04-15 RX ORDER — SODIUM CHLORIDE, SODIUM LACTATE, POTASSIUM CHLORIDE, CALCIUM CHLORIDE 600; 310; 30; 20 MG/100ML; MG/100ML; MG/100ML; MG/100ML
INJECTION, SOLUTION INTRAVENOUS CONTINUOUS
Status: DISCONTINUED | OUTPATIENT
Start: 2022-04-15 | End: 2022-04-15 | Stop reason: HOSPADM

## 2022-04-15 RX ORDER — NALOXONE HYDROCHLORIDE 0.4 MG/ML
0.2 INJECTION, SOLUTION INTRAMUSCULAR; INTRAVENOUS; SUBCUTANEOUS
Status: DISCONTINUED | OUTPATIENT
Start: 2022-04-15 | End: 2022-04-15 | Stop reason: HOSPADM

## 2022-04-15 RX ORDER — ONDANSETRON 2 MG/ML
INJECTION INTRAMUSCULAR; INTRAVENOUS PRN
Status: DISCONTINUED | OUTPATIENT
Start: 2022-04-15 | End: 2022-04-15

## 2022-04-15 RX ORDER — MEPERIDINE HYDROCHLORIDE 50 MG/ML
12.5 INJECTION INTRAMUSCULAR; INTRAVENOUS; SUBCUTANEOUS
Status: DISCONTINUED | OUTPATIENT
Start: 2022-04-15 | End: 2022-04-15 | Stop reason: HOSPADM

## 2022-04-15 RX ORDER — FENTANYL CITRATE 50 UG/ML
50 INJECTION, SOLUTION INTRAMUSCULAR; INTRAVENOUS EVERY 5 MIN PRN
Status: DISCONTINUED | OUTPATIENT
Start: 2022-04-15 | End: 2022-04-15 | Stop reason: HOSPADM

## 2022-04-15 RX ORDER — HYDROCODONE BITARTRATE AND ACETAMINOPHEN 5; 325 MG/1; MG/1
1 TABLET ORAL
Status: DISCONTINUED | OUTPATIENT
Start: 2022-04-15 | End: 2022-04-15 | Stop reason: HOSPADM

## 2022-04-15 RX ORDER — PROPOFOL 10 MG/ML
INJECTION, EMULSION INTRAVENOUS CONTINUOUS PRN
Status: DISCONTINUED | OUTPATIENT
Start: 2022-04-15 | End: 2022-04-15

## 2022-04-15 RX ORDER — ONDANSETRON 2 MG/ML
4 INJECTION INTRAMUSCULAR; INTRAVENOUS EVERY 30 MIN PRN
Status: DISCONTINUED | OUTPATIENT
Start: 2022-04-15 | End: 2022-04-15 | Stop reason: HOSPADM

## 2022-04-15 RX ORDER — ONDANSETRON 4 MG/1
4 TABLET, ORALLY DISINTEGRATING ORAL EVERY 30 MIN PRN
Status: DISCONTINUED | OUTPATIENT
Start: 2022-04-15 | End: 2022-04-15 | Stop reason: HOSPADM

## 2022-04-15 RX ORDER — FENTANYL CITRATE 50 UG/ML
INJECTION, SOLUTION INTRAMUSCULAR; INTRAVENOUS PRN
Status: DISCONTINUED | OUTPATIENT
Start: 2022-04-15 | End: 2022-04-15

## 2022-04-15 RX ORDER — PROPOFOL 10 MG/ML
INJECTION, EMULSION INTRAVENOUS PRN
Status: DISCONTINUED | OUTPATIENT
Start: 2022-04-15 | End: 2022-04-15

## 2022-04-15 RX ORDER — CEFAZOLIN SODIUM/WATER 2 G/20 ML
2 SYRINGE (ML) INTRAVENOUS SEE ADMIN INSTRUCTIONS
Status: DISCONTINUED | OUTPATIENT
Start: 2022-04-15 | End: 2022-04-15 | Stop reason: HOSPADM

## 2022-04-15 RX ORDER — DEXAMETHASONE SODIUM PHOSPHATE 4 MG/ML
INJECTION, SOLUTION INTRA-ARTICULAR; INTRALESIONAL; INTRAMUSCULAR; INTRAVENOUS; SOFT TISSUE PRN
Status: DISCONTINUED | OUTPATIENT
Start: 2022-04-15 | End: 2022-04-15

## 2022-04-15 RX ORDER — HYDROMORPHONE HYDROCHLORIDE 1 MG/ML
0.4 INJECTION, SOLUTION INTRAMUSCULAR; INTRAVENOUS; SUBCUTANEOUS EVERY 5 MIN PRN
Status: DISCONTINUED | OUTPATIENT
Start: 2022-04-15 | End: 2022-04-15 | Stop reason: HOSPADM

## 2022-04-15 RX ORDER — LIDOCAINE 40 MG/G
CREAM TOPICAL
Status: DISCONTINUED | OUTPATIENT
Start: 2022-04-15 | End: 2022-04-15 | Stop reason: HOSPADM

## 2022-04-15 RX ORDER — LIDOCAINE HYDROCHLORIDE 20 MG/ML
INJECTION, SOLUTION INFILTRATION; PERINEURAL PRN
Status: DISCONTINUED | OUTPATIENT
Start: 2022-04-15 | End: 2022-04-15

## 2022-04-15 RX ORDER — OXYCODONE HYDROCHLORIDE 5 MG/1
5 TABLET ORAL EVERY 4 HOURS PRN
Status: DISCONTINUED | OUTPATIENT
Start: 2022-04-15 | End: 2022-04-15 | Stop reason: HOSPADM

## 2022-04-15 RX ORDER — KETOROLAC TROMETHAMINE 30 MG/ML
INJECTION, SOLUTION INTRAMUSCULAR; INTRAVENOUS PRN
Status: DISCONTINUED | OUTPATIENT
Start: 2022-04-15 | End: 2022-04-15

## 2022-04-15 RX ORDER — TRAMADOL HYDROCHLORIDE 50 MG/1
50 TABLET ORAL EVERY 6 HOURS PRN
Qty: 10 TABLET | Refills: 0 | Status: SHIPPED | OUTPATIENT
Start: 2022-04-15 | End: 2022-04-18

## 2022-04-15 RX ORDER — BUPIVACAINE HYDROCHLORIDE 2.5 MG/ML
INJECTION, SOLUTION EPIDURAL; INFILTRATION; INTRACAUDAL PRN
Status: DISCONTINUED | OUTPATIENT
Start: 2022-04-15 | End: 2022-04-15 | Stop reason: HOSPADM

## 2022-04-15 RX ADMIN — ACETAMINOPHEN 975 MG: 325 TABLET, FILM COATED ORAL at 08:25

## 2022-04-15 RX ADMIN — SODIUM CHLORIDE, POTASSIUM CHLORIDE, SODIUM LACTATE AND CALCIUM CHLORIDE 100 ML/HR: 600; 310; 30; 20 INJECTION, SOLUTION INTRAVENOUS at 08:24

## 2022-04-15 RX ADMIN — PROPOFOL 200 MCG/KG/MIN: 10 INJECTION, EMULSION INTRAVENOUS at 09:25

## 2022-04-15 RX ADMIN — PHENYLEPHRINE HYDROCHLORIDE 100 MCG: 10 INJECTION INTRAVENOUS at 09:41

## 2022-04-15 RX ADMIN — PROPOFOL 200 MG: 10 INJECTION, EMULSION INTRAVENOUS at 09:25

## 2022-04-15 RX ADMIN — Medication 2 G: at 09:18

## 2022-04-15 RX ADMIN — ONDANSETRON HYDROCHLORIDE 4 MG: 2 SOLUTION INTRAMUSCULAR; INTRAVENOUS at 09:25

## 2022-04-15 RX ADMIN — FENTANYL CITRATE 50 MCG: 50 INJECTION, SOLUTION INTRAMUSCULAR; INTRAVENOUS at 09:45

## 2022-04-15 RX ADMIN — LIDOCAINE HYDROCHLORIDE 40 MG: 20 INJECTION, SOLUTION INFILTRATION; PERINEURAL at 09:25

## 2022-04-15 RX ADMIN — KETOROLAC TROMETHAMINE 30 MG: 30 INJECTION, SOLUTION INTRAMUSCULAR at 10:03

## 2022-04-15 RX ADMIN — FENTANYL CITRATE 25 MCG: 50 INJECTION, SOLUTION INTRAMUSCULAR; INTRAVENOUS at 09:32

## 2022-04-15 RX ADMIN — DEXAMETHASONE SODIUM PHOSPHATE 4 MG: 4 INJECTION, SOLUTION INTRAMUSCULAR; INTRAVENOUS at 09:30

## 2022-04-15 RX ADMIN — FENTANYL CITRATE 25 MCG: 50 INJECTION, SOLUTION INTRAMUSCULAR; INTRAVENOUS at 09:51

## 2022-04-15 RX ADMIN — MIDAZOLAM HYDROCHLORIDE 2 MG: 1 INJECTION, SOLUTION INTRAMUSCULAR; INTRAVENOUS at 09:24

## 2022-04-15 NOTE — DISCHARGE INSTRUCTIONS
[facility]  Adult Discharge Orders & Instructions      For 24 hours after surgery:  Get plenty of rest.  A responsible adult must stay with you for at least 24 hours after you leave the hospital.   You may feel lightheaded.  IF so, sit for a few minutes before standing.  Have someone help you get up.   You may have a slight fever. Call the doctor if your fever is over 101 F (38.3 C) (taken under the tongue) or lasts longer than 24 hours.  You may have a dry mouth, a sore throat, muscle aches or trouble sleeping.  These should go away after 24 hours.  Do not make important or legal decisions.  6.   Do not drive or use heavy equipment.  If you have weakness or tingling, don't drive or use heavy equipment until this feeling goes away.                                                                                                                                                                         To contact a doctor, call    [phone]______________     Surgeon Contact Information  If you have questions or concerns related to your procedure please contact your surgeon through [facility] at [phone].

## 2022-04-15 NOTE — BRIEF OP NOTE
[facility]    Brief Operative Note    Pre-operative diagnosis: Tear of lateral cartilage or meniscus of knee, current, right, initial encounter [Q54.684R]  Post-operative diagnosis Same as pre-operative diagnosis    Procedure: Procedure(s):  Right Knee Arthoscopy with Partial Lateral Meniscectomy and  chondroplasty  Surgeon: Surgeon(s) and Role:     * Parth Hansen MD - Primary  Anesthesia: General   Estimated Blood Loss: Less than 10 ml    Drains: None  Specimens: * No specimens in log *  Findings:   None.  Complications: None.  Implants: * No implants in log *

## 2022-04-15 NOTE — PROGRESS NOTES
PACU Transfer Note    Daly Perry was transferred to 732 via cart.  Equipment used for transport:  none.  Accompanied by:  Angelika/Evette    PACU Respiratory Event Documentation     1) Episodes of Apnea greater than or equal to 10 seconds: no    2) Bradypnea - less than 8 breaths per minute: no    3) Pain score on 0 to 10 scale: 0    4) Pain-sedation mismatch (yes or no): no    5) Repeated 02 desaturation less than 90% (yes or no): no    Anesthesia notified? (yes or no): n/a    Any of the above events occuring repeatedly in separate 30 minute intervals may be considered recurrent PACU respiratory events.    Patient stable and meets phase 1 discharge criteria for transport from PACU.

## 2022-04-15 NOTE — ANESTHESIA PREPROCEDURE EVALUATION
Anesthesia Pre-Procedure Evaluation    Patient: Daly Perry   MRN: [id number] : 1950        Procedure : Procedure(s):  Right Knee Arthoscopy with Partial Lateral Meniscectomy and  chondroplasty          Past Medical History:   Diagnosis Date     Closed left ankle fracture      Cyst of ovary     Hx of right ovarian cyst.     Diverticulosis of large intestine 2011     Endometriosis     growth on ovary s/p oophrectomy     Fibrocystic breast disease 2018     Generalized anxiety disorder     Dysthymia, generalized anxiety     Lichen sclerosus     Vulvar LSEA; asymptomatic     Neck pain, chronic 2018     Improved after alf     Rosacea 2013     Severe major depression with psychotic features (H) 10/25/2016    history of ECT; inpatient for several months; she does not have complete memory of that time     Sleep apnea       Past Surgical History:   Procedure Laterality Date     BIOPSY BREAST Right 2008    stereotactic, benign result     CATARACT IOL, RT/LT Bilateral     2018     COLONOSCOPY  2011    Normal; F/U      DILATION AND CURETTAGE  2003    D&C with hysteroscopy and endometrial ablation     OOPHORECTOMY Right 1997     OPEN REDUCTION INTERNAL FIXATION ANKLE Left      RELEASE CARPAL TUNNEL  2010     REMOVE HARDWARE ANKLE Left 2011     VITRECTOMY ANTERIOR Left 2017    Dr. Collins      No Known Allergies   Social History     Tobacco Use     Smoking status: Never Smoker     Smokeless tobacco: Never Used   Substance Use Topics     Alcohol use: Yes     Comment: 1-2 per week      Wt Readings from Last 1 Encounters:   22 80.2 kg (176 lb 12.8 oz)        Anesthesia Evaluation   Pt has had prior anesthetic.         ROS/MED HX  ENT/Pulmonary:     (+) sleep apnea, uses CPAP,     Neurologic:  - neg neurologic ROS     Cardiovascular:       METS/Exercise Tolerance: 4 - Raking leaves, gardening    Hematologic:  - neg hematologic  ROS      Musculoskeletal:  - neg musculoskeletal ROS     GI/Hepatic:  - neg GI/hepatic ROS     Renal/Genitourinary:  - neg Renal ROS     Endo:  - neg endo ROS     Psychiatric/Substance Use:     (+) psychiatric history anxiety and depression     Infectious Disease:  - neg infectious disease ROS     Malignancy:  - neg malignancy ROS     Other:            Physical Exam    Airway        Mallampati: I   TM distance: > 3 FB   Neck ROM: full   Mouth opening: > 3 cm    Respiratory Devices and Support         Dental  no notable dental history         Cardiovascular   cardiovascular exam normal          Pulmonary   pulmonary exam normal                OUTSIDE LABS:  CBC:   Lab Results   Component Value Date    WBC 4.9 04/05/2022    WBC 5.6 01/19/2022    HGB 13.3 04/05/2022    HGB 12.7 01/19/2022    HCT 39.7 04/05/2022    HCT 38.6 01/19/2022     04/05/2022     01/19/2022     BMP:   Lab Results   Component Value Date     04/05/2022     01/19/2022    POTASSIUM 4.4 04/05/2022    POTASSIUM 4.2 01/19/2022    CHLORIDE 105 04/05/2022    CHLORIDE 104 01/19/2022    CO2 31 04/05/2022    CO2 30 01/19/2022    BUN 19 04/05/2022    BUN 23 01/19/2022    CR 0.68 04/05/2022    CR 0.66 01/19/2022    GLC 92 04/05/2022    GLC 99 01/19/2022     COAGS: No results found for: PTT, INR, FIBR  POC: No results found for: BGM, HCG, HCGS  HEPATIC:   Lab Results   Component Value Date    ALBUMIN 4.2 04/05/2022    PROTTOTAL 6.7 04/05/2022    ALT 15 04/05/2022    AST 17 04/05/2022    ALKPHOS 70 04/05/2022    BILITOTAL 0.4 04/05/2022     OTHER:   Lab Results   Component Value Date    KYMBERLY 9.7 04/05/2022    MAG 1.9 01/23/2018    TSH 1.65 01/19/2022    T4 0.94 09/05/2013    CRP <1.0 01/19/2022    SED 7 01/19/2022       Anesthesia Plan    ASA Status:  2   NPO Status:  NPO Appropriate    Anesthesia Type: General.     - Airway: LMA              Consents    Anesthesia Plan(s) and associated risks, benefits, and realistic alternatives discussed.  Questions answered and patient/representative(s) expressed understanding.     - Discussed: Risks, Benefits and Alternatives for BOTH SEDATION and the PROCEDURE were discussed     - Discussed with:  Patient, Spouse      - Extended Intubation/Ventilatory Support Discussed: No.      - Patient is DNR/DNI Status: No    Use of blood products discussed: No .     Postoperative Care       PONV prophylaxis: Ondansetron (or other 5HT-3), Dexamethasone or Solumedrol     Comments:                LIANG DODSON CRNA

## 2022-04-15 NOTE — INTERVAL H&P NOTE
Brief History of Illness:   Daly Perry is a 71 year old female who was admitted for right knee pain    H&P reviewed and patient examined with no new updates from prior exam

## 2022-04-15 NOTE — ANESTHESIA POSTPROCEDURE EVALUATION
Patient: Daly Perry    Procedure: Procedure(s):  Right Knee Arthoscopy with Partial MEDIAL AND Lateral Meniscectomy and  chondroplasty       Anesthesia Type:  General    Note:  Disposition: Outpatient   Postop Pain Control: Uneventful            Sign Out: Well controlled pain   PONV: No   Neuro/Psych: Uneventful            Sign Out: Acceptable/Baseline neuro status   Airway/Respiratory: Uneventful            Sign Out: Acceptable/Baseline resp. status   CV/Hemodynamics: Uneventful            Sign Out: Acceptable CV status; No obvious hypovolemia; No obvious fluid overload   Other NRE: NONE   DID A NON-ROUTINE EVENT OCCUR? No           Last vitals:  Vitals Value Taken Time   /83 04/15/22 1031   Temp 97.4  F (36.3  C) 04/15/22 1031   Pulse 65 04/15/22 1031   Resp 13 04/15/22 1031   SpO2 94 % 04/15/22 1031   Vitals shown include unvalidated device data.    Electronically Signed By: LIANG DODSON CRNA  April 15, 2022  12:00 PM

## 2022-04-15 NOTE — OR NURSING
Patient and responsible adult given discharge instructions with no questions regarding instructions. Trino score 20. Pain level 0/10.  Discharged from unit via wheelchair . Patient discharged to home.

## 2022-04-15 NOTE — ANESTHESIA PROCEDURE NOTES
Airway       Patient location during procedure: OR       Procedure Start/Stop Times: 4/15/2022 9:26 AM  Staff -        CRNA: Rachel Avila APRN CRNA       Performed By: CRNA  Consent for Airway        Urgency: elective  Indications and Patient Condition       Indications for airway management: jamarcus-procedural       Induction type:intravenous       Mask difficulty assessment: 0 - not attempted    Final Airway Details       Final airway type: supraglottic airway    Supraglottic Airway Details        Type: LMA       Brand: I-Gel       LMA size: 4    Post intubation assessment        Placement verified by: capnometry, equal breath sounds and chest rise        Number of attempts at approach: 1       Number of other approaches attempted: 0       Secured with: silk tape       Ease of procedure: easy       Dentition: Intact and Unchanged    Medication(s) Administered   Medication Administration Time: 4/15/2022 9:26 AM

## 2022-04-15 NOTE — BRIEF OP NOTE
[facility]    Brief Operative Note    Pre-operative diagnosis: Tear of lateral cartilage or meniscus of knee, current, right, initial encounter [P77.259C]  Post-operative diagnosis Same as pre-operative diagnosis    Procedure: Procedure(s):  Right Knee Arthoscopy with Partial MEDIAL AND Lateral Meniscectomy and  chondroplasty  Surgeon: Surgeon(s) and Role:     * Parth Hansen MD - Primary     * Jay Jay Ortiz PA-C - Assisting  Anesthesia: General   Estimated Blood Loss: Less than 10 ml    Drains: None  Specimens: * No specimens in log *  Findings:   None.  Complications: None.  Implants: * No implants in log *

## 2022-04-15 NOTE — OR NURSING
Patient and responsible adult given discharge instructions with no questions regarding instructions. Trino score 20. Pain level 0/10.  Discharged from unit via wheelchair . Patient discharged to home .

## 2022-04-15 NOTE — OP NOTE
Procedure Date: 04/15/2022    PREOPERATIVE DIAGNOSIS:  Symptomatic left knee medial and lateral meniscus tear.    POSTOPERATIVE DIAGNOSIS:  Symptomatic left knee medial and lateral meniscus tear.    PROCEDURES:     1.  Left knee arthroscopy with partial medial and lateral meniscectomy.  2.  Chondroplasty.    SURGEON:  Parth Hansen MD    ASSISTANT:  Jay Jay Ortiz PA-C    ANESTHESIA:  LMA.    COMPLICATIONS:  Without.    INDICATIONS:  Ms. Perry is a 71-year-old with history of minor tear on the medial side as well as lateral meniscal tearing and fragmentation.  Recommendations for operative knee arthroscopy, debridement and indicated treatment.  The patient did elect to proceed following a discussion of the risks and benefits.    DESCRIPTION OF PROCEDURE:  The patient was taken to the operative suite, administered anesthesia.  Following induction, placed in standard arthroscopy setup.  Right lower extremity or others prepped and draped in normal sterile fashion.  Preoperative antibiotics administered and timeout was called.  At this point in time, anteromedial and anterolateral portal was established.  Diagnostic arthroscopy was performed.  Medial compartment demonstrated some very minor tearing on the far posterior medial meniscus, debrided back to smooth contoured stable margins.  After that was complete, chondral damage was not present.  ACL was intact.  Lateral compartment demonstrated a mid body to anterior horn tear using meniscal shaver to resect this back to smooth contoured stable margins, roughly 25% or thereabouts was resected.  We did smooth this down with our 50-degree wand.  It also showed some mild chondromalacia present there as well, which we debrided.  Patellofemoral joint was inspected and showed only mild chondromalacia present there as well.  We did debride that in addition.  Following completion, scope was withdrawn.  The portals were closed with nylon in interrupted fashion followed by  application of soft postsurgical dressing.  The patient then was transferred to recovery room in stable condition having tolerated the procedure.    POSTOPERATIVE PLAN:  Sutures out 5-7 days, ice, elevate, weightbear as tolerated, increase activities as comfortable here at this point in time.    A skilled first assistant was necessary for position, intraoperative decision making, retraction and exposure during the procedure.    Furthermore, a skilled first assistant was necessary to complete the procedure in a technically safe and efficient manner.    Parth Hansen MD        D: 04/15/2022   T: 04/15/2022   MT: VANESSA    Name:     LORRAINE DOYLERishabh  MRN:      [id number]        Account:        [id number]   :      1950           Procedure Date: 04/15/2022     Document: [id number]

## 2022-04-15 NOTE — ANESTHESIA CARE TRANSFER NOTE
Patient: Daly Perry    Procedure: Procedure(s):  Right Knee Arthoscopy with Partial MEDIAL AND Lateral Meniscectomy and  chondroplasty       Diagnosis: Tear of lateral cartilage or meniscus of knee, current, right, initial encounter [S83.244A]  Diagnosis Additional Information: No value filed.    Anesthesia Type:   General     Note:    Oropharynx: oropharynx clear of all foreign objects  Level of Consciousness: awake  Oxygen Supplementation: face mask  Level of Supplemental Oxygen (L/min / FiO2): 8  Independent Airway: airway patency satisfactory and stable  Dentition: dentition unchanged  Vital Signs Stable: post-procedure vital signs reviewed and stable  Report to RN Given: handoff report given  Patient transferred to: PACU    Handoff Report: Identifed the Patient, Identified the Reponsible Provider, Reviewed the pertinent medical history, Discussed the surgical course, Reviewed Intra-OP anesthesia mangement and issues during anesthesia, Set expectations for post-procedure period and Allowed opportunity for questions and acknowledgement of understanding      Vitals:  Vitals Value Taken Time   BP     Temp     Pulse     Resp     SpO2         Electronically Signed By: LIANG WELSH CRNA  April 15, 2022  10:18 AM

## 2022-04-26 ENCOUNTER — OFFICE VISIT (OUTPATIENT)
Dept: ORTHOPEDICS | Facility: OTHER | Age: 72
End: 2022-04-26
Attending: ORTHOPAEDIC SURGERY
Payer: MEDICARE

## 2022-04-26 DIAGNOSIS — S83.281A TEAR OF LATERAL CARTILAGE OR MENISCUS OF KNEE, CURRENT, RIGHT, INITIAL ENCOUNTER: Primary | ICD-10-CM

## 2022-04-26 PROCEDURE — G0463 HOSPITAL OUTPT CLINIC VISIT: HCPCS

## 2022-04-26 PROCEDURE — 99024 POSTOP FOLLOW-UP VISIT: CPT | Performed by: ORTHOPAEDIC SURGERY

## 2022-04-26 NOTE — PROGRESS NOTES
Patient is here for follow up on her ight knee scope.  Yolanda Tesfaye LPN .....................4/26/2022 12:51 PM

## 2022-04-26 NOTE — PROGRESS NOTES
Visit Date: 2022    REASON FOR EVALUATION:  Right knee pain.    HISTORY:  Daly comes in with regards to her right knee.  She is now 11 days status post knee arthroscopy, partial medial and lateral meniscectomy at this point in time.    PHYSICAL EXAMINATION:  Examination of the right knee shows incisional site to be healing properly, no signs or symptoms of infection present.  Neurovascular examination is otherwise intact.     IMPRESSION:  Right knee arthroscopy with partial medial and lateral meniscectomy.    PLAN:  Work on range of motion and strengthening, ice, elevation, gradual increase in activities as tolerated.  Follow up examination as symptoms dictate and warrant in the future.  If she does have increasing pain here down the road, certainly would plan for an injection in that scenario.    Parth Hansen MD        D: 2022   T: 2022   MT: VANESSA    Name:     DALY DOYLE  MRN:      [id number]        Account:    [id number]   :      1950           Visit Date: 2022     Document: [id number]

## 2022-05-13 ENCOUNTER — HOSPITAL ENCOUNTER (OUTPATIENT)
Dept: MAMMOGRAPHY | Facility: OTHER | Age: 72
Discharge: HOME | End: 2022-05-13
Attending: INTERNAL MEDICINE | Admitting: INTERNAL MEDICINE
Payer: MEDICARE

## 2022-05-13 DIAGNOSIS — Z12.31 VISIT FOR SCREENING MAMMOGRAM: ICD-10-CM

## 2022-05-13 PROCEDURE — 77067 SCR MAMMO BI INCL CAD: CPT

## 2022-07-26 ENCOUNTER — OFFICE VISIT (OUTPATIENT)
Dept: ORTHOPEDICS | Facility: OTHER | Age: 72
End: 2022-07-26
Attending: ORTHOPAEDIC SURGERY
Payer: MEDICARE

## 2022-07-26 DIAGNOSIS — S83.281A TEAR OF LATERAL CARTILAGE OR MENISCUS OF KNEE, CURRENT, RIGHT, INITIAL ENCOUNTER: Primary | ICD-10-CM

## 2022-07-26 PROCEDURE — G0463 HOSPITAL OUTPT CLINIC VISIT: HCPCS | Mod: 25 | Performed by: ORTHOPAEDIC SURGERY

## 2022-07-26 PROCEDURE — 20610 DRAIN/INJ JOINT/BURSA W/O US: CPT | Mod: RT | Performed by: ORTHOPAEDIC SURGERY

## 2022-07-26 PROCEDURE — 250N000011 HC RX IP 250 OP 636: Performed by: ORTHOPAEDIC SURGERY

## 2022-07-26 PROCEDURE — 250N000009 HC RX 250: Performed by: ORTHOPAEDIC SURGERY

## 2022-07-26 PROCEDURE — 99212 OFFICE O/P EST SF 10 MIN: CPT | Mod: 25 | Performed by: ORTHOPAEDIC SURGERY

## 2022-07-26 RX ORDER — LIDOCAINE HYDROCHLORIDE 10 MG/ML
4 INJECTION, SOLUTION EPIDURAL; INFILTRATION; INTRACAUDAL; PERINEURAL ONCE
Status: COMPLETED | OUTPATIENT
Start: 2022-07-26 | End: 2022-07-26

## 2022-07-26 RX ORDER — TRIAMCINOLONE ACETONIDE 40 MG/ML
40 INJECTION, SUSPENSION INTRA-ARTICULAR; INTRAMUSCULAR ONCE
Status: COMPLETED | OUTPATIENT
Start: 2022-07-26 | End: 2022-07-26

## 2022-07-26 RX ADMIN — LIDOCAINE HYDROCHLORIDE 4 ML: 10 INJECTION, SOLUTION EPIDURAL; INFILTRATION; INTRACAUDAL; PERINEURAL at 10:57

## 2022-07-26 RX ADMIN — TRIAMCINOLONE ACETONIDE 40 MG: 40 INJECTION, SUSPENSION INTRA-ARTICULAR; INTRAMUSCULAR at 10:57

## 2022-07-26 NOTE — PROGRESS NOTES
Visit Date: 2022    REASON FOR EVALUATION:  Right knee pain.    HISTORY:  Daly comes back, status post knee scope, partial meniscectomy and chondroplasty.  She overall reports that she is having some discomfort here, wanted to have things checked out and evaluated, and try maybe an injection here as well at this time.  Has pain mostly with activity.    PHYSICAL EXAMINATION:  Examination of right knee shows mild swelling.  Neurovascular examination is intact.  Range of motion is tolerable 0-115.  Limb exam stable and balanced.    PROCEDURE:  Following informed consent and sterile preparation, the patient's right knee was injected with 4 mL of 1% lidocaine and 40 mg of Kenalog under sterile conditions.    IMPRESSION:  Right knee inflammation, status post prior scope, chondroplasty and partial meniscectomy.    PLAN:  Injection here today.  Repeat down the road if necessary as well.  Continue on quad strengthening in addition to that.    Parth Hansen MD        D: 2022   T: 2022   MT: AASHISH    Name:     DALY DOYLE  MRN:      [id number]        Account:    [id number]   :      1950           Visit Date: 2022     Document: [id number]

## 2022-07-26 NOTE — NURSING NOTE
"Chief Complaint   Patient presents with     RECHECK     Recheck right knee      Pt is here today for a recheck of her right knee and possible injection.     Amber Giron LPN on 7/26/2022 at 10:31 AM       Initial LMP  (LMP Unknown)  Estimated body mass index is 29.29 kg/m  as calculated from the following:    Height as of 4/15/22: 1.651 m (5' 5\").    Weight as of 4/15/22: 79.8 kg (176 lb).  Medication Reconciliation: complete    Amber Giron LPN  "

## 2022-07-26 NOTE — PROGRESS NOTES
Pt is here today for a recheck of her right knee and possible injection.     Amber Giron LPN on 7/26/2022 at 10:31 AM

## 2022-09-17 ENCOUNTER — HEALTH MAINTENANCE LETTER (OUTPATIENT)
Age: 72
End: 2022-09-17

## 2022-10-11 ENCOUNTER — DOCUMENTATION ONLY (OUTPATIENT)
Dept: INTERNAL MEDICINE | Facility: OTHER | Age: 72
End: 2022-10-11

## 2022-10-11 DIAGNOSIS — G47.33 OBSTRUCTIVE SLEEP APNEA (ADULT) (PEDIATRIC): Primary | ICD-10-CM

## 2022-10-11 DIAGNOSIS — G47.14 SLEEP DISORDER DUE TO A GENERAL MEDICAL CONDITION, HYPERSOMNIA TYPE: ICD-10-CM

## 2022-11-17 ENCOUNTER — IMMUNIZATION (OUTPATIENT)
Dept: FAMILY MEDICINE | Facility: OTHER | Age: 72
End: 2022-11-17
Attending: INTERNAL MEDICINE
Payer: MEDICARE

## 2022-11-17 DIAGNOSIS — Z23 HIGH PRIORITY FOR 2019-NCOV VACCINE: ICD-10-CM

## 2022-11-17 DIAGNOSIS — Z23 NEED FOR PROPHYLACTIC VACCINATION AND INOCULATION AGAINST INFLUENZA: Primary | ICD-10-CM

## 2022-11-17 PROCEDURE — 0124A COVID-19,PF,PFIZER BOOSTER BIVALENT: CPT

## 2022-11-17 PROCEDURE — G0008 ADMIN INFLUENZA VIRUS VAC: HCPCS

## 2022-11-17 PROCEDURE — 91312 COVID-19,PF,PFIZER BOOSTER BIVALENT: CPT

## 2023-02-02 ENCOUNTER — OFFICE VISIT (OUTPATIENT)
Dept: INTERNAL MEDICINE | Facility: OTHER | Age: 73
End: 2023-02-02
Attending: INTERNAL MEDICINE
Payer: COMMERCIAL

## 2023-02-02 VITALS
SYSTOLIC BLOOD PRESSURE: 136 MMHG | HEART RATE: 71 BPM | TEMPERATURE: 97 F | DIASTOLIC BLOOD PRESSURE: 84 MMHG | BODY MASS INDEX: 31.32 KG/M2 | OXYGEN SATURATION: 100 % | RESPIRATION RATE: 12 BRPM | WEIGHT: 188 LBS | HEIGHT: 65 IN

## 2023-02-02 DIAGNOSIS — Z78.0 ASYMPTOMATIC MENOPAUSAL STATE: ICD-10-CM

## 2023-02-02 DIAGNOSIS — M85.80 OSTEOPENIA, UNSPECIFIED LOCATION: ICD-10-CM

## 2023-02-02 DIAGNOSIS — L90.0 LICHEN SCLEROSUS: ICD-10-CM

## 2023-02-02 DIAGNOSIS — Z13.1 SCREENING FOR DIABETES MELLITUS: ICD-10-CM

## 2023-02-02 DIAGNOSIS — Z00.00 ENCOUNTER FOR MEDICARE ANNUAL WELLNESS EXAM: Primary | ICD-10-CM

## 2023-02-02 DIAGNOSIS — R79.9 ABNORMAL FINDING OF BLOOD CHEMISTRY, UNSPECIFIED: ICD-10-CM

## 2023-02-02 DIAGNOSIS — R25.1 TREMOR: ICD-10-CM

## 2023-02-02 DIAGNOSIS — F32.3 SEVERE MAJOR DEPRESSION WITH PSYCHOTIC FEATURES (H): ICD-10-CM

## 2023-02-02 LAB
ALBUMIN SERPL BCG-MCNC: 4.1 G/DL (ref 3.5–5.2)
ALP SERPL-CCNC: 89 U/L (ref 35–104)
ALT SERPL W P-5'-P-CCNC: 16 U/L (ref 10–35)
ANION GAP SERPL CALCULATED.3IONS-SCNC: 10 MMOL/L (ref 7–15)
AST SERPL W P-5'-P-CCNC: 23 U/L (ref 10–35)
BILIRUB SERPL-MCNC: 0.3 MG/DL
BUN SERPL-MCNC: 22.1 MG/DL (ref 8–23)
CALCIUM SERPL-MCNC: 9.1 MG/DL (ref 8.8–10.2)
CHLORIDE SERPL-SCNC: 104 MMOL/L (ref 98–107)
CREAT SERPL-MCNC: 0.61 MG/DL (ref 0.51–0.95)
DEPRECATED HCO3 PLAS-SCNC: 24 MMOL/L (ref 22–29)
GFR SERPL CREATININE-BSD FRML MDRD: >90 ML/MIN/1.73M2
GLUCOSE SERPL-MCNC: 94 MG/DL (ref 70–99)
HBA1C MFR BLD: 5.9 % (ref 4–6.2)
POTASSIUM SERPL-SCNC: 4 MMOL/L (ref 3.4–5.3)
PROT SERPL-MCNC: 6.4 G/DL (ref 6.4–8.3)
SODIUM SERPL-SCNC: 138 MMOL/L (ref 136–145)

## 2023-02-02 PROCEDURE — 83036 HEMOGLOBIN GLYCOSYLATED A1C: CPT | Mod: ZL | Performed by: INTERNAL MEDICINE

## 2023-02-02 PROCEDURE — 36415 COLL VENOUS BLD VENIPUNCTURE: CPT | Mod: ZL | Performed by: INTERNAL MEDICINE

## 2023-02-02 PROCEDURE — 80053 COMPREHEN METABOLIC PANEL: CPT | Mod: ZL | Performed by: INTERNAL MEDICINE

## 2023-02-02 PROCEDURE — 99213 OFFICE O/P EST LOW 20 MIN: CPT | Mod: 25 | Performed by: INTERNAL MEDICINE

## 2023-02-02 PROCEDURE — G0463 HOSPITAL OUTPT CLINIC VISIT: HCPCS

## 2023-02-02 PROCEDURE — G0439 PPPS, SUBSEQ VISIT: HCPCS | Performed by: INTERNAL MEDICINE

## 2023-02-02 RX ORDER — CLOBETASOL PROPIONATE 0.5 MG/G
OINTMENT TOPICAL 2 TIMES DAILY
Qty: 30 G | Refills: 3 | Status: ON HOLD | OUTPATIENT
Start: 2023-02-02 | End: 2024-02-20

## 2023-02-02 RX ORDER — PROPRANOLOL HYDROCHLORIDE 40 MG/1
40 TABLET ORAL 2 TIMES DAILY
Qty: 180 TABLET | Refills: 4 | Status: SHIPPED | OUTPATIENT
Start: 2023-02-02

## 2023-02-02 RX ORDER — ARIPIPRAZOLE 10 MG/1
10 TABLET ORAL DAILY
COMMUNITY
Start: 2023-02-02

## 2023-02-02 ASSESSMENT — ANXIETY QUESTIONNAIRES
1. FEELING NERVOUS, ANXIOUS, OR ON EDGE: NOT AT ALL
6. BECOMING EASILY ANNOYED OR IRRITABLE: NOT AT ALL
4. TROUBLE RELAXING: NOT AT ALL
5. BEING SO RESTLESS THAT IT IS HARD TO SIT STILL: NOT AT ALL
8. IF YOU CHECKED OFF ANY PROBLEMS, HOW DIFFICULT HAVE THESE MADE IT FOR YOU TO DO YOUR WORK, TAKE CARE OF THINGS AT HOME, OR GET ALONG WITH OTHER PEOPLE?: NOT DIFFICULT AT ALL
7. FEELING AFRAID AS IF SOMETHING AWFUL MIGHT HAPPEN: NOT AT ALL
GAD7 TOTAL SCORE: 0
GAD7 TOTAL SCORE: 0
IF YOU CHECKED OFF ANY PROBLEMS ON THIS QUESTIONNAIRE, HOW DIFFICULT HAVE THESE PROBLEMS MADE IT FOR YOU TO DO YOUR WORK, TAKE CARE OF THINGS AT HOME, OR GET ALONG WITH OTHER PEOPLE: NOT DIFFICULT AT ALL
3. WORRYING TOO MUCH ABOUT DIFFERENT THINGS: NOT AT ALL
2. NOT BEING ABLE TO STOP OR CONTROL WORRYING: NOT AT ALL
7. FEELING AFRAID AS IF SOMETHING AWFUL MIGHT HAPPEN: NOT AT ALL
GAD7 TOTAL SCORE: 0

## 2023-02-02 ASSESSMENT — ENCOUNTER SYMPTOMS
ARTHRALGIAS: 1
HEADACHES: 0
ABDOMINAL PAIN: 0
PARESTHESIAS: 0
FEVER: 0
NERVOUS/ANXIOUS: 0
HEMATURIA: 0
HEMATOCHEZIA: 0
FREQUENCY: 1
DYSURIA: 0
MYALGIAS: 0
JOINT SWELLING: 1
DIARRHEA: 0
NAUSEA: 0
COUGH: 0
HEARTBURN: 0
EYE PAIN: 0
CONSTIPATION: 0
PALPITATIONS: 0
CHILLS: 0
DIZZINESS: 0
BREAST MASS: 0
SORE THROAT: 0
WEAKNESS: 0
SHORTNESS OF BREATH: 0

## 2023-02-02 ASSESSMENT — PAIN SCALES - GENERAL: PAINLEVEL: NO PAIN (0)

## 2023-02-02 ASSESSMENT — PATIENT HEALTH QUESTIONNAIRE - PHQ9
10. IF YOU CHECKED OFF ANY PROBLEMS, HOW DIFFICULT HAVE THESE PROBLEMS MADE IT FOR YOU TO DO YOUR WORK, TAKE CARE OF THINGS AT HOME, OR GET ALONG WITH OTHER PEOPLE: NOT DIFFICULT AT ALL
SUM OF ALL RESPONSES TO PHQ QUESTIONS 1-9: 1
SUM OF ALL RESPONSES TO PHQ QUESTIONS 1-9: 1

## 2023-02-02 ASSESSMENT — ACTIVITIES OF DAILY LIVING (ADL): CURRENT_FUNCTION: NO ASSISTANCE NEEDED

## 2023-02-02 NOTE — PROGRESS NOTES
"SUBJECTIVE:   Daly is a 72 year old who presents for Preventive Visit.  Patient has been advised of split billing requirements and indicates understanding: Yes  Are you in the first 12 months of your Medicare coverage?  No    Patient overall is doing well.  She was recently started on Abilify by her mental health provider.  She feels this is really helped her depression.  She has had some weight gain with this however.  She is trying to exercise 3 days a week.    She has noted some tremor with movement at times.  It is bothersome and she is interested in treatment for this.  We discussed various options.    She has a history of lichen sclerosis.  She has used clobetasol for this.  She does need a refill.    She has known osteopenia and is due for DEXA scan.  She is also due for diabetes screening.  She is up-to-date on her cancer screening otherwise.    Healthy Habits:     In general, how would you rate your overall health?  Good    Frequency of exercise:  4-5 days/week    Duration of exercise:  30-45 minutes    Do you usually eat at least 4 servings of fruit and vegetables a day, include whole grains    & fiber and avoid regularly eating high fat or \"junk\" foods?  Yes    Taking medications regularly:  Yes    Medication side effects:  None    Ability to successfully perform activities of daily living:  No assistance needed    Home Safety:  No safety concerns identified    Hearing Impairment:  Difficulty following a conversation in a noisy restaurant or crowded room    In the past 6 months, have you been bothered by leaking of urine?  No    In general, how would you rate your overall mental or emotional health?  Excellent      PHQ-2 Total Score: 0    Additional concerns today:  No      Have you ever done Advance Care Planning? (For example, a Health Directive, POLST, or a discussion with a medical provider or your loved ones about your wishes): Yes, advance care planning is on file.       Fall risk  Fallen 2 or more " times in the past year?: Yes (falls snowshoeing, but no injuries)  Any fall with injury in the past year?: No  Timed Up and Go Test (>13.5 is fall risk; contact physician) : 5    Cognitive Screening   1) Repeat 3 items (River, Kitchen, Carlin)    2) Clock draw: NORMAL  3) 3 item recall: Recalls 3 objects  Results: 3 items recalled: COGNITIVE IMPAIRMENT LESS LIKELY    Mini-CogTM Copyright JOSE Choudhary. Licensed by the author for use in [facility]; reprinted with permission (rishi@[facility]). All rights reserved.      Do you have sleep apnea, excessive snoring or daytime drowsiness?: yes    Breast cancer screenin22     Regular dental visits: Every six months     Eye exam with in 2 years: annually     Reviewed and updated as needed this visit by clinical staff   Tobacco  Allergies  Meds  Problems  Med Hx  Surg Hx  Fam Hx  Soc   Hx        Reviewed and updated as needed this visit by Provider   Tobacco  Allergies  Meds  Problems  Med Hx  Surg Hx  Fam Hx         Social History     Tobacco Use     Smoking status: Never     Smokeless tobacco: Never   Substance Use Topics     Alcohol use: Yes     Comment: 1-2 per week         Alcohol Use 2023   Prescreen: >3 drinks/day or >7 drinks/week? No     Review current opioid prescriptions    For a patient with a current opioid prescription:    Reviewed potential Opioid Use Disorder (OUD) risk factors: Yes     Evaluate their pain severity and current treatment plan:     Provide information on non-opioid treatment options:    Refer to a specialist, as appropriate:    Screen for potential Substance Use Disorders (SUDs)    Reviewed the patient s potential risk factors for SUDs: Yes     Refer to treatment or specialist, as appropriate:     A screening tool isn t required but you may use one:  Find more information in the [employer] [place] on Drug Abuse Screening and Assessment Tools Chart              Current providers sharing in care for this patient  include:   Patient Care Team:  Nidhi Baer DO as PCP - General (Internal Medicine)  Nidhi Baer DO as Assigned PCP  Parth Hansen MD as Assigned Musculoskeletal Provider    The following health maintenance items are reviewed in Epic and correct as of today:  Health Maintenance   Topic Date Due     MAMMO SCREENING  05/13/2023     PHQ-9  08/02/2023     MEDICARE ANNUAL WELLNESS VISIT  02/02/2024     FALL RISK ASSESSMENT  02/02/2024     COLORECTAL CANCER SCREENING  01/31/2025     LIPID  01/19/2027     ADVANCE CARE PLANNING  02/02/2028     DTAP/TDAP/TD IMMUNIZATION (3 - Td or Tdap) 11/05/2030     DEXA  02/16/2032     HEPATITIS C SCREENING  Completed     DEPRESSION ACTION PLAN  Completed     INFLUENZA VACCINE  Completed     Pneumococcal Vaccine: 65+ Years  Completed     ZOSTER IMMUNIZATION  Completed     COVID-19 Vaccine  Completed     IPV IMMUNIZATION  Aged Out     MENINGITIS IMMUNIZATION  Aged Out           Current Outpatient Medications   Medication     acetaminophen (TYLENOL) 325 MG tablet     ARIPiprazole (ABILIFY) 10 MG tablet     calcium carbonate 600 mg-vitamin D 400 units (CALCIUM CARB-CHOLECALCIFEROL) 600-400 MG-UNIT per tablet     Cholecalciferol (VITAMIN D3) 25 MCG (1000 UT) CAPS     clobetasol (TEMOVATE) 0.05 % external ointment     ibuprofen (ADVIL/MOTRIN) 200 MG tablet     Lutein-Zeaxanthin (OCUVITE LUTEIN 25) 25-5 MG CAPS     Multiple Vitamins-Minerals (OCUVITE PO)     propranolol (INDERAL) 40 MG tablet     venlafaxine (EFFEXOR-XR) 75 MG 24 hr capsule     No current facility-administered medications for this visit.       Review of Systems   Constitutional: Negative for chills and fever.   HENT: Negative for congestion, ear pain, hearing loss and sore throat.    Eyes: Negative for pain and visual disturbance.   Respiratory: Negative for cough and shortness of breath.    Cardiovascular: Negative for chest pain, palpitations and peripheral edema.   Gastrointestinal: Negative for abdominal pain,  "constipation, diarrhea, heartburn, hematochezia and nausea.   Breasts:  Negative for tenderness, breast mass and discharge.   Genitourinary: Positive for frequency and urgency. Negative for dysuria, genital sores, hematuria, pelvic pain, vaginal bleeding and vaginal discharge.        Some increased ammonia smell, encouraged to increase water intake   Musculoskeletal: Positive for arthralgias and joint swelling. Negative for myalgias.        Chronic and intermittent   Skin: Negative for rash.   Neurological: Negative for dizziness, weakness, headaches and paresthesias.   Psychiatric/Behavioral: Negative for mood changes. The patient is not nervous/anxious.          OBJECTIVE:   /84 (BP Location: Right arm, Patient Position: Sitting, Cuff Size: Adult Large)   Pulse 71   Temp 97  F (36.1  C) (Temporal)   Resp 12   Ht 1.661 m (5' 5.4\")   Wt 85.3 kg (188 lb)   LMP  (LMP Unknown)   SpO2 100%   BMI 30.90 kg/m   Estimated body mass index is 30.9 kg/m  as calculated from the following:    Height as of this encounter: 1.661 m (5' 5.4\").    Weight as of this encounter: 85.3 kg (188 lb).  Physical Exam  GEN: Vitals reviewed. Healthy appearing. Patient is in no acute distress. Cooperative with exam.  HEENT: Normocephalic atraumatic.  Eyes grossly normal to inspection.  No discharge or erythema, or obvious scleral/conjunctival abnormalities.   NECK: Supple; no thyromegaly or masses noted.  No cervical or supraclavicular lymphadenopathy.  CV: Heart regular in rate and rhythm with no murmur.    LUNGS: No audible wheeze, cough, or visible cyanosis.  No visible retractions or increased work of breathing.  Lungs clear to auscultation bilaterally.    ABD:  Obese, Nondistended  SKIN: Warm and dry to touch.  Visible skin clear. No significant rash, abnormal pigmentation or lesions.  EXT: No clubbing or cyanosis.  No peripheral edema.  NEURO: Alert and oriented to person, place, and time.  Cranial nerves II-XII grossly " "intact with no focal or lateralizing deficits.  Muscle tone normal.  Gait normal. No tremor.   MSK: ROM of upper and lower ext symmetric and full.  PSYCH: Mood is good.  Mentation appears normal, affect normal/bright, judgement and insight intact, normal speech and appearance well-groomed.      Diagnostic Test Results:  Labs reviewed in Epic    ASSESSMENT / PLAN:   1. Encounter for Medicare annual wellness exam    2. Severe major depression with psychotic features (H)  - Med list updated, encouraged to monitor weight with this med, follow up with MH  - ARIPiprazole (ABILIFY) 10 MG tablet; Take 1 tablet (10 mg) by mouth every evening    3. Tremor  - will trial propranolol, Rx sent in, she is to call for side effects  - propranolol (INDERAL) 40 MG tablet; Take 1 tablet (40 mg) by mouth 2 times daily Increase in dose  Dispense: 180 tablet; Refill: 4    4. Lichen sclerosus  - Controlled.  Continue current regimen.    - clobetasol (TEMOVATE) 0.05 % external ointment; Apply topically 2 times daily  Dispense: 30 g; Refill: 3    5. Screening for diabetes mellitus  - Hemoglobin A1c  - Comprehensive Metabolic Panel    6. Abnormal finding of blood chemistry, unspecified  - Hemoglobin A1c    7. Osteopenia, unspecified location  - recheck and consider treatment if indicated  - DX Hip/Pelvis/Spine; Future    8. Asymptomatic menopausal state  - DX Hip/Pelvis/Spine; Future      Patient has been advised of split billing requirements and indicates understanding: Yes      COUNSELING:  Reviewed preventive health counseling, as reflected in patient instructions      BMI:   Estimated body mass index is 30.9 kg/m  as calculated from the following:    Height as of this encounter: 1.661 m (5' 5.4\").    Weight as of this encounter: 85.3 kg (188 lb).   Weight management plan: Discussed healthy diet and exercise guidelines      She reports that she has never smoked. She has never used smokeless tobacco.      Appropriate preventive services " were discussed with this patient, including applicable screening as appropriate for cardiovascular disease, diabetes, osteopenia/osteoporosis, and glaucoma.  As appropriate for age/gender, discussed screening for colorectal cancer, prostate cancer, breast cancer, and cervical cancer. Checklist reviewing preventive services available has been given to the patient.    Reviewed patients plan of care and provided an AVS. The Basic Care Plan (routine screening as documented in Health Maintenance) for Daly meets the Care Plan requirement. This Care Plan has been established and reviewed with the Patient.      Nidhi Baer DO  [facility]    Identified Health Risks:

## 2023-02-02 NOTE — NURSING NOTE
Patient presents to clinic today for annual Medicare wellness visit.  Medication review completed.    Advanced Care Planning discussed/reviewed.    FOOD SECURITY SCREENING QUESTIONS    The next two questions are to help us understand your food security.  If you are feeling you need any assistance in this area, we have resources available to support you today.    Hunger Vital Signs:   Within the past 12 months we worried whether our food would run out before we got money to buy more. Never  Within the past 12 months the food we bought just didn't last and we didn't have money to get more. Never    Sabina Chaudhry CMA(AAMA)..................2/2/2023   3:10 PM

## 2023-02-02 NOTE — PATIENT INSTRUCTIONS
Patient Education   Personalized Prevention Plan  You are due for the preventive services outlined below.  Your care team is available to assist you in scheduling these services.  If you have already completed any of these items, please share that information with your care team to update in your medical record.  Health Maintenance Due   Topic Date Due     Annual Wellness Visit  01/19/2023       Signs of Hearing Loss      Hearing much better with one ear can be a sign of hearing loss.   Hearing loss is a problem shared by many people. In fact, it is one of the most common health problems, particularly as people age. Most people age 65 and older have some hearing loss. By age 80, almost everyone does. Hearing loss often occurs slowly over the years. So you may not realize your hearing has gotten worse.  Have your hearing checked  Call your healthcare provider if you:    Have to strain to hear normal conversation    Have to watch other people s faces very carefully to follow what they re saying    Need to ask people to repeat what they ve said    Often misunderstand what people are saying    Turn the volume of the television or radio up so high that others complain    Feel that people are mumbling when they re talking to you    Find that the effort to hear leaves you feeling tired and irritated    Notice, when using the phone, that you hear better with one ear than the other  [employer]) last reviewed this educational content on 1/1/2020 2000-2021 [employer]. All rights reserved. This information is not intended as a substitute for professional medical care. Always follow your healthcare professional's instructions.          Preventing Falls at Home  A person can fall for many reasons. Older adults may fall because reaction time slows down as we age. Your muscles and joints may get stiff, weak, or less flexible because of illness, medicines, or a physical condition.   Other health problems that make  falls more likely include:     Arthritis    Dizziness or lightheadedness when you stand up (orthostatic hypotension)    History of a stroke    Dizziness    Anemia    Certain medicines taken for mental illness or to control blood pressure.    Problems with balance or gait    Bladder or urinary problems    History of falling    Changes in vision (vision impairment)    Changes in thinking skills and memory (cognitive impairment)  Injuries from a fall can include serious injuries such as broken bones, dislocated joints, internal bleeding and cuts. Injuries like these can limit your independence.   Prevention tips  To help prevent falls and fall-related injuries, follow the tips below.    Floors  To make floors safer:     Put nonskid pads under area rugs.    Remove small rugs.    Replace worn floor coverings.    Tack carpets firmly to each step on carpeted stairs. Put nonskid strips on the edges of uncarpeted stairs.    Keep floors and stairs free of clutter and cords.    Arrange furniture so there are clear pathways.    Clean up any spills right away.  Bathrooms    To make bathrooms safer:     Install grab bars in the tub or shower.    Apply nonskid strips or put a nonskid rubber mat in the tub or shower.    Sit on a bath chair to bathe.    Use bathmats with nonskid backing.  Lighting  To improve visibility in your home:      Keep a flashlight in each room. Or put a lamp next to the bed within easy reach.    Put nightlights in the bedrooms, hallways, kitchen, and bathrooms.    Make sure all stairways have good lighting.    Take your time when going up and down stairs.    Put handrails on both sides of stairs and in walkways for more support. To prevent injury to your wrist or arm, don t use handrails to pull yourself up.    Install grab bars to pull yourself up.    Move or rearrange items that you use often. This will make them easier to find or reach.    Look at your home to find any safety hazards. Especially look at  doorways, walkways, and the driveway. Remove or repair any safety problems that you find.  Other changes to make    Look around to find any safety hazards. Look closely at doorways, walkways, and the driveway. Remove or repair any safety problems that you find.    Wear shoes that fit well.    Take your time when going up and down stairs.    Put handrails on both sides of stairs and in walkways for more support. To prevent injury to your wrist or arm, don t use handrails to pull yourself up.    Install grab bars wherever needed to pull yourself up.    Arrange items that you use often. This will make them easier to find or reach.    [employer] last reviewed this educational content on 3/1/2020    5542-5481 [employer]. All rights reserved. This information is not intended as a substitute for professional medical care. Always follow your healthcare professional's instructions.

## 2023-06-06 ENCOUNTER — HOSPITAL ENCOUNTER (OUTPATIENT)
Dept: MAMMOGRAPHY | Facility: OTHER | Age: 73
Discharge: HOME | End: 2023-06-06
Attending: INTERNAL MEDICINE | Admitting: INTERNAL MEDICINE
Payer: MEDICARE

## 2023-06-06 DIAGNOSIS — Z12.31 VISIT FOR SCREENING MAMMOGRAM: ICD-10-CM

## 2023-06-06 PROCEDURE — 77067 SCR MAMMO BI INCL CAD: CPT

## 2023-08-22 ENCOUNTER — HOSPITAL ENCOUNTER (OUTPATIENT)
Dept: BONE DENSITY | Facility: OTHER | Age: 73
Discharge: HOME | End: 2023-08-22
Attending: INTERNAL MEDICINE | Admitting: INTERNAL MEDICINE
Payer: MEDICARE

## 2023-08-22 DIAGNOSIS — Z78.0 ASYMPTOMATIC MENOPAUSAL STATE: ICD-10-CM

## 2023-08-22 DIAGNOSIS — M85.80 OSTEOPENIA, UNSPECIFIED LOCATION: ICD-10-CM

## 2023-08-22 PROCEDURE — 77080 DXA BONE DENSITY AXIAL: CPT

## 2023-08-29 ENCOUNTER — DOCUMENTATION ONLY (OUTPATIENT)
Dept: INTERNAL MEDICINE | Facility: OTHER | Age: 73
End: 2023-08-29
Payer: COMMERCIAL

## 2023-08-29 DIAGNOSIS — G47.33 OBSTRUCTIVE SLEEP APNEA (ADULT) (PEDIATRIC): Primary | ICD-10-CM

## 2023-08-29 DIAGNOSIS — G47.14 SLEEP DISORDER DUE TO A GENERAL MEDICAL CONDITION, HYPERSOMNIA TYPE: ICD-10-CM

## 2023-09-19 ENCOUNTER — TELEPHONE (OUTPATIENT)
Dept: PULMONOLOGY | Facility: OTHER | Age: 73
End: 2023-09-19

## 2023-10-06 NOTE — PROGRESS NOTES
Daly Perry is a 72 year old female who is being evaluated via in-person clinic visit.       Visit Details     In-clinic visit for establishing care for history of mild obstructive sleep apnea managed with CPAP.     Assessment / Plan:    1.)  History of mild KB    Appears to be well controlled with excellent compliance on CPAP auto titrate 6-10.2 cm H2O.  It is noted that the AHI was mildly elevated at 5.7, but this appeared to be affected by a period of mask leak with increase in central apneas, but overall I have low clinical concern for significant complex sleep apnea.    Plan to continue CPAP on current settings and for follow-up routinely in 1-2 years.    SUBJECTIVE:  Daly Perry is a 72 year old female.    Pertinent PMHx of chronic pain (neck, knee) neurodegenerative dementia with behavioral disturbance, KB, MDD, ERICA.    Prior Sleep Testin2013 - PSG with weight 120 lbs.  AHI 5.5, RDI 12.7.  Mean SpO2 95%.    Today -she presents today primarily to establish care and to have her CPAP checked to make sure that is being effective.  This was prompted by a friend of hers who had seen our clinic and was found to have what sounds like complex sleep apnea and we did a different type of machine.    Otherwise, she feels that she sleeps well, she denies any daytime fatigue or sleepiness.    We did review her CPAP data over the last 30 days on settings of auto titrate 6-10.2 cm H2O.  Average daily usage 9.25 hours.  AHI 5.7 with a central apnea index of 3.9.  Periodic breathing was approximately 1%.    Past medical history:    Patient Active Problem List    Diagnosis Date Noted    Chronic pain of right knee 2022     Priority: Medium    KB on CPAP 2021     Priority: Medium    Fibrocystic breast disease 2018     Priority: Medium     Overview:   Nonproliferative breast disease    Formatting of this note might be different from the original.  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  Vulvar LSEA      Neck pain, chronic 01/30/2018     Priority: Medium     Overview:   Improved after residential    Formatting of this note might be different from the original.  Improved after residential      Severe major depression with psychotic features (H) 10/25/2016     Priority: Medium     Formatting of this note might be different from the original.  ect treatment      Generalized anxiety disorder 07/18/2016     Priority: Medium     Overview:   Dysthymia, generalized anxiety    Formatting of this note might be different from the original.  Dysthymia, generalized anxiety      Major depressive disorder, recurrent episode, moderate (H) 07/18/2016     Priority: Medium    Neurodegenerative dementia with behavioral disturbance (H) 07/18/2016     Priority: Medium    Rosacea 01/04/2013     Priority: Medium    Diverticulosis of large intestine 03/14/2011     Priority: Medium     Overview:   Diffuse    Formatting of this note might be different from the original.  Diffuse         10 point ROS of systems including Constitutional, Eyes, Respiratory, Cardiovascular, Gastroenterology, Genitourinary, Integumentary, Muscularskeletal, Psychiatric were all negative except for pertinent positives noted in my HPI.    Current Outpatient Medications   Medication Sig Dispense Refill    acetaminophen (TYLENOL) 325 MG tablet Take 325 mg by mouth every 4 hours as needed Max acetaminophen dose: 4000 mg in 24 hours      ARIPiprazole (ABILIFY) 10 MG tablet Take 1 tablet (10 mg) by mouth every evening      calcium carbonate 600 mg-vitamin D 400 units (CALCIUM CARB-CHOLECALCIFEROL) 600-400 MG-UNIT per tablet Take 1 tablet by mouth daily      Cholecalciferol (VITAMIN D3) 25 MCG (1000 UT) CAPS Take 1 capsule by mouth daily      clobetasol (TEMOVATE) 0.05 % external ointment Apply topically 2 times daily 30 g  3    ibuprofen (ADVIL/MOTRIN) 200 MG tablet Take 200 mg by mouth 4 times daily as needed      Lutein-Zeaxanthin (OCUVITE LUTEIN 25) 25-5 MG CAPS Take 1 capsule by mouth daily       Multiple Vitamins-Minerals (OCUVITE PO) Take 1 tablet by mouth daily      propranolol (INDERAL) 40 MG tablet Take 1 tablet (40 mg) by mouth 2 times daily Increase in dose 180 tablet 4    venlafaxine (EFFEXOR-XR) 75 MG 24 hr capsule Take 75 mg by mouth 2 times daily          OBJECTIVE:  LMP  (LMP Unknown)     Physical Exam     ---  This note was written with the assistance of the Dragon voice-dictation technology software. The final document, although reviewed, may contain errors. For corrections, please contact the office.    Total time spent preparing to see the patient, review of chart, obtaining history and physical examination, review of sleep testing, review of treatment options, education, discussion with patient and documenting in Epic / EMR was 20 minutes.  All time involved was spent on the day of service for the patient (the same day as the patient's appointment).    Stalin Real MD    Sleep Medicine  [facility]  - [place], [place]  Main Office: [phone]  [facility] [place]  [address] [place] [address], [id number]  Schedule visits: [phone]  Main Office: [phone]  Fax: [phone]

## 2023-10-09 ENCOUNTER — ALLIED HEALTH/NURSE VISIT (OUTPATIENT)
Dept: FAMILY MEDICINE | Facility: OTHER | Age: 73
End: 2023-10-09
Attending: INTERNAL MEDICINE
Payer: MEDICARE

## 2023-10-09 ENCOUNTER — OFFICE VISIT (OUTPATIENT)
Dept: FAMILY MEDICINE | Facility: OTHER | Age: 73
End: 2023-10-09
Attending: FAMILY MEDICINE
Payer: MEDICARE

## 2023-10-09 DIAGNOSIS — Z23 NEED FOR PROPHYLACTIC VACCINATION AND INOCULATION AGAINST INFLUENZA: Primary | ICD-10-CM

## 2023-10-09 DIAGNOSIS — G47.33 OSA ON CPAP: Primary | Chronic | ICD-10-CM

## 2023-10-09 PROCEDURE — G0463 HOSPITAL OUTPT CLINIC VISIT: HCPCS | Mod: 25

## 2023-10-09 PROCEDURE — G0008 ADMIN INFLUENZA VIRUS VAC: HCPCS

## 2023-10-09 PROCEDURE — 99213 OFFICE O/P EST LOW 20 MIN: CPT | Performed by: FAMILY MEDICINE

## 2023-10-09 NOTE — PATIENT INSTRUCTIONS
Ramya Kauffman,    Overall, the CPAP looks like it is working correctly for treating the sleep apnea.  I would recommend we have a routine follow-up every 1-2 years.    Stalin Real MD    Sleep Medicine  [facility]  - [place], [place]  Main Office: [phone]  [facility] [address], [address]  Schedule visits: [phone]  Main Office: [phone]  Fax: [phone]

## 2023-10-09 NOTE — PROGRESS NOTES
CPAP Compliance Visit:       Name: Daly Perry MRN# [id number]   Age: 72 year old YOB: 1950     Date of Consultation: October 9, 2023  Primary care provider: Nidhi Baer    Compliance:   100 % of days with >4 hours of use.   0days/30 with no use   Average use: 9hours 17minutes per day   95%ile leak 11.9 L/min   CPAP 95% pressure 9.7 cm   AHI 5.7 events/hour   MELANIE 3.9  PB 1%     Impression:   Patient is compliant with CPAP therapy and using CPAP with no issues. Continue to use CPAP and follow up with sleep medicine as necessary.     Sleep Medicine Clinic Rooming Note    Patient was identified appropriately by name and date of birth.    Medication Reconciliation: complete    Chief complaint: CPAP follow up      Height: 66 ft/inches  Weight: 185 lbs  SpO2: 97  HR: 60  BP: 124/82  Neck circumference: 14 inches     Selden Sleepiness Scale    How likely are you to doze off or fall asleep in the following situations, in contrast to just feeling tired?    This refers to your usual way of life recently.    Even if you haven't done some of these things recently, try to figure out how they would have affected you.    Use the following scale to choose the most appropriate number for each situation:  0 = NO CHANCE of dozing  1 = SLIGHT CHANCE of dozing  2 = MODERATE CHANCE of dozing  3 = HIGH CHANCE of dozing    Sitting and Reading: 3  Watching television: 3  Sitting inactive in a public place:1  Riding in car:0  Laying down to rest in the afternoon:0  Sitting and talking to someone:0  Sitting quietly after lunch without alcohol:1  In a car, stopped in traffic for a few minutes:0    Score: 8    DME needs: -    Additional Notes: -

## 2023-12-14 ENCOUNTER — HOSPITAL ENCOUNTER (OUTPATIENT)
Dept: GENERAL RADIOLOGY | Facility: OTHER | Age: 73
Discharge: HOME | End: 2023-12-14
Attending: FAMILY MEDICINE
Payer: MEDICARE

## 2023-12-14 ENCOUNTER — OFFICE VISIT (OUTPATIENT)
Dept: FAMILY MEDICINE | Facility: OTHER | Age: 73
End: 2023-12-14
Attending: FAMILY MEDICINE
Payer: MEDICARE

## 2023-12-14 VITALS
WEIGHT: 189 LBS | RESPIRATION RATE: 16 BRPM | TEMPERATURE: 96.5 F | BODY MASS INDEX: 31.07 KG/M2 | SYSTOLIC BLOOD PRESSURE: 120 MMHG | DIASTOLIC BLOOD PRESSURE: 78 MMHG | HEART RATE: 67 BPM | OXYGEN SATURATION: 98 %

## 2023-12-14 DIAGNOSIS — M72.2 PLANTAR FASCIITIS OF RIGHT FOOT: Primary | ICD-10-CM

## 2023-12-14 PROCEDURE — 99214 OFFICE O/P EST MOD 30 MIN: CPT | Performed by: FAMILY MEDICINE

## 2023-12-14 PROCEDURE — G0463 HOSPITAL OUTPT CLINIC VISIT: HCPCS

## 2023-12-14 PROCEDURE — G0463 HOSPITAL OUTPT CLINIC VISIT: HCPCS | Mod: 25

## 2023-12-14 PROCEDURE — 73630 X-RAY EXAM OF FOOT: CPT | Mod: RT

## 2023-12-14 ASSESSMENT — PAIN SCALES - GENERAL: PAINLEVEL: MODERATE PAIN (5)

## 2023-12-14 NOTE — PROGRESS NOTES
Sports Medicine Office Note    HPI:  73-year-old female coming in for evaluation of right heel pain.  Her pain has been present for approximately 5 weeks.  No inciting event or injury.  She does report that in the last couple months she has been going to the [facility] more frequently, doing daily exercise classes.  Her pain is on the plantar aspect of the heel.  She rates her pain at 5/10.  She characterized the pain as achy.  Walking and going up/down stairs is particularly bothersome.  She has been doing icing and home stretches/exercises.  No symptoms on the left side.  She does report a history of a right knee surgery that has left her with increased genu valgum and she is unsure if this is related.  She [place] material in her foot.  She is unsure if all of this washed out at the time of the injury.      EXAM:  /78 (BP Location: Right arm, Patient Position: Sitting, Cuff Size: Adult Regular)   Pulse 67   Temp (!) 96.5  F (35.8  C) (Temporal)   Resp 16   Wt 85.7 kg (189 lb)   LMP  (LMP Unknown)   SpO2 98%   BMI 31.07 kg/m    MUSCULOSKELETAL EXAM:  RIGHT FOOT AND ANKLE  Inspection:  -No gross deformity  -No bruising or swelling  -Scars:  None    Tenderness to palpation of the:  -Fibular head:  Negative  -Fibular shaft:  Negative  -Tibial shaft:  Negative  -Medial malleolus:  Negative  -Deltoid ligament:  Negative  -Lateral malleolus:  Negative  -ATFL:  Negative  -CFL:  Negative  -PTFL:  Negative  -Syndesmosis (AITFL):  Negative  -Midsubstance Achilles tendon:  Negative  -Achilles tendon insertion:  Negative  -Plantar fascial origin on the calcaneus: Positive  -Base of the fifth metatarsal:  Negative  -Navicular:  Negative  -Lisfranc joint:  Negative  -Metatarsals:  Negative    Range of Motion:  -Passive plantarflexion:  70  -Passive dorsiflexion:  5    Strength:  -Dorsiflexion:  5/5  -Plantarflexion:   5/5  -Inversion:  5/5  -Eversion:  5/5    Other:  -Intact sensation to light touch distally.  -No signs of cyanosis. Normal skin temperature.  -Knee:  No gross deformity. Normal motion.  -Left foot/ankle:  No gross deformity. No palpable tenderness. Normal strength.      IMAGIN2023: 3 view right foot x-ray  - No fracture, dislocation, or bony lesion.  Mild-moderate degenerative changes of the first MTP joint.  Bipartite tibial sesamoid.  Calcification seen on the plantar aspect of the foot..      ASSESSMENT/PLAN:  Diagnoses and all orders for this visit:  Plantar fasciitis of right foot  -     XR Foot Right G/E 3 Views  -     Physical Therapy Referral; Future    73-year-old female with right-sided plantar fasciosis.  X-rays were performed in the office today and personally reviewed by me with the findings as demonstrated above by my interpretation.  We discussed treatment options that include home exercises, night splinting, and physical therapy.  - Referral placed to physical therapy  - Handout given with home exercises  - Discussed buying an over-the-counter night splint  - Follow-up if the above interventions are failing to provide desired symptom relief      Ramy Verdugo MD  2023  10:05 AM    Total time spent with this patient was 33 minutes which included chart review, visualization and independent interpretation of images, time spent with the patient, and documentation.    Procedure time:  0 minute(s)

## 2024-01-11 ENCOUNTER — THERAPY VISIT (OUTPATIENT)
Dept: PHYSICAL THERAPY | Facility: OTHER | Age: 74
End: 2024-01-11
Attending: FAMILY MEDICINE
Payer: MEDICARE

## 2024-01-11 DIAGNOSIS — M72.2 PLANTAR FASCIITIS OF RIGHT FOOT: ICD-10-CM

## 2024-01-11 DIAGNOSIS — M79.671 PAIN OF RIGHT HEEL: Primary | ICD-10-CM

## 2024-01-11 PROCEDURE — 97161 PT EVAL LOW COMPLEX 20 MIN: CPT | Mod: GP

## 2024-01-11 PROCEDURE — 97110 THERAPEUTIC EXERCISES: CPT | Mod: GP

## 2024-01-11 NOTE — PROGRESS NOTES
PHYSICAL THERAPY EVALUATION  Type of Visit: Evaluation    See electronic medical record for Abuse and Falls Screening details.    Subjective patient is a 73-year-old female with 6-week history of right insidious heel pain which limits walking especially first step in the morning.  She is not waking secondary to pain.  Reports it feels like a bruise at the heel where the plantar fascia inserts.  She reports a history of bilateral knee pain and notes that she is knocked kneed especially on the right and is contemplating possible treatment for this.  She has been doing home exercise with the ice bottle massage.  She uses inserts in her shoes for the past 2 years to help reduce the valgus deformity on the right side.      Presenting condition or subjective complaint:  Right heel pain limiting walking  Date of onset: 23    Relevant medical history:   Right knee valgus deformity, past medical history was reviewed through the electronic medical record    Prior diagnostic imaging/testing results:     X-ray report reveals mild first metatarsal phalangeal degenerative change, no calcaneal spur.      Living Environment  Social support:   Lives with   Type of home:   4 step entry home with a railing, main floor living.  Stairs to the basement upstairs but are not utilized consistently.    Employment:    Retired  Hobbies/Interests:  Works out at the [employer] 3-4 times a week    Patient goals for therapy:  Decrease pain with ambulation    Pain assessment: Pain present  Location: Right heel/Ratin/10     Objective   FOOT/ANKLE EVALUATION  PAIN: Pain Level at Rest: 2/10  Pain Level with Use: 6/10  Pain Quality: Aching and Stabbing  Pain is Exacerbated By: Walking, for step in the morning  INTEGUMENTARY (edema, incisions): WNL  POSTURE: WNL  GAIT:   Weightbearing Status: WBAT  Assistive Device(s): None  Gait Deviations:  Valgus deformity of the right knee creates a gait dysfunction with varus positioning of the calcaneus  and inversion of the foot noted in standing.  BALANCE/PROPRIOCEPTION:  Decreased single-leg balance bilaterally  WEIGHT BEARING ALIGNMENT:  Right knee valgus deformity, inversion of the calcaneus and foot  ROM:  Ankle and knee range of motion within normal limits  STRENGTH:  Right ankle 5/5 globally, knee flexion extension WFL , hip abduction and external rotation right-sided 4/5  PALPATION:  Positive right plantar fascial insertion, nontender at the plantar fascia  JOINT MOBILITY: WNL    Assessment & Plan   CLINICAL IMPRESSIONS  Medical Diagnosis: Plantar fasciitis of right foot    Treatment Diagnosis: Plantar fasciitis of right foot, right heel pain   Impression/Assessment: Patient is a 73 year old female with 6-week history of right heel complaints.  The following significant findings have been identified: Pain, Decreased strength, Impaired balance, and Impaired gait.  Patient's right knee is a contributing factor with the valgus deformity. These impairments interfere with their ability to perform self care tasks, household mobility, and community mobility as compared to previous level of function.     Clinical Decision Making (Complexity):  Clinical Presentation: Stable/Uncomplicated  Clinical Presentation Rationale: based on medical and personal factors listed in PT evaluation  Clinical Decision Making (Complexity): Low complexity    PLAN OF CARE  Treatment Interventions:  Interventions: Gait Training, Manual Therapy, Therapeutic Exercise    Long Term Goals     PT Goal 1  Goal Identifier: Pain  Goal Description: Patient will report pain no more than a 1/10 at the right heel upon first step in the morning for safe ambulation and return to previous function.  Target Date: 03/07/24  PT Goal 2  Goal Identifier: Mobility  Goal Description: Patient will be able to ambulate without right heel pain limiting return to previous level of function.  Target Date: 03/07/24      Frequency of Treatment: 2 times a  week  Duration of Treatment: 8 weeks      Education Assessment:   Learner/Method: Patient;Listening;Pictures/Video    Risks and benefits of evaluation/treatment have been explained.   Patient/Family/caregiver agrees with Plan of Care.     Evaluation Time:     PT Eval, Low Complexity Minutes (72711): 20       Signing Clinician: TRISTON Lopez [facility]                                                                                   OUTPATIENT PHYSICAL THERAPY      PLAN OF TREATMENT FOR OUTPATIENT REHABILITATION   Patient's Last Name, First Name, KLAUDIARishabhVLADIMIR  OrlandoDaly YOB: 1950   Provider's Name   [facility]   Medical Record No.  [id number]     Onset Date: 11/30/23  Start of Care Date: 01/11/24     Medical Diagnosis:  Plantar fasciitis of right foot      PT Treatment Diagnosis:  Plantar fasciitis of right foot, right heel pain Plan of Treatment  Frequency/Duration: 2 times a week/ 8 weeks    Certification date from 01/11/24 to 03/07/24         See note for plan of treatment details and functional goals     Cecilia Andino, PT                         I CERTIFY THE NEED FOR THESE SERVICES FURNISHED UNDER        THIS PLAN OF TREATMENT AND WHILE UNDER MY CARE     (Physician attestation of this document indicates review and certification of the therapy plan).              Referring Provider:  Ramy Sepulveda    Initial Assessment  See Epic Evaluation- Start of Care Date: 01/11/24

## 2024-01-16 ENCOUNTER — THERAPY VISIT (OUTPATIENT)
Dept: PHYSICAL THERAPY | Facility: OTHER | Age: 74
End: 2024-01-16
Attending: FAMILY MEDICINE
Payer: MEDICARE

## 2024-01-16 DIAGNOSIS — M79.671 PAIN OF RIGHT HEEL: ICD-10-CM

## 2024-01-16 DIAGNOSIS — M72.2 PLANTAR FASCIITIS OF RIGHT FOOT: Primary | ICD-10-CM

## 2024-01-16 PROCEDURE — 97110 THERAPEUTIC EXERCISES: CPT | Mod: GP

## 2024-01-18 ENCOUNTER — THERAPY VISIT (OUTPATIENT)
Dept: PHYSICAL THERAPY | Facility: OTHER | Age: 74
End: 2024-01-18
Attending: FAMILY MEDICINE
Payer: MEDICARE

## 2024-01-18 DIAGNOSIS — M79.671 PAIN OF RIGHT HEEL: ICD-10-CM

## 2024-01-18 DIAGNOSIS — M72.2 PLANTAR FASCIITIS OF RIGHT FOOT: Primary | ICD-10-CM

## 2024-01-18 PROCEDURE — 97110 THERAPEUTIC EXERCISES: CPT | Mod: GP

## 2024-01-22 DIAGNOSIS — M25.561 RIGHT KNEE PAIN, UNSPECIFIED CHRONICITY: Primary | ICD-10-CM

## 2024-01-23 ENCOUNTER — OFFICE VISIT (OUTPATIENT)
Dept: ORTHOPEDICS | Facility: OTHER | Age: 74
End: 2024-01-23
Attending: ORTHOPAEDIC SURGERY
Payer: MEDICARE

## 2024-01-23 ENCOUNTER — HOSPITAL ENCOUNTER (OUTPATIENT)
Dept: GENERAL RADIOLOGY | Facility: OTHER | Age: 74
Discharge: HOME | End: 2024-01-23
Attending: ORTHOPAEDIC SURGERY
Payer: MEDICARE

## 2024-01-23 DIAGNOSIS — M25.561 RIGHT KNEE PAIN, UNSPECIFIED CHRONICITY: ICD-10-CM

## 2024-01-23 DIAGNOSIS — M25.561 CHRONIC PAIN OF RIGHT KNEE: Primary | ICD-10-CM

## 2024-01-23 DIAGNOSIS — G89.29 CHRONIC PAIN OF RIGHT KNEE: Primary | ICD-10-CM

## 2024-01-23 PROCEDURE — 73564 X-RAY EXAM KNEE 4 OR MORE: CPT | Mod: RT

## 2024-01-23 PROCEDURE — 99214 OFFICE O/P EST MOD 30 MIN: CPT | Performed by: ORTHOPAEDIC SURGERY

## 2024-01-23 PROCEDURE — G0463 HOSPITAL OUTPT CLINIC VISIT: HCPCS

## 2024-01-23 PROCEDURE — 73560 X-RAY EXAM OF KNEE 1 OR 2: CPT | Mod: LT

## 2024-01-23 NOTE — PROGRESS NOTES
Surgical Clinic Consult  Primary physician:     Nidhi Baer    Chief complaint:   Bilateral knee pain    History of present illness:  This is a 73 year old female I am seeing in consultation for bilateral knee pain chronic in nature.  Patient's had prior scope on the right knee and then injection postop which did not necessarily help her out much.  Patient ports a significant progression of her pain and discomfort at this time.  Patient would like to move forward on surgical intervention and would like to do as an outpatient as her  did as well.  Patient is well-known to me.  Both knees bother her with increased activities.    Past medical history:   Past Medical History:   Diagnosis Date    Closed left ankle fracture     Cyst of ovary     Hx of right ovarian cyst.    Diverticulosis of large intestine 03/14/2011    Endometriosis     growth on ovary s/p oophrectomy    Fibrocystic breast disease 01/30/2018    Generalized anxiety disorder     Dysthymia, generalized anxiety    Lichen sclerosus     Vulvar LSEA; asymptomatic    Neck pain, chronic 01/30/2018     Improved after [facility]    Rosacea 01/04/2013    Severe major depression with psychotic features (H) 10/25/2016    history of ECT; inpatient for several months; she does not have complete memory of that time    Sleep apnea        Pastsurgical history:  Past Surgical History:   Procedure Laterality Date    ARTHROSCOPY KNEE WITH MENISCECTOMY Right 4/15/2022    Procedure: Right Knee Arthoscopy with Partial MEDIAL AND Lateral Meniscectomy and  chondroplasty;  Surgeon: Parth Hansen MD;  Location: [facility] OR    BIOPSY BREAST Right 07/07/2008    stereotactic, benign result    CATARACT IOL, RT/LT Bilateral     2017, 2018    COLONOSCOPY  03/14/2011    Normal; F/U 2021    DILATION AND CURETTAGE  2003    D&C with hysteroscopy and endometrial ablation    OOPHORECTOMY Right 02/1997    OPEN REDUCTION INTERNAL FIXATION ANKLE Left 2002    RELEASE CARPAL TUNNEL  2010     REMOVE HARDWARE ANKLE Left 06/27/2011    VITRECTOMY ANTERIOR Left 05/22/2017    Dr. Collins       Current medications:  Current Outpatient Medications   Medication Sig Dispense Refill    acetaminophen (TYLENOL) 325 MG tablet Take 325 mg by mouth every 4 hours as needed Max acetaminophen dose: 4000 mg in 24 hours      ARIPiprazole (ABILIFY) 10 MG tablet Take 1 tablet (10 mg) by mouth every evening      calcium carbonate 600 mg-vitamin D 400 units (CALCIUM CARB-CHOLECALCIFEROL) 600-400 MG-UNIT per tablet Take 1 tablet by mouth daily      Cholecalciferol (VITAMIN D3) 25 MCG (1000 UT) CAPS Take 1 capsule by mouth daily      clobetasol (TEMOVATE) 0.05 % external ointment Apply topically 2 times daily 30 g 3    ibuprofen (ADVIL/MOTRIN) 200 MG tablet Take 200 mg by mouth 4 times daily as needed      Lutein-Zeaxanthin (OCUVITE LUTEIN 25) 25-5 MG CAPS Take 1 capsule by mouth daily       Multiple Vitamins-Minerals (OCUVITE PO) Take 1 tablet by mouth daily      propranolol (INDERAL) 40 MG tablet Take 1 tablet (40 mg) by mouth 2 times daily Increase in dose 180 tablet 4    venlafaxine (EFFEXOR-XR) 75 MG 24 hr capsule Take 75 mg by mouth 2 times daily          Allergies:  No Known Allergies    Family history:  Family History   Problem Relation Age of Onset    Arthritis Mother     Heart Disease Mother     Hypertension Mother     Thyroid Disease Mother     Dementia Mother     Pulmonary Embolism Mother     Heart Disease Father     Other - See Comments Father         Psychiatric illness    Chronic Obstructive Pulmonary Disease Father     Dementia Brother     Colon Cancer Paternal Grandmother         Cancer-colon    No Known Problems Brother     Hypertension Sister     Allergies Sister         food    Breast Cancer No family hx of         Cancer-breast       Social history:  Social History     Socioeconomic History    Marital status:      Spouse name: Not on file    Number of children: Not on file    Years of education: Not on  file    Highest education level: Not on file   Occupational History    Not on file   Tobacco Use    Smoking status: Never    Smokeless tobacco: Never   Vaping Use    Vaping Use: Never used   Substance and Sexual Activity    Alcohol use: Yes     Comment: 1-2 per week    Drug use: No    Sexual activity: Not Currently     Partners: Male   Other Topics Concern    Parent/sibling w/ CABG, MI or angioplasty before 65F 55M? Not Asked   Social History Narrative    Patient  to Jimenez Perry, a retired [place] anesthetist. She is retired, previously taught art.  2 sons, Shiraz Perry ([place]); Regino ([place])  4 grand daughters     Social Determinants of Health     Financial Resource Strain: Not on file   Food Insecurity: Not on file   Transportation Needs: Not on file   Physical Activity: Not on file   Stress: Not on file   Social Connections: Not on file   Interpersonal Safety: Low Risk  (12/14/2023)    Interpersonal Safety     Do you feel physically and emotionally safe where you currently live?: Yes     Within the past 12 months, have you been hit, slapped, kicked or otherwise physically hurt by someone?: No     Within the past 12 months, have you been humiliated or emotionally abused in other ways by your partner or ex-partner?: No   Housing Stability: Not on file       PROBLEM LIST:  Patient Active Problem List   Diagnosis    Diverticulosis of large intestine    Fibrocystic breast disease    Generalized anxiety disorder    Circumscribed scleroderma    Neck pain, chronic    Rosacea    Severe major depression with psychotic features (H)    KB on CPAP    Chronic pain of right knee    Major depressive disorder, recurrent episode, moderate (H)    Neurodegenerative dementia with behavioral disturbance (H)    Plantar fasciitis of right foot       Review of Systems:  COMPLETE 12 point REVIEW OF SYSTEMS is otherwise negative with the exception of which is stated above.    Physical exam: LMP  (LMP Unknown)     General: this  is a pleasant female patient in no acute distress.  Patient is awake alert and oriented x3 .   EXAM:  Chest/Respiratory Exam: Normal - Clear to auscultation without rales, rhonchi, or wheezing.  Cardiovascular Exam: normal  Musculoskeletal: Bilateral knee examination shows valgus deformity.  Range of motion 0-1 25.  Knee Tracking is acceptable bilaterally.  Crepitation with flexion extension.  Neuro examination intact.  Quad strength is 5 out of 5.    Imaging: Bilateral knee x-rays reveal severe arthrosis lateral compartment as well as patellofemoral joint.    Assessment:   Bilateral knee osteoarthrosis with valgus deformity    Plan:    Recommendation that we proceed forth right total knee replacement Yudi persona posterior stabilized cemented.  We will plan to have this done on an outpatient basis.  If by chance her pain is increased we can certainly keep her overnight.  She also would like to consider having her left knee done 6 weeks after that as well.  We will help to organize a ROM tech bike for as well for rehab and recovery.      Parth Hansen MD

## 2024-01-25 ENCOUNTER — THERAPY VISIT (OUTPATIENT)
Dept: PHYSICAL THERAPY | Facility: OTHER | Age: 74
End: 2024-01-25
Attending: FAMILY MEDICINE
Payer: MEDICARE

## 2024-01-25 DIAGNOSIS — M79.671 PAIN OF RIGHT HEEL: ICD-10-CM

## 2024-01-25 DIAGNOSIS — M72.2 PLANTAR FASCIITIS OF RIGHT FOOT: Primary | ICD-10-CM

## 2024-01-25 PROCEDURE — 97110 THERAPEUTIC EXERCISES: CPT | Mod: GP

## 2024-01-25 NOTE — PROGRESS NOTES
01/25/24 0500   Appointment Info   Signing clinician's name / credentials Cecilia Andino DPT   Total/Authorized Visits 4   Visits Used 4/10   Medical Diagnosis Plantar fasciitis of right foot   PT Tx Diagnosis Plantar fasciitis of right foot, right heel pain   Progress Note/Certification   Start of Care Date 01/11/24   Onset of illness/injury or Date of Surgery 11/30/23   Therapy Frequency 2 times a week   Predicted Duration 8 weeks   Certification date from 01/11/24   Certification date to 03/07/24   PT Goal 1   Goal Identifier Pain   Goal Description Patient will report pain no more than a 1/10 at the right heel upon first step in the morning for safe ambulation and return to previous function.   Goal Progress Progressing   Target Date 03/07/24   PT Goal 2   Goal Identifier Mobility   Goal Description Patient will be able to ambulate without right heel pain limiting return to previous level of function.   Target Date 03/07/24   Goal Progress Progressing   Subjective Report   Subjective Report Returns with continued right heel pain, but improving, notes less first step paina nd less pain with walking. Is planning to have a right total knee arthroplasty done within the next month or so.   Objective Measure 1   Objective Measure Pain   Details 2/10   Objective Measure 2   Objective Measure Mobility   Details Improving in mornings, some pain increase towards end of day   Therapeutic Procedure/Exercise   Therapeutic Procedures: strength, endurance, ROM, flexibillity minutes (92913) 38   Ther Proc 1 - Details NuStep Pro: seat# 10 lvl 3 x 7 mins. Standing: gastroc stretch x2 each (30 secs), dbl calf raises at rail x 15 (verbal cues for mechanics), mini squat x 10, single leg balance x 3 each (to LOB).   Clamshell: R 2x10, R hip abduction 2x10 (cues for positioning). Long sitting: DF red x 10, INV red x 10, EV x 10. AROM INV/EV 2x10, ankle circles x10 each direction. Supine: bridge x 10. Supine: quad set x 10 R,  SAQ R x 10.   Skilled Intervention Instruction in exercise, verbal cues for mechanics, tactile cues for proper mechanics   Patient Response/Progress Favorable, tolerates exercise without increased pain. Would benefit from further LE stabilization and strengthening, core and hip stability.   Education   Learner/Method Patient;Listening;Pictures/Video   Plan   Home program Access Code: [email or web address]  URL: [email or web address]  Date: 01/11/2024  Prepared by: Cecilia Andino    Exercises  - Clamshell  - 1 x daily - 7 x weekly - 2 sets - 10 reps  - Sidelying Hip Abduction  - 1 x daily - 7 x weekly - 2 sets - 10 reps. Access Code: [email or web address]  URL: [email or web address]  Date: 01/16/2024  Prepared by: Cecilia Andino    Exercises  - Single Leg Stance  - 1 x daily - 7 x weekly - 2 reps - 30 hold  - Supine Ankle Inversion and Eversion AROM  - 1 x daily - 7 x weekly - 2 sets - 10 reps  - Seated Ankle Dorsiflexion AROM  - 1 x daily - 7 x weekly - 2 sets - 10 reps   Plan for next session Pt requests to continue independently with HEP. Will call with questions with HEP.   Total Session Time   Timed Code Treatment Minutes 38   Total Treatment Time (sum of timed and untimed services) 38         DISCHARGE  Reason for Discharge: Patient chooses to discontinue therapy.    Equipment Issued: Red theraband    Discharge Plan: Patient to continue home program.    Referring Provider:  Ramy Sepulveda

## 2024-01-26 ENCOUNTER — TELEPHONE (OUTPATIENT)
Dept: ORTHOPEDICS | Facility: OTHER | Age: 74
End: 2024-01-26
Payer: COMMERCIAL

## 2024-01-26 DIAGNOSIS — M25.561 RIGHT KNEE PAIN, UNSPECIFIED CHRONICITY: Primary | ICD-10-CM

## 2024-01-26 NOTE — TELEPHONE ENCOUNTER
Needs OP PT to start 5-7 days post-surgery. Jade  4/2/24  right total knee arthroplasty.-OUT PATIENT--NO STAY OVER NIGHT.

## 2024-02-14 ENCOUNTER — OFFICE VISIT (OUTPATIENT)
Dept: INTERNAL MEDICINE | Facility: OTHER | Age: 74
End: 2024-02-14
Payer: MEDICARE

## 2024-02-14 VITALS
HEART RATE: 64 BPM | TEMPERATURE: 97.1 F | BODY MASS INDEX: 31.36 KG/M2 | RESPIRATION RATE: 16 BRPM | WEIGHT: 188.2 LBS | HEIGHT: 65 IN | DIASTOLIC BLOOD PRESSURE: 88 MMHG | SYSTOLIC BLOOD PRESSURE: 122 MMHG | OXYGEN SATURATION: 99 %

## 2024-02-14 DIAGNOSIS — G89.29 CHRONIC PAIN OF RIGHT KNEE: ICD-10-CM

## 2024-02-14 DIAGNOSIS — F33.1 MAJOR DEPRESSIVE DISORDER, RECURRENT EPISODE, MODERATE (H): ICD-10-CM

## 2024-02-14 DIAGNOSIS — E78.2 MIXED HYPERLIPIDEMIA: ICD-10-CM

## 2024-02-14 DIAGNOSIS — R73.03 PREDIABETES: ICD-10-CM

## 2024-02-14 DIAGNOSIS — L90.0 LICHEN SCLEROSUS: ICD-10-CM

## 2024-02-14 DIAGNOSIS — G47.33 OSA ON CPAP: Chronic | ICD-10-CM

## 2024-02-14 DIAGNOSIS — M25.561 CHRONIC PAIN OF RIGHT KNEE: ICD-10-CM

## 2024-02-14 DIAGNOSIS — N89.8 VAGINAL ITCHING: ICD-10-CM

## 2024-02-14 DIAGNOSIS — Z01.818 PREOP GENERAL PHYSICAL EXAM: Primary | ICD-10-CM

## 2024-02-14 LAB
ALBUMIN SERPL BCG-MCNC: 4.4 G/DL (ref 3.5–5.2)
ALBUMIN UR-MCNC: NEGATIVE MG/DL
ALP SERPL-CCNC: 95 U/L (ref 40–150)
ALT SERPL W P-5'-P-CCNC: 16 U/L (ref 0–50)
ANION GAP SERPL CALCULATED.3IONS-SCNC: 9 MMOL/L (ref 7–15)
APPEARANCE UR: CLEAR
AST SERPL W P-5'-P-CCNC: 22 U/L (ref 0–45)
BACTERIAL VAGINOSIS VAG-IMP: NEGATIVE
BASOPHILS # BLD AUTO: 0.1 10E3/UL (ref 0–0.2)
BASOPHILS NFR BLD AUTO: 1 %
BILIRUB SERPL-MCNC: 0.3 MG/DL
BILIRUB UR QL STRIP: NEGATIVE
BUN SERPL-MCNC: 19.9 MG/DL (ref 8–23)
CALCIUM SERPL-MCNC: 9.9 MG/DL (ref 8.8–10.2)
CANDIDA DNA VAG QL NAA+PROBE: NOT DETECTED
CANDIDA GLABRATA / CANDIDA KRUSEI DNA: NOT DETECTED
CHLORIDE SERPL-SCNC: 103 MMOL/L (ref 98–107)
CHOLEST SERPL-MCNC: 221 MG/DL
COLOR UR AUTO: YELLOW
CREAT SERPL-MCNC: 0.83 MG/DL (ref 0.51–0.95)
DEPRECATED HCO3 PLAS-SCNC: 27 MMOL/L (ref 22–29)
EGFRCR SERPLBLD CKD-EPI 2021: 75 ML/MIN/1.73M2
EOSINOPHIL # BLD AUTO: 0.2 10E3/UL (ref 0–0.7)
EOSINOPHIL NFR BLD AUTO: 4 %
ERYTHROCYTE [DISTWIDTH] IN BLOOD BY AUTOMATED COUNT: 12.7 % (ref 10–15)
FASTING STATUS PATIENT QL REPORTED: ABNORMAL
GLUCOSE SERPL-MCNC: 79 MG/DL (ref 70–99)
GLUCOSE UR STRIP-MCNC: NEGATIVE MG/DL
HBA1C MFR BLD: 5.8 % (ref 4–6.2)
HCT VFR BLD AUTO: 42.3 % (ref 35–47)
HDLC SERPL-MCNC: 87 MG/DL
HGB BLD-MCNC: 14.4 G/DL (ref 11.7–15.7)
HGB UR QL STRIP: NEGATIVE
IMM GRANULOCYTES # BLD: 0 10E3/UL
IMM GRANULOCYTES NFR BLD: 0 %
KETONES UR STRIP-MCNC: NEGATIVE MG/DL
LDLC SERPL CALC-MCNC: 120 MG/DL
LEUKOCYTE ESTERASE UR QL STRIP: NEGATIVE
LYMPHOCYTES # BLD AUTO: 1.4 10E3/UL (ref 0.8–5.3)
LYMPHOCYTES NFR BLD AUTO: 29 %
MCH RBC QN AUTO: 32.2 PG (ref 26.5–33)
MCHC RBC AUTO-ENTMCNC: 34 G/DL (ref 31.5–36.5)
MCV RBC AUTO: 95 FL (ref 78–100)
MONOCYTES # BLD AUTO: 0.4 10E3/UL (ref 0–1.3)
MONOCYTES NFR BLD AUTO: 7 %
MUCOUS THREADS #/AREA URNS LPF: PRESENT /LPF
NEUTROPHILS # BLD AUTO: 2.9 10E3/UL (ref 1.6–8.3)
NEUTROPHILS NFR BLD AUTO: 59 %
NITRATE UR QL: NEGATIVE
NONHDLC SERPL-MCNC: 134 MG/DL
NRBC # BLD AUTO: 0 10E3/UL
NRBC BLD AUTO-RTO: 0 /100
PH UR STRIP: 6 [PH] (ref 5–9)
PLATELET # BLD AUTO: 317 10E3/UL (ref 150–450)
POTASSIUM SERPL-SCNC: 4.1 MMOL/L (ref 3.4–5.3)
PROT SERPL-MCNC: 7 G/DL (ref 6.4–8.3)
RBC # BLD AUTO: 4.47 10E6/UL (ref 3.8–5.2)
RBC URINE: 1 /HPF
SODIUM SERPL-SCNC: 139 MMOL/L (ref 135–145)
SP GR UR STRIP: 1.02 (ref 1–1.03)
T VAGINALIS DNA VAG QL NAA+PROBE: NOT DETECTED
TRIGL SERPL-MCNC: 70 MG/DL
UROBILINOGEN UR STRIP-MCNC: NORMAL MG/DL
WBC # BLD AUTO: 5 10E3/UL (ref 4–11)
WBC URINE: <1 /HPF

## 2024-02-14 PROCEDURE — 85025 COMPLETE CBC W/AUTO DIFF WBC: CPT | Mod: ZL

## 2024-02-14 PROCEDURE — 80053 COMPREHEN METABOLIC PANEL: CPT | Mod: ZL

## 2024-02-14 PROCEDURE — 99214 OFFICE O/P EST MOD 30 MIN: CPT

## 2024-02-14 PROCEDURE — G0463 HOSPITAL OUTPT CLINIC VISIT: HCPCS

## 2024-02-14 PROCEDURE — 93010 ELECTROCARDIOGRAM REPORT: CPT | Performed by: INTERNAL MEDICINE

## 2024-02-14 PROCEDURE — 83036 HEMOGLOBIN GLYCOSYLATED A1C: CPT | Mod: ZL

## 2024-02-14 PROCEDURE — 36415 COLL VENOUS BLD VENIPUNCTURE: CPT | Mod: ZL

## 2024-02-14 PROCEDURE — 81001 URINALYSIS AUTO W/SCOPE: CPT | Mod: ZL

## 2024-02-14 PROCEDURE — 80061 LIPID PANEL: CPT | Mod: ZL

## 2024-02-14 PROCEDURE — 0352U MULTIPLEX VAGINAL PANEL BY PCR: CPT | Mod: ZL

## 2024-02-14 PROCEDURE — 93005 ELECTROCARDIOGRAM TRACING: CPT

## 2024-02-14 PROCEDURE — 87086 URINE CULTURE/COLONY COUNT: CPT | Mod: ZL

## 2024-02-14 ASSESSMENT — PATIENT HEALTH QUESTIONNAIRE - PHQ9
SUM OF ALL RESPONSES TO PHQ QUESTIONS 1-9: 0
SUM OF ALL RESPONSES TO PHQ QUESTIONS 1-9: 0
10. IF YOU CHECKED OFF ANY PROBLEMS, HOW DIFFICULT HAVE THESE PROBLEMS MADE IT FOR YOU TO DO YOUR WORK, TAKE CARE OF THINGS AT HOME, OR GET ALONG WITH OTHER PEOPLE: NOT DIFFICULT AT ALL

## 2024-02-14 ASSESSMENT — PAIN SCALES - GENERAL: PAINLEVEL: MILD PAIN (2)

## 2024-02-14 NOTE — NURSING NOTE
"Chief Complaint   Patient presents with    Pre-Op Exam       Initial /88   Pulse 64   Temp 97.1  F (36.2  C) (Tympanic)   Resp 16   Ht 1.65 m (5' 4.96\")   Wt 85.4 kg (188 lb 3.2 oz)   LMP 02/14/2004 (Approximate)   SpO2 99%   Breastfeeding No   BMI 31.36 kg/m   Estimated body mass index is 31.36 kg/m  as calculated from the following:    Height as of this encounter: 1.65 m (5' 4.96\").    Weight as of this encounter: 85.4 kg (188 lb 3.2 oz).  Medication Review: complete    The next two questions are to help us understand your food security.  If you are feeling you need any assistance in this area, we have resources available to support you today.          2/14/2024   SDOH- Food Insecurity   Within the past 12 months, did you worry that your food would run out before you got money to buy more? N   Within the past 12 months, did the food you bought just not last and you didn t have money to get more? N         Health Care Directive:  Patient has a Health Care Directive on file      Norma J. Gosselin, LPN      "

## 2024-02-14 NOTE — H&P (VIEW-ONLY)
Preoperative Evaluation  [facility]  [address]  [place] [address]  Phone: [phone]  Fax: [phone]  Primary Provider: Nidhi Baer  Pre-op Performing Provider: LUIS BURNS  Feb 14, 2024       Daly is a 73 year old, presenting for the following:  Pre-Op Exam      Surgical Information  Surgery/Procedure: Right TKA  Surgery Location: [facility]  Surgeon: Dr Hansen  Surgery Date: 2/20/24  Time of Surgery:   Where patient plans to recover: At home with family  Fax number for surgical facility: Note does not need to be faxed, will be available electronically in Epic.    Assessment & Plan     The proposed surgical procedure is considered INTERMEDIATE risk.      ICD-10-CM    1. Preop general physical exam  Z01.818 Comprehensive Metabolic Panel     CBC with Platelets & Differential ([facility] Only)     Hemoglobin A1c     Lipid Profile     EKG 12-lead ([facility] Only)     Lipid Profile     Hemoglobin A1c     Comprehensive Metabolic Panel     CBC with Platelets & Differential ([facility] Only)     Urine Culture      2. Chronic pain of right knee  M25.561     G89.29       3. Vaginal itching  N89.8 UA with Microscopic reflex to Culture     Multiplex Vaginal Panel by PCR     Multiplex Vaginal Panel by PCR     UA with Microscopic reflex to Culture     Urine Culture      4. Lichen sclerosus  L90.0 Ob/Gyn  Referral      5. Prediabetes  R73.03 Hemoglobin A1c     Hemoglobin A1c      6. KB on CPAP  G47.33       7. Major depressive disorder, recurrent episode, moderate (H)  F33.1       8. Mixed hyperlipidemia  E78.2            Risks and Recommendations  The patient has the following additional risks and recommendations for perioperative complications:  Obstructive Sleep Apnea    Antiplatelet or Anticoagulation Medication Instructions   - Patient is on no antiplatelet or anticoagulation medications.    Additional Medication Instructions  Patient is to take all scheduled medications  on the day of surgery    Recommendation  APPROVAL GIVEN to proceed with proposed procedure, without further diagnostic evaluation. Urine culture pending.    Referral  has been placed to gynecology for ongoing lichen sclerosus.  Wet prep negative, urinalysis does not show signs of urinary tract infection.  Urine culture is pending.  Vaginal examination shows vulvar irritation related to lichen sclerosis without secondary signs of infection.             Subjective       HPI related to upcoming procedure:   Patient presents to clinic for preoperative evaluation for right total knee arthroplasty scheduled on 2/20/2024 by Dr. Hansen.  She has had ongoing difficulty with chronic right knee pain, she is looking forward to upcoming procedure.  Patient denies having any difficulties with anesthesia in the past, and is able to meet greater than 4 METS.  She does mention that she has had vaginal itching over the last month which has progressively become worse.  She does have a history of lichen sclerosis and uses clobetasol.  She is worried that she may have a urinary tract infection.         2/14/2024     8:50 AM   Preop Questions   1. Have you ever had a heart attack or stroke? No   2. Have you ever had surgery on your heart or blood vessels, such as a stent placement, a coronary artery bypass, or surgery on an artery in your head, neck, heart, or legs? No   3. Do you have chest pain with activity? No   4. Do you have a history of  heart failure? No   5. Do you currently have a cold, bronchitis or symptoms of other infection? No   6. Do you have a cough, shortness of breath, or wheezing? No   7. Do you or anyone in your family have previous history of blood clots? No   8. Do you or does anyone in your family have a serious bleeding problem such as prolonged bleeding following surgeries or cuts? No   9. Have you ever had problems with anemia or been told to take iron pills? No   10. Have you had any abnormal blood loss such  as black, tarry or bloody stools, or abnormal vaginal bleeding? No   11. Have you ever had a blood transfusion? No   12. Are you willing to have a blood transfusion if it is medically needed before, during, or after your surgery? Yes   13. Have you or any of your relatives ever had problems with anesthesia? No   14. Do you have sleep apnea, excessive snoring or daytime drowsiness? YES    14a. Do you have a CPAP machine? Yes   15. Do you have any artifical heart valves or other implanted medical devices like a pacemaker, defibrillator, or continuous glucose monitor? No   16. Do you have artificial joints? No   17. Are you allergic to latex? No       Health Care Directive  Patient has a Health Care Directive on file      Preoperative Review of    reviewed - no record of controlled substances prescribed.      Status of Chronic Conditions:  DEPRESSION - Patient has a long history of Depression of moderate severity requiring medication for control with recent symptoms being stable..Current symptoms of depression include anxiety.     HYPERLIPIDEMIA: ASCVD risk score is 9.3% statin therapy recommended.     PREDIABETES: A1C is 5.8    The 10-year ASCVD risk score (Jose DK, et al., 2019) is: 9.3%    Values used to calculate the score:      Age: 73 years      Sex: Female      Is Non- : No      Diabetic: No      Tobacco smoker: No      Systolic Blood Pressure: 122 mmHg      Is BP treated: No      HDL Cholesterol: 87 mg/dL      Total Cholesterol: 221 mg/dL    Patient Active Problem List    Diagnosis Date Noted     Mixed hyperlipidemia 02/15/2024     Priority: Medium     Chronic pain of right knee 04/04/2022     Priority: Medium     KB on CPAP 05/27/2021     Priority: Medium     Fibrocystic breast disease 01/30/2018     Priority: Medium     Overview:   Nonproliferative breast disease    Formatting of this note might be different from the original.  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  Vulvar LSEA       Neck pain, chronic 01/30/2018     Priority: Medium     Overview:   Improved after [facility]    Formatting of this note might be different from the original.  Improved after [facility]       Severe major depression with psychotic features (H) 10/25/2016     Priority: Medium     Formatting of this note might be different from the original.  ect treatment       Generalized anxiety disorder 07/18/2016     Priority: Medium     Overview:   Dysthymia, generalized anxiety    Formatting of this note might be different from the original.  Dysthymia, generalized anxiety       Major depressive disorder, recurrent episode, moderate (H) 07/18/2016     Priority: Medium     Neurodegenerative dementia with behavioral disturbance (H) 07/18/2016     Priority: Medium     Rosacea 01/04/2013     Priority: Medium     Diverticulosis of large intestine 03/14/2011     Priority: Medium     Overview:   Diffuse    Formatting of this note might be different from the original.  Diffuse        Past Medical History:   Diagnosis Date     Closed left ankle fracture      Cyst of ovary     Hx of right ovarian cyst.     Diverticulosis of large intestine 03/14/2011     Endometriosis     growth on ovary s/p oophrectomy     Fibrocystic breast disease 01/30/2018     Generalized anxiety disorder     Dysthymia, generalized anxiety     Lichen sclerosus     Vulvar LSEA; asymptomatic     Neck pain, chronic 01/30/2018     Improved after [facility]     Rosacea 01/04/2013     Severe major depression with psychotic features (H) 10/25/2016    history of ECT; inpatient for several months; she does not have complete memory of that time     Sleep apnea      Past Surgical History:   Procedure Laterality Date     ARTHROSCOPY KNEE WITH MENISCECTOMY Right 4/15/2022    Procedure: Right Knee Arthoscopy with  Partial MEDIAL AND Lateral Meniscectomy and  chondroplasty;  Surgeon: Parth Hansen MD;  Location: [facility] OR     BIOPSY BREAST Right 07/07/2008    stereotactic, benign result     CATARACT IOL, RT/LT Bilateral     2017, 2018     COLONOSCOPY  03/14/2011    Normal; F/U 2021     DILATION AND CURETTAGE  2003    D&C with hysteroscopy and endometrial ablation     OOPHORECTOMY Right 02/1997     OPEN REDUCTION INTERNAL FIXATION ANKLE Left 2002     RELEASE CARPAL TUNNEL  2010     REMOVE HARDWARE ANKLE Left 06/27/2011     VITRECTOMY ANTERIOR Left 05/22/2017    Dr. Collins     Current Outpatient Medications   Medication Sig Dispense Refill     acetaminophen (TYLENOL) 325 MG tablet Take 325 mg by mouth every 4 hours as needed Max acetaminophen dose: 4000 mg in 24 hours       ARIPiprazole (ABILIFY) 10 MG tablet Take 1 tablet (10 mg) by mouth every evening       calcium carbonate 600 mg-vitamin D 400 units (CALCIUM CARB-CHOLECALCIFEROL) 600-400 MG-UNIT per tablet Take 1 tablet by mouth daily       Cholecalciferol (VITAMIN D3) 25 MCG (1000 UT) CAPS Take 1 capsule by mouth daily       clobetasol (TEMOVATE) 0.05 % external ointment Apply topically 2 times daily 30 g 3     ibuprofen (ADVIL/MOTRIN) 200 MG tablet Take 200 mg by mouth 4 times daily as needed       Lutein-Zeaxanthin (OCUVITE LUTEIN 25) 25-5 MG CAPS Take 1 capsule by mouth daily        Multiple Vitamins-Minerals (OCUVITE PO) Take 1 tablet by mouth daily       propranolol (INDERAL) 40 MG tablet Take 1 tablet (40 mg) by mouth 2 times daily Increase in dose 180 tablet 4     venlafaxine (EFFEXOR-XR) 75 MG 24 hr capsule Take 75 mg by mouth 2 times daily          No Known Allergies     Social History     Tobacco Use     Smoking status: Never     Smokeless tobacco: Never   Substance Use Topics     Alcohol use: Yes     Comment: 1-2 per week       History   Drug Use No           Constitutional: Negative for chills and fever.   HENT: Negative for congestion.    Eyes: Negative for  "visual disturbance.   Respiratory: Negative for chest tightness and shortness of breath.    Cardiovascular: Negative for chest pain.   Gastrointestinal: Negative for abdominal pain. Review of Systems   Genitourinary: Negative for frequency and hematuria.        +vaginal itching associated with dabs of blood on toilet paper   Musculoskeletal: Negative for back pain.   Skin: Negative for rash and wound.   Neurological: Negative for syncope.   Hematological: Does not bruise/bleed easily.   Psychiatric/Behavioral: Negative for confusion.       Objective    /88   Pulse 64   Temp 97.1  F (36.2  C) (Tympanic)   Resp 16   Ht 1.65 m (5' 4.96\")   Wt 85.4 kg (188 lb 3.2 oz)   LMP 02/14/2004 (Approximate)   SpO2 99%   Breastfeeding No   BMI 31.36 kg/m     Estimated body mass index is 31.36 kg/m  as calculated from the following:    Height as of this encounter: 1.65 m (5' 4.96\").    Weight as of this encounter: 85.4 kg (188 lb 3.2 oz).  Physical Exam  Vitals reviewed.   Constitutional:       General: She is not in acute distress.     Appearance: She is well-developed.   HENT:      Head: Normocephalic and atraumatic.      Nose: Nose normal.      Mouth/Throat:      Pharynx: No oropharyngeal exudate.   Eyes:      General: No scleral icterus.     Conjunctiva/sclera: Conjunctivae normal.      Pupils: Pupils are equal, round, and reactive to light.   Neck:      Thyroid: No thyromegaly.   Cardiovascular:      Rate and Rhythm: Normal rate and regular rhythm.      Heart sounds: No murmur heard.  Pulmonary:      Effort: Pulmonary effort is normal. No respiratory distress.      Breath sounds: Normal breath sounds. No wheezing or rhonchi.   Genitourinary:     Comments: Lichen sclerosus, vulvar irritation noted-wet prep collected  Musculoskeletal:         General: No tenderness or deformity. Normal range of motion.      Cervical back: Normal range of motion and neck supple.   Skin:     General: Skin is warm and dry.      " Findings: No rash.   Neurological:      Mental Status: She is alert and oriented to person, place, and time.      Cranial Nerves: No cranial nerve deficit.      Coordination: Coordination normal.   Psychiatric:         Behavior: Behavior normal.         Thought Content: Thought content normal.         Judgment: Judgment normal.         Diagnostics  Recent Results (from the past 48 hour(s))   Multiplex Vaginal Panel by PCR    Collection Time: 02/14/24  9:20 AM    Specimen: Vagina; Swab   Result Value Ref Range    Bacterial Vaginosis Organism DNA Negative Negative    Candida Group DNA Not Detected Not Detected    Candida glabrata / Ching krusei DNA Not Detected Not Detected    Trichomonas vaginalis DNA Not Detected Not Detected   Lipid Profile    Collection Time: 02/14/24  9:29 AM   Result Value Ref Range    Cholesterol 221 (H) <200 mg/dL    Triglycerides 70 <150 mg/dL    Direct Measure HDL 87 >=50 mg/dL    LDL Cholesterol Calculated 120 (H) <=100 mg/dL    Non HDL Cholesterol 134 (H) <130 mg/dL    Patient Fasting > 8hrs? Unknown    Hemoglobin A1c    Collection Time: 02/14/24  9:29 AM   Result Value Ref Range    Hemoglobin A1C 5.8 4.0 - 6.2 %   Comprehensive Metabolic Panel    Collection Time: 02/14/24  9:29 AM   Result Value Ref Range    Sodium 139 135 - 145 mmol/L    Potassium 4.1 3.4 - 5.3 mmol/L    Carbon Dioxide (CO2) 27 22 - 29 mmol/L    Anion Gap 9 7 - 15 mmol/L    Urea Nitrogen 19.9 8.0 - 23.0 mg/dL    Creatinine 0.83 0.51 - 0.95 mg/dL    GFR Estimate 75 >60 mL/min/1.73m2    Calcium 9.9 8.8 - 10.2 mg/dL    Chloride 103 98 - 107 mmol/L    Glucose 79 70 - 99 mg/dL    Alkaline Phosphatase 95 40 - 150 U/L    AST 22 0 - 45 U/L    ALT 16 0 - 50 U/L    Protein Total 7.0 6.4 - 8.3 g/dL    Albumin 4.4 3.5 - 5.2 g/dL    Bilirubin Total 0.3 <=1.2 mg/dL   CBC with platelets and differential    Collection Time: 02/14/24  9:29 AM   Result Value Ref Range    WBC Count 5.0 4.0 - 11.0 10e3/uL    RBC Count 4.47 3.80 - 5.20  10e6/uL    Hemoglobin 14.4 11.7 - 15.7 g/dL    Hematocrit 42.3 35.0 - 47.0 %    MCV 95 78 - 100 fL    MCH 32.2 26.5 - 33.0 pg    MCHC 34.0 31.5 - 36.5 g/dL    RDW 12.7 10.0 - 15.0 %    Platelet Count 317 150 - 450 10e3/uL    % Neutrophils 59 %    % Lymphocytes 29 %    % Monocytes 7 %    % Eosinophils 4 %    % Basophils 1 %    % Immature Granulocytes 0 %    NRBCs per 100 WBC 0 <1 /100    Absolute Neutrophils 2.9 1.6 - 8.3 10e3/uL    Absolute Lymphocytes 1.4 0.8 - 5.3 10e3/uL    Absolute Monocytes 0.4 0.0 - 1.3 10e3/uL    Absolute Eosinophils 0.2 0.0 - 0.7 10e3/uL    Absolute Basophils 0.1 0.0 - 0.2 10e3/uL    Absolute Immature Granulocytes 0.0 <=0.4 10e3/uL    Absolute NRBCs 0.0 10e3/uL   EKG 12-lead ([facility] Only)    Collection Time: 02/14/24  9:39 AM   Result Value Ref Range    Systolic Blood Pressure  mmHg    Diastolic Blood Pressure  mmHg    Ventricular Rate 58 BPM    Atrial Rate 58 BPM    TN Interval 148 ms    QRS Duration 88 ms     ms    QTc 422 ms    P Axis 58 degrees    R AXIS 16 degrees    T Axis 4 degrees    Interpretation ECG       Sinus bradycardia  Otherwise normal ECG  When compared with ECG of 05-APR-2022 09:05,  No significant change was found     UA with Microscopic reflex to Culture    Collection Time: 02/14/24  9:59 AM    Specimen: Urine, Midstream   Result Value Ref Range    Color Urine Yellow Colorless, Straw, Light Yellow, Yellow    Appearance Urine Clear Clear    Glucose Urine Negative Negative mg/dL    Bilirubin Urine Negative Negative    Ketones Urine Negative Negative mg/dL    Specific Gravity Urine 1.022 1.000 - 1.030    Blood Urine Negative Negative    pH Urine 6.0 5.0 - 9.0    Protein Albumin Urine Negative Negative mg/dL    Urobilinogen Urine Normal Normal, 2.0 mg/dL    Nitrite Urine Negative Negative    Leukocyte Esterase Urine Negative Negative    Mucus Urine Present (A) None Seen /LPF    RBC Urine 1 <=2 /HPF    WBC Urine <1 <=5 /HPF          Revised Cardiac Risk  Index (RCRI)  The patient has the following serious cardiovascular risks for perioperative complications:   - No serious cardiac risks = 0 points     RCRI Interpretation: 0 points: Class I (very low risk - 0.4% complication rate)           Signed Electronically by: LIANG Larios CNP  Copy of this evaluation report is provided to requesting physician.         Answers submitted by the patient for this visit:  Patient Health Questionnaire (Submitted on 2/14/2024)  If you checked off any problems, how difficult have these problems made it for you to do your work, take care of things at home, or get along with other people?: Not difficult at all  PHQ9 TOTAL SCORE: 0

## 2024-02-14 NOTE — PATIENT INSTRUCTIONS
Preparing for Your Surgery  Getting started  A nurse will call you to review your health history and instructions. They will give you an arrival time based on your scheduled surgery time. Please be ready to share:  Your doctor's clinic name and phone number  Your medical, surgical, and anesthesia history  A list of allergies and sensitivities  A list of medicines, including herbal treatments and over-the-counter drugs  Whether the patient has a legal guardian (ask how to send us the papers in advance)  Please tell us if you're pregnant--or if there's any chance you might be pregnant. Some surgeries may injure a fetus (unborn baby), so they require a pregnancy test. Surgeries that are safe for a fetus don't always need a test, and you can choose whether to have one.   If you have a child who's having surgery, please ask for a copy of Preparing for Your Child's Surgery.    Preparing for surgery  Within 10 to 30 days of surgery: Have a pre-op exam (sometimes called an H&P, or History and Physical). This can be done at a clinic or pre-operative center.  If you're having a , you may not need this exam. Talk to your care team.  At your pre-op exam, talk to your care team about all medicines you take. If you need to stop any medicines before surgery, ask when to start taking them again.  We do this for your safety. Many medicines can make you bleed too much during surgery. Some change how well surgery (anesthesia) drugs work.  Call your insurance company to let them know you're having surgery. (If you don't have insurance, call [phone].)  Call your clinic if there's any change in your health. This includes signs of a cold or flu (sore throat, runny nose, cough, rash, fever). It also includes a scrape or scratch near the surgery site.  If you have questions on the day of surgery, call your hospital or surgery center.  Eating and drinking guidelines  For your safety: Unless your surgeon tells you otherwise,  follow the guidelines below.  Eat and drink as usual until 8 hours before you arrive for surgery. After that, no food or milk.  Drink clear liquids until 2 hours before you arrive. These are liquids you can see through, like water, Gatorade, and Propel Water. They also include plain black coffee and tea (no cream or milk), candy, and breath mints. You can spit out gum when you arrive.  If you drink alcohol: Stop drinking it the night before surgery.  If your care team tells you to take medicine on the morning of surgery, it's okay to take it with a sip of water.  Preventing infection  Shower or bathe the night before and morning of your surgery. Follow the instructions your clinic gave you. (If no instructions, use regular soap.)  Don't shave or clip hair near your surgery site. We'll remove the hair if needed.  Don't smoke or vape the morning of surgery. You may chew nicotine gum up to 2 hours before surgery. A nicotine patch is okay.  Note: Some surgeries require you to completely quit smoking and nicotine. Check with your surgeon.  Your care team will make every effort to keep you safe from infection. We will:  Clean our hands often with soap and water (or an alcohol-based hand rub).  Clean the skin at your surgery site with a special soap that kills germs.  Give you a special gown to keep you warm. (Cold raises the risk of infection.)  Wear special hair covers, masks, gowns and gloves during surgery.  Give antibiotic medicine, if prescribed. Not all surgeries need antibiotics.  What to bring on the day of surgery  Photo ID and insurance card  Copy of your health care directive, if you have one  Glasses and hearing aids (bring cases)  You can't wear contacts during surgery  Inhaler and eye drops, if you use them (tell us about these when you arrive)  CPAP machine or breathing device, if you use them  A few personal items, if spending the night  If you have . . .  A pacemaker, ICD (cardiac defibrillator) or other  implant: Bring the ID card.  An implanted stimulator: Bring the remote control.  A legal guardian: Bring a copy of the certified (court-stamped) guardianship papers.  Please remove any jewelry, including body piercings. Leave jewelry and other valuables at home.  If you're going home the day of surgery  You must have a responsible adult drive you home. They should stay with you overnight as well.  If you don't have someone to stay with you, and you aren't safe to go home alone, we may keep you overnight. Insurance often won't pay for this.  After surgery  If it's hard to control your pain or you need more pain medicine, please call your surgeon's office.  Questions?   If you have any questions for your care team, list them here: _________________________________________________________________________________________________________________________________________________________________________ ____________________________________ ____________________________________ ____________________________________  For informational purposes only. Not to replace the advice of your health care provider. Copyright   2003, 2019 [facility]. All rights reserved. Clinically reviewed by Vivienne Lozano MD. [employer] 416600 - REV 12/22.    How to Take Your Medication Before Surgery  - Take all of your medications before surgery as usual

## 2024-02-14 NOTE — PROGRESS NOTES
Preoperative Evaluation  [facility]  [address]  [place] [address]  Phone: [phone]  Fax: [phone]  Primary Provider: Nidhi Baer  Pre-op Performing Provider: LUIS BURNS  Feb 14, 2024       Daly is a 73 year old, presenting for the following:  Pre-Op Exam      Surgical Information  Surgery/Procedure: Right TKA  Surgery Location: [facility]  Surgeon: Dr Hansen  Surgery Date: 2/20/24  Time of Surgery:   Where patient plans to recover: At home with family  Fax number for surgical facility: Note does not need to be faxed, will be available electronically in Epic.    Assessment & Plan     The proposed surgical procedure is considered INTERMEDIATE risk.      ICD-10-CM    1. Preop general physical exam  Z01.818 Comprehensive Metabolic Panel     CBC with Platelets & Differential ([facility] Only)     Hemoglobin A1c     Lipid Profile     EKG 12-lead ([facility] and [facility] Only)     Lipid Profile     Hemoglobin A1c     Comprehensive Metabolic Panel     CBC with Platelets & Differential ([facility] Only)     Urine Culture      2. Chronic pain of right knee  M25.561     G89.29       3. Vaginal itching  N89.8 UA with Microscopic reflex to Culture     Multiplex Vaginal Panel by PCR     Multiplex Vaginal Panel by PCR     UA with Microscopic reflex to Culture     Urine Culture      4. Lichen sclerosus  L90.0 Ob/Gyn  Referral      5. Prediabetes  R73.03 Hemoglobin A1c     Hemoglobin A1c      6. KB on CPAP  G47.33       7. Major depressive disorder, recurrent episode, moderate (H)  F33.1       8. Mixed hyperlipidemia  E78.2            Risks and Recommendations  The patient has the following additional risks and recommendations for perioperative complications:  Obstructive Sleep Apnea    Antiplatelet or Anticoagulation Medication Instructions   - Patient is on no antiplatelet or anticoagulation medications.    Additional Medication Instructions  Patient is to take all scheduled medications  on the day of surgery    Recommendation  APPROVAL GIVEN to proceed with proposed procedure, without further diagnostic evaluation. Urine culture pending.    Referral  has been placed to gynecology for ongoing lichen sclerosus.  Wet prep negative, urinalysis does not show signs of urinary tract infection.  Urine culture is pending.  Vaginal examination shows vulvar irritation related to lichen sclerosis without secondary signs of infection.             Subjective       HPI related to upcoming procedure:   Patient presents to clinic for preoperative evaluation for right total knee arthroplasty scheduled on 2/20/2024 by Dr. Hansen.  She has had ongoing difficulty with chronic right knee pain, she is looking forward to upcoming procedure.  Patient denies having any difficulties with anesthesia in the past, and is able to meet greater than 4 METS.  She does mention that she has had vaginal itching over the last month which has progressively become worse.  She does have a history of lichen sclerosis and uses clobetasol.  She is worried that she may have a urinary tract infection.         2/14/2024     8:50 AM   Preop Questions   1. Have you ever had a heart attack or stroke? No   2. Have you ever had surgery on your heart or blood vessels, such as a stent placement, a coronary artery bypass, or surgery on an artery in your head, neck, heart, or legs? No   3. Do you have chest pain with activity? No   4. Do you have a history of  heart failure? No   5. Do you currently have a cold, bronchitis or symptoms of other infection? No   6. Do you have a cough, shortness of breath, or wheezing? No   7. Do you or anyone in your family have previous history of blood clots? No   8. Do you or does anyone in your family have a serious bleeding problem such as prolonged bleeding following surgeries or cuts? No   9. Have you ever had problems with anemia or been told to take iron pills? No   10. Have you had any abnormal blood loss such  as black, tarry or bloody stools, or abnormal vaginal bleeding? No   11. Have you ever had a blood transfusion? No   12. Are you willing to have a blood transfusion if it is medically needed before, during, or after your surgery? Yes   13. Have you or any of your relatives ever had problems with anesthesia? No   14. Do you have sleep apnea, excessive snoring or daytime drowsiness? YES    14a. Do you have a CPAP machine? Yes   15. Do you have any artifical heart valves or other implanted medical devices like a pacemaker, defibrillator, or continuous glucose monitor? No   16. Do you have artificial joints? No   17. Are you allergic to latex? No       Health Care Directive  Patient has a Health Care Directive on file      Preoperative Review of    reviewed - no record of controlled substances prescribed.      Status of Chronic Conditions:  DEPRESSION - Patient has a long history of Depression of moderate severity requiring medication for control with recent symptoms being stable..Current symptoms of depression include anxiety.     HYPERLIPIDEMIA: ASCVD risk score is 9.3% statin therapy recommended.     PREDIABETES: A1C is 5.8    The 10-year ASCVD risk score (Jose DK, et al., 2019) is: 9.3%    Values used to calculate the score:      Age: 73 years      Sex: Female      Is Non- : No      Diabetic: No      Tobacco smoker: No      Systolic Blood Pressure: 122 mmHg      Is BP treated: No      HDL Cholesterol: 87 mg/dL      Total Cholesterol: 221 mg/dL    Patient Active Problem List    Diagnosis Date Noted     Mixed hyperlipidemia 02/15/2024     Priority: Medium     Chronic pain of right knee 04/04/2022     Priority: Medium     KB on CPAP 05/27/2021     Priority: Medium     Fibrocystic breast disease 01/30/2018     Priority: Medium     Overview:   Nonproliferative breast disease    Formatting of this note might be different from the original.  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  Vulvar LSEA       Neck pain, chronic 01/30/2018     Priority: Medium     Overview:   Improved after [facility]    Formatting of this note might be different from the original.  Improved after [facility]       Severe major depression with psychotic features (H) 10/25/2016     Priority: Medium     Formatting of this note might be different from the original.  ect treatment       Generalized anxiety disorder 07/18/2016     Priority: Medium     Overview:   Dysthymia, generalized anxiety    Formatting of this note might be different from the original.  Dysthymia, generalized anxiety       Major depressive disorder, recurrent episode, moderate (H) 07/18/2016     Priority: Medium     Neurodegenerative dementia with behavioral disturbance (H) 07/18/2016     Priority: Medium     Rosacea 01/04/2013     Priority: Medium     Diverticulosis of large intestine 03/14/2011     Priority: Medium     Overview:   Diffuse    Formatting of this note might be different from the original.  Diffuse        Past Medical History:   Diagnosis Date     Closed left ankle fracture      Cyst of ovary     Hx of right ovarian cyst.     Diverticulosis of large intestine 03/14/2011     Endometriosis     growth on ovary s/p oophrectomy     Fibrocystic breast disease 01/30/2018     Generalized anxiety disorder     Dysthymia, generalized anxiety     Lichen sclerosus     Vulvar LSEA; asymptomatic     Neck pain, chronic 01/30/2018     Improved after [facility]CC     Rosacea 01/04/2013     Severe major depression with psychotic features (H) 10/25/2016    history of ECT; inpatient for several months; she does not have complete memory of that time     Sleep apnea      Past Surgical History:   Procedure Laterality Date     ARTHROSCOPY KNEE WITH MENISCECTOMY Right 4/15/2022    Procedure: Right Knee Arthoscopy with  Partial MEDIAL AND Lateral Meniscectomy and  chondroplasty;  Surgeon: Parth Hansen MD;  Location: [facility] OR     BIOPSY BREAST Right 07/07/2008    stereotactic, benign result     CATARACT IOL, RT/LT Bilateral     2017, 2018     COLONOSCOPY  03/14/2011    Normal; F/U 2021     DILATION AND CURETTAGE  2003    D&C with hysteroscopy and endometrial ablation     OOPHORECTOMY Right 02/1997     OPEN REDUCTION INTERNAL FIXATION ANKLE Left 2002     RELEASE CARPAL TUNNEL  2010     REMOVE HARDWARE ANKLE Left 06/27/2011     VITRECTOMY ANTERIOR Left 05/22/2017    Dr. Collins     Current Outpatient Medications   Medication Sig Dispense Refill     acetaminophen (TYLENOL) 325 MG tablet Take 325 mg by mouth every 4 hours as needed Max acetaminophen dose: 4000 mg in 24 hours       ARIPiprazole (ABILIFY) 10 MG tablet Take 1 tablet (10 mg) by mouth every evening       calcium carbonate 600 mg-vitamin D 400 units (CALCIUM CARB-CHOLECALCIFEROL) 600-400 MG-UNIT per tablet Take 1 tablet by mouth daily       Cholecalciferol (VITAMIN D3) 25 MCG (1000 UT) CAPS Take 1 capsule by mouth daily       clobetasol (TEMOVATE) 0.05 % external ointment Apply topically 2 times daily 30 g 3     ibuprofen (ADVIL/MOTRIN) 200 MG tablet Take 200 mg by mouth 4 times daily as needed       Lutein-Zeaxanthin (OCUVITE LUTEIN 25) 25-5 MG CAPS Take 1 capsule by mouth daily        Multiple Vitamins-Minerals (OCUVITE PO) Take 1 tablet by mouth daily       propranolol (INDERAL) 40 MG tablet Take 1 tablet (40 mg) by mouth 2 times daily Increase in dose 180 tablet 4     venlafaxine (EFFEXOR-XR) 75 MG 24 hr capsule Take 75 mg by mouth 2 times daily          No Known Allergies     Social History     Tobacco Use     Smoking status: Never     Smokeless tobacco: Never   Substance Use Topics     Alcohol use: Yes     Comment: 1-2 per week       History   Drug Use No           Constitutional: Negative for chills and fever.   HENT: Negative for congestion.    Eyes: Negative for  "visual disturbance.   Respiratory: Negative for chest tightness and shortness of breath.    Cardiovascular: Negative for chest pain.   Gastrointestinal: Negative for abdominal pain. Review of Systems   Genitourinary: Negative for frequency and hematuria.        +vaginal itching associated with dabs of blood on toilet paper   Musculoskeletal: Negative for back pain.   Skin: Negative for rash and wound.   Neurological: Negative for syncope.   Hematological: Does not bruise/bleed easily.   Psychiatric/Behavioral: Negative for confusion.       Objective    /88   Pulse 64   Temp 97.1  F (36.2  C) (Tympanic)   Resp 16   Ht 1.65 m (5' 4.96\")   Wt 85.4 kg (188 lb 3.2 oz)   LMP 02/14/2004 (Approximate)   SpO2 99%   Breastfeeding No   BMI 31.36 kg/m     Estimated body mass index is 31.36 kg/m  as calculated from the following:    Height as of this encounter: 1.65 m (5' 4.96\").    Weight as of this encounter: 85.4 kg (188 lb 3.2 oz).  Physical Exam  Vitals reviewed.   Constitutional:       General: She is not in acute distress.     Appearance: She is well-developed.   HENT:      Head: Normocephalic and atraumatic.      Nose: Nose normal.      Mouth/Throat:      Pharynx: No oropharyngeal exudate.   Eyes:      General: No scleral icterus.     Conjunctiva/sclera: Conjunctivae normal.      Pupils: Pupils are equal, round, and reactive to light.   Neck:      Thyroid: No thyromegaly.   Cardiovascular:      Rate and Rhythm: Normal rate and regular rhythm.      Heart sounds: No murmur heard.  Pulmonary:      Effort: Pulmonary effort is normal. No respiratory distress.      Breath sounds: Normal breath sounds. No wheezing or rhonchi.   Genitourinary:     Comments: Lichen sclerosus, vulvar irritation noted-wet prep collected  Musculoskeletal:         General: No tenderness or deformity. Normal range of motion.      Cervical back: Normal range of motion and neck supple.   Skin:     General: Skin is warm and dry.      " Findings: No rash.   Neurological:      Mental Status: She is alert and oriented to person, place, and time.      Cranial Nerves: No cranial nerve deficit.      Coordination: Coordination normal.   Psychiatric:         Behavior: Behavior normal.         Thought Content: Thought content normal.         Judgment: Judgment normal.         Diagnostics  Recent Results (from the past 48 hour(s))   Multiplex Vaginal Panel by PCR    Collection Time: 02/14/24  9:20 AM    Specimen: Vagina; Swab   Result Value Ref Range    Bacterial Vaginosis Organism DNA Negative Negative    Candida Group DNA Not Detected Not Detected    Candida glabrata / Ching krusei DNA Not Detected Not Detected    Trichomonas vaginalis DNA Not Detected Not Detected   Lipid Profile    Collection Time: 02/14/24  9:29 AM   Result Value Ref Range    Cholesterol 221 (H) <200 mg/dL    Triglycerides 70 <150 mg/dL    Direct Measure HDL 87 >=50 mg/dL    LDL Cholesterol Calculated 120 (H) <=100 mg/dL    Non HDL Cholesterol 134 (H) <130 mg/dL    Patient Fasting > 8hrs? Unknown    Hemoglobin A1c    Collection Time: 02/14/24  9:29 AM   Result Value Ref Range    Hemoglobin A1C 5.8 4.0 - 6.2 %   Comprehensive Metabolic Panel    Collection Time: 02/14/24  9:29 AM   Result Value Ref Range    Sodium 139 135 - 145 mmol/L    Potassium 4.1 3.4 - 5.3 mmol/L    Carbon Dioxide (CO2) 27 22 - 29 mmol/L    Anion Gap 9 7 - 15 mmol/L    Urea Nitrogen 19.9 8.0 - 23.0 mg/dL    Creatinine 0.83 0.51 - 0.95 mg/dL    GFR Estimate 75 >60 mL/min/1.73m2    Calcium 9.9 8.8 - 10.2 mg/dL    Chloride 103 98 - 107 mmol/L    Glucose 79 70 - 99 mg/dL    Alkaline Phosphatase 95 40 - 150 U/L    AST 22 0 - 45 U/L    ALT 16 0 - 50 U/L    Protein Total 7.0 6.4 - 8.3 g/dL    Albumin 4.4 3.5 - 5.2 g/dL    Bilirubin Total 0.3 <=1.2 mg/dL   CBC with platelets and differential    Collection Time: 02/14/24  9:29 AM   Result Value Ref Range    WBC Count 5.0 4.0 - 11.0 10e3/uL    RBC Count 4.47 3.80 - 5.20  10e6/uL    Hemoglobin 14.4 11.7 - 15.7 g/dL    Hematocrit 42.3 35.0 - 47.0 %    MCV 95 78 - 100 fL    MCH 32.2 26.5 - 33.0 pg    MCHC 34.0 31.5 - 36.5 g/dL    RDW 12.7 10.0 - 15.0 %    Platelet Count 317 150 - 450 10e3/uL    % Neutrophils 59 %    % Lymphocytes 29 %    % Monocytes 7 %    % Eosinophils 4 %    % Basophils 1 %    % Immature Granulocytes 0 %    NRBCs per 100 WBC 0 <1 /100    Absolute Neutrophils 2.9 1.6 - 8.3 10e3/uL    Absolute Lymphocytes 1.4 0.8 - 5.3 10e3/uL    Absolute Monocytes 0.4 0.0 - 1.3 10e3/uL    Absolute Eosinophils 0.2 0.0 - 0.7 10e3/uL    Absolute Basophils 0.1 0.0 - 0.2 10e3/uL    Absolute Immature Granulocytes 0.0 <=0.4 10e3/uL    Absolute NRBCs 0.0 10e3/uL   EKG 12-lead ([facility] Only)    Collection Time: 02/14/24  9:39 AM   Result Value Ref Range    Systolic Blood Pressure  mmHg    Diastolic Blood Pressure  mmHg    Ventricular Rate 58 BPM    Atrial Rate 58 BPM    UT Interval 148 ms    QRS Duration 88 ms     ms    QTc 422 ms    P Axis 58 degrees    R AXIS 16 degrees    T Axis 4 degrees    Interpretation ECG       Sinus bradycardia  Otherwise normal ECG  When compared with ECG of 05-APR-2022 09:05,  No significant change was found     UA with Microscopic reflex to Culture    Collection Time: 02/14/24  9:59 AM    Specimen: Urine, Midstream   Result Value Ref Range    Color Urine Yellow Colorless, Straw, Light Yellow, Yellow    Appearance Urine Clear Clear    Glucose Urine Negative Negative mg/dL    Bilirubin Urine Negative Negative    Ketones Urine Negative Negative mg/dL    Specific Gravity Urine 1.022 1.000 - 1.030    Blood Urine Negative Negative    pH Urine 6.0 5.0 - 9.0    Protein Albumin Urine Negative Negative mg/dL    Urobilinogen Urine Normal Normal, 2.0 mg/dL    Nitrite Urine Negative Negative    Leukocyte Esterase Urine Negative Negative    Mucus Urine Present (A) None Seen /LPF    RBC Urine 1 <=2 /HPF    WBC Urine <1 <=5 /HPF          Revised Cardiac Risk  Index (RCRI)  The patient has the following serious cardiovascular risks for perioperative complications:   - No serious cardiac risks = 0 points     RCRI Interpretation: 0 points: Class I (very low risk - 0.4% complication rate)           Signed Electronically by: LIANG Larios CNP  Copy of this evaluation report is provided to requesting physician.         Answers submitted by the patient for this visit:  Patient Health Questionnaire (Submitted on 2/14/2024)  If you checked off any problems, how difficult have these problems made it for you to do your work, take care of things at home, or get along with other people?: Not difficult at all  PHQ9 TOTAL SCORE: 0

## 2024-02-15 PROBLEM — E78.2 MIXED HYPERLIPIDEMIA: Status: ACTIVE | Noted: 2024-02-15

## 2024-02-15 ASSESSMENT — ENCOUNTER SYMPTOMS
SHORTNESS OF BREATH: 0
CONFUSION: 0
HEMATURIA: 0
BACK PAIN: 0
CHEST TIGHTNESS: 0
WOUND: 0
ABDOMINAL PAIN: 0
FREQUENCY: 0
FEVER: 0
BRUISES/BLEEDS EASILY: 0
CHILLS: 0

## 2024-02-16 LAB — BACTERIA UR CULT: NO GROWTH

## 2024-02-19 ENCOUNTER — ANESTHESIA EVENT (OUTPATIENT)
Dept: SURGERY | Facility: OTHER | Age: 74
End: 2024-02-19
Payer: MEDICARE

## 2024-02-19 LAB
ATRIAL RATE - MUSE: 58 BPM
DIASTOLIC BLOOD PRESSURE - MUSE: NORMAL MMHG
INTERPRETATION ECG - MUSE: NORMAL
P AXIS - MUSE: 58 DEGREES
PR INTERVAL - MUSE: 148 MS
QRS DURATION - MUSE: 88 MS
QT - MUSE: 430 MS
QTC - MUSE: 422 MS
R AXIS - MUSE: 16 DEGREES
SYSTOLIC BLOOD PRESSURE - MUSE: NORMAL MMHG
T AXIS - MUSE: 4 DEGREES
VENTRICULAR RATE- MUSE: 58 BPM

## 2024-02-20 ENCOUNTER — ANESTHESIA (OUTPATIENT)
Dept: SURGERY | Facility: OTHER | Age: 74
End: 2024-02-20
Payer: MEDICARE

## 2024-02-20 ENCOUNTER — APPOINTMENT (OUTPATIENT)
Dept: GENERAL RADIOLOGY | Facility: OTHER | Age: 74
End: 2024-02-20
Attending: ORTHOPAEDIC SURGERY
Payer: MEDICARE

## 2024-02-20 ENCOUNTER — HOSPITAL ENCOUNTER (OUTPATIENT)
Facility: OTHER | Age: 74
Discharge: HOME | End: 2024-02-21
Attending: ORTHOPAEDIC SURGERY | Admitting: ORTHOPAEDIC SURGERY
Payer: MEDICARE

## 2024-02-20 ENCOUNTER — APPOINTMENT (OUTPATIENT)
Dept: OCCUPATIONAL THERAPY | Facility: OTHER | Age: 74
End: 2024-02-20
Attending: ORTHOPAEDIC SURGERY
Payer: MEDICARE

## 2024-02-20 ENCOUNTER — APPOINTMENT (OUTPATIENT)
Dept: PHYSICAL THERAPY | Facility: OTHER | Age: 74
End: 2024-02-20
Attending: ORTHOPAEDIC SURGERY
Payer: MEDICARE

## 2024-02-20 DIAGNOSIS — M25.561 CHRONIC PAIN OF RIGHT KNEE: Primary | ICD-10-CM

## 2024-02-20 DIAGNOSIS — G89.29 CHRONIC PAIN OF RIGHT KNEE: Primary | ICD-10-CM

## 2024-02-20 PROBLEM — Z96.651 S/P TOTAL KNEE ARTHROPLASTY, RIGHT: Status: ACTIVE | Noted: 2024-02-20

## 2024-02-20 PROBLEM — G47.33 OSA ON CPAP: Chronic | Status: ACTIVE | Noted: 2021-05-27

## 2024-02-20 LAB — GLUCOSE BLDC GLUCOMTR-MCNC: 106 MG/DL (ref 70–99)

## 2024-02-20 PROCEDURE — 97165 OT EVAL LOW COMPLEX 30 MIN: CPT | Mod: GO | Performed by: OCCUPATIONAL THERAPIST

## 2024-02-20 PROCEDURE — 258N000003 HC RX IP 258 OP 636: Performed by: NURSE ANESTHETIST, CERTIFIED REGISTERED

## 2024-02-20 PROCEDURE — 250N000011 HC RX IP 250 OP 636: Performed by: ORTHOPAEDIC SURGERY

## 2024-02-20 PROCEDURE — 370N000017 HC ANESTHESIA TECHNICAL FEE, PER MIN: Performed by: ORTHOPAEDIC SURGERY

## 2024-02-20 PROCEDURE — 250N000011 HC RX IP 250 OP 636: Performed by: NURSE ANESTHETIST, CERTIFIED REGISTERED

## 2024-02-20 PROCEDURE — C1776 JOINT DEVICE (IMPLANTABLE): HCPCS | Performed by: ORTHOPAEDIC SURGERY

## 2024-02-20 PROCEDURE — 99100 ANES PT EXTEME AGE<1 YR&>70: CPT | Performed by: NURSE ANESTHETIST, CERTIFIED REGISTERED

## 2024-02-20 PROCEDURE — 97530 THERAPEUTIC ACTIVITIES: CPT | Mod: GP

## 2024-02-20 PROCEDURE — 710N000010 HC RECOVERY PHASE 1, LEVEL 2, PER MIN: Performed by: ORTHOPAEDIC SURGERY

## 2024-02-20 PROCEDURE — 64999 UNLISTED PX NERVOUS SYSTEM: CPT | Mod: RT | Performed by: NURSE ANESTHETIST, CERTIFIED REGISTERED

## 2024-02-20 PROCEDURE — 250N000009 HC RX 250: Performed by: NURSE ANESTHETIST, CERTIFIED REGISTERED

## 2024-02-20 PROCEDURE — 250N000013 HC RX MED GY IP 250 OP 250 PS 637: Performed by: ORTHOPAEDIC SURGERY

## 2024-02-20 PROCEDURE — 27447 TOTAL KNEE ARTHROPLASTY: CPT | Mod: RT | Performed by: ORTHOPAEDIC SURGERY

## 2024-02-20 PROCEDURE — 250N000013 HC RX MED GY IP 250 OP 250 PS 637: Performed by: INTERNAL MEDICINE

## 2024-02-20 PROCEDURE — 97116 GAIT TRAINING THERAPY: CPT | Mod: GP

## 2024-02-20 PROCEDURE — 258N000001 HC RX 258: Performed by: ORTHOPAEDIC SURGERY

## 2024-02-20 PROCEDURE — 64447 NJX AA&/STRD FEMORAL NRV IMG: CPT | Mod: RT | Performed by: NURSE ANESTHETIST, CERTIFIED REGISTERED

## 2024-02-20 PROCEDURE — 97535 SELF CARE MNGMENT TRAINING: CPT | Mod: GO | Performed by: OCCUPATIONAL THERAPIST

## 2024-02-20 PROCEDURE — 999N000065 XR KNEE PORT RIGHT 1/2 VIEWS: Mod: RT

## 2024-02-20 PROCEDURE — 272N000002 HC OR SUPPLY OTHER OPNP: Performed by: ORTHOPAEDIC SURGERY

## 2024-02-20 PROCEDURE — 258N000003 HC RX IP 258 OP 636: Performed by: ORTHOPAEDIC SURGERY

## 2024-02-20 PROCEDURE — 272N000001 HC OR GENERAL SUPPLY STERILE: Performed by: ORTHOPAEDIC SURGERY

## 2024-02-20 PROCEDURE — 97161 PT EVAL LOW COMPLEX 20 MIN: CPT | Mod: GP

## 2024-02-20 PROCEDURE — 999N000141 HC STATISTIC PRE-PROCEDURE NURSING ASSESSMENT: Performed by: ORTHOPAEDIC SURGERY

## 2024-02-20 PROCEDURE — C1713 ANCHOR/SCREW BN/BN,TIS/BN: HCPCS | Performed by: ORTHOPAEDIC SURGERY

## 2024-02-20 PROCEDURE — 360N000077 HC SURGERY LEVEL 4, PER MIN: Performed by: ORTHOPAEDIC SURGERY

## 2024-02-20 PROCEDURE — 27447 TOTAL KNEE ARTHROPLASTY: CPT | Performed by: NURSE ANESTHETIST, CERTIFIED REGISTERED

## 2024-02-20 PROCEDURE — 82962 GLUCOSE BLOOD TEST: CPT | Mod: GZ

## 2024-02-20 PROCEDURE — 99213 OFFICE O/P EST LOW 20 MIN: CPT | Mod: 25 | Performed by: INTERNAL MEDICINE

## 2024-02-20 DEVICE — IMPLANTABLE DEVICE
Type: IMPLANTABLE DEVICE | Site: KNEE | Status: FUNCTIONAL
Brand: PERSONA®

## 2024-02-20 DEVICE — IMPLANTABLE DEVICE
Type: IMPLANTABLE DEVICE | Site: KNEE | Status: FUNCTIONAL
Brand: PERSONA® NATURAL TIBIA®

## 2024-02-20 DEVICE — FULL DOSE BONE CEMENT, 10 PACK CATALOG NUMBER IS 6191-1-010
Type: IMPLANTABLE DEVICE | Site: KNEE | Status: FUNCTIONAL
Brand: SIMPLEX

## 2024-02-20 DEVICE — IMPLANTABLE DEVICE
Type: IMPLANTABLE DEVICE | Site: KNEE | Status: FUNCTIONAL
Brand: PERSONA™

## 2024-02-20 DEVICE — IMPLANTABLE DEVICE
Type: IMPLANTABLE DEVICE | Site: KNEE | Status: FUNCTIONAL
Brand: PERSONA® VIVACIT-E®

## 2024-02-20 RX ORDER — DEXAMETHASONE SODIUM PHOSPHATE 10 MG/ML
INJECTION, SOLUTION INTRAMUSCULAR; INTRAVENOUS
Status: COMPLETED | OUTPATIENT
Start: 2024-02-20 | End: 2024-02-20

## 2024-02-20 RX ORDER — ONDANSETRON 4 MG/1
4 TABLET, ORALLY DISINTEGRATING ORAL EVERY 30 MIN PRN
Status: DISCONTINUED | OUTPATIENT
Start: 2024-02-20 | End: 2024-02-20 | Stop reason: HOSPADM

## 2024-02-20 RX ORDER — BUPIVACAINE HYDROCHLORIDE 2.5 MG/ML
INJECTION, SOLUTION EPIDURAL; INFILTRATION; INTRACAUDAL
Status: COMPLETED | OUTPATIENT
Start: 2024-02-20 | End: 2024-02-20

## 2024-02-20 RX ORDER — NALOXONE HYDROCHLORIDE 0.4 MG/ML
0.4 INJECTION, SOLUTION INTRAMUSCULAR; INTRAVENOUS; SUBCUTANEOUS
Status: DISCONTINUED | OUTPATIENT
Start: 2024-02-20 | End: 2024-02-21 | Stop reason: HOSPADM

## 2024-02-20 RX ORDER — OXYCODONE HYDROCHLORIDE 5 MG/1
5 TABLET ORAL EVERY 4 HOURS PRN
Qty: 20 TABLET | Refills: 0 | Status: SHIPPED | OUTPATIENT
Start: 2024-02-20 | End: 2024-04-01

## 2024-02-20 RX ORDER — CEFAZOLIN SODIUM/WATER 2 G/20 ML
2 SYRINGE (ML) INTRAVENOUS
Status: COMPLETED | OUTPATIENT
Start: 2024-02-20 | End: 2024-02-20

## 2024-02-20 RX ORDER — LIDOCAINE 40 MG/G
CREAM TOPICAL
Status: DISCONTINUED | OUTPATIENT
Start: 2024-02-20 | End: 2024-02-21 | Stop reason: HOSPADM

## 2024-02-20 RX ORDER — HYDROMORPHONE HCL IN WATER/PF 6 MG/30 ML
0.4 PATIENT CONTROLLED ANALGESIA SYRINGE INTRAVENOUS EVERY 5 MIN PRN
Status: DISCONTINUED | OUTPATIENT
Start: 2024-02-20 | End: 2024-02-20 | Stop reason: HOSPADM

## 2024-02-20 RX ORDER — CELECOXIB 100 MG/1
400 CAPSULE ORAL ONCE
Status: COMPLETED | OUTPATIENT
Start: 2024-02-20 | End: 2024-02-20

## 2024-02-20 RX ORDER — IBUPROFEN 600 MG/1
600 TABLET, FILM COATED ORAL
Status: DISCONTINUED | OUTPATIENT
Start: 2024-02-20 | End: 2024-02-21 | Stop reason: HOSPADM

## 2024-02-20 RX ORDER — AMOXICILLIN 250 MG
1 CAPSULE ORAL 2 TIMES DAILY
Status: DISCONTINUED | OUTPATIENT
Start: 2024-02-20 | End: 2024-02-21 | Stop reason: HOSPADM

## 2024-02-20 RX ORDER — NALOXONE HYDROCHLORIDE 0.4 MG/ML
0.2 INJECTION, SOLUTION INTRAMUSCULAR; INTRAVENOUS; SUBCUTANEOUS
Status: DISCONTINUED | OUTPATIENT
Start: 2024-02-20 | End: 2024-02-21 | Stop reason: HOSPADM

## 2024-02-20 RX ORDER — SODIUM CHLORIDE, SODIUM LACTATE, POTASSIUM CHLORIDE, CALCIUM CHLORIDE 600; 310; 30; 20 MG/100ML; MG/100ML; MG/100ML; MG/100ML
INJECTION, SOLUTION INTRAVENOUS CONTINUOUS
Status: DISCONTINUED | OUTPATIENT
Start: 2024-02-20 | End: 2024-02-20 | Stop reason: HOSPADM

## 2024-02-20 RX ORDER — ACETAMINOPHEN 325 MG/1
650 TABLET ORAL EVERY 4 HOURS PRN
Status: DISCONTINUED | OUTPATIENT
Start: 2024-02-23 | End: 2024-02-21 | Stop reason: HOSPADM

## 2024-02-20 RX ORDER — ACETAMINOPHEN 325 MG/1
975 TABLET ORAL EVERY 8 HOURS
Qty: 27 TABLET | Refills: 0 | Status: DISCONTINUED | OUTPATIENT
Start: 2024-02-20 | End: 2024-02-21 | Stop reason: HOSPADM

## 2024-02-20 RX ORDER — ASPIRIN 81 MG/1
81 TABLET ORAL 2 TIMES DAILY
Qty: 60 TABLET | Refills: 0 | Status: SHIPPED | OUTPATIENT
Start: 2024-02-20 | End: 2024-03-21

## 2024-02-20 RX ORDER — NALOXONE HYDROCHLORIDE 0.4 MG/ML
0.2 INJECTION, SOLUTION INTRAMUSCULAR; INTRAVENOUS; SUBCUTANEOUS
Status: DISCONTINUED | OUTPATIENT
Start: 2024-02-20 | End: 2024-02-20 | Stop reason: HOSPADM

## 2024-02-20 RX ORDER — FLUMAZENIL 0.1 MG/ML
0.2 INJECTION, SOLUTION INTRAVENOUS
Status: DISCONTINUED | OUTPATIENT
Start: 2024-02-20 | End: 2024-02-20 | Stop reason: HOSPADM

## 2024-02-20 RX ORDER — NALOXONE HYDROCHLORIDE 0.4 MG/ML
0.4 INJECTION, SOLUTION INTRAMUSCULAR; INTRAVENOUS; SUBCUTANEOUS
Status: DISCONTINUED | OUTPATIENT
Start: 2024-02-20 | End: 2024-02-20 | Stop reason: HOSPADM

## 2024-02-20 RX ORDER — PROPRANOLOL HYDROCHLORIDE 40 MG/1
40 TABLET ORAL 2 TIMES DAILY
Status: DISCONTINUED | OUTPATIENT
Start: 2024-02-20 | End: 2024-02-21 | Stop reason: HOSPADM

## 2024-02-20 RX ORDER — KETOROLAC TROMETHAMINE 15 MG/ML
15 INJECTION, SOLUTION INTRAMUSCULAR; INTRAVENOUS EVERY 6 HOURS
Qty: 4 ML | Refills: 0 | Status: DISCONTINUED | OUTPATIENT
Start: 2024-02-20 | End: 2024-02-21

## 2024-02-20 RX ORDER — CEFAZOLIN SODIUM/WATER 2 G/20 ML
2 SYRINGE (ML) INTRAVENOUS SEE ADMIN INSTRUCTIONS
Status: DISCONTINUED | OUTPATIENT
Start: 2024-02-20 | End: 2024-02-20 | Stop reason: HOSPADM

## 2024-02-20 RX ORDER — PROPOFOL 10 MG/ML
INJECTION, EMULSION INTRAVENOUS CONTINUOUS PRN
Status: DISCONTINUED | OUTPATIENT
Start: 2024-02-20 | End: 2024-02-20

## 2024-02-20 RX ORDER — OXYCODONE HYDROCHLORIDE 5 MG/1
5 TABLET ORAL EVERY 4 HOURS PRN
Status: DISCONTINUED | OUTPATIENT
Start: 2024-02-20 | End: 2024-02-21 | Stop reason: HOSPADM

## 2024-02-20 RX ORDER — ONDANSETRON 4 MG/1
4 TABLET, ORALLY DISINTEGRATING ORAL EVERY 8 HOURS PRN
Qty: 4 TABLET | Refills: 0 | Status: ON HOLD | OUTPATIENT
Start: 2024-02-20 | End: 2024-04-03

## 2024-02-20 RX ORDER — OXYCODONE HYDROCHLORIDE 5 MG/1
5 TABLET ORAL
Status: COMPLETED | OUTPATIENT
Start: 2024-02-20 | End: 2024-02-20

## 2024-02-20 RX ORDER — FENTANYL CITRATE 50 UG/ML
25-50 INJECTION, SOLUTION INTRAMUSCULAR; INTRAVENOUS
Status: DISCONTINUED | OUTPATIENT
Start: 2024-02-20 | End: 2024-02-20 | Stop reason: HOSPADM

## 2024-02-20 RX ORDER — HYDROMORPHONE HCL IN WATER/PF 6 MG/30 ML
0.2 PATIENT CONTROLLED ANALGESIA SYRINGE INTRAVENOUS EVERY 5 MIN PRN
Status: DISCONTINUED | OUTPATIENT
Start: 2024-02-20 | End: 2024-02-20 | Stop reason: HOSPADM

## 2024-02-20 RX ORDER — LIDOCAINE 40 MG/G
CREAM TOPICAL
Status: DISCONTINUED | OUTPATIENT
Start: 2024-02-20 | End: 2024-02-20 | Stop reason: HOSPADM

## 2024-02-20 RX ORDER — ONDANSETRON 2 MG/ML
4 INJECTION INTRAMUSCULAR; INTRAVENOUS EVERY 6 HOURS PRN
Status: DISCONTINUED | OUTPATIENT
Start: 2024-02-20 | End: 2024-02-21 | Stop reason: HOSPADM

## 2024-02-20 RX ORDER — IBUPROFEN 600 MG/1
600 TABLET, FILM COATED ORAL EVERY 6 HOURS PRN
Qty: 30 TABLET | Refills: 0 | Status: SHIPPED | OUTPATIENT
Start: 2024-02-20 | End: 2024-04-01

## 2024-02-20 RX ORDER — VENLAFAXINE HYDROCHLORIDE 75 MG/1
75 CAPSULE, EXTENDED RELEASE ORAL 2 TIMES DAILY
Status: DISCONTINUED | OUTPATIENT
Start: 2024-02-20 | End: 2024-02-21 | Stop reason: HOSPADM

## 2024-02-20 RX ORDER — FENTANYL CITRATE 50 UG/ML
50 INJECTION, SOLUTION INTRAMUSCULAR; INTRAVENOUS EVERY 5 MIN PRN
Status: DISCONTINUED | OUTPATIENT
Start: 2024-02-20 | End: 2024-02-20 | Stop reason: HOSPADM

## 2024-02-20 RX ORDER — HYDROXYZINE HYDROCHLORIDE 25 MG/1
25 TABLET, FILM COATED ORAL EVERY 6 HOURS PRN
Qty: 30 TABLET | Refills: 0 | Status: ON HOLD | OUTPATIENT
Start: 2024-02-20 | End: 2024-04-03

## 2024-02-20 RX ORDER — CEFAZOLIN SODIUM/WATER 2 G/20 ML
2 SYRINGE (ML) INTRAVENOUS EVERY 8 HOURS
Qty: 40 ML | Refills: 0 | Status: COMPLETED | OUTPATIENT
Start: 2024-02-20 | End: 2024-02-21

## 2024-02-20 RX ORDER — PROCHLORPERAZINE MALEATE 5 MG
5 TABLET ORAL EVERY 6 HOURS PRN
Status: DISCONTINUED | OUTPATIENT
Start: 2024-02-20 | End: 2024-02-21 | Stop reason: HOSPADM

## 2024-02-20 RX ORDER — PROPOFOL 10 MG/ML
INJECTION, EMULSION INTRAVENOUS PRN
Status: DISCONTINUED | OUTPATIENT
Start: 2024-02-20 | End: 2024-02-20

## 2024-02-20 RX ORDER — POLYETHYLENE GLYCOL 3350 17 G/17G
17 POWDER, FOR SOLUTION ORAL DAILY
Status: DISCONTINUED | OUTPATIENT
Start: 2024-02-21 | End: 2024-02-21 | Stop reason: HOSPADM

## 2024-02-20 RX ORDER — FENTANYL CITRATE 50 UG/ML
25 INJECTION, SOLUTION INTRAMUSCULAR; INTRAVENOUS EVERY 5 MIN PRN
Status: DISCONTINUED | OUTPATIENT
Start: 2024-02-20 | End: 2024-02-20 | Stop reason: HOSPADM

## 2024-02-20 RX ORDER — ONDANSETRON 4 MG/1
4 TABLET, ORALLY DISINTEGRATING ORAL
Status: DISCONTINUED | OUTPATIENT
Start: 2024-02-20 | End: 2024-02-20

## 2024-02-20 RX ORDER — ACETAMINOPHEN 325 MG/1
975 TABLET ORAL ONCE
Status: COMPLETED | OUTPATIENT
Start: 2024-02-20 | End: 2024-02-20

## 2024-02-20 RX ORDER — ONDANSETRON 2 MG/ML
4 INJECTION INTRAMUSCULAR; INTRAVENOUS EVERY 30 MIN PRN
Status: DISCONTINUED | OUTPATIENT
Start: 2024-02-20 | End: 2024-02-20 | Stop reason: HOSPADM

## 2024-02-20 RX ORDER — ARIPIPRAZOLE 5 MG/1
10 TABLET ORAL DAILY
Status: DISCONTINUED | OUTPATIENT
Start: 2024-02-21 | End: 2024-02-21 | Stop reason: HOSPADM

## 2024-02-20 RX ORDER — LIDOCAINE HYDROCHLORIDE 20 MG/ML
INJECTION, SOLUTION INFILTRATION; PERINEURAL PRN
Status: DISCONTINUED | OUTPATIENT
Start: 2024-02-20 | End: 2024-02-20

## 2024-02-20 RX ORDER — ONDANSETRON 2 MG/ML
INJECTION INTRAMUSCULAR; INTRAVENOUS PRN
Status: DISCONTINUED | OUTPATIENT
Start: 2024-02-20 | End: 2024-02-20

## 2024-02-20 RX ORDER — BISACODYL 10 MG
10 SUPPOSITORY, RECTAL RECTAL DAILY PRN
Status: DISCONTINUED | OUTPATIENT
Start: 2024-02-20 | End: 2024-02-21 | Stop reason: HOSPADM

## 2024-02-20 RX ORDER — OXYCODONE HYDROCHLORIDE 5 MG/1
10 TABLET ORAL EVERY 4 HOURS PRN
Status: DISCONTINUED | OUTPATIENT
Start: 2024-02-20 | End: 2024-02-21 | Stop reason: HOSPADM

## 2024-02-20 RX ORDER — ONDANSETRON 4 MG/1
4 TABLET, ORALLY DISINTEGRATING ORAL EVERY 6 HOURS PRN
Status: DISCONTINUED | OUTPATIENT
Start: 2024-02-20 | End: 2024-02-21 | Stop reason: HOSPADM

## 2024-02-20 RX ORDER — HYDROMORPHONE HCL IN WATER/PF 6 MG/30 ML
0.2 PATIENT CONTROLLED ANALGESIA SYRINGE INTRAVENOUS
Status: DISCONTINUED | OUTPATIENT
Start: 2024-02-20 | End: 2024-02-21 | Stop reason: HOSPADM

## 2024-02-20 RX ORDER — HYDROMORPHONE HCL IN WATER/PF 6 MG/30 ML
0.4 PATIENT CONTROLLED ANALGESIA SYRINGE INTRAVENOUS
Status: DISCONTINUED | OUTPATIENT
Start: 2024-02-20 | End: 2024-02-21 | Stop reason: HOSPADM

## 2024-02-20 RX ORDER — SODIUM CHLORIDE, SODIUM LACTATE, POTASSIUM CHLORIDE, CALCIUM CHLORIDE 600; 310; 30; 20 MG/100ML; MG/100ML; MG/100ML; MG/100ML
INJECTION, SOLUTION INTRAVENOUS CONTINUOUS
Status: DISCONTINUED | OUTPATIENT
Start: 2024-02-20 | End: 2024-02-21 | Stop reason: ALTCHOICE

## 2024-02-20 RX ORDER — ASPIRIN 81 MG/1
81 TABLET ORAL 2 TIMES DAILY
Status: DISCONTINUED | OUTPATIENT
Start: 2024-02-21 | End: 2024-02-21 | Stop reason: HOSPADM

## 2024-02-20 RX ORDER — AMOXICILLIN 250 MG
1-2 CAPSULE ORAL 2 TIMES DAILY
Qty: 30 TABLET | Refills: 0 | Status: ON HOLD | OUTPATIENT
Start: 2024-02-20 | End: 2024-04-03

## 2024-02-20 RX ORDER — BUPIVACAINE HYDROCHLORIDE 2.5 MG/ML
INJECTION, SOLUTION INFILTRATION; PERINEURAL PRN
Status: DISCONTINUED | OUTPATIENT
Start: 2024-02-20 | End: 2024-02-20 | Stop reason: HOSPADM

## 2024-02-20 RX ORDER — TRANEXAMIC ACID 650 MG/1
1950 TABLET ORAL ONCE
Status: COMPLETED | OUTPATIENT
Start: 2024-02-20 | End: 2024-02-20

## 2024-02-20 RX ADMIN — PROPRANOLOL HYDROCHLORIDE 40 MG: 40 TABLET ORAL at 22:14

## 2024-02-20 RX ADMIN — Medication 2 G: at 16:48

## 2024-02-20 RX ADMIN — Medication 2 G: at 07:24

## 2024-02-20 RX ADMIN — CELECOXIB 400 MG: 100 CAPSULE ORAL at 06:26

## 2024-02-20 RX ADMIN — ACETAMINOPHEN 975 MG: 325 TABLET, FILM COATED ORAL at 20:11

## 2024-02-20 RX ADMIN — PHENYLEPHRINE HYDROCHLORIDE 100 MCG: 10 INJECTION INTRAVENOUS at 09:07

## 2024-02-20 RX ADMIN — VENLAFAXINE HYDROCHLORIDE 75 MG: 75 CAPSULE, EXTENDED RELEASE ORAL at 22:14

## 2024-02-20 RX ADMIN — SODIUM CHLORIDE, SODIUM LACTATE, POTASSIUM CHLORIDE, AND CALCIUM CHLORIDE: 600; 310; 30; 20 INJECTION, SOLUTION INTRAVENOUS at 09:10

## 2024-02-20 RX ADMIN — ACETAMINOPHEN 975 MG: 325 TABLET, FILM COATED ORAL at 06:26

## 2024-02-20 RX ADMIN — Medication 2 G: at 08:27

## 2024-02-20 RX ADMIN — LIDOCAINE HYDROCHLORIDE 40 MG: 20 INJECTION, SOLUTION INFILTRATION; PERINEURAL at 08:23

## 2024-02-20 RX ADMIN — MIDAZOLAM 2 MG: 1 INJECTION INTRAMUSCULAR; INTRAVENOUS at 07:12

## 2024-02-20 RX ADMIN — SENNOSIDES AND DOCUSATE SODIUM 1 TABLET: 8.6; 5 TABLET ORAL at 11:14

## 2024-02-20 RX ADMIN — TRANEXAMIC ACID 1950 MG: 650 TABLET ORAL at 06:26

## 2024-02-20 RX ADMIN — PHENYLEPHRINE HYDROCHLORIDE 100 MCG: 10 INJECTION INTRAVENOUS at 08:48

## 2024-02-20 RX ADMIN — ONDANSETRON HYDROCHLORIDE 4 MG: 2 SOLUTION INTRAMUSCULAR; INTRAVENOUS at 07:39

## 2024-02-20 RX ADMIN — SODIUM CHLORIDE, POTASSIUM CHLORIDE, SODIUM LACTATE AND CALCIUM CHLORIDE: 600; 310; 30; 20 INJECTION, SOLUTION INTRAVENOUS at 11:14

## 2024-02-20 RX ADMIN — DEXAMETHASONE SODIUM PHOSPHATE 5 MG: 10 INJECTION, SOLUTION INTRAMUSCULAR; INTRAVENOUS at 07:17

## 2024-02-20 RX ADMIN — KETOROLAC TROMETHAMINE 15 MG: 15 INJECTION, SOLUTION INTRAMUSCULAR; INTRAVENOUS at 18:16

## 2024-02-20 RX ADMIN — BUPIVACAINE HYDROCHLORIDE 20 ML: 2.5 INJECTION, SOLUTION EPIDURAL; INFILTRATION; INTRACAUDAL; PERINEURAL at 07:17

## 2024-02-20 RX ADMIN — MEPIVACAINE HYDROCHLORIDE 2.5 ML: 20 INJECTION, SOLUTION INFILTRATION at 08:18

## 2024-02-20 RX ADMIN — PHENYLEPHRINE HYDROCHLORIDE 100 MCG: 10 INJECTION INTRAVENOUS at 09:12

## 2024-02-20 RX ADMIN — SENNOSIDES AND DOCUSATE SODIUM 1 TABLET: 8.6; 5 TABLET ORAL at 22:13

## 2024-02-20 RX ADMIN — SODIUM CHLORIDE, SODIUM LACTATE, POTASSIUM CHLORIDE, AND CALCIUM CHLORIDE 100 ML/HR: 600; 310; 30; 20 INJECTION, SOLUTION INTRAVENOUS at 06:53

## 2024-02-20 RX ADMIN — MIDAZOLAM HYDROCHLORIDE 2 MG: 1 INJECTION, SOLUTION INTRAMUSCULAR; INTRAVENOUS at 07:42

## 2024-02-20 RX ADMIN — OXYCODONE HYDROCHLORIDE 5 MG: 5 TABLET ORAL at 12:35

## 2024-02-20 RX ADMIN — PHENYLEPHRINE HYDROCHLORIDE 100 MCG: 10 INJECTION INTRAVENOUS at 08:58

## 2024-02-20 RX ADMIN — SODIUM CHLORIDE, POTASSIUM CHLORIDE, SODIUM LACTATE AND CALCIUM CHLORIDE: 600; 310; 30; 20 INJECTION, SOLUTION INTRAVENOUS at 16:48

## 2024-02-20 RX ADMIN — PROPOFOL 70 MG: 10 INJECTION, EMULSION INTRAVENOUS at 08:23

## 2024-02-20 RX ADMIN — ACETAMINOPHEN 975 MG: 325 TABLET, FILM COATED ORAL at 12:25

## 2024-02-20 RX ADMIN — DEXAMETHASONE SODIUM PHOSPHATE 5 MG: 10 INJECTION, SOLUTION INTRAMUSCULAR; INTRAVENOUS at 07:22

## 2024-02-20 RX ADMIN — PROPOFOL 140 MCG/KG/MIN: 10 INJECTION, EMULSION INTRAVENOUS at 08:23

## 2024-02-20 RX ADMIN — BUPIVACAINE HYDROCHLORIDE 20 ML: 2.5 INJECTION, SOLUTION EPIDURAL; INFILTRATION; INTRACAUDAL at 07:22

## 2024-02-20 ASSESSMENT — ACTIVITIES OF DAILY LIVING (ADL)
ADLS_ACUITY_SCORE: 34
ADLS_ACUITY_SCORE: 30
ADLS_ACUITY_SCORE: 31
ADLS_ACUITY_SCORE: 34
ADLS_ACUITY_SCORE: 30
ADLS_ACUITY_SCORE: 31
ADLS_ACUITY_SCORE: 30
ADLS_ACUITY_SCORE: 34
ADLS_ACUITY_SCORE: 30

## 2024-02-20 NOTE — PHARMACY-ADMISSION MEDICATION HISTORY
"Pharmacist Admission Medication History    Admission medication history is complete. The information provided in this note is only as accurate as the sources available at the time of the update.    Information Source(s): Patient and CareEverywhere/SureScripts via in-person    Pertinent Information: Patient very tired during interview. Not truly able to speak to last doses- states she is \"pretty sure\" she didn't take abilify on AM of admission.    Changes made to PTA medication list:  Added: None  Deleted: Clobetasol  Changed: Abilify to daily    Allergies reviewed with patient and updates made in EHR: yes    Medication History Completed By: Melinda Toro [facility] 2/20/2024 2:31 PM    PTA Med List   Medication Sig Last Dose    acetaminophen (TYLENOL) 325 MG tablet Take 325 mg by mouth every 4 hours as needed Max acetaminophen dose: 4000 mg in 24 hours Past Week at PRN    ARIPiprazole (ABILIFY) 10 MG tablet Take 10 mg by mouth daily 2/19/2024 at AM    aspirin 81 MG EC tablet Take 1 tablet (81 mg) by mouth 2 times daily for 30 days     calcium carbonate 600 mg-vitamin D 400 units (CALCIUM CARB-CHOLECALCIFEROL) 600-400 MG-UNIT per tablet Take 1 tablet by mouth daily 2/1/24    Cholecalciferol (VITAMIN D3) 25 MCG (1000 UT) CAPS Take 1 capsule by mouth daily 2/1/24    hydrOXYzine HCl (ATARAX) 25 MG tablet Take 1 tablet (25 mg) by mouth every 6 hours as needed for itching     ibuprofen (ADVIL/MOTRIN) 200 MG tablet Take 200 mg by mouth 4 times daily as needed Past Month at PRN    ibuprofen (ADVIL/MOTRIN) 600 MG tablet Take 1 tablet (600 mg) by mouth every 6 hours as needed for other (mild and/or inflammatory pain)     Lutein-Zeaxanthin (OCUVITE LUTEIN 25) 25-5 MG CAPS Take 1 capsule by mouth daily  2/1/24    Multiple Vitamins-Minerals (OCUVITE PO) Take 1 tablet by mouth daily 2/1/24    ondansetron (ZOFRAN ODT) 4 MG ODT tab Take 1 tablet (4 mg) by mouth every 8 hours as needed for nausea     oxyCODONE (ROXICODONE) 5 MG tablet " Take 1 tablet (5 mg) by mouth every 4 hours as needed for moderate to severe pain     propranolol (INDERAL) 40 MG tablet Take 1 tablet (40 mg) by mouth 2 times daily Increase in dose 2/20/2024 at AM    senna-docusate (SENOKOT-S/PERICOLACE) 8.6-50 MG tablet Take 1-2 tablets by mouth 2 times daily     venlafaxine (EFFEXOR-XR) 75 MG 24 hr capsule Take 75 mg by mouth 2 times daily  2/19/2024 at AM

## 2024-02-20 NOTE — ANESTHESIA POSTPROCEDURE EVALUATION
Patient: aDly Perry    Procedure: Procedure(s):  ARTHROPLASTY, KNEE, TOTAL       Anesthesia Type:  Spinal    Note:  Disposition: Outpatient   Postop Pain Control: Uneventful            Sign Out: Well controlled pain   PONV: No   Neuro/Psych: Uneventful            Sign Out: Acceptable/Baseline neuro status   Airway/Respiratory: Uneventful            Sign Out: Acceptable/Baseline resp. status   CV/Hemodynamics: Uneventful            Sign Out: Acceptable CV status; No obvious hypovolemia; No obvious fluid overload   Other NRE: NONE   DID A NON-ROUTINE EVENT OCCUR? No           Last vitals:  Vitals Value Taken Time   /64 02/20/24 1005   Temp 97.7  F (36.5  C) 02/20/24 1000   Pulse 58 02/20/24 1005   Resp 14 02/20/24 1005   SpO2 96 % 02/20/24 1005   Vitals shown include unfiled device data.    Electronically Signed By: LIANG WELSH CRNA  February 20, 2024  1:18 PM

## 2024-02-20 NOTE — OP NOTE
Preoperative Diagnosis: Right Knee Osteoarthritis    Postoperative Diagnosis: Right Knee Osteoarthritis    Procedure: Right Total Knee Arthroplasty    Surgeon: Parth Hansen MD    Assistants: Jay Jay VIZCARRA    A skilled first assistant was required for patient positioning, retracting, and carrying out the procedure in a safe and effective manner    Anesthesia:   1) Spinal with Sedation  2) Adductor Canal Femoral Nerve Block  3) Local Injection around surgical site    Findings:    1) Tricompartmental OA   2) Stable TKA with ROM 0-125   3) Central Patella Tracking   4) Adequate hemostasis     Implants:    1) Yudi persona Size 8N Femoral Component cemented   2) Size D Tibial Base Plate cemented   3) Size 32mm Patella   4) 10mm PS Poly Insert    Tourniquet Time: Not Used    Estimated Blood Loss: 100 ml    Specimens: None    Drains: None    Complications: None    Indications:   This is a 73 year old patient with long standing right knee pain.  They have failed nonoperative treatment including use of NSAIDs; Tylenol; injections and exercise.  Given the continued symptoms they wished to proceed with a knee replacement.  The risks including pain, bleeding, infection, deep vein thrombosis, pulmonary embolism, myocardial infarction, component failure, need for revision operation was discussed with the patient.  The surgical consent was signed and surgical site was marked.  All questions regarding postoperative disposition were answered.      Description of Procedure:   The patient was administered a adductor canal femoral nerve block in the preoperative area.  They were then taken to the operating room and placed under spinal anesthesia.  A non-sterile tourniquet was applied and the right leg was prepped with a Chloraprep wipe and Chloraprep device and was draped in standard fashion.  A timeout was called to identify the correct patient and surgical site.  A midline incision and medial parapatellar arthrotomy was  completed.  Adequate medial and lateral releases were completed.  The patella was everted and 9.0 mm of bone resection was completed using the patella clamp.  The patella was sized to 32 mm and the lug holes were drilled.  The patella protector plate was placed.  The knee was flexed and the intramedullary canal was drilled.  The intramedullary 5 degree Right distal femoral guide was placed.  Standard bone resection was completed.  The posterior referencing femoral sizing guide was placed and the femur was sized to a 8.  This cutting guide was placed and the 4 chamfer cuts were made.  The ACL, PCL, Medial and Lateral Meniscus were resected.  The tibia was subluxed anteriorly and the posterior retractor was placed.  The extramedullary proximal tibia cutting guide and made 10 mm of bone resection off the less involved lateral compartment.  The flexion and extension gaps were checked and pins were removed.  The Femoral component was placed and the box cut for the posterior stabilizing component was made.  All trial implants were placed and the knee was brought out to extension.  The rotation of the tibial trial was marked.  The knee came out to full extension and flexed to 125 degrees with central patellar tracking.  The tibia was sized to a D and prepared in standard fashion.  The periarticular injection was completed and the bone ends were washed and dried.  Final implants were cemented in place.  Betadine and pulse lavage irrigation was used.   A 10 mm PS poly insert was chosen and confirmed knee ROM and patella tracking.  The arthrotomy was closed with a #1 Vicryl suture in interrupted fashion.  A 0 Vicryl running suture was used in the deep fascia.  The skin was closed with a 2-0 Vicryl and staples.  An Aquacel dressing was applied.  The patient was awakened and transferred to PACU in stable condition.      Postoperative Plan:   Routine TKA protocol (Weightbearing as tolerated, PT, Pain control, DVT prophylaxis  with Aspirin).  Anticipate discharge in 1-2 days.  Follow-up in  2 weeks in [facility].  No X-rays needed.

## 2024-02-20 NOTE — PROGRESS NOTES
"[facility]    Medicine Progress Note - Hospitalist Service    Date of Admission:  2/20/2024    Assessment & Plan   Principal Problem:    S/P total knee arthroplasty, right    Assessment: postoperative day # 0. Currently no pain. Still numb in feet. No dyspnea, no nausea, vomiting     Plan: pain control   Physical and occupational therapy      Active Problems:    Circumscribed scleroderma    Assessment: chronic        Severe major depression with psychotic features (H)    Assessment: chronic        KB on CPAP    Assessment: chronic        Chronic pain of right knee          Diet: Advance Diet as Tolerated: Regular Diet Adult  Discharge Instruction - Regular Diet Adult    DVT Prophylaxis: aspirin BID  Barajas Catheter: Not present  Lines: None     Cardiac Monitoring: None  Code Status: Full Code      Clinically Significant Risk Factors Present on Admission                       # Obesity: Estimated body mass index is 32.27 kg/m  as calculated from the following:    Height as of this encounter: 1.626 m (5' 4\").    Weight as of this encounter: 85.3 kg (188 lb).              Disposition Plan      Expected Discharge Date: 02/21/2024                    Hu Rivero MD  Hospitalist Service  [facility]  Securely message with [employer] [place] info)  Text page via [facility] Paging/Directory   ______________________________________________________________________    Interval History   Feeling well, no pain, no nausea, vomiting, no dyspnea.     Physical Exam   Vital Signs: Temp: 96.9  F (36.1  C) Temp src: Tympanic BP: 117/69 Pulse: 59   Resp: 18 SpO2: 97 % O2 Device: None (Room air) Oxygen Delivery: 2 LPM  Weight: 188 lbs 0 oz    GENERAL: Comfortable, no apparent distress.  CARDIOVASCULAR: regular rate and rhythm, no murmur. No lower extremity edema   RESPIRATORY: Clear to auscultation bilaterally, no wheezes or crackles.  GI: non-tender, non-distended, normal bowel sounds.   SKIN: warm " periphery, no rashes      Medical Decision Making       35 MINUTES SPENT BY ME on the date of service doing chart review, history, exam, documentation & further activities per the note.

## 2024-02-20 NOTE — ANESTHESIA PROCEDURE NOTES
"Intrathecal injection Procedure Note    Pre-Procedure   Staff -        CRNA: Rachel Avila APRN CRNA       Other Anesthesia Staff: Carolyn Morales       Performed By: CRNA       Location: OR       Procedure Start/Stop Times: 2/20/2024 7:43 AM and 2/20/2024 8:18 AM       Pre-Anesthestic Checklist: patient identified, IV checked, risks and benefits discussed, informed consent, monitors and equipment checked, pre-op evaluation, at physician/surgeon's request and post-op pain management  Timeout:       Correct Patient: Yes        Correct Procedure: Yes        Correct Site: Yes        Correct Position: Yes   Procedure Documentation  Procedure: intrathecal injection       Diagnosis: Primary Anesthesia       Patient Position: sitting       Patient Prep/Sterile Barriers: sterile gloves, mask, patient draped       Skin prep: Chloraprep       Insertion Site: L3-4, L2-3, L1-2, L4-5. (midline approach).       Needle Gauge: 25.        Needle Length (Inches): 3.5        Spinal Needle Type: Buzz tip       Introducer used       Introducer: 20 G       # of attempts:  # of redirects:  3         Number of attempts: 4.    Assessment/Narrative         Paresthesias: No.       CSF fluid: clear.    Medication(s) Administered   Medication Administration Time: 2/20/2024 7:43 AM      FOR [facility] ([facility]) ONLY:   Pain Team Contact information: please page the Pain Team Via [employer]. Search \"Pain\". During daytime hours, please page the attending first. At night please page the resident first.      "

## 2024-02-20 NOTE — PROGRESS NOTES
Interdisciplinary Discharge Planning Note    Anticipated Discharge Date: 2/21    Anticipated Discharge Location: Home    Clinical Needs Before Discharge:  stable functional status    Treatment Needs After Discharge:  None identified    Potential Barriers to Discharge: None identified at this time    RAZA Lindsey  2/20/2024,  1:13 PM

## 2024-02-20 NOTE — ANESTHESIA CARE TRANSFER NOTE
Patient: Daly Perry    Procedure: Procedure(s):  ARTHROPLASTY, KNEE, TOTAL       Diagnosis: Chronic pain of right knee [M25.561, G89.29]  Diagnosis Additional Information: No value filed.    Anesthesia Type:   Spinal     Note:    Oropharynx: oropharynx clear of all foreign objects  Level of Consciousness: awake  Oxygen Supplementation: nasal cannula  Level of Supplemental Oxygen (L/min / FiO2): 3  Independent Airway: airway patency satisfactory and stable  Dentition: dentition unchanged  Vital Signs Stable: post-procedure vital signs reviewed and stable  Report to RN Given: handoff report given  Patient transferred to: Phase II    Handoff Report: Identifed the Patient, Identified the Reponsible Provider, Reviewed the pertinent medical history, Discussed the surgical course, Reviewed Intra-OP anesthesia mangement and issues during anesthesia, Set expectations for post-procedure period and Allowed opportunity for questions and acknowledgement of understanding    Vitals:  Vitals Value Taken Time   BP     Temp     Pulse     Resp     SpO2 93 % 02/20/24 0929   Vitals shown include unfiled device data.    Electronically Signed By: LIANG WELSH CRNA  February 20, 2024  9:29 AM

## 2024-02-20 NOTE — PROGRESS NOTES
02/20/24 0144   Appointment Info   Signing Clinician's Name / Credentials (OT) Liz Lema, OTR/L   Living Environment   People in Home spouse   Current Living Arrangements house   Home Accessibility no concerns;stairs to enter home   Number of Stairs, Main Entrance 5   Stair Railings, Main Entrance railings safe and in good condition   Transportation Anticipated family or friend will provide;car, drives self   Self-Care   Usual Activity Tolerance good   Current Activity Tolerance fair   Equipment Currently Used at Home cane, straight;crutches;raised toilet seat;shower chair;walker, rolling   Fall history within last six months no   Cognitive Status Examination   Orientation Status orientation to person, place and time   Affect/Mental Status (Cognitive) WFL   Follows Commands WFL   Pain Assessment   Patient Currently in Pain No   Range of Motion Comprehensive   General Range of Motion no range of motion deficits identified   Coordination   Upper Extremity Coordination No deficits were identified   Bed Mobility   Bed Mobility supine-sit   Supine-Sit [place] (Bed Mobility) minimum assist (75% patient effort);1 person to manage equipment   Transfers   Transfers toilet transfer   Toilet Transfer   Type (Toilet Transfer) stand-sit   [place] Level (Toilet Transfer) minimum assist (75% patient effort);1 person to manage equipment   Assistive Device (Toilet Transfer) grab bars/safety frame   Clinical Impression   Criteria for Skilled Therapeutic Interventions Met (OT) Yes, treatment indicated   OT Diagnosis right TKA   Influenced by the following impairments limited mobility   OT Problem List-Impairments impacting ADL activity tolerance impaired;post-surgical precautions   Assessment of Occupational Performance 1-3 Performance Deficits   Identified Performance Deficits mobility and self care   Planned Therapy Interventions (OT) ADL retraining;progressive activity/exercise   Clinical Decision Making Complexity  (OT) problem focused assessment/low complexity   Risk & Benefits of therapy have been explained risks/benefits reviewed   OT Total Evaluation Time   OT Eval, Low Complexity Minutes (05893) 15   OT Goals   Therapy Frequency (OT) Daily   OT Predicted Duration/Target Date for Goal Attainment 02/21/24   OT Goals Upper Body Dressing;Lower Body Dressing;Upper Body Bathing;Lower Body Bathing;Transfers;Toilet Transfer/Toileting   OT: Upper Body Dressing Supervision/stand-by assist   OT: Lower Body Dressing Supervision/stand-by assist   OT: Upper Body Bathing Supervision/stand-by assist   OT: Lower Body Bathing Supervision/stand-by assist   OT: Transfer Supervision/stand-by assist   OT: Toilet Transfer/Toileting Supervision/stand-by assist   Interventions   Interventions Quick Adds Self-Care/Home Management   Self-Care/Home Management   Self-Care/Home Mgmt/ADL, Compensatory, Meal Prep Minutes (44882) 25   Walker Level (Toilet Training) minimum assist (75% patient effort)   Assistance (Toilet Training) 1 person assist   OT Discharge Planning   OT Plan Progress activity tolerance and ADL's   OT Discharge Recommendation (DC Rec) home with assist   OT Rationale for DC Rec Pt will likely have no further OT needs upon D/C   OT Brief overview of current status see above for details, pt progressing well on day of surgery, on track for d/c home tomorrow   OT Equipment Needed at Discharge   (has all)   Total Session Time   Timed Code Treatment Minutes 25   Total Session Time (sum of timed and untimed services) 40

## 2024-02-20 NOTE — PROVIDER NOTIFICATION
02/20/24 1025   Valuables   Patient Belongings remains with patient   Patient Belongings Remaining with Patient vision aids;cell phone/electronics;clothing;medical/assistive equipment   Did you bring any home meds/supplements to the hospital?  No     A               Admission:  I am responsible for any personal items that are not sent to the safe or pharmacy.  [facility] is not responsible for loss, theft or damage of any property in my possession.    Signature:  _________________________________ Date: _______  Time: _____                                              Staff Signature:  ____________________________ Date: ________  Time: _____      2nd Staff person, if patient is unable/unwilling to sign:    Signature: ________________________________ Date: ________  Time: _____     Discharge:  [facility] has returned all of my personal belongings:    Signature: _________________________________ Date: ________  Time: _____                                          Staff Signature:  ____________________________ Date: ________  Time: _____

## 2024-02-20 NOTE — PROGRESS NOTES
02/20/24 1441   Appointment Info   Signing Clinician's Name / Credentials (PT) Jimenez Joseph MPT   Living Environment   People in Home spouse   Current Living Arrangements house   Home Accessibility no concerns;stairs to enter home   Number of Stairs, Main Entrance 5   Stair Railings, Main Entrance railings safe and in good condition   Transportation Anticipated family or friend will provide;car, drives self   Self-Care   Usual Activity Tolerance good   Current Activity Tolerance fair   Equipment Currently Used at Home cane, straight;crutches;raised toilet seat;shower chair;walker, rolling   Fall history within last six months no   General Information   Referring Physician Jade   Patient/Family Therapy Goals Statement (PT) return home   Existing Precautions/Restrictions fall  (s/p right TKA)   Weight-Bearing Status - LLE full weight-bearing   Weight-Bearing Status - RLE weight-bearing as tolerated   Cognition   Affect/Mental Status (Cognition) WFL   Orientation Status (Cognition) oriented x 4   Follows Commands (Cognition) WFL   Pain Assessment   Patient Currently in Pain Yes, see Vital Sign flowsheet   Integumentary/Edema   Integumentary/Edema Comments surgical wound anterior right knee   Posture    Posture Not impaired   Range of Motion (ROM)   Range of Motion ROM is WFL   ROM Comment right knee (10-60) with ACE wrap   Strength (Manual Muscle Testing)   Strength (Manual Muscle Testing) strength is WFL   Strength Comments right LE not tested post-op   Bed Mobility   Impairments Contributing to Impaired Bed Mobility pain;decreased strength   Comment, (Bed Mobility) moderate assist for right LE support   Transfers   Impairments Contributing to Impaired Transfers pain;decreased strength   Comment, (Transfers) minimal assist with Fww   Gait/Stairs (Locomotion)   [place] Level (Gait) contact guard   Assistive Device (Gait) walker, front-wheeled   Distance in Feet (Gait) 20   Pattern (Gait) 3-point   Balance    Balance Comments good with Fww   Sensory Examination   Sensory Perception WFL   Coordination   Coordination no deficits were identified   Muscle Tone   Muscle Tone no deficits were identified   Clinical Impression   Criteria for Skilled Therapeutic Intervention Yes, treatment indicated   PT Diagnosis (PT) impaired mobility   Influenced by the following impairments fatigue, pain and weakness   Functional limitations due to impairments activity/gait tolerance and stability   Clinical Presentation (PT Evaluation Complexity) stable   Clinical Decision Making (Complexity) low complexity   Planned Therapy Interventions (PT) bed mobility training;gait training;transfer training;home exercise program   Risk & Benefits of therapy have been explained evaluation/treatment results reviewed;risks/benefits reviewed;patient;spouse/significant other   PT Total Evaluation Time   PT Eval, Low Complexity Minutes (51741) 15   Physical Therapy Goals   PT Frequency Daily   PT Predicted Duration/Target Date for Goal Attainment 02/22/24   PT Goals Bed Mobility;Transfers;Gait   PT: Bed Mobility Supervision/stand-by assist   PT: Transfers Supervision/stand-by assist;Assistive device   PT: Gait Supervision/stand-by assist;Rolling walker;150 feet   PT Discharge Planning   PT Plan Continue PT   PT Discharge Recommendation (DC Rec) home with assist;home with outpatient physical therapy   PT Rationale for DC Rec to promote strength, stability and safe mobility   PT Brief overview of current status assist necessary for all mobilities; fair pain control; patient will benefit from overnight stay and continued PT   PT Equipment Needed at Discharge walker, rolling

## 2024-02-20 NOTE — PROGRESS NOTES
"[facility] ADMISSION NOTE    Patient admitted to room 333 at approximately 1015 via bed from surgery. Patient was accompanied by spouse and transport tech.     Verbal SBAR report received from MERVIN Alas prior to patient arrival.     Patient trasferred to bed via slide Patient alert and oriented X 3. The patient is not having any pain.  . Admission vital signs: Blood pressure 117/69, pulse 59, temperature 96.9  F (36.1  C), temperature source Tympanic, resp. rate 18, height 1.626 m (5' 4\"), weight 85.3 kg (188 lb), SpO2 97%, not currently breastfeeding. Patient and spouse was oriented to plan of care, call light, bed controls, tv, telephone, bathroom, and visiting hours.     Risk Assessment    The following safety risks were identified during admission: fall. Yellow risk band applied: YES.     Skin Initial Assessment    This writer admitted this patient and completed a full skin assessment and Arnold score in the Adult PCS flowsheet. Appropriate interventions initiated as needed.       Education    Patient has a [place] to Observation order: No  Observation education completed and documented: N/A      Belkys Vences RN      "

## 2024-02-20 NOTE — ANESTHESIA PROCEDURE NOTES
Adductor canal Procedure Note    Pre-Procedure   Staff -        CRNA: Rachel Avila APRN CRNA       Performed By: CRNA       Location: pre-op       Procedure Start/Stop Times: 2/20/2024 7:18 AM and 2/20/2024 7:22 AM       Pre-Anesthestic Checklist: patient identified, IV checked, site marked, risks and benefits discussed, informed consent, monitors and equipment checked, pre-op evaluation, at physician/surgeon's request and post-op pain management  Timeout:       Correct Patient: Yes        Correct Procedure: Yes        Correct Site: Yes        Correct Position: Yes        Correct Laterality: Yes        Site Marked: Yes  Procedure Documentation  Procedure: Adductor canal       Diagnosis: POST OPERATIVE PAIN CONTROL       Laterality: right       Patient Position: supine       Patient Prep/Sterile Barriers: sterile gloves, mask       Skin prep: Chloraprep       Local skin infiltrated with 1 mL of 1% lidocaine.        Needle Type: insulated and short bevel       Needle Gauge: 20.        Needle Length (Inches): 4        Ultrasound guided       1. Ultrasound was used to identify targeted nerve, plexus, vascular marker, or fascial plane and place a needle adjacent to it in real-time.       2. Ultrasound was used to visualize the spread of anesthetic in close proximity to the above referenced structure.       3. A permanent image is entered into the patient's record.       4. The visualized anatomic structures appeared normal.       5. There were no apparent abnormal pathologic findings.    Assessment/Narrative         The placement was negative for: blood aspirated and site bleeding       Paresthesias: No.       Bolus given via needle..        Secured via.        Insertion/Infusion Method: Single Shot       Complications: none    Medication(s) Administered   Bupivacaine 0.25% PF (Infiltration) - Infiltration   20 mL - 2/20/2024 7:22:00 AM  Dexamethasone 10 mg/mL PF (Perineural) - Perineural   5 mg - 2/20/2024 7:22:00  "AM  Medication Administration Time: 2/20/2024 7:18 AM      FOR [facility] ([facility]) ONLY:   Pain Team Contact information: please page the Pain Team Via [employer]. Search \"Pain\". During daytime hours, please page the attending first. At night please page the resident first.      "

## 2024-02-20 NOTE — OR NURSING
PACU Transfer Note    Daly Perry was transferred to [facility] via bed.  Equipment used for transport:  none.  Accompanied by:  rn  Prescriptions were: none    PACU Respiratory Event Documentation     1) Episodes of Apnea greater than or equal to 10 seconds: no    2) Bradypnea - less than 8 breaths per minute: no    3) Pain score on 0 to 10 scale: 0    4) Pain-sedation mismatch (yes or no): no    5) Repeated 02 desaturation less than 90% (yes or no): no    Anesthesia notified? (yes or no): no    Any of the above events occuring repeatedly in separate 30 minute intervals may be considered recurrent PACU respiratory events.    Patient stable and meets phase 1 discharge criteria for transport from PACU.

## 2024-02-20 NOTE — ANESTHESIA PROCEDURE NOTES
IPACK Procedure Note    Pre-Procedure   Staff -        CRNA: Rachel Avila APRN CRNA       Performed By: CRNA       Location: pre-op       Procedure Start/Stop Times: 2/20/2024 7:12 AM and 2/20/2024 7:17 AM       Pre-Anesthestic Checklist: patient identified, IV checked, site marked, risks and benefits discussed, informed consent, monitors and equipment checked, pre-op evaluation, at physician/surgeon's request and post-op pain management  Timeout:       Correct Patient: Yes        Correct Procedure: Yes        Correct Site: Yes        Correct Position: Yes        Correct Laterality: Yes        Site Marked: Yes  Procedure Documentation  Procedure: IPACK       Diagnosis: POST OPERATIVE PAIN CONTROL       Laterality: right       Patient Position: lateral       Patient Prep/Sterile Barriers: sterile gloves, mask       Skin prep: Chloraprep       Local skin infiltrated with 1 mL of 1% lidocaine.        Needle Type: insulated and short bevel       Needle Gauge: 20.        Needle Length (Inches): 4        Ultrasound guided       1. Ultrasound was used to identify targeted nerve, plexus, vascular marker, or fascial plane and place a needle adjacent to it in real-time.       2. Ultrasound was used to visualize the spread of anesthetic in close proximity to the above referenced structure.       3. A permanent image is entered into the patient's record.       4. The visualized anatomic structures appeared normal.       5. There were no apparent abnormal pathologic findings.    Assessment/Narrative         The placement was negative for: blood aspirated, painful injection and site bleeding       Paresthesias: No.       Bolus given via needle..        Secured via.        Insertion/Infusion Method: Single Shot       Complications: none    Medication(s) Administered   Bupivacaine 0.25% PF (Infiltration) - Infiltration   20 mL - 2/20/2024 7:17:00 AM  Dexamethasone 10 mg/mL PF (Perineural) - Perineural   5 mg - 2/20/2024  "7:17:00 AM  Medication Administration Time: 2/20/2024 7:12 AM      FOR [facility] ([facility]) ONLY:   Pain Team Contact information: please page the Pain Team Via [employer]. Search \"Pain\". During daytime hours, please page the attending first. At night please page the resident first.      "

## 2024-02-20 NOTE — ANESTHESIA PREPROCEDURE EVALUATION
Anesthesia Pre-Procedure Evaluation    Patient: Daly Perry   MRN: [id number] : 1950        Procedure : Procedure(s):  ARTHROPLASTY, KNEE, TOTAL          Past Medical History:   Diagnosis Date    Closed left ankle fracture     Cyst of ovary     Hx of right ovarian cyst.    Diverticulosis of large intestine 2011    Endometriosis     growth on ovary s/p oophrectomy    Fibrocystic breast disease 2018    Generalized anxiety disorder     Dysthymia, generalized anxiety    Lichen sclerosus     Vulvar LSEA; asymptomatic    Neck pain, chronic 2018     Improved after [facility]    Rosacea 2013    Severe major depression with psychotic features (H) 10/25/2016    history of ECT; inpatient for several months; she does not have complete memory of that time    Sleep apnea       Past Surgical History:   Procedure Laterality Date    ARTHROSCOPY KNEE WITH MENISCECTOMY Right 4/15/2022    Procedure: Right Knee Arthoscopy with Partial MEDIAL AND Lateral Meniscectomy and  chondroplasty;  Surgeon: Parth Hansen MD;  Location: [facility] OR    BIOPSY BREAST Right 2008    stereotactic, benign result    CATARACT IOL, RT/LT Bilateral     ,     COLONOSCOPY  2011    Normal; F/U     DILATION AND CURETTAGE  2003    D&C with hysteroscopy and endometrial ablation    OOPHORECTOMY Right 1997    OPEN REDUCTION INTERNAL FIXATION ANKLE Left     RELEASE CARPAL TUNNEL  2010    REMOVE HARDWARE ANKLE Left 2011    VITRECTOMY ANTERIOR Left 2017    Dr. Collins      No Known Allergies   Social History     Tobacco Use    Smoking status: Never    Smokeless tobacco: Never   Substance Use Topics    Alcohol use: Yes     Comment: 1-2 per week      Wt Readings from Last 1 Encounters:   24 85.3 kg (188 lb)        Anesthesia Evaluation   Pt has had prior anesthetic.     No history of anesthetic complications       ROS/MED HX  ENT/Pulmonary:     (+) sleep apnea, uses CPAP,                         "              Neurologic:  - neg neurologic ROS     Cardiovascular:     (+) Dyslipidemia hypertension- -   -  - -                                      METS/Exercise Tolerance: >4 METS    Hematologic:  - neg hematologic  ROS     Musculoskeletal:   (+)  arthritis,             GI/Hepatic:     (+) GERD,                   Renal/Genitourinary:  - neg Renal ROS     Endo:  - neg endo ROS     Psychiatric/Substance Use:     (+) psychiatric history anxiety and depression       Infectious Disease:  - neg infectious disease ROS     Malignancy:  - neg malignancy ROS     Other:  - neg other ROS          Physical Exam    Airway        Mallampati: II   TM distance: > 3 FB   Neck ROM: full   Mouth opening: > 3 cm    Respiratory Devices and Support         Dental       (+) Minor Abnormalities - some fillings, tiny chips      Cardiovascular   cardiovascular exam normal       Rhythm and rate: regular and normal     Pulmonary   pulmonary exam normal        breath sounds clear to auscultation           OUTSIDE LABS:  CBC:   Lab Results   Component Value Date    WBC 5.0 02/14/2024    WBC 4.9 04/05/2022    HGB 14.4 02/14/2024    HGB 13.3 04/05/2022    HCT 42.3 02/14/2024    HCT 39.7 04/05/2022     02/14/2024     04/05/2022     BMP:   Lab Results   Component Value Date     02/14/2024     02/02/2023    POTASSIUM 4.1 02/14/2024    POTASSIUM 4.0 02/02/2023    CHLORIDE 103 02/14/2024    CHLORIDE 104 02/02/2023    CO2 27 02/14/2024    CO2 24 02/02/2023    BUN 19.9 02/14/2024    BUN 22.1 02/02/2023    CR 0.83 02/14/2024    CR 0.61 02/02/2023     (H) 02/20/2024    GLC 79 02/14/2024     COAGS: No results found for: \"PTT\", \"INR\", \"FIBR\"  POC: No results found for: \"BGM\", \"HCG\", \"HCGS\"  HEPATIC:   Lab Results   Component Value Date    ALBUMIN 4.4 02/14/2024    PROTTOTAL 7.0 02/14/2024    ALT 16 02/14/2024    AST 22 02/14/2024    ALKPHOS 95 02/14/2024    BILITOTAL 0.3 02/14/2024     OTHER:   Lab Results   Component " "Value Date    A1C 5.8 02/14/2024    KYMBERLY 9.9 02/14/2024    MAG 1.9 01/23/2018    TSH 1.65 01/19/2022    T4 0.94 09/05/2013    CRP <1.0 01/19/2022    SED 7 01/19/2022       Anesthesia Plan    ASA Status:  2    NPO Status:  NPO Appropriate    Anesthesia Type: Spinal.   Induction: Propofol.   Maintenance: Balanced.        Consents    Anesthesia Plan(s) and associated risks, benefits, and realistic alternatives discussed. Questions answered and patient/representative(s) expressed understanding.     - Discussed: Risks, Benefits and Alternatives for BOTH SEDATION and the PROCEDURE were discussed     - Discussed with:  Patient      - Extended Intubation/Ventilatory Support Discussed: No.      - Patient is DNR/DNI Status: No     Use of blood products discussed: Yes.     - Discussed with: Patient.     - Consented: consented to blood products     Postoperative Care    Pain management: IV analgesics, Peripheral nerve block (Single Shot), Multi-modal analgesia.   PONV prophylaxis: Ondansetron (or other 5HT-3), Dexamethasone or Solumedrol     Comments:               LIANG WELSH CRNA    I have reviewed the pertinent notes and labs in the chart from the past 30 days and (re)examined the patient.  Any updates or changes from those notes are reflected in this note.              # Obesity: Estimated body mass index is 32.27 kg/m  as calculated from the following:    Height as of this encounter: 1.626 m (5' 4\").    Weight as of this encounter: 85.3 kg (188 lb).      "

## 2024-02-20 NOTE — INTERVAL H&P NOTE
Brief History of Illness:   Daly Perry is a 73 year old female who was admitted for right knee pain    H&P reviewed and patient examined with no new updates from prior exam

## 2024-02-21 ENCOUNTER — APPOINTMENT (OUTPATIENT)
Dept: PHYSICAL THERAPY | Facility: OTHER | Age: 74
End: 2024-02-21
Attending: ORTHOPAEDIC SURGERY
Payer: MEDICARE

## 2024-02-21 VITALS
SYSTOLIC BLOOD PRESSURE: 138 MMHG | DIASTOLIC BLOOD PRESSURE: 57 MMHG | BODY MASS INDEX: 32.1 KG/M2 | OXYGEN SATURATION: 96 % | TEMPERATURE: 98.3 F | HEART RATE: 65 BPM | RESPIRATION RATE: 16 BRPM | HEIGHT: 64 IN | WEIGHT: 188 LBS

## 2024-02-21 LAB — GLUCOSE BLDC GLUCOMTR-MCNC: 120 MG/DL (ref 70–99)

## 2024-02-21 PROCEDURE — 250N000013 HC RX MED GY IP 250 OP 250 PS 637: Performed by: ORTHOPAEDIC SURGERY

## 2024-02-21 PROCEDURE — 250N000013 HC RX MED GY IP 250 OP 250 PS 637: Performed by: INTERNAL MEDICINE

## 2024-02-21 PROCEDURE — 97116 GAIT TRAINING THERAPY: CPT | Mod: GP

## 2024-02-21 PROCEDURE — 82962 GLUCOSE BLOOD TEST: CPT

## 2024-02-21 PROCEDURE — 99212 OFFICE O/P EST SF 10 MIN: CPT | Mod: 24 | Performed by: INTERNAL MEDICINE

## 2024-02-21 PROCEDURE — 250N000011 HC RX IP 250 OP 636: Performed by: ORTHOPAEDIC SURGERY

## 2024-02-21 RX ORDER — IBUPROFEN 600 MG/1
600 TABLET, FILM COATED ORAL 4 TIMES DAILY
Status: DISCONTINUED | OUTPATIENT
Start: 2024-02-21 | End: 2024-02-21 | Stop reason: HOSPADM

## 2024-02-21 RX ORDER — KETOROLAC TROMETHAMINE 10 MG/1
10 TABLET, FILM COATED ORAL EVERY 6 HOURS PRN
Status: DISCONTINUED | OUTPATIENT
Start: 2024-02-21 | End: 2024-02-21

## 2024-02-21 RX ADMIN — KETOROLAC TROMETHAMINE 15 MG: 15 INJECTION, SOLUTION INTRAMUSCULAR; INTRAVENOUS at 00:11

## 2024-02-21 RX ADMIN — PROPRANOLOL HYDROCHLORIDE 40 MG: 40 TABLET ORAL at 09:37

## 2024-02-21 RX ADMIN — ASPIRIN 81 MG: 81 TABLET, COATED ORAL at 09:37

## 2024-02-21 RX ADMIN — SENNOSIDES AND DOCUSATE SODIUM 1 TABLET: 8.6; 5 TABLET ORAL at 09:37

## 2024-02-21 RX ADMIN — POLYETHYLENE GLYCOL 3350 17 G: 17 POWDER, FOR SOLUTION ORAL at 09:38

## 2024-02-21 RX ADMIN — Medication 2 G: at 00:13

## 2024-02-21 RX ADMIN — IBUPROFEN 600 MG: 600 TABLET, FILM COATED ORAL at 07:46

## 2024-02-21 RX ADMIN — ACETAMINOPHEN 975 MG: 325 TABLET, FILM COATED ORAL at 04:19

## 2024-02-21 RX ADMIN — VENLAFAXINE HYDROCHLORIDE 75 MG: 75 CAPSULE, EXTENDED RELEASE ORAL at 09:37

## 2024-02-21 RX ADMIN — ARIPIPRAZOLE 10 MG: 5 TABLET ORAL at 09:37

## 2024-02-21 RX ADMIN — OXYCODONE HYDROCHLORIDE 5 MG: 5 TABLET ORAL at 02:44

## 2024-02-21 ASSESSMENT — ACTIVITIES OF DAILY LIVING (ADL)
ADLS_ACUITY_SCORE: 34
ADLS_ACUITY_SCORE: 34
ADLS_ACUITY_SCORE: 32
ADLS_ACUITY_SCORE: 34
ADLS_ACUITY_SCORE: 34

## 2024-02-21 NOTE — PROGRESS NOTES
Subjective: The patient is POD #1 s/p R Total Knee Arthroplasty.  The patients pain is controlled fairly well.  They deny shortness of breath, chest pain, nausea or vomiting.  There have been no acute perioperative complications    Objective:   B/P: 152/88, Temp: 99, Pulse: 72, Resp: 20    Alert and Orientated x3; No acute distress; Appears comfortable    Knee Exam: dressing/wound is clean, dry, intact without significant drainage.  No erythema or signs of infection.     No evidence of DVT    Distal motor/sensory exam is intact in the superficial peroneal, deep peroneal and tibial nerve distributions.      Normal capillary refill with a palpable dorsalis pedis pulse.    Hemoglobin   Date Value Ref Range Status   02/14/2024 14.4 11.7 - 15.7 g/dL Final   10/07/2020 13.3 11.7 - 15.7 g/dL Final       Assessment/Plan:     1) POD # 1 S/P R Total Knee Arthroplasty  -Pain Controlled  -PT/OT--ROM and Gait training  -DVT prophylaxis with Aspirin  -Dispo--To home when cleared Medically and by PT  -Follow-up in 2 weeks [facility]  -Hospitalist co-management

## 2024-02-21 NOTE — PROGRESS NOTES
02/21/24 1300   Appointment Info   Signing Clinician's Name / Credentials (OT) Liz Lema OTR/L   Self-Care/Home Management   Self-Care/Home Mgmt/ADL, Compensatory, Meal Prep Minutes ([id number]) 60   [place] Level (Grooming Training) stand-by assist   Assistance (Grooming Training) supervision;set-up required   [place] Level (Bathing Training) stand-by assist   Assistance (Bathing Training) supervision   [place] Level (Upper Body Dressing Training) stand-by assist   Assistance (Upper Body Dressing Training) supervision   Lower Body Dressing Training Assistance minimum assist (75% patient effort)   Lower Body Dressing Training Assistance 1 person assist   OT Discharge Planning   OT Plan d/c home today   OT Discharge Recommendation (DC Rec) home with assist   OT Rationale for DC Rec Pt has no further OT needs at this time   OT Brief overview of current status Pt has made great progress, completed full shower and dressing today with SBA/very minimal assist. Pt has assist at home from spouse as needed   OT Equipment Needed at Discharge   (pt has all necessary equipment)   Total Session Time   Timed Code Treatment Minutes 60   Total Session Time (sum of timed and untimed services) 60

## 2024-02-21 NOTE — DISCHARGE SUMMARY
"[facility]  Hospitalist Discharge Summary      Date of Admission:  2/20/2024  Date of Discharge:  2/21/2024  Discharging Provider: Hu Rivero MD  Discharge Service: Hospitalist Service    Discharge Diagnoses   Principal Problem:    S/P total knee arthroplasty, right  Active Problems:    Circumscribed scleroderma    Severe major depression with psychotic features (H)    KB on CPAP    Chronic pain of right knee       Clinically Significant Risk Factors     # Obesity: Estimated body mass index is 32.27 kg/m  as calculated from the following:    Height as of this encounter: 1.626 m (5' 4\").    Weight as of this encounter: 85.3 kg (188 lb).       Follow-ups Needed After Discharge   Follow-up Appointments     Follow Up Care      Follow-up with Dr. Hansen on 3/6 at 1030            Discharge Disposition   Discharged to home  Condition at discharge: Stable    Hospital Course   Principal Problem:    S/P total knee arthroplasty, right    Assessment: postoperative day # 1. Currently no pain.  No dyspnea, no nausea, vomiting     Plan:  discharge today   Physical and occupational therapy      Active Problems:    Circumscribed scleroderma    Assessment: chronic        Severe major depression with psychotic features (H)    Assessment: chronic        KB on CPAP    Assessment: chronic        Chronic pain of right knee    Consultations This Hospital Stay   PHYSICAL THERAPY ADULT IP CONSULT  HOSPITALIST IP CONSULT  PHYSICAL THERAPY ADULT IP CONSULT  OCCUPATIONAL THERAPY ADULT IP CONSULT    Code Status   Full Code    Time Spent on this Encounter   I, Hu Rivero MD, personally saw the patient today and spent greater than 30 minutes discharging this patient.       Hu Rivero MD  [facility]  [address]  [place] [address]  Phone: [phone]  Fax: [phone]  ______________________________________________________________________    Physical Exam   Vital " Signs: Temp: 98.3  F (36.8  C) Temp src: Tympanic BP: 138/57 Pulse: 65   Resp: 16 SpO2: 96 % O2 Device: BiPAP/CPAP (home cpap)    Weight: 188 lbs 0 oz  GENERAL: Comfortable, no apparent distress.  CARDIOVASCULAR: regular rate and rhythm, no murmur. No lower extremity edema   RESPIRATORY: Clear to auscultation bilaterally, no wheezes or crackles.  GI: non-tender, non-distended, normal bowel sounds.   SKIN: warm periphery, no rashes         Primary Care Physician   Nidhi Baer    Discharge Orders      Brief Discharge Instructions    Review outpatient procedure discharge instructions with patient as directed by Provider     Symptoms - Fever Management    A low grade fever can be expected after surgery.  Use acetaminophen (TYLENOL) as needed for fever management.  Contact your Surgeon Team if you have a fever greater than 101.5 F, chills, and/or night sweats.     Symptoms - Constipation management    Constipation (hard, dry bowel movements) is expected after surgery due to the combination of being less active, the anesthetic, and the opioid pain medication.  You can do the following to help reduce constipation:  ~  FLUIDS:  Drink clear liquids (water or Gatorade), or juice (apple/prune).  ~  DIET:  Eat a fiber rich diet.    ~  ACTIVITY:  Get up and move around several times a day.  Increase your activity as you are able.  MEDICATIONS:  Reduce the risk of constipation by starting medications before you are constipated.  You can take Miralax   (1 packet as directed) and/or a stool softener (Senokot 1-2 tablets 1-2 times a day).  If you already have constipation and these medications are not working, you can get magnesium citrate and use as directed.  If you continue to have constipation you can try an over the counter suppository or enema.  Call your Surgeon Team if it has been greater than 3 days since your last bowel movement.     Symptoms - Reduced Urine Output    Changes in the amount of fluids you drank before and  "after surgery may result in problems urinating.  It is important to stay well-hydrated after surgery and drink plenty of water. If it has been greater than 8 hours since you have urinated despite drinking plenty of water, call your Surgeon Team.     Activity - Exercises to prevent blood clots    Unless otherwise directed by your Surgeon team, perform the following exercises at least three times per day for the first four weeks after surgery to prevent blood clots in your legs: 1) Point and flex your feet (Ankle Pumps), 2) Move your ankle around in big circles, 3) Wiggle your toes, 4) Walk, even for short distances, several times a day, will help decrease the risk of blood clots.     Elevate affected extremity     Ice to affected area    Ice can be used to control swelling and discomfort after surgery. Place a thin towel over your operative site and apply the ice pack overtop. Leave ice pack in place for 20 minutes, then remove for 20 minutes. Repeat this 20 minutes on/20 minutes off routine as often as tolerated.     Medication instructions -  Anticoagulation - aspirin    Take the aspirin as prescribed for a total of four weeks after surgery.  This is given to help minimize your risk of blood clot.     No Dressing Change    No dressing change until follow up appointment.     Weight bearing - As tolerated     Return to clinic    Return to clinic in 2 weeks as previously arranged OR if not already scheduled, patient to arrange for return to clinic appointment in 2 weeks.     Reason for your hospital stay    Status Post Right Total Knee Arthroplasty     When to call - Contact Surgeon Team    You may experience symptoms that require follow-up before your scheduled appointment. Refer to the \"Stoplight Tool\" for instructions on when to contact your Surgeon Team if you are concerned about pain control, blood clots, constipation, or if you are unable to urinate.     When to call - Reach out to Urgent Care    If you are not " able to reach your Surgeon Team and you need immediate care, go to the Orthopedic Walk-in Clinic or Urgent Care at your Surgeon's office.  Do NOT go to the Emergency Room unless you have shortness of breath, chest pain, or other signs of a medical emergency.     When to call - Reasons to Call 911    Call 911 immediately if you experience sudden-onset chest pain, arm weakness/numbness, slurred speech, or shortness of breath     Discharge Instruction - Breathing exercises    Perform breathing exercises using your Incentive Spirometer 10 times per hour while awake for 2 weeks.     Symptoms - Fever Management    A low grade fever can be expected after surgery.  Use acetaminophen (TYLENOL) as needed for fever management.  Contact your Surgeon Team if you have a fever greater than 101.5 F, chills, and/or night sweats.     Symptoms - Constipation management    Constipation (hard, dry bowel movements) is expected after surgery due to the combination of being less active, the anesthetic, and the opioid pain medication.  You can do the following to help reduce constipation:  ~  FLUIDS:  Drink clear liquids (water or Gatorade), or juice (apple/prune).  ~  DIET:  Eat a fiber rich diet.    ~  ACTIVITY:  Get up and move around several times a day.  Increase your activity as you are able.  MEDICATIONS:  Reduce the risk of constipation by starting medications before you are constipated.  You can take Miralax   (1 packet as directed) and/or a stool softener (Senokot 1-2 tablets 1-2 times a day).  If you already have constipation and these medications are not working, you can get magnesium citrate and use as directed.  If you continue to have constipation you can try an over the counter suppository or enema.  Call your Surgeon Team if it has been greater than 3 days since your last bowel movement.     Symptoms - Reduced Urine Output    Changes in the amount of fluids you drank before and after surgery may result in problems  urinating.  It is important to stay well-hydrated after surgery and drink plenty of water. If it has been greater than 8 hours since you have urinated despite drinking plenty of water, call your Surgeon Team.     Activity - Exercises to prevent blood clots    Unless otherwise directed by your Surgeon team, perform the following exercises at least three times per day for the first four weeks after surgery to prevent blood clots in your legs: 1) Point and flex your feet (Ankle Pumps), 2) Move your ankle around in big circles, 3) Wiggle your toes, 4) Walk, even for short distances, several times a day, will help decrease the risk of blood clots.     Comfort and Pain Management - Pain after Surgery    Pain after surgery is normal and expected.  You will have some amount of pain for several weeks after surgery.  Your pain will improve with time.  There are several things you can do to help reduce your pain including: rest, ice, elevation, and using pain medications as needed. Contact your Surgeon Team if you have pain that persists or worsens after surgery despite rest, ice, elevation, and taking your medication(s) as prescribed. Contact your Surgeon Team if you have new numbness, tingling, or weakness in your operative extremity.     Comfort and Pain Management - Swelling after Surgery    Swelling and/or bruising of the surgical extremity is common and may persist for several months after surgery. In addition to frequent icing and elevation, gentle compressive support with an ACE wrap or tubigrip may help with swelling. Apply compression regularly, removing at least twice daily to perform skin checks. Contact your Surgeon Team if your swelling increases and is NOT associated with an increase in your activity level, or if your swelling increases and is associated with redness and pain.     Comfort and Pain Management - LOWER Extremity Elevation    Swelling is expected for several months after surgery. This type of  "swelling is usually associated with gravity and activity, and can be improved with elevation.   The best way to do this is to get your \"toes above your nose\" by laying down and placing several pillows lengthwise under your calf and heel. When elevating your leg keep your knee completely straight. Perform this elevation as often as possible especially for the first two weeks after surgery.     Comfort and Pain Management - Cold therapy    Ice can be used to control swelling and discomfort after surgery. Place a thin towel over your operative site and apply the ice pack overtop. Leave ice pack in place for 20 minutes, then remove for 20 minutes. Repeat this 20 minutes on/20 minutes off routine as often as tolerated.     Medication Instructions - Acetaminophen (TYLENOL) Instructions    You were discharged with acetaminophen (TYLENOL) for pain management after surgery. Acetaminophen most effectively manages pain symptoms when it is taken on a schedule without missing doses (every four, six, or eight hours). Your Provider will prescribe a safe daily dose between 3000 - 4000 mg.  Do NOT exceed this daily dose. Most patients use acetaminophen for pain control for the first four weeks after surgery.  You can wean from this medication as your pain decreases.     Medication Instructions - NSAID Instructions    You were discharged with an anti-inflammatory medication for pain management to use in combination with acetaminophen (TYLENOL) and the narcotic pain medication.  Take this medication exactly as directed.  You should only take one anti-inflammatory at a time.  Some common anti-inflammatories include: ibuprofen (ADVIL, MOTRIN), naproxen (ALEVE, NAPROSYN), celecoxib (CELEBREX), meloxicam (MOBIC), ketorolac (TORADOL).  Take this medication with food and water.     Medication Instructions - Opioids - Tapering Instructions    In the first three days following surgery, your symptoms may warrant use of the narcotic pain " medication every four to six hours as prescribed. This is normal. As your pain symptoms improve, focus your efforts on decreasing (tapering) use of narcotic medications. The most successful tapering strategy is to first, decrease the number of tablets you take every 4-6 hours to the minimum prescribed. Then, increase the amount of time between doses.  For example:  First, taper to   or 1 tablet every 4-6 hours.  Then, taper to   or 1 tablet every 6-8 hours.  Then, taper to   or 1 tablet every 8-10 hours.  Then, taper to   or 1 tablet every 10-12 hours.  Then, taper to   or 1 tablet at bedtime.  The bedtime dose can help with comfort during sleep and is typically the last dose to be discontinued after surgery.     Activity - Total Knee Arthroplasty     Return to Driving    Return to driving - Driving is NOT permitted until directed by your provider. Under no circumstance are you permitted to drive while using narcotic pain medications.     Dressing / Wound Care - Wound    You have a clean dressing on your surgical wound. Dressing change instructions as follows: dressing will be removed at your follow-up appointment. Contact your Surgeon Team if you have increased redness, warmth around the surgical wound, and/or drainage from the surgical wound.     Dressing / Wound Care - NO Tub Bathing    Tub bathing, swimming, or any other activities that will cause your incision to be submerged in water should be avoided until allowed by your Surgeon.     Medication instructions -  Anticoagulation - aspirin    Take the aspirin as prescribed for a total of four weeks after surgery.  This is given to help minimize your risk of blood clot.     Medication Instructions - Opioid Instructions (Greater than or equal to 65 years)    You were discharged with an opioid medication (hydromorphone, oxycodone, hydrocodone, or tramadol). This medication should only be taken for breakthrough pain that is not controlled with acetaminophen  (TYLENOL). If you rate your pain less than 3 you do not need this medication.  Pain rating 0-3:  You do not need this medication  Pain rating 4-6:  Take 1/2 tablet every 4-6 hours as needed  Pain rating 7-10:  Take 1 tablet every 4-6 hours as needed  Do not exceed 4 tablets per day     NO Precautions    No precautions directed by your Provider.  You may perform range of motion activities as tolerated.     Weight bearing as tolerated    Weight bearing as tolerated on your operative extremity.     Dressing / Wound care - Shower with wound/dressing covered    You must COVER your dressing or incision with saran wrap (or any other non-permeable covering) to allow the incision to remain dry while showering.  You may shower 3 days after surgery as long as the surgical wound stays dry. Continue to cover your dressing or incision for showering until your first office visit.  You are strictly prohibited from soaking   or submerging the surgical wound underwater.     Follow Up Care    Follow-up with Dr. Hansen on 3/6 at 1030     Discharge Instruction - Regular Diet Adult    Return to your pre-surgery diet unless instructed otherwise       Significant Results and Procedures   Most Recent 3 CBC's:  Recent Labs   Lab Test 02/14/24  0929 04/05/22  0828 01/19/22  1410   WBC 5.0 4.9 5.6   HGB 14.4 13.3 12.7   MCV 95 97 97    283 287     Most Recent 3 BMP's:  Recent Labs   Lab Test 02/21/24  0553 02/20/24  0650 02/14/24  0929 02/02/23  1542 04/15/22  0827 04/05/22  0828   NA  --   --  139 138  --  140   POTASSIUM  --   --  4.1 4.0  --  4.4   CHLORIDE  --   --  103 104  --  105   CO2  --   --  27 24  --  31   BUN  --   --  19.9 22.1  --  19   CR  --   --  0.83 0.61  --  0.68   ANIONGAP  --   --  9 10  --  4   KYMBERLY  --   --  9.9 9.1  --  9.7   * 106* 79 94   < > 92    < > = values in this interval not displayed.   ,   Results for orders placed or performed during the hospital encounter of 02/20/24   XR Knee Port Right 1/2  Views    Narrative    PROCEDURE:  XR KNEE PORT RIGHT 1/2 VIEWS    HISTORY: Post-Op Total Knee.    COMPARISON:  1/23/24    TECHNIQUE:  2 views right knee.    FINDINGS:  Postoperative changes of interval right knee arthroplasty.  No periprosthetic fracture or dislocation. Skin staples and  subcutaneous air.      Impression    IMPRESSION: Right knee arthroplasty.      CAMDEN LYONS MD         SYSTEM ID:  [id number]       Discharge Medications   Current Discharge Medication List        START taking these medications    Details   aspirin 81 MG EC tablet Take 1 tablet (81 mg) by mouth 2 times daily for 30 days  Qty: 60 tablet, Refills: 0    Associated Diagnoses: Chronic pain of right knee      hydrOXYzine HCl (ATARAX) 25 MG tablet Take 1 tablet (25 mg) by mouth every 6 hours as needed for itching  Qty: 30 tablet, Refills: 0    Associated Diagnoses: Chronic pain of right knee      !! ibuprofen (ADVIL/MOTRIN) 600 MG tablet Take 1 tablet (600 mg) by mouth every 6 hours as needed for other (mild and/or inflammatory pain)  Qty: 30 tablet, Refills: 0    Associated Diagnoses: Chronic pain of right knee      ondansetron (ZOFRAN ODT) 4 MG ODT tab Take 1 tablet (4 mg) by mouth every 8 hours as needed for nausea  Qty: 4 tablet, Refills: 0    Associated Diagnoses: Chronic pain of right knee      oxyCODONE (ROXICODONE) 5 MG tablet Take 1 tablet (5 mg) by mouth every 4 hours as needed for moderate to severe pain  Qty: 20 tablet, Refills: 0    Associated Diagnoses: Chronic pain of right knee      senna-docusate (SENOKOT-S/PERICOLACE) 8.6-50 MG tablet Take 1-2 tablets by mouth 2 times daily  Qty: 30 tablet, Refills: 0    Associated Diagnoses: Chronic pain of right knee       !! - Potential duplicate medications found. Please discuss with provider.        CONTINUE these medications which have NOT CHANGED    Details   acetaminophen (TYLENOL) 325 MG tablet Take 325 mg by mouth every 4 hours as needed Max acetaminophen dose: 4000 mg in  24 hours      ARIPiprazole (ABILIFY) 10 MG tablet Take 10 mg by mouth daily    Associated Diagnoses: Severe major depression with psychotic features (H)      calcium carbonate 600 mg-vitamin D 400 units (CALCIUM CARB-CHOLECALCIFEROL) 600-400 MG-UNIT per tablet Take 1 tablet by mouth daily      Cholecalciferol (VITAMIN D3) 25 MCG (1000 UT) CAPS Take 1 capsule by mouth daily      !! ibuprofen (ADVIL/MOTRIN) 200 MG tablet Take 200 mg by mouth 4 times daily as needed      Lutein-Zeaxanthin (OCUVITE LUTEIN 25) 25-5 MG CAPS Take 1 capsule by mouth daily       Multiple Vitamins-Minerals (OCUVITE PO) Take 1 tablet by mouth daily      propranolol (INDERAL) 40 MG tablet Take 1 tablet (40 mg) by mouth 2 times daily Increase in dose  Qty: 180 tablet, Refills: 4    Associated Diagnoses: Tremor      venlafaxine (EFFEXOR-XR) 75 MG 24 hr capsule Take 75 mg by mouth 2 times daily        !! - Potential duplicate medications found. Please discuss with provider.        Allergies   No Known Allergies

## 2024-02-21 NOTE — PLAN OF CARE
Physical Therapy Discharge Summary    Reason for therapy discharge:    Discharged to home with outpatient therapy.    Progress towards therapy goal(s). See goals on Care Plan in [facility] electronic health record for goal details.  Goals met    Therapy recommendation(s):    Continued therapy is recommended.  Rationale/Recommendations:  OP therapy to continue TKA rehab.

## 2024-02-21 NOTE — PLAN OF CARE
"Goal Outcome Evaluation:  Vss. /57 (BP Location: Left arm, Patient Position: Semi-Escamilla's, Cuff Size: Adult Regular)   Pulse 65   Temp 98.3  F (36.8  C) (Tympanic)   Resp 16   Ht 1.626 m (5' 4\")   Wt 85.3 kg (188 lb)   LMP  (LMP Unknown)   SpO2 96%   BMI 32.27 kg/m   Ace bandage removed from patients right leg this morning. No swelling or edema noted. CMS intact. No drainage or shadowing to dressing. Patient denies any pain at this time.                       "

## 2024-02-21 NOTE — PLAN OF CARE
"Afebrile,vss. Right leg/foot has ace wrap on, unable to visualize aquacel or knee, cms intact. Pt pain controlled well rated 0-4/10, PRN oxycodone as well as scheduled tylenol/Toradol given, ice rotated. Pt ambulating in room and gross with SBA walker and gaitbelt. Home cpap on overnight, remains on room air.  Pt tolerating po, IVF discontinued at 0400.    BP (!) 152/88   Pulse 72   Temp 99  F (37.2  C) (Tympanic)   Resp 20   Ht 1.626 m (5' 4\")   Wt 85.3 kg (188 lb)   LMP  (LMP Unknown)   SpO2 96%   BMI 32.27 kg/m        Goal Outcome Evaluation:      Plan of Care Reviewed With: patient    Overall Patient Progress: improving          0600: lost IV access, MD Ontiveros notified, ketorolac discontinued and Ibuprofen ordered by MD. No IV needed.           "

## 2024-02-21 NOTE — PLAN OF CARE
Problem: Adult Inpatient Plan of Care  Goal: Plan of Care Review  Outcome: Progressing  Goal: Patient-Specific Goal (Individualized)  Outcome: Progressing  Goal: Absence of Hospital-Acquired Illness or Injury  Outcome: Progressing  Intervention: Identify and Manage Fall Risk  Recent Flowsheet Documentation  Taken 2/20/2024 1650 by Belkys Vences RN  Safety Promotion/Fall Prevention:   activity supervised   assistive device/personal items within reach   mobility aid in reach   nonskid shoes/slippers when out of bed   room door open   safety round/check completed  Taken 2/20/2024 1018 by Belkys Vences RN  Safety Promotion/Fall Prevention:   activity supervised   assistive device/personal items within reach   mobility aid in reach   nonskid shoes/slippers when out of bed   room door open   safety round/check completed  Intervention: Prevent Skin Injury  Recent Flowsheet Documentation  Taken 2/20/2024 1650 by Belkys Vences RN  Body Position: position changed independently  Taken 2/20/2024 1018 by Belkys Vences RN  Body Position: position changed independently  Intervention: Prevent and Manage VTE (Venous Thromboembolism) Risk  Recent Flowsheet Documentation  Taken 2/20/2024 1650 by Belkys Vences RN  VTE Prevention/Management: SCDs (sequential compression devices) on  Taken 2/20/2024 1018 by Belkys Vences RN  VTE Prevention/Management: SCDs (sequential compression devices) on  Intervention: Prevent Infection  Recent Flowsheet Documentation  Taken 2/20/2024 1650 by Belkys Vences RN  Infection Prevention:   single patient room provided   hand hygiene promoted  Taken 2/20/2024 1018 by Belkys Vences RN  Infection Prevention:   single patient room provided   hand hygiene promoted  Goal: Optimal Comfort and Wellbeing  Outcome: Progressing  Intervention: Monitor Pain and Promote Comfort  Recent Flowsheet Documentation  Taken 2/20/2024 1650 by Belkys Vences RN  Pain Management Interventions: cold  "applied  Goal: Readiness for Transition of Care  Outcome: Progressing   Goal Outcome Evaluation:    A&O, VSS, pain managed with Tylenol, toradol and one dose PRN Oxycodone, ice applied to knee, dressing clean/dry/intact, denies nausea, would like to walk on the night shift.     /65 (BP Location: Left arm, Patient Position: Semi-Escamilla's, Cuff Size: Adult Regular)   Pulse 55   Temp 97.9  F (36.6  C) (Tympanic)   Resp 18   Ht 1.626 m (5' 4\")   Wt 85.3 kg (188 lb)   LMP  (LMP Unknown)   SpO2 97%   BMI 32.27 kg/m      Belkys Vences RN on 2/20/2024 at 7:00 PM                          "

## 2024-02-21 NOTE — PROGRESS NOTES
02/21/24 1100   Appointment Info   Signing Clinician's Name / Credentials (PT) Lupe Medina DPT   General Information   Existing Precautions/Restrictions   (s/p right TKA)   Physical Therapy Goals   PT Frequency Daily   PT Predicted Duration/Target Date for Goal Attainment 02/22/24   PT Goals Bed Mobility;Transfers;Gait   PT: Bed Mobility Supervision/stand-by assist   PT: Transfers Supervision/stand-by assist;Assistive device   PT: Gait Supervision/stand-by assist;Rolling walker;150 feet   Interventions   Interventions Quick Adds Gait Training   Gait Training   Gait Training Minutes (23313) 25   Symptoms Noted During/After Treatment (Gait Training) fatigue   Treatment Detail/Skilled Intervention gait with FWW for 100 feet with FWW and cues for stepping pattern, with cues pt able to take step through pattern. 4 stairs with railing on the L with CGA and education for pt and  on safety when returning to home, step to pattern. pt and  feel comforable returning to home with OP therapy tomorrow. review of 10 reps glut sets, quads sets, and ankle pumps   Distance in Feet 100   [place] Level (Gait Training) stand-by assist   Physical Assistance Level (Gait Training) 1 person assist   Weight Bearing (Gait Training) weight-bearing as tolerated   Assistive Device (Gait Training) rolling walker   Pattern Analysis (Gait Training) swing-through gait   Gait Analysis Deviations decreased naima;decreased step length   Impairments (Gait Analysis/Training) balance impaired;pain;ROM decreased   Stair Railings present on left side   Physical Assist/Nonphysical Assist (Stairs) 1 person assist   Level of [place] (Stairs) contact guard   Assistive Device (Stairs) other (see comments)  (use of L railing)   PT Discharge Planning   PT Plan Continue PT   PT Discharge Recommendation (DC Rec) home with assist;home with outpatient physical therapy   PT Rationale for DC Rec to promote strength, stability and safe  mobility   PT Brief overview of current status SBA for transfers with FWW, SBA to CGA for gait with FWW for 100 feet, CGA for 4 stairs with single railing   PT Equipment Needed at Discharge walker, rolling   Total Session Time   Timed Code Treatment Minutes 25   Total Session Time (sum of timed and untimed services) 25

## 2024-02-21 NOTE — PHARMACY - DISCHARGE MEDICATION RECONCILIATION AND EDUCATION
Pharmacy:  Discharge Counseling and Medication Reconciliation    Daly Perry  [address]  [place] [address] [phone] (home)   73 year old female  PCP: Nidhi Baer    Allergies: Patient has no known allergies.    Discharge Counseling:    Pharmacist met with patient (and/or family) today to review the medication portion of the After Visit Summary (with an emphasis on NEW medications) and to address patient's questions/concerns.    Summary of Education: Met with patient and spouse to discuss new medications: aspirin, oxycodone, ibuprofen, hydroxyzine, ondansetron and senna    Materials Provided:  MedCounselor sheets printed from Clinical Pharmacology on:  as above    Discharge Medication Reconciliation:    It has been determined that the patient has an adequate supply of medications available or which can be obtained from the patient's preferred pharmacy, which HE/SHE has confirmed as: [facility]    Thank you for the consult.    Shruthi Villegas RP........February 21, 2024 10:08 AM

## 2024-02-21 NOTE — PROGRESS NOTES
[facility] DISCHARGE NOTE    Patient discharged to home at 10:16 AM via wheel chair. Accompanied by spouse and staff. Discharge instructions reviewed with patient and spouse, opportunity offered to ask questions. Prescriptions sent to patients preferred pharmacy. All belongings sent with patient.    Velma Wynne RN

## 2024-02-22 ENCOUNTER — PATIENT OUTREACH (OUTPATIENT)
Dept: INTERNAL MEDICINE | Facility: OTHER | Age: 74
End: 2024-02-22
Payer: COMMERCIAL

## 2024-02-22 ENCOUNTER — THERAPY VISIT (OUTPATIENT)
Dept: PHYSICAL THERAPY | Facility: OTHER | Age: 74
End: 2024-02-22
Attending: ORTHOPAEDIC SURGERY
Payer: MEDICARE

## 2024-02-22 DIAGNOSIS — M25.561 PAIN AND SWELLING OF RIGHT KNEE: ICD-10-CM

## 2024-02-22 DIAGNOSIS — Z96.651 S/P TOTAL KNEE ARTHROPLASTY, RIGHT: ICD-10-CM

## 2024-02-22 DIAGNOSIS — M25.661 DECREASED ROM OF RIGHT KNEE: Primary | ICD-10-CM

## 2024-02-22 DIAGNOSIS — M25.561 RIGHT KNEE PAIN, UNSPECIFIED CHRONICITY: ICD-10-CM

## 2024-02-22 DIAGNOSIS — M25.461 PAIN AND SWELLING OF RIGHT KNEE: ICD-10-CM

## 2024-02-22 PROCEDURE — 97110 THERAPEUTIC EXERCISES: CPT | Mod: GP

## 2024-02-22 PROCEDURE — 97161 PT EVAL LOW COMPLEX 20 MIN: CPT | Mod: GP

## 2024-02-22 PROCEDURE — 97016 VASOPNEUMATIC DEVICE THERAPY: CPT | Mod: GP

## 2024-02-22 NOTE — TELEPHONE ENCOUNTER
Surgical. Patient discharged with surgical follow-up only. No TCM call required per policy.   Richa Kim RN on 2/22/2024 at 7:36 AM    
significant other

## 2024-02-23 PROBLEM — M25.561 RIGHT KNEE PAIN, UNSPECIFIED CHRONICITY: Status: ACTIVE | Noted: 2024-02-23

## 2024-02-23 NOTE — PROGRESS NOTES
PHYSICAL THERAPY EVALUATION  Type of Visit: Evaluation    See electronic medical record for Abuse and Falls Screening details.    Subjective patient is a 73-year-old female who is companied by her  today for physical therapy referred following right total knee arthroplasty performed by Dr. Parth Hansen MD on 2/20/2024.  Patient reports she is doing well so far and is using Tylenol and ibuprofen during the day for pain relief and the prescribed pain medication at night.  She has been icing and elevating and is sleeping in bed.  Rates her pain as a 3/10.        Presenting condition or subjective complaint:  Right knee pain with decreased range of motion following right total knee arthroplasty  Date of onset: 02/20/24    Relevant medical history:   History of right Achilles tendinitis  Dates & types of surgery:  Right TKA 2/20/2024      Prior Level of Function  Transfers: Independent  Ambulation: Independent  ADL: Independent    Living Environment  Social support:   Lives with   Type of home:   1 level  Stairs to enter the home:       4 with railing  Stairs inside the home:       No  Equipment owned:   Front wheel walker  Employment:    Retired nurse      Patient goals for therapy:  Walk without feeling out of balance and without pain, return to playing pickle ball.  Getting up and out of a chair without pain and difficulty    Pain assessment: Pain present  Location: Right knee/Rating: 3/10     Objective   KNEE EVALUATION  PAIN: Pain Level at Rest: 2/10  Pain Level with Use: 3/10  Pain Location: Right knee  Pain Quality: Dull  Pain is Exacerbated By: Certain positions, lifting and carrying, longer amounts of walking  Pain is Relieved By: cold and sitting and walking  INTEGUMENTARY (edema, incisions):  Swelling present, midline knee incision covered with bandage without drainage or redness  POSTURE: WFL  GAIT:  Weightbearing Status: WBAT  Assistive Device(s): Walker (front wheeled)  Gait Deviations:  Antalgic mildly, lacks TKE  ROM: Supine AROM: Right knee 10-98 degrees  TRANSFERS: Sit to stand independent, sit to supine min assist for right lower extremity.  TREATMENT: Supine: Right heel slides x 10, quad set with towel roll at the knee x 10, knee extension stretch with towel roll at the heel for 60 seconds x 1, short arc quad with min assist x 10.  Use of the vasopneumatic device was utilized at the right knee for pain and swelling for 15 minutes, medium setting at cold level 4, NICE machine with right leg elevated in supine.    Assessment & Plan   CLINICAL IMPRESSIONS  Medical Diagnosis: Right knee pain, unspecified chronicity, S/P total knee arthroplasty, right    Treatment Diagnosis: Right knee pain, unspecified chronicity, S/P total knee arthroplasty, right, pain and swelling of the right knee, decreased range of motion of the right knee   Impression/Assessment: Patient is a 73 year old female with history of chronic right knee pain who presents with decreased range of motion, swelling and difficulty walking with pain following a elective right total knee arthroplasty.   The following significant findings have been identified: Pain, Decreased ROM/flexibility, Decreased strength, Edema, and Impaired gait. These impairments interfere with their ability to perform self care tasks, recreational activities, household chores, driving , household mobility, and community mobility as compared to previous level of function.     Clinical Decision Making (Complexity):  Clinical Presentation: Stable/Uncomplicated  Clinical Presentation Rationale: based on medical and personal factors listed in PT evaluation  Clinical Decision Making (Complexity): Low complexity    PLAN OF CARE  Treatment Interventions:  Modalities: Vasoneumatic Device  Interventions: Gait Training, Manual Therapy, Therapeutic Exercise    Long Term Goals     PT Goal 1  Goal Identifier: HEP  Goal Description: Patient will be compliant with a home  exercise program 5 days a week for appropriate progression of home exercise program for success in physical therapy.  Target Date: 03/22/24  PT Goal 2  Goal Identifier: ROM  Goal Description: Patient will demonstrate right knee flexion to 115 degrees to allow for proper mechanics for reciprocal ambulation on the stairs are safe and independent mobility within the home and community.  Target Date: 05/17/24  PT Goal 3  Goal Identifier: Gait/mobility  Goal Description: Patient will be able to ambulate without assistive device to keep up with her  to be a community ambulator.  Target Date: 05/17/24      Frequency of Treatment: 2 times a week  Duration of Treatment: 12 weeks      Education Assessment:   Learner/Method: Patient;Significant Other;Pictures/Video;Demonstration    Risks and benefits of evaluation/treatment have been explained.   Patient/Family/caregiver agrees with Plan of Care.     Evaluation Time:     PT Eval, Low Complexity Minutes (69289): 20       Signing Clinician: Cecilia Andino, PT      [facility]                                                                                   OUTPATIENT PHYSICAL THERAPY      PLAN OF TREATMENT FOR OUTPATIENT REHABILITATION   Patient's Last Name, First Name, Daly Cruz YOB: 1950   Provider's Name   [facility]   Medical Record No.  [id number]     Onset Date: 02/20/24  Start of Care Date: 02/22/24     Medical Diagnosis:  Right knee pain, unspecified chronicity, S/P total knee arthroplasty, right      PT Treatment Diagnosis:  Right knee pain, unspecified chronicity, S/P total knee arthroplasty, right, pain and swelling of the right knee, decreased range of motion of the right knee Plan of Treatment  Frequency/Duration: 2 times a week/ 12 weeks    Certification date from 02/22/24 to 05/16/24         See note for plan of treatment details and functional goals     Cecilia DIEZ  Sina, PT                         I CERTIFY THE NEED FOR THESE SERVICES FURNISHED UNDER        THIS PLAN OF TREATMENT AND WHILE UNDER MY CARE     (Physician attestation of this document indicates review and certification of the therapy plan).              Referring Provider:  Parth Hansen    Initial Assessment  See Epic Evaluation- Start of Care Date: 02/22/24

## 2024-02-26 ENCOUNTER — THERAPY VISIT (OUTPATIENT)
Dept: PHYSICAL THERAPY | Facility: OTHER | Age: 74
End: 2024-02-26
Attending: ORTHOPAEDIC SURGERY
Payer: MEDICARE

## 2024-02-26 DIAGNOSIS — M25.561 PAIN AND SWELLING OF RIGHT KNEE: ICD-10-CM

## 2024-02-26 DIAGNOSIS — M25.661 DECREASED ROM OF RIGHT KNEE: ICD-10-CM

## 2024-02-26 DIAGNOSIS — M25.561 RIGHT KNEE PAIN, UNSPECIFIED CHRONICITY: ICD-10-CM

## 2024-02-26 DIAGNOSIS — M25.461 PAIN AND SWELLING OF RIGHT KNEE: ICD-10-CM

## 2024-02-26 DIAGNOSIS — Z96.651 S/P TOTAL KNEE ARTHROPLASTY, RIGHT: Primary | ICD-10-CM

## 2024-02-26 PROCEDURE — 97110 THERAPEUTIC EXERCISES: CPT | Mod: GP

## 2024-02-26 PROCEDURE — 97016 VASOPNEUMATIC DEVICE THERAPY: CPT | Mod: GP

## 2024-02-28 ENCOUNTER — THERAPY VISIT (OUTPATIENT)
Dept: PHYSICAL THERAPY | Facility: OTHER | Age: 74
End: 2024-02-28
Attending: ORTHOPAEDIC SURGERY
Payer: MEDICARE

## 2024-02-28 DIAGNOSIS — M25.661 DECREASED ROM OF RIGHT KNEE: ICD-10-CM

## 2024-02-28 DIAGNOSIS — M25.461 PAIN AND SWELLING OF RIGHT KNEE: ICD-10-CM

## 2024-02-28 DIAGNOSIS — M25.561 PAIN AND SWELLING OF RIGHT KNEE: ICD-10-CM

## 2024-02-28 DIAGNOSIS — M25.561 RIGHT KNEE PAIN, UNSPECIFIED CHRONICITY: ICD-10-CM

## 2024-02-28 DIAGNOSIS — Z96.651 S/P TOTAL KNEE ARTHROPLASTY, RIGHT: Primary | ICD-10-CM

## 2024-02-28 PROCEDURE — 97110 THERAPEUTIC EXERCISES: CPT | Mod: GP

## 2024-02-28 PROCEDURE — 97016 VASOPNEUMATIC DEVICE THERAPY: CPT | Mod: GP

## 2024-03-01 ENCOUNTER — THERAPY VISIT (OUTPATIENT)
Dept: PHYSICAL THERAPY | Facility: OTHER | Age: 74
End: 2024-03-01
Attending: ORTHOPAEDIC SURGERY
Payer: MEDICARE

## 2024-03-01 DIAGNOSIS — M25.561 RIGHT KNEE PAIN, UNSPECIFIED CHRONICITY: ICD-10-CM

## 2024-03-01 DIAGNOSIS — Z96.651 S/P TOTAL KNEE ARTHROPLASTY, RIGHT: Primary | ICD-10-CM

## 2024-03-01 PROCEDURE — 97110 THERAPEUTIC EXERCISES: CPT | Mod: GP,CQ

## 2024-03-04 ENCOUNTER — THERAPY VISIT (OUTPATIENT)
Dept: PHYSICAL THERAPY | Facility: OTHER | Age: 74
End: 2024-03-04
Attending: ORTHOPAEDIC SURGERY
Payer: MEDICARE

## 2024-03-04 DIAGNOSIS — M25.461 PAIN AND SWELLING OF RIGHT KNEE: ICD-10-CM

## 2024-03-04 DIAGNOSIS — Z96.651 S/P TOTAL KNEE ARTHROPLASTY, RIGHT: Primary | ICD-10-CM

## 2024-03-04 DIAGNOSIS — M25.561 PAIN AND SWELLING OF RIGHT KNEE: ICD-10-CM

## 2024-03-04 DIAGNOSIS — M25.561 RIGHT KNEE PAIN, UNSPECIFIED CHRONICITY: ICD-10-CM

## 2024-03-04 DIAGNOSIS — M25.661 DECREASED ROM OF RIGHT KNEE: ICD-10-CM

## 2024-03-04 PROCEDURE — 97110 THERAPEUTIC EXERCISES: CPT | Mod: GP

## 2024-03-04 PROCEDURE — 97016 VASOPNEUMATIC DEVICE THERAPY: CPT | Mod: GP

## 2024-03-06 ENCOUNTER — TELEPHONE (OUTPATIENT)
Dept: ORTHOPEDICS | Facility: OTHER | Age: 74
End: 2024-03-06

## 2024-03-06 ENCOUNTER — OFFICE VISIT (OUTPATIENT)
Dept: ORTHOPEDICS | Facility: OTHER | Age: 74
End: 2024-03-06
Attending: ORTHOPAEDIC SURGERY
Payer: MEDICARE

## 2024-03-06 ENCOUNTER — THERAPY VISIT (OUTPATIENT)
Dept: PHYSICAL THERAPY | Facility: OTHER | Age: 74
End: 2024-03-06
Attending: ORTHOPAEDIC SURGERY
Payer: MEDICARE

## 2024-03-06 DIAGNOSIS — M25.461 PAIN AND SWELLING OF RIGHT KNEE: ICD-10-CM

## 2024-03-06 DIAGNOSIS — M25.561 PAIN AND SWELLING OF RIGHT KNEE: ICD-10-CM

## 2024-03-06 DIAGNOSIS — M25.562 CHRONIC PAIN OF LEFT KNEE: ICD-10-CM

## 2024-03-06 DIAGNOSIS — Z96.651 STATUS POST TOTAL RIGHT KNEE REPLACEMENT: Primary | ICD-10-CM

## 2024-03-06 DIAGNOSIS — G89.29 CHRONIC PAIN OF LEFT KNEE: ICD-10-CM

## 2024-03-06 DIAGNOSIS — M25.561 RIGHT KNEE PAIN, UNSPECIFIED CHRONICITY: ICD-10-CM

## 2024-03-06 DIAGNOSIS — M25.562 CHRONIC PAIN OF LEFT KNEE: Primary | ICD-10-CM

## 2024-03-06 DIAGNOSIS — M25.661 DECREASED ROM OF RIGHT KNEE: ICD-10-CM

## 2024-03-06 DIAGNOSIS — G89.29 CHRONIC PAIN OF LEFT KNEE: Primary | ICD-10-CM

## 2024-03-06 DIAGNOSIS — Z96.651 S/P TOTAL KNEE ARTHROPLASTY, RIGHT: Primary | ICD-10-CM

## 2024-03-06 PROCEDURE — 97110 THERAPEUTIC EXERCISES: CPT | Mod: GP

## 2024-03-06 PROCEDURE — G0463 HOSPITAL OUTPT CLINIC VISIT: HCPCS

## 2024-03-06 PROCEDURE — 99024 POSTOP FOLLOW-UP VISIT: CPT | Performed by: ORTHOPAEDIC SURGERY

## 2024-03-06 NOTE — PROGRESS NOTES
SUBJECTIVE:  Patient returns 2 weeks status post right total knee replacement and she is doing very well in regards to that.  Patient reports her pain is well-controlled at this time.  Patient is here for staple wound examination.  Patient does want to move forward with left total knee replacement and she already had tenderness to the ear marked April 2 for that.  Patient denies any other major concerns at this point.    ROS: Musculoskeletal and general review of systems are negative, per review of previous clinic questionnaire.  Denies SOB and calf pain.    EXAM: Right knee examination shows a well-healed incision.  Staples removed Steri-Strips applied.  Range of motion is tolerable -2 to about 115.  Ligament examination is otherwise stable    IMAGING: None    ASSESSMENT: Right total knee replacement.  Left knee end-stage arthrosis.    PLAN: Continue PT process.  Recheck examination 2 weeks 3 views right knee.  If all looks good at that time we will proceed according plan here with left total knee replacement on April 2.    Parth Hansen MD

## 2024-03-11 ENCOUNTER — THERAPY VISIT (OUTPATIENT)
Dept: PHYSICAL THERAPY | Facility: OTHER | Age: 74
End: 2024-03-11
Attending: ORTHOPAEDIC SURGERY
Payer: MEDICARE

## 2024-03-11 DIAGNOSIS — M25.561 PAIN AND SWELLING OF RIGHT KNEE: ICD-10-CM

## 2024-03-11 DIAGNOSIS — Z96.651 S/P TOTAL KNEE ARTHROPLASTY, RIGHT: Primary | ICD-10-CM

## 2024-03-11 DIAGNOSIS — M25.661 DECREASED ROM OF RIGHT KNEE: ICD-10-CM

## 2024-03-11 DIAGNOSIS — M25.461 PAIN AND SWELLING OF RIGHT KNEE: ICD-10-CM

## 2024-03-11 DIAGNOSIS — M25.561 RIGHT KNEE PAIN, UNSPECIFIED CHRONICITY: ICD-10-CM

## 2024-03-11 PROCEDURE — 97110 THERAPEUTIC EXERCISES: CPT | Mod: GP

## 2024-03-11 PROCEDURE — 97140 MANUAL THERAPY 1/> REGIONS: CPT | Mod: GP

## 2024-03-11 PROCEDURE — 97016 VASOPNEUMATIC DEVICE THERAPY: CPT | Mod: GP

## 2024-03-13 ENCOUNTER — THERAPY VISIT (OUTPATIENT)
Dept: PHYSICAL THERAPY | Facility: OTHER | Age: 74
End: 2024-03-13
Attending: ORTHOPAEDIC SURGERY
Payer: MEDICARE

## 2024-03-13 DIAGNOSIS — M25.461 PAIN AND SWELLING OF RIGHT KNEE: ICD-10-CM

## 2024-03-13 DIAGNOSIS — M25.561 RIGHT KNEE PAIN, UNSPECIFIED CHRONICITY: ICD-10-CM

## 2024-03-13 DIAGNOSIS — M25.561 PAIN AND SWELLING OF RIGHT KNEE: ICD-10-CM

## 2024-03-13 DIAGNOSIS — M25.661 DECREASED ROM OF RIGHT KNEE: ICD-10-CM

## 2024-03-13 DIAGNOSIS — Z96.651 S/P TOTAL KNEE ARTHROPLASTY, RIGHT: Primary | ICD-10-CM

## 2024-03-13 PROCEDURE — 97016 VASOPNEUMATIC DEVICE THERAPY: CPT | Mod: GP

## 2024-03-13 PROCEDURE — 97110 THERAPEUTIC EXERCISES: CPT | Mod: GP

## 2024-03-13 PROCEDURE — 97140 MANUAL THERAPY 1/> REGIONS: CPT | Mod: GP

## 2024-03-18 DIAGNOSIS — Z96.651 STATUS POST TOTAL RIGHT KNEE REPLACEMENT: Primary | ICD-10-CM

## 2024-03-19 ENCOUNTER — THERAPY VISIT (OUTPATIENT)
Dept: PHYSICAL THERAPY | Facility: OTHER | Age: 74
End: 2024-03-19
Attending: ORTHOPAEDIC SURGERY
Payer: MEDICARE

## 2024-03-19 DIAGNOSIS — Z96.651 S/P TOTAL KNEE ARTHROPLASTY, RIGHT: Primary | ICD-10-CM

## 2024-03-19 DIAGNOSIS — M25.561 PAIN AND SWELLING OF RIGHT KNEE: ICD-10-CM

## 2024-03-19 DIAGNOSIS — M25.561 RIGHT KNEE PAIN, UNSPECIFIED CHRONICITY: ICD-10-CM

## 2024-03-19 DIAGNOSIS — M25.661 DECREASED ROM OF RIGHT KNEE: ICD-10-CM

## 2024-03-19 DIAGNOSIS — M25.461 PAIN AND SWELLING OF RIGHT KNEE: ICD-10-CM

## 2024-03-19 PROCEDURE — 97140 MANUAL THERAPY 1/> REGIONS: CPT | Mod: GP

## 2024-03-19 PROCEDURE — 97110 THERAPEUTIC EXERCISES: CPT | Mod: GP

## 2024-03-19 PROCEDURE — 97016 VASOPNEUMATIC DEVICE THERAPY: CPT | Mod: GP

## 2024-03-20 ENCOUNTER — OFFICE VISIT (OUTPATIENT)
Dept: ORTHOPEDICS | Facility: OTHER | Age: 74
End: 2024-03-20
Attending: ORTHOPAEDIC SURGERY
Payer: MEDICARE

## 2024-03-20 ENCOUNTER — HOSPITAL ENCOUNTER (OUTPATIENT)
Dept: GENERAL RADIOLOGY | Facility: OTHER | Age: 74
Discharge: HOME | End: 2024-03-20
Attending: ORTHOPAEDIC SURGERY
Payer: MEDICARE

## 2024-03-20 DIAGNOSIS — Z96.651 STATUS POST TOTAL RIGHT KNEE REPLACEMENT: Primary | ICD-10-CM

## 2024-03-20 DIAGNOSIS — Z96.651 STATUS POST TOTAL RIGHT KNEE REPLACEMENT: ICD-10-CM

## 2024-03-20 DIAGNOSIS — G89.29 CHRONIC PAIN OF LEFT KNEE: ICD-10-CM

## 2024-03-20 DIAGNOSIS — M25.562 CHRONIC PAIN OF LEFT KNEE: ICD-10-CM

## 2024-03-20 PROCEDURE — 73564 X-RAY EXAM KNEE 4 OR MORE: CPT | Mod: 50

## 2024-03-20 PROCEDURE — 99024 POSTOP FOLLOW-UP VISIT: CPT | Performed by: ORTHOPAEDIC SURGERY

## 2024-03-20 PROCEDURE — G0463 HOSPITAL OUTPT CLINIC VISIT: HCPCS

## 2024-03-20 NOTE — PROGRESS NOTES
SUBJECTIVE:  Patient returns 4 weeks status post right total knee replacement.  Patient overall continues to do well there and is getting better here with time.  Patient also is on my schedule for her left knee replacement here for 224 here at [facility] as well.  Patient is looking forward to moving forward on that plan.    ROS: Musculoskeletal and general review of systems are negative, per review of previous clinic questionnaire.  Denies SOB and calf pain.    EXAM: Right knee examination shows range of motion 0-1 20.  Ligament examination is stable.  Neuro exam intact.  Left knee examination shows valgus deformity tenderness across joint line and crepitation with flexion extension.  Range of motion tolerable 0-1 25.    IMAGING: 3 views of each knee have been reviewed right side shows well aligned total knee replacement.  Left side shows end-stage arthrosis with valgus deformity.    ASSESSMENT: #1 right total knee replacement.  #2 left knee end-stage arthritis with valgus deformity    PLAN: Proceed forward with left total knee replacement.  Yudi persona posterior stabilized cemented in nature.  Plan for 1 day stay transition home from there.  In terms of her right knee continued flexibility and strengthening.  Recheck examination when I see her back for postop on her left knee simple rehab check at that time.    Parth Hansen MD

## 2024-03-21 ENCOUNTER — THERAPY VISIT (OUTPATIENT)
Dept: PHYSICAL THERAPY | Facility: OTHER | Age: 74
End: 2024-03-21
Attending: ORTHOPAEDIC SURGERY
Payer: MEDICARE

## 2024-03-21 DIAGNOSIS — M25.561 PAIN AND SWELLING OF RIGHT KNEE: ICD-10-CM

## 2024-03-21 DIAGNOSIS — M25.461 PAIN AND SWELLING OF RIGHT KNEE: ICD-10-CM

## 2024-03-21 DIAGNOSIS — M25.661 DECREASED ROM OF RIGHT KNEE: ICD-10-CM

## 2024-03-21 DIAGNOSIS — Z96.651 S/P TOTAL KNEE ARTHROPLASTY, RIGHT: Primary | ICD-10-CM

## 2024-03-21 DIAGNOSIS — M25.561 RIGHT KNEE PAIN, UNSPECIFIED CHRONICITY: ICD-10-CM

## 2024-03-21 PROCEDURE — 97110 THERAPEUTIC EXERCISES: CPT | Mod: GP

## 2024-03-21 PROCEDURE — 97140 MANUAL THERAPY 1/> REGIONS: CPT | Mod: GP

## 2024-03-21 NOTE — PROGRESS NOTES
03/21/24 0500   Appointment Info   Signing clinician's name / credentials Cecilia Andino DPT   Total/Authorized Visits 10   Visits Used 10/10   Medical Diagnosis Right knee pain, unspecified chronicity, S/P total knee arthroplasty, right   PT Tx Diagnosis Right knee pain, unspecified chronicity, S/P total knee arthroplasty, right, pain and swelling of the right knee, decreased range of motion of the right knee   Progress Note/Certification   Start of Care Date 02/22/24   Onset of illness/injury or Date of Surgery 02/20/24   Therapy Frequency 2 times a week   Predicted Duration 12 weeks   Certification date from 02/22/24   Certification date to 05/16/24   Progress Note Completed Date 02/22/24   Supervision   PT Assistant Visit Number 1   PT Goal 1   Goal Identifier HEP   Goal Description Patient will be compliant with a home exercise program 5 days a week for appropriate progression of home exercise program for success in physical therapy.   Target Date 03/22/24   Date Met 03/19/24   PT Goal 2   Goal Identifier ROM   Goal Description Patient will demonstrate right knee flexion to 115 degrees to allow for proper mechanics for reciprocal ambulation on the stairs are safe and independent mobility within the home and community.   Goal Progress Acheived 115 degrees but unable to do reciprocal pattern on stairs   Target Date 05/17/24   PT Goal 3   Goal Identifier Gait/mobility   Goal Description Patient will be able to ambulate without assistive device to keep up with her  to be a community ambulator.   Goal Progress Progressing to no assistive device, endurance limited.   Target Date 05/17/24   Subjective Report   Subjective Report Sleeping well with right TKA, working on TKE at home. Using Tylenol for pain managment. Planning on L TKA to be done April 2.   Objective Measure 1   Objective Measure Pain   Details 1/10   Objective Measure 2   Objective Measure Mobility/gait   Details Lacks TKE, ambulates  "without A device   Objective Measure 3   Objective Measure ROM   Details Supine: R knee AROM 6-115   Therapeutic Procedure/Exercise   Therapeutic Procedures: strength, endurance, ROM, flexibility minutes (15262) 30   Ther Proc 1 - Details NuStep Pro: seat #10 lvl 3 x 8 mins. Stairs: Flex and Ext stretch x 5,  reciprocal  6\" stairs x 4 (4 in succesion),  6\" Fwd Step up R x 10. Leg Press: dbl leg 100# 2x10.  Supine heel slide x 10,  (AROM 6-115 degrees). Manual overpressure knee extension stretch in supine x 4.   Skilled Intervention Instruction in exercise, verbal cues for knee extension during gait. Verbal and manual cues for exercise mechanics   Patient Response/Progress Tolerates well, improving knee extension noted today. Progressing with gait and stairs as well.   Manual Therapy   Manual Therapy: Mobilization, MFR, MLD, friction massage minutes (84988) 15   Manual Therapy 1 - Details Supine: patella mobs x 15. STM right distal thigh, STM medial and lateral right knee, ITB, and  fibular head mobs x 10.  STM posterior knee.   Skilled Intervention Technique, pressure   Patient Response/Progress Favorable, improving tissue tensions at the right knee.   Education   Learner/Method Patient;Significant Other;Pictures/Video;Demonstration   Plan   Home program Access Code: [email or web address]  URL: [email or web address]  Date: 02/22/2024  Prepared by: Cecilia Andino    Exercises  - Supine Quad Set  - 2 x daily - 7 x weekly - 2 sets - 10 reps - 5 hold  - Supine Heel Slide  - 2 x daily - 7 x weekly - 2 sets - 10 reps  - Supine Knee Extension Stretch on Towel Roll  - 2 x daily - 7 x weekly - 2 reps - 60 hold  - Short Arc Quad with Ankle Weight  - 1 x daily - 7 x weekly - 2 sets - 10 reps  - Gastroc Stretch on Wall  - 1 x daily - 7 x weekly - 2 reps - 30 hold  - Supine Ankle Pumps  - 2 x daily - 7 x weekly - 2 sets - 10 reps. Elevate and ice. Access Code: [email or web address]  URL: [email or web address]  Date: " 02/26/2024  Prepared by: Cecilia Andino    Exercises  - Gastroc Stretch on Wall  - 2 x daily - 7 x weekly - 2 reps - 30 hold  - Standing Knee Flexion AROM with Chair Support  - 1 x daily - 7 x weekly - 2 sets - 10 reps  - Mini Squat with Counter Support  - 1 x daily - 7 x weekly - 2 sets - 10 reps  - Heel Raises with Counter Support  - 1 x daily - 7 x weekly - 2 sets - 10 reps. 3/1- added alt marches and standing wt shifts EO/EC. Access Code: [email or web address]  URL: [email or web address]  Date: 03/11/2024  Prepared by: Cecilia Andino    Exercises  - Standing Terminal Knee Extension with Resistance  - 1 x daily - 5 x weekly - 2 sets - 10 reps   Plan for next session Exercise per protocol status post right total knee arthroplasty.  Use of the vasopneumatic device for pain and swelling.  Continue manual techniques to promote increase ROM. Progress strengthening.   Total Session Time   Timed Code Treatment Minutes 45   Total Treatment Time (sum of timed and untimed services) 45         PLAN  Continue therapy per current plan of care.    Beginning/End Dates of Progress Note Reporting Period:  02/22/24 to 03/21/2024    Referring Provider:  Parth Hansen

## 2024-03-22 ENCOUNTER — OFFICE VISIT (OUTPATIENT)
Dept: INTERNAL MEDICINE | Facility: OTHER | Age: 74
End: 2024-03-22
Payer: COMMERCIAL

## 2024-03-22 VITALS
SYSTOLIC BLOOD PRESSURE: 120 MMHG | DIASTOLIC BLOOD PRESSURE: 70 MMHG | BODY MASS INDEX: 30.76 KG/M2 | HEART RATE: 78 BPM | WEIGHT: 191.4 LBS | OXYGEN SATURATION: 97 % | HEIGHT: 66 IN | TEMPERATURE: 97.4 F | RESPIRATION RATE: 16 BRPM

## 2024-03-22 DIAGNOSIS — G89.29 CHRONIC PAIN OF LEFT KNEE: ICD-10-CM

## 2024-03-22 DIAGNOSIS — F32.3 SEVERE MAJOR DEPRESSION WITH PSYCHOTIC FEATURES (H): ICD-10-CM

## 2024-03-22 DIAGNOSIS — E78.2 MIXED HYPERLIPIDEMIA: ICD-10-CM

## 2024-03-22 DIAGNOSIS — Z01.818 PREOP GENERAL PHYSICAL EXAM: Primary | ICD-10-CM

## 2024-03-22 DIAGNOSIS — R73.03 PREDIABETES: ICD-10-CM

## 2024-03-22 DIAGNOSIS — G47.33 OSA ON CPAP: Chronic | ICD-10-CM

## 2024-03-22 DIAGNOSIS — M25.562 CHRONIC PAIN OF LEFT KNEE: ICD-10-CM

## 2024-03-22 PROCEDURE — G0463 HOSPITAL OUTPT CLINIC VISIT: HCPCS

## 2024-03-22 PROCEDURE — 99214 OFFICE O/P EST MOD 30 MIN: CPT | Mod: 24

## 2024-03-22 ASSESSMENT — ENCOUNTER SYMPTOMS
COUGH: 0
LIGHT-HEADEDNESS: 0
WHEEZING: 0
VOMITING: 0
WOUND: 0
ABDOMINAL PAIN: 0
DYSURIA: 0
HEMATURIA: 0
SHORTNESS OF BREATH: 0
AGITATION: 0
CHILLS: 0
MYALGIAS: 0
BRUISES/BLEEDS EASILY: 0
CONFUSION: 0
DIARRHEA: 0
ARTHRALGIAS: 1
NAUSEA: 0
DIZZINESS: 0
FEVER: 0

## 2024-03-22 ASSESSMENT — PAIN SCALES - GENERAL: PAINLEVEL: NO PAIN (0)

## 2024-03-22 NOTE — H&P (VIEW-ONLY)
Preoperative Evaluation  [facility]  [address]  [place] [address]  Phone: [phone]  Fax: [phone]  Primary Provider: Nidhi Baer  Pre-op Performing Provider: LUIS BURNS  Mar 22, 2024       Daly is a 73 year old, presenting for the following:  Pre-Op Exam (Left TKA)      Surgical Information  Surgery/Procedure: left TKA  Surgery Location:  [facility]   Surgeon: Dr. Hansen  Surgery Date: 04/02/2024  Time of Surgery: TBD  Where patient plans to recover: At home with family  Fax number for surgical facility: Note does not need to be faxed, will be available electronically in Epic.    Assessment & Plan     The proposed surgical procedure is considered INTERMEDIATE risk.      ICD-10-CM    1. Preop general physical exam  Z01.818       2. Chronic pain of left knee  M25.562     G89.29       3. Severe major depression with psychotic features (H)  F32.3       4. KB on CPAP  G47.33       5. Mixed hyperlipidemia  E78.2       6. Prediabetes  R73.03                Risks and Recommendations  The patient has the following additional risks and recommendations for perioperative complications:  Obstructive Sleep Apnea:       Antiplatelet or Anticoagulation Medication Instructions   - Patient is on no antiplatelet or anticoagulation medications.    Additional Medication Instructions  Patient is to take all scheduled medications on the day of surgery    Recommendation  APPROVAL GIVEN to proceed with proposed procedure, without further diagnostic evaluation.        Subjective       HPI related to upcoming procedure: Patient presents to clinic for preoperative evaluation for left total knee arthroplasty scheduled on 4-24 by Dr. Hansen.  Patient denies difficulty with anesthesia in the past, is able to meet greater than 4 METS.  She recently had her right knee replaced without any difficulties.          3/22/2024     9:02 AM   Preop Questions   1. Have you ever had a heart attack or stroke?  No   2. Have you ever had surgery on your heart or blood vessels, such as a stent placement, a coronary artery bypass, or surgery on an artery in your head, neck, heart, or legs? No   3. Do you have chest pain with activity? No   4. Do you have a history of  heart failure? No   5. Do you currently have a cold, bronchitis or symptoms of other infection? No   6. Do you have a cough, shortness of breath, or wheezing? No   7. Do you or anyone in your family have previous history of blood clots? No   8. Do you or does anyone in your family have a serious bleeding problem such as prolonged bleeding following surgeries or cuts? No   9. Have you ever had problems with anemia or been told to take iron pills? No   10. Have you had any abnormal blood loss such as black, tarry or bloody stools, or abnormal vaginal bleeding? No   11. Have you ever had a blood transfusion? No   12. Are you willing to have a blood transfusion if it is medically needed before, during, or after your surgery? Yes   13. Have you or any of your relatives ever had problems with anesthesia? No   14. Do you have sleep apnea, excessive snoring or daytime drowsiness? YES   14a. Do you have a CPAP machine? Yes   15. Do you have any artifical heart valves or other implanted medical devices like a pacemaker, defibrillator, or continuous glucose monitor? No   16. Do you have artificial joints? YES - Left knee    17. Are you allergic to latex? No     Health Care Directive  Patient has a Health Care Directive on file      Preoperative Review of    reviewed - no record of controlled substances prescribed.      Status of Chronic Conditions:    DEPRESSION - Patient has a long history of Depression of moderate severity requiring medication for control with recent symptoms being stable..Current symptoms of depression include anxiety.      HYPERLIPIDEMIA: ASCVD risk score is 9.3% statin therapy recommended.      PREDIABETES: A1C is 5.8  Patient Active Problem  List    Diagnosis Date Noted    Right knee pain, unspecified chronicity 02/23/2024     Priority: Medium    S/P total knee arthroplasty, right 02/20/2024     Priority: Medium    Mixed hyperlipidemia 02/15/2024     Priority: Medium    Chronic pain of right knee 04/04/2022     Priority: Medium    KB on CPAP 05/27/2021     Priority: Medium    Fibrocystic breast disease 01/30/2018     Priority: Medium     Overview:   Nonproliferative breast disease    Formatting of this note might be different from the original.  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  Vulvar LSEA      Neck pain, chronic 01/30/2018     Priority: Medium     Overview:   Improved after nursing home    Formatting of this note might be different from the original.  Improved after nursing home      Severe major depression with psychotic features (H) 10/25/2016     Priority: Medium     Formatting of this note might be different from the original.  ect treatment      Generalized anxiety disorder 07/18/2016     Priority: Medium     Overview:   Dysthymia, generalized anxiety    Formatting of this note might be different from the original.  Dysthymia, generalized anxiety      Major depressive disorder, recurrent episode, moderate (H) 07/18/2016     Priority: Medium    Rosacea 01/04/2013     Priority: Medium    Diverticulosis of large intestine 03/14/2011     Priority: Medium     Overview:   Diffuse    Formatting of this note might be different from the original.  Diffuse        Past Medical History:   Diagnosis Date    Closed left ankle fracture     Cyst of ovary     Hx of right ovarian cyst.    Diverticulosis of large intestine 03/14/2011    Endometriosis     growth on ovary s/p oophrectomy    Fibrocystic breast disease 01/30/2018    Generalized anxiety disorder     Dysthymia, generalized anxiety    Lichen sclerosus      Vulvar LSEA; asymptomatic    Neck pain, chronic 01/30/2018     Improved after half-way    Rosacea 01/04/2013    Severe major depression with psychotic features (H) 10/25/2016    history of ECT; inpatient for several months; she does not have complete memory of that time    Sleep apnea      Past Surgical History:   Procedure Laterality Date    ARTHROPLASTY KNEE Right 2/20/2024    Procedure: ARTHROPLASTY, KNEE, TOTAL;  Surgeon: Parth Hansen MD;  Location: [facility] OR    ARTHROSCOPY KNEE WITH MENISCECTOMY Right 4/15/2022    Procedure: Right Knee Arthoscopy with Partial MEDIAL AND Lateral Meniscectomy and  chondroplasty;  Surgeon: Parth Hansen MD;  Location: [facility] OR    BIOPSY BREAST Right 07/07/2008    stereotactic, benign result    CATARACT IOL, RT/LT Bilateral     2017, 2018    COLONOSCOPY  03/14/2011    Normal; F/U 2021    DILATION AND CURETTAGE  2003    D&C with hysteroscopy and endometrial ablation    OOPHORECTOMY Right 02/1997    OPEN REDUCTION INTERNAL FIXATION ANKLE Left 2002    RELEASE CARPAL TUNNEL  2010    REMOVE HARDWARE ANKLE Left 06/27/2011    VITRECTOMY ANTERIOR Left 05/22/2017    Dr. Collins     Current Outpatient Medications   Medication Sig Dispense Refill    acetaminophen (TYLENOL) 325 MG tablet Take 325 mg by mouth every 4 hours as needed Max acetaminophen dose: 4000 mg in 24 hours      ARIPiprazole (ABILIFY) 10 MG tablet Take 10 mg by mouth daily      calcium carbonate 600 mg-vitamin D 400 units (CALCIUM CARB-CHOLECALCIFEROL) 600-400 MG-UNIT per tablet Take 1 tablet by mouth daily      Cholecalciferol (VITAMIN D3) 25 MCG (1000 UT) CAPS Take 1 capsule by mouth daily      hydrOXYzine HCl (ATARAX) 25 MG tablet Take 1 tablet (25 mg) by mouth every 6 hours as needed for itching 30 tablet 0    ibuprofen (ADVIL/MOTRIN) 200 MG tablet Take 200 mg by mouth 4 times daily as needed      ibuprofen (ADVIL/MOTRIN) 600 MG tablet Take 1 tablet (600 mg) by mouth every 6 hours as needed for other (mild and/or  inflammatory pain) 30 tablet 0    Lutein-Zeaxanthin (OCUVITE LUTEIN 25) 25-5 MG CAPS Take 1 capsule by mouth daily       Multiple Vitamins-Minerals (OCUVITE PO) Take 1 tablet by mouth daily      ondansetron (ZOFRAN ODT) 4 MG ODT tab Take 1 tablet (4 mg) by mouth every 8 hours as needed for nausea 4 tablet 0    oxyCODONE (ROXICODONE) 5 MG tablet Take 1 tablet (5 mg) by mouth every 4 hours as needed for moderate to severe pain 20 tablet 0    propranolol (INDERAL) 40 MG tablet Take 1 tablet (40 mg) by mouth 2 times daily Increase in dose 180 tablet 4    senna-docusate (SENOKOT-S/PERICOLACE) 8.6-50 MG tablet Take 1-2 tablets by mouth 2 times daily 30 tablet 0    venlafaxine (EFFEXOR-XR) 75 MG 24 hr capsule Take 75 mg by mouth 2 times daily          No Known Allergies     Social History     Tobacco Use    Smoking status: Never    Smokeless tobacco: Never   Substance Use Topics    Alcohol use: Yes     Comment: 1-2 per week       History   Drug Use No         Review of Systems   Constitutional:  Negative for chills and fever.   HENT:  Negative for congestion and hearing loss.    Eyes:  Negative for visual disturbance.   Respiratory:  Negative for cough, shortness of breath and wheezing.    Cardiovascular:  Negative for chest pain.   Gastrointestinal:  Negative for abdominal pain, diarrhea, nausea and vomiting.   Endocrine: Negative for cold intolerance and heat intolerance.   Genitourinary:  Negative for dysuria and hematuria.   Musculoskeletal:  Positive for arthralgias (left knee). Negative for myalgias.   Skin:  Negative for rash and wound.   Allergic/Immunologic: Negative for immunocompromised state.   Neurological:  Negative for dizziness and light-headedness.   Hematological:  Does not bruise/bleed easily.   Psychiatric/Behavioral:  Negative for agitation and confusion.          Objective    /70 (BP Location: Right arm, Patient Position: Sitting, Cuff Size: Adult Large)   Pulse 78   Temp 97.4  F (36.3  C)  "(Temporal)   Resp 16   Ht 1.676 m (5' 6\")   Wt 86.8 kg (191 lb 6.4 oz)   LMP 02/14/2004 (Approximate)   SpO2 97%   BMI 30.89 kg/m     Estimated body mass index is 30.89 kg/m  as calculated from the following:    Height as of this encounter: 1.676 m (5' 6\").    Weight as of this encounter: 86.8 kg (191 lb 6.4 oz).  Physical Exam  Vitals reviewed.   Constitutional:       General: She is not in acute distress.     Appearance: She is well-developed.   HENT:      Head: Normocephalic and atraumatic.      Nose: Nose normal.      Mouth/Throat:      Pharynx: No oropharyngeal exudate.   Eyes:      General: No scleral icterus.     Conjunctiva/sclera: Conjunctivae normal.      Pupils: Pupils are equal, round, and reactive to light.   Neck:      Thyroid: No thyromegaly.   Cardiovascular:      Rate and Rhythm: Normal rate and regular rhythm.      Heart sounds: Normal heart sounds. No murmur heard.  Pulmonary:      Effort: Pulmonary effort is normal. No respiratory distress.      Breath sounds: Normal breath sounds. No wheezing.   Musculoskeletal:         General: No tenderness or deformity. Normal range of motion.      Cervical back: Normal range of motion and neck supple.   Skin:     General: Skin is warm and dry.      Findings: No rash.   Neurological:      Mental Status: She is alert and oriented to person, place, and time.      Cranial Nerves: No cranial nerve deficit.      Coordination: Coordination normal.   Psychiatric:         Behavior: Behavior normal.         Thought Content: Thought content normal.         Judgment: Judgment normal.           Recent Labs   Lab Test 02/14/24  0929 02/02/23  1542 04/05/22  0828   HGB 14.4  --  13.3     --  283    138 140   POTASSIUM 4.1 4.0 4.4   CR 0.83 0.61 0.68   A1C 5.8 5.9  --         Diagnostics  No labs were ordered during this visit. Labs are within 3 months without any clinical changes  No EKG this visit, completed in the last 90 days.    Revised Cardiac Risk " Index (RCRI)  The patient has the following serious cardiovascular risks for perioperative complications:   - No serious cardiac risks = 0 points     RCRI Interpretation: 0 points: Class I (very low risk - 0.4% complication rate)         Signed Electronically by: LIANG Larios CNP  Copy of this evaluation report is provided to requesting physician.

## 2024-03-22 NOTE — PATIENT INSTRUCTIONS
Preparing for Your Surgery  Getting started  A nurse will call you to review your health history and instructions. They will give you an arrival time based on your scheduled surgery time. Please be ready to share:  Your doctor's clinic name and phone number  Your medical, surgical, and anesthesia history  A list of allergies and sensitivities  A list of medicines, including herbal treatments and over-the-counter drugs  Whether the patient has a legal guardian (ask how to send us the papers in advance)  Please tell us if you're pregnant--or if there's any chance you might be pregnant. Some surgeries may injure a fetus (unborn baby), so they require a pregnancy test. Surgeries that are safe for a fetus don't always need a test, and you can choose whether to have one.   If you have a child who's having surgery, please ask for a copy of Preparing for Your Child's Surgery.    Preparing for surgery  Within 10 to 30 days of surgery: Have a pre-op exam (sometimes called an H&P, or History and Physical). This can be done at a clinic or pre-operative center.  If you're having a , you may not need this exam. Talk to your care team.  At your pre-op exam, talk to your care team about all medicines you take. If you need to stop any medicines before surgery, ask when to start taking them again.  We do this for your safety. Many medicines can make you bleed too much during surgery. Some change how well surgery (anesthesia) drugs work.  Call your insurance company to let them know you're having surgery. (If you don't have insurance, call [phone].)  Call your clinic if there's any change in your health. This includes signs of a cold or flu (sore throat, runny nose, cough, rash, fever). It also includes a scrape or scratch near the surgery site.  If you have questions on the day of surgery, call your hospital or surgery center.  Eating and drinking guidelines  For your safety: Unless your surgeon tells you otherwise,  follow the guidelines below.  Eat and drink as usual until 8 hours before you arrive for surgery. After that, no food or milk.  Drink clear liquids until 2 hours before you arrive. These are liquids you can see through, like water, Gatorade, and Propel Water. They also include plain black coffee and tea (no cream or milk), candy, and breath mints. You can spit out gum when you arrive.  If you drink alcohol: Stop drinking it the night before surgery.  If your care team tells you to take medicine on the morning of surgery, it's okay to take it with a sip of water.  Preventing infection  Shower or bathe the night before and morning of your surgery. Follow the instructions your clinic gave you. (If no instructions, use regular soap.)  Don't shave or clip hair near your surgery site. We'll remove the hair if needed.  Don't smoke or vape the morning of surgery. You may chew nicotine gum up to 2 hours before surgery. A nicotine patch is okay.  Note: Some surgeries require you to completely quit smoking and nicotine. Check with your surgeon.  Your care team will make every effort to keep you safe from infection. We will:  Clean our hands often with soap and water (or an alcohol-based hand rub).  Clean the skin at your surgery site with a special soap that kills germs.  Give you a special gown to keep you warm. (Cold raises the risk of infection.)  Wear special hair covers, masks, gowns and gloves during surgery.  Give antibiotic medicine, if prescribed. Not all surgeries need antibiotics.  What to bring on the day of surgery  Photo ID and insurance card  Copy of your health care directive, if you have one  Glasses and hearing aids (bring cases)  You can't wear contacts during surgery  Inhaler and eye drops, if you use them (tell us about these when you arrive)  CPAP machine or breathing device, if you use them  A few personal items, if spending the night  If you have . . .  A pacemaker, ICD (cardiac defibrillator) or other  implant: Bring the ID card.  An implanted stimulator: Bring the remote control.  A legal guardian: Bring a copy of the certified (court-stamped) guardianship papers.  Please remove any jewelry, including body piercings. Leave jewelry and other valuables at home.  If you're going home the day of surgery  You must have a responsible adult drive you home. They should stay with you overnight as well.  If you don't have someone to stay with you, and you aren't safe to go home alone, we may keep you overnight. Insurance often won't pay for this.  After surgery  If it's hard to control your pain or you need more pain medicine, please call your surgeon's office.  Questions?   If you have any questions for your care team, list them here: _________________________________________________________________________________________________________________________________________________________________________ ____________________________________ ____________________________________ ____________________________________  For informational purposes only. Not to replace the advice of your health care provider. Copyright   2003, 2019 [employer]. All rights reserved. Clinically reviewed by Vivienne Lozano MD. [employer] 146715 - REV 12/22.    How to Take Your Medication Before Surgery  - HOLD (do not take) Aspirin for 7 days  - STOP taking all vitamins and herbal supplements 14 days before surgery.

## 2024-03-22 NOTE — PROGRESS NOTES
Preoperative Evaluation  [facility]  [address]  [place] [address]  Phone: [phone]  Fax: [phone]  Primary Provider: Nidhi Baer  Pre-op Performing Provider: LUIS BURNS  Mar 22, 2024       Daly is a 73 year old, presenting for the following:  Pre-Op Exam (Left TKA)      Surgical Information  Surgery/Procedure: left TKA  Surgery Location:  [facility]   Surgeon: Dr. Hansen  Surgery Date: 04/02/2024  Time of Surgery: TBD  Where patient plans to recover: At home with family  Fax number for surgical facility: Note does not need to be faxed, will be available electronically in Epic.    Assessment & Plan     The proposed surgical procedure is considered INTERMEDIATE risk.      ICD-10-CM    1. Preop general physical exam  Z01.818       2. Chronic pain of left knee  M25.562     G89.29       3. Severe major depression with psychotic features (H)  F32.3       4. KB on CPAP  G47.33       5. Mixed hyperlipidemia  E78.2       6. Prediabetes  R73.03                Risks and Recommendations  The patient has the following additional risks and recommendations for perioperative complications:  Obstructive Sleep Apnea:       Antiplatelet or Anticoagulation Medication Instructions   - Patient is on no antiplatelet or anticoagulation medications.    Additional Medication Instructions  Patient is to take all scheduled medications on the day of surgery    Recommendation  APPROVAL GIVEN to proceed with proposed procedure, without further diagnostic evaluation.        Subjective       HPI related to upcoming procedure: Patient presents to clinic for preoperative evaluation for left total knee arthroplasty scheduled on 4-24 by Dr. Hansen.  Patient denies difficulty with anesthesia in the past, is able to meet greater than 4 METS.  She recently had her right knee replaced without any difficulties.          3/22/2024     9:02 AM   Preop Questions   1. Have you ever had a heart attack or stroke?  No   2. Have you ever had surgery on your heart or blood vessels, such as a stent placement, a coronary artery bypass, or surgery on an artery in your head, neck, heart, or legs? No   3. Do you have chest pain with activity? No   4. Do you have a history of  heart failure? No   5. Do you currently have a cold, bronchitis or symptoms of other infection? No   6. Do you have a cough, shortness of breath, or wheezing? No   7. Do you or anyone in your family have previous history of blood clots? No   8. Do you or does anyone in your family have a serious bleeding problem such as prolonged bleeding following surgeries or cuts? No   9. Have you ever had problems with anemia or been told to take iron pills? No   10. Have you had any abnormal blood loss such as black, tarry or bloody stools, or abnormal vaginal bleeding? No   11. Have you ever had a blood transfusion? No   12. Are you willing to have a blood transfusion if it is medically needed before, during, or after your surgery? Yes   13. Have you or any of your relatives ever had problems with anesthesia? No   14. Do you have sleep apnea, excessive snoring or daytime drowsiness? YES   14a. Do you have a CPAP machine? Yes   15. Do you have any artifical heart valves or other implanted medical devices like a pacemaker, defibrillator, or continuous glucose monitor? No   16. Do you have artificial joints? YES - Left knee    17. Are you allergic to latex? No     Health Care Directive  Patient has a Health Care Directive on file      Preoperative Review of    reviewed - no record of controlled substances prescribed.      Status of Chronic Conditions:    DEPRESSION - Patient has a long history of Depression of moderate severity requiring medication for control with recent symptoms being stable..Current symptoms of depression include anxiety.      HYPERLIPIDEMIA: ASCVD risk score is 9.3% statin therapy recommended.      PREDIABETES: A1C is 5.8  Patient Active Problem  List    Diagnosis Date Noted    Right knee pain, unspecified chronicity 02/23/2024     Priority: Medium    S/P total knee arthroplasty, right 02/20/2024     Priority: Medium    Mixed hyperlipidemia 02/15/2024     Priority: Medium    Chronic pain of right knee 04/04/2022     Priority: Medium    KB on CPAP 05/27/2021     Priority: Medium    Fibrocystic breast disease 01/30/2018     Priority: Medium     Overview:   Nonproliferative breast disease    Formatting of this note might be different from the original.  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  Vulvar LSEA      Neck pain, chronic 01/30/2018     Priority: Medium     Overview:   Improved after prison    Formatting of this note might be different from the original.  Improved after prison      Severe major depression with psychotic features (H) 10/25/2016     Priority: Medium     Formatting of this note might be different from the original.  ect treatment      Generalized anxiety disorder 07/18/2016     Priority: Medium     Overview:   Dysthymia, generalized anxiety    Formatting of this note might be different from the original.  Dysthymia, generalized anxiety      Major depressive disorder, recurrent episode, moderate (H) 07/18/2016     Priority: Medium    Rosacea 01/04/2013     Priority: Medium    Diverticulosis of large intestine 03/14/2011     Priority: Medium     Overview:   Diffuse    Formatting of this note might be different from the original.  Diffuse        Past Medical History:   Diagnosis Date    Closed left ankle fracture     Cyst of ovary     Hx of right ovarian cyst.    Diverticulosis of large intestine 03/14/2011    Endometriosis     growth on ovary s/p oophrectomy    Fibrocystic breast disease 01/30/2018    Generalized anxiety disorder     Dysthymia, generalized anxiety    Lichen sclerosus      Vulvar LSEA; asymptomatic    Neck pain, chronic 01/30/2018     Improved after halfway    Rosacea 01/04/2013    Severe major depression with psychotic features (H) 10/25/2016    history of ECT; inpatient for several months; she does not have complete memory of that time    Sleep apnea      Past Surgical History:   Procedure Laterality Date    ARTHROPLASTY KNEE Right 2/20/2024    Procedure: ARTHROPLASTY, KNEE, TOTAL;  Surgeon: Parth Hansen MD;  Location: [facility] OR    ARTHROSCOPY KNEE WITH MENISCECTOMY Right 4/15/2022    Procedure: Right Knee Arthoscopy with Partial MEDIAL AND Lateral Meniscectomy and  chondroplasty;  Surgeon: Parth Hansen MD;  Location: [facility] OR    BIOPSY BREAST Right 07/07/2008    stereotactic, benign result    CATARACT IOL, RT/LT Bilateral     2017, 2018    COLONOSCOPY  03/14/2011    Normal; F/U 2021    DILATION AND CURETTAGE  2003    D&C with hysteroscopy and endometrial ablation    OOPHORECTOMY Right 02/1997    OPEN REDUCTION INTERNAL FIXATION ANKLE Left 2002    RELEASE CARPAL TUNNEL  2010    REMOVE HARDWARE ANKLE Left 06/27/2011    VITRECTOMY ANTERIOR Left 05/22/2017    Dr. Collins     Current Outpatient Medications   Medication Sig Dispense Refill    acetaminophen (TYLENOL) 325 MG tablet Take 325 mg by mouth every 4 hours as needed Max acetaminophen dose: 4000 mg in 24 hours      ARIPiprazole (ABILIFY) 10 MG tablet Take 10 mg by mouth daily      calcium carbonate 600 mg-vitamin D 400 units (CALCIUM CARB-CHOLECALCIFEROL) 600-400 MG-UNIT per tablet Take 1 tablet by mouth daily      Cholecalciferol (VITAMIN D3) 25 MCG (1000 UT) CAPS Take 1 capsule by mouth daily      hydrOXYzine HCl (ATARAX) 25 MG tablet Take 1 tablet (25 mg) by mouth every 6 hours as needed for itching 30 tablet 0    ibuprofen (ADVIL/MOTRIN) 200 MG tablet Take 200 mg by mouth 4 times daily as needed      ibuprofen (ADVIL/MOTRIN) 600 MG tablet Take 1 tablet (600 mg) by mouth every 6 hours as needed for other (mild and/or  inflammatory pain) 30 tablet 0    Lutein-Zeaxanthin (OCUVITE LUTEIN 25) 25-5 MG CAPS Take 1 capsule by mouth daily       Multiple Vitamins-Minerals (OCUVITE PO) Take 1 tablet by mouth daily      ondansetron (ZOFRAN ODT) 4 MG ODT tab Take 1 tablet (4 mg) by mouth every 8 hours as needed for nausea 4 tablet 0    oxyCODONE (ROXICODONE) 5 MG tablet Take 1 tablet (5 mg) by mouth every 4 hours as needed for moderate to severe pain 20 tablet 0    propranolol (INDERAL) 40 MG tablet Take 1 tablet (40 mg) by mouth 2 times daily Increase in dose 180 tablet 4    senna-docusate (SENOKOT-S/PERICOLACE) 8.6-50 MG tablet Take 1-2 tablets by mouth 2 times daily 30 tablet 0    venlafaxine (EFFEXOR-XR) 75 MG 24 hr capsule Take 75 mg by mouth 2 times daily          No Known Allergies     Social History     Tobacco Use    Smoking status: Never    Smokeless tobacco: Never   Substance Use Topics    Alcohol use: Yes     Comment: 1-2 per week       History   Drug Use No         Review of Systems   Constitutional:  Negative for chills and fever.   HENT:  Negative for congestion and hearing loss.    Eyes:  Negative for visual disturbance.   Respiratory:  Negative for cough, shortness of breath and wheezing.    Cardiovascular:  Negative for chest pain.   Gastrointestinal:  Negative for abdominal pain, diarrhea, nausea and vomiting.   Endocrine: Negative for cold intolerance and heat intolerance.   Genitourinary:  Negative for dysuria and hematuria.   Musculoskeletal:  Positive for arthralgias (left knee). Negative for myalgias.   Skin:  Negative for rash and wound.   Allergic/Immunologic: Negative for immunocompromised state.   Neurological:  Negative for dizziness and light-headedness.   Hematological:  Does not bruise/bleed easily.   Psychiatric/Behavioral:  Negative for agitation and confusion.          Objective    /70 (BP Location: Right arm, Patient Position: Sitting, Cuff Size: Adult Large)   Pulse 78   Temp 97.4  F (36.3  C)  "(Temporal)   Resp 16   Ht 1.676 m (5' 6\")   Wt 86.8 kg (191 lb 6.4 oz)   LMP 02/14/2004 (Approximate)   SpO2 97%   BMI 30.89 kg/m     Estimated body mass index is 30.89 kg/m  as calculated from the following:    Height as of this encounter: 1.676 m (5' 6\").    Weight as of this encounter: 86.8 kg (191 lb 6.4 oz).  Physical Exam  Vitals reviewed.   Constitutional:       General: She is not in acute distress.     Appearance: She is well-developed.   HENT:      Head: Normocephalic and atraumatic.      Nose: Nose normal.      Mouth/Throat:      Pharynx: No oropharyngeal exudate.   Eyes:      General: No scleral icterus.     Conjunctiva/sclera: Conjunctivae normal.      Pupils: Pupils are equal, round, and reactive to light.   Neck:      Thyroid: No thyromegaly.   Cardiovascular:      Rate and Rhythm: Normal rate and regular rhythm.      Heart sounds: Normal heart sounds. No murmur heard.  Pulmonary:      Effort: Pulmonary effort is normal. No respiratory distress.      Breath sounds: Normal breath sounds. No wheezing.   Musculoskeletal:         General: No tenderness or deformity. Normal range of motion.      Cervical back: Normal range of motion and neck supple.   Skin:     General: Skin is warm and dry.      Findings: No rash.   Neurological:      Mental Status: She is alert and oriented to person, place, and time.      Cranial Nerves: No cranial nerve deficit.      Coordination: Coordination normal.   Psychiatric:         Behavior: Behavior normal.         Thought Content: Thought content normal.         Judgment: Judgment normal.           Recent Labs   Lab Test 02/14/24  0929 02/02/23  1542 04/05/22  0828   HGB 14.4  --  13.3     --  283    138 140   POTASSIUM 4.1 4.0 4.4   CR 0.83 0.61 0.68   A1C 5.8 5.9  --         Diagnostics  No labs were ordered during this visit. Labs are within 3 months without any clinical changes  No EKG this visit, completed in the last 90 days.    Revised Cardiac Risk " Index (RCRI)  The patient has the following serious cardiovascular risks for perioperative complications:   - No serious cardiac risks = 0 points     RCRI Interpretation: 0 points: Class I (very low risk - 0.4% complication rate)         Signed Electronically by: LIANG Larios CNP  Copy of this evaluation report is provided to requesting physician.

## 2024-03-22 NOTE — NURSING NOTE
"Chief Complaint   Patient presents with    Pre-Op Exam     Left TKA   Patient presents to the clinic today for a Pre op for Left TKA    Initial LMP 02/14/2004 (Approximate)  Estimated body mass index is 32.27 kg/m  as calculated from the following:    Height as of 2/20/24: 1.626 m (5' 4\").    Weight as of 2/20/24: 85.3 kg (188 lb).  Meds Reconciled: complete      Jerrica Chavez LPN,JORGEN on 3/22/2024 at 8:58 AM  Ext. [phone]        Jerrica Chavez LPN  "

## 2024-03-26 ENCOUNTER — THERAPY VISIT (OUTPATIENT)
Dept: PHYSICAL THERAPY | Facility: OTHER | Age: 74
End: 2024-03-26
Attending: ORTHOPAEDIC SURGERY
Payer: MEDICARE

## 2024-03-26 DIAGNOSIS — M25.661 DECREASED ROM OF RIGHT KNEE: ICD-10-CM

## 2024-03-26 DIAGNOSIS — M25.561 RIGHT KNEE PAIN, UNSPECIFIED CHRONICITY: ICD-10-CM

## 2024-03-26 DIAGNOSIS — M25.561 PAIN AND SWELLING OF RIGHT KNEE: ICD-10-CM

## 2024-03-26 DIAGNOSIS — M25.461 PAIN AND SWELLING OF RIGHT KNEE: ICD-10-CM

## 2024-03-26 DIAGNOSIS — Z96.651 S/P TOTAL KNEE ARTHROPLASTY, RIGHT: Primary | ICD-10-CM

## 2024-03-26 PROCEDURE — 97016 VASOPNEUMATIC DEVICE THERAPY: CPT | Mod: GP

## 2024-03-26 PROCEDURE — 97110 THERAPEUTIC EXERCISES: CPT | Mod: GP

## 2024-03-26 PROCEDURE — 97140 MANUAL THERAPY 1/> REGIONS: CPT | Mod: GP

## 2024-03-28 ENCOUNTER — THERAPY VISIT (OUTPATIENT)
Dept: PHYSICAL THERAPY | Facility: OTHER | Age: 74
End: 2024-03-28
Attending: ORTHOPAEDIC SURGERY
Payer: MEDICARE

## 2024-03-28 DIAGNOSIS — M25.461 PAIN AND SWELLING OF RIGHT KNEE: ICD-10-CM

## 2024-03-28 DIAGNOSIS — M25.661 DECREASED ROM OF RIGHT KNEE: ICD-10-CM

## 2024-03-28 DIAGNOSIS — M25.561 PAIN AND SWELLING OF RIGHT KNEE: ICD-10-CM

## 2024-03-28 DIAGNOSIS — M25.561 RIGHT KNEE PAIN, UNSPECIFIED CHRONICITY: ICD-10-CM

## 2024-03-28 DIAGNOSIS — Z96.651 S/P TOTAL KNEE ARTHROPLASTY, RIGHT: Primary | ICD-10-CM

## 2024-03-28 PROCEDURE — 97140 MANUAL THERAPY 1/> REGIONS: CPT | Mod: GP

## 2024-03-28 PROCEDURE — 97110 THERAPEUTIC EXERCISES: CPT | Mod: GP

## 2024-03-28 PROCEDURE — 97016 VASOPNEUMATIC DEVICE THERAPY: CPT | Mod: GP

## 2024-03-28 NOTE — PROGRESS NOTES
03/28/24 0500   Appointment Info   Signing clinician's name / credentials Cecilia Andino DPT   Total/Authorized Visits 12   Visits Used 2/10   Medical Diagnosis Right knee pain, unspecified chronicity, S/P total knee arthroplasty, right   PT Tx Diagnosis Right knee pain, unspecified chronicity, S/P total knee arthroplasty, right, pain and swelling of the right knee, decreased range of motion of the right knee   Progress Note/Certification   Start of Care Date 02/22/24   Onset of illness/injury or Date of Surgery 02/20/24   Therapy Frequency 2 times a week   Predicted Duration 12 weeks   Certification date from 02/22/24   Certification date to 05/16/24   Progress Note Completed Date 03/21/24   Supervision   PT Assistant Visit Number 1   PT Goal 1   Goal Identifier HEP   Goal Description Patient will be compliant with a home exercise program 5 days a week for appropriate progression of home exercise program for success in physical therapy.   Target Date 03/22/24   Date Met 03/19/24   PT Goal 2   Goal Identifier ROM   Goal Description Patient will demonstrate right knee flexion to 115 degrees to allow for proper mechanics for reciprocal ambulation on the stairs are safe and independent mobility within the home and community.   Goal Progress Met   Target Date 05/17/24   Date Met 03/28/24   PT Goal 3   Goal Identifier Gait/mobility   Goal Description Patient will be able to ambulate without assistive device to keep up with her  to be a community ambulator.   Goal Progress Progressing endurance but still fatigues.   Target Date 05/17/24   Subjective Report   Subjective Report Got a good workout yesterday, was busy out doing errands, then practiced the stairs and was wiped out. Is having Left TKA done next Tuesday.   Objective Measure 1   Objective Measure Pain   Details 1/10   Objective Measure 2   Objective Measure Mobility/gait   Details Lacks TKE, ambulates without A device   Objective Measure 3    Objective Measure ROM   Details Supine: R knee AROM 8-121   Vasopneumatic Device   Vasopneumatic device minutes (53743) 15   Treatment Detail Supine right large knee sleeve legs elevated, NICE machine   Vasopneumatic Pressure medium   Duration 15 min   Temperature Level 4   Patient Response/Progress Favorable   Therapeutic Procedure/Exercise   Therapeutic Procedures: strength, endurance, ROM, flexibility minutes (09860) 30   Ther Proc 1 - Details NuStep Pro: seat #9 lvl 3 x 10 mins. Gastroc stretch: R x 2.  Leg Press: dbl leg 100# 2x10, R 60# x12 .  Supine heel slide x 10,  (AROM 8-121 degrees). Manual overpressure knee extension stretch in supine x 4.   Skilled Intervention Instruction in exercise, verbal cues for knee extension during gait. Verbal and manual cues for exercise mechanics   Patient Response/Progress Tolerates well. Progressing with gait and stairs as well.   Manual Therapy   Manual Therapy: Mobilization, MFR, MLD, friction massage minutes (49342) 15   Manual Therapy 1 - Details Supine: patella mobs x 15. STM right distal thigh, STM medial and lateral right knee, ITB, and  fibular head mobs x 10.  STM posterior knee. Knee extension stretch.   Skilled Intervention Technique, pressure   Patient Response/Progress Favorable, improving tissue tensions at the right knee, but continues to lack full knee extension.   Education   Learner/Method Patient;Significant Other;Pictures/Video;Demonstration   Plan   Home program Access Code: [email or web address]  URL: [email or web address]  Date: 02/22/2024  Prepared by: Cecilia Andino    Exercises  - Supine Quad Set  - 2 x daily - 7 x weekly - 2 sets - 10 reps - 5 hold  - Supine Heel Slide  - 2 x daily - 7 x weekly - 2 sets - 10 reps  - Supine Knee Extension Stretch on Towel Roll  - 2 x daily - 7 x weekly - 2 reps - 60 hold  - Short Arc Quad with Ankle Weight  - 1 x daily - 7 x weekly - 2 sets - 10 reps  - Gastroc Stretch on Wall  - 1 x daily - 7 x weekly - 2  reps - 30 hold  - Supine Ankle Pumps  - 2 x daily - 7 x weekly - 2 sets - 10 reps. Elevate and ice. Access Code: [email or web address]  URL: [email or web address]  Date: 02/26/2024  Prepared by: Cecilia Andino    Exercises  - Gastroc Stretch on Wall  - 2 x daily - 7 x weekly - 2 reps - 30 hold  - Standing Knee Flexion AROM with Chair Support  - 1 x daily - 7 x weekly - 2 sets - 10 reps  - Mini Squat with Counter Support  - 1 x daily - 7 x weekly - 2 sets - 10 reps  - Heel Raises with Counter Support  - 1 x daily - 7 x weekly - 2 sets - 10 reps. 3/1- added alt marches and standing wt shifts EO/EC. Access Code: [email or web address]  URL: [email or web address]  Date: 03/11/2024  Prepared by: Cecilia Andino    Exercises  - Standing Terminal Knee Extension with Resistance  - 1 x daily - 5 x weekly - 2 sets - 10 reps   Plan for next session Discharge patient for R TKA to continue with HEP until L TKA is performed next week, then return for new evaluation.   Total Session Time   Timed Code Treatment Minutes 45   Total Treatment Time (sum of timed and untimed services) 60         DISCHARGE  Reason for Discharge: Patient has met one of her goals and will continue with HEP. She is scheduled for Left TKA.      Discharge Plan: Patient to continue home program.    Referring Provider:  Parth Hansen

## 2024-04-01 ENCOUNTER — ANESTHESIA EVENT (OUTPATIENT)
Dept: SURGERY | Facility: OTHER | Age: 74
End: 2024-04-01
Payer: MEDICARE

## 2024-04-02 ENCOUNTER — ANESTHESIA (OUTPATIENT)
Dept: SURGERY | Facility: OTHER | Age: 74
End: 2024-04-02
Payer: MEDICARE

## 2024-04-02 ENCOUNTER — HOSPITAL ENCOUNTER (OUTPATIENT)
Facility: OTHER | Age: 74
Discharge: HOME | End: 2024-04-03
Attending: ORTHOPAEDIC SURGERY | Admitting: ORTHOPAEDIC SURGERY
Payer: MEDICARE

## 2024-04-02 ENCOUNTER — APPOINTMENT (OUTPATIENT)
Dept: GENERAL RADIOLOGY | Facility: OTHER | Age: 74
End: 2024-04-02
Attending: ORTHOPAEDIC SURGERY
Payer: MEDICARE

## 2024-04-02 DIAGNOSIS — G89.29 CHRONIC PAIN OF LEFT KNEE: Primary | ICD-10-CM

## 2024-04-02 DIAGNOSIS — M25.562 CHRONIC PAIN OF LEFT KNEE: Primary | ICD-10-CM

## 2024-04-02 PROBLEM — Z96.659 S/P TOTAL KNEE ARTHROPLASTY: Status: ACTIVE | Noted: 2024-04-02

## 2024-04-02 LAB — GLUCOSE BLDC GLUCOMTR-MCNC: 91 MG/DL (ref 70–99)

## 2024-04-02 PROCEDURE — 64999 UNLISTED PX NERVOUS SYSTEM: CPT | Mod: LT | Performed by: NURSE ANESTHETIST, CERTIFIED REGISTERED

## 2024-04-02 PROCEDURE — 710N000010 HC RECOVERY PHASE 1, LEVEL 2, PER MIN: Performed by: ORTHOPAEDIC SURGERY

## 2024-04-02 PROCEDURE — 96375 TX/PRO/DX INJ NEW DRUG ADDON: CPT | Mod: XU

## 2024-04-02 PROCEDURE — 370N000017 HC ANESTHESIA TECHNICAL FEE, PER MIN: Performed by: ORTHOPAEDIC SURGERY

## 2024-04-02 PROCEDURE — 250N000009 HC RX 250: Performed by: NURSE ANESTHETIST, CERTIFIED REGISTERED

## 2024-04-02 PROCEDURE — 258N000003 HC RX IP 258 OP 636: Performed by: NURSE ANESTHETIST, CERTIFIED REGISTERED

## 2024-04-02 PROCEDURE — 27447 TOTAL KNEE ARTHROPLASTY: CPT | Performed by: NURSE ANESTHETIST, CERTIFIED REGISTERED

## 2024-04-02 PROCEDURE — 999N000065 XR KNEE PORT LEFT 1/2 VIEWS: Mod: LT

## 2024-04-02 PROCEDURE — 82962 GLUCOSE BLOOD TEST: CPT | Mod: GZ

## 2024-04-02 PROCEDURE — 96374 THER/PROPH/DIAG INJ IV PUSH: CPT | Mod: XU

## 2024-04-02 PROCEDURE — C1713 ANCHOR/SCREW BN/BN,TIS/BN: HCPCS | Performed by: ORTHOPAEDIC SURGERY

## 2024-04-02 PROCEDURE — 250N000013 HC RX MED GY IP 250 OP 250 PS 637: Performed by: ORTHOPAEDIC SURGERY

## 2024-04-02 PROCEDURE — 250N000011 HC RX IP 250 OP 636: Performed by: ORTHOPAEDIC SURGERY

## 2024-04-02 PROCEDURE — 99100 ANES PT EXTEME AGE<1 YR&>70: CPT | Performed by: NURSE ANESTHETIST, CERTIFIED REGISTERED

## 2024-04-02 PROCEDURE — 27447 TOTAL KNEE ARTHROPLASTY: CPT | Mod: LT | Performed by: ORTHOPAEDIC SURGERY

## 2024-04-02 PROCEDURE — C1776 JOINT DEVICE (IMPLANTABLE): HCPCS | Performed by: ORTHOPAEDIC SURGERY

## 2024-04-02 PROCEDURE — 99213 OFFICE O/P EST LOW 20 MIN: CPT | Mod: 25 | Performed by: INTERNAL MEDICINE

## 2024-04-02 PROCEDURE — 360N000077 HC SURGERY LEVEL 4, PER MIN: Performed by: ORTHOPAEDIC SURGERY

## 2024-04-02 PROCEDURE — 250N000011 HC RX IP 250 OP 636: Performed by: NURSE ANESTHETIST, CERTIFIED REGISTERED

## 2024-04-02 PROCEDURE — 258N000001 HC RX 258: Performed by: ORTHOPAEDIC SURGERY

## 2024-04-02 PROCEDURE — 999N000141 HC STATISTIC PRE-PROCEDURE NURSING ASSESSMENT: Performed by: ORTHOPAEDIC SURGERY

## 2024-04-02 PROCEDURE — 272N000001 HC OR GENERAL SUPPLY STERILE: Performed by: ORTHOPAEDIC SURGERY

## 2024-04-02 PROCEDURE — 64447 NJX AA&/STRD FEMORAL NRV IMG: CPT | Mod: LT | Performed by: NURSE ANESTHETIST, CERTIFIED REGISTERED

## 2024-04-02 DEVICE — IMPLANTABLE DEVICE
Type: IMPLANTABLE DEVICE | Site: KNEE | Status: FUNCTIONAL
Brand: PERSONA® VIVACIT-E®

## 2024-04-02 DEVICE — IMPLANTABLE DEVICE
Type: IMPLANTABLE DEVICE | Site: KNEE | Status: FUNCTIONAL
Brand: PERSONA®

## 2024-04-02 DEVICE — BONE CEMENT SIMPLEX FULL DOSE 6191-1-001: Type: IMPLANTABLE DEVICE | Site: KNEE | Status: FUNCTIONAL

## 2024-04-02 DEVICE — IMPLANTABLE DEVICE
Type: IMPLANTABLE DEVICE | Site: KNEE | Status: FUNCTIONAL
Brand: PERSONA™

## 2024-04-02 DEVICE — IMPLANTABLE DEVICE
Type: IMPLANTABLE DEVICE | Site: KNEE | Status: FUNCTIONAL
Brand: PERSONA® NATURAL TIBIA®

## 2024-04-02 RX ORDER — ASPIRIN 81 MG/1
81 TABLET ORAL 2 TIMES DAILY
Qty: 60 TABLET | Refills: 0 | Status: SHIPPED | OUTPATIENT
Start: 2024-04-02

## 2024-04-02 RX ORDER — DEXAMETHASONE SODIUM PHOSPHATE 10 MG/ML
INJECTION, SOLUTION INTRAMUSCULAR; INTRAVENOUS
Status: COMPLETED | OUTPATIENT
Start: 2024-04-02 | End: 2024-04-02

## 2024-04-02 RX ORDER — OXYCODONE HYDROCHLORIDE 5 MG/1
10 TABLET ORAL EVERY 4 HOURS PRN
Status: DISCONTINUED | OUTPATIENT
Start: 2024-04-02 | End: 2024-04-03 | Stop reason: HOSPADM

## 2024-04-02 RX ORDER — FLUMAZENIL 0.1 MG/ML
0.2 INJECTION, SOLUTION INTRAVENOUS
Status: DISCONTINUED | OUTPATIENT
Start: 2024-04-02 | End: 2024-04-02 | Stop reason: HOSPADM

## 2024-04-02 RX ORDER — ONDANSETRON 4 MG/1
4 TABLET, ORALLY DISINTEGRATING ORAL EVERY 6 HOURS PRN
Status: DISCONTINUED | OUTPATIENT
Start: 2024-04-02 | End: 2024-04-03 | Stop reason: HOSPADM

## 2024-04-02 RX ORDER — ACETAMINOPHEN 325 MG/1
325 TABLET ORAL EVERY 4 HOURS PRN
Status: DISCONTINUED | OUTPATIENT
Start: 2024-04-02 | End: 2024-04-02 | Stop reason: DRUGHIGH

## 2024-04-02 RX ORDER — SODIUM CHLORIDE, SODIUM LACTATE, POTASSIUM CHLORIDE, CALCIUM CHLORIDE 600; 310; 30; 20 MG/100ML; MG/100ML; MG/100ML; MG/100ML
INJECTION, SOLUTION INTRAVENOUS CONTINUOUS
Status: DISCONTINUED | OUTPATIENT
Start: 2024-04-02 | End: 2024-04-02 | Stop reason: HOSPADM

## 2024-04-02 RX ORDER — NALOXONE HYDROCHLORIDE 0.4 MG/ML
0.2 INJECTION, SOLUTION INTRAMUSCULAR; INTRAVENOUS; SUBCUTANEOUS
Status: DISCONTINUED | OUTPATIENT
Start: 2024-04-02 | End: 2024-04-03 | Stop reason: HOSPADM

## 2024-04-02 RX ORDER — NALOXONE HYDROCHLORIDE 0.4 MG/ML
0.4 INJECTION, SOLUTION INTRAMUSCULAR; INTRAVENOUS; SUBCUTANEOUS
Status: DISCONTINUED | OUTPATIENT
Start: 2024-04-02 | End: 2024-04-03 | Stop reason: HOSPADM

## 2024-04-02 RX ORDER — FENTANYL CITRATE 50 UG/ML
25-50 INJECTION, SOLUTION INTRAMUSCULAR; INTRAVENOUS
Status: DISCONTINUED | OUTPATIENT
Start: 2024-04-02 | End: 2024-04-02 | Stop reason: HOSPADM

## 2024-04-02 RX ORDER — ONDANSETRON 2 MG/ML
INJECTION INTRAMUSCULAR; INTRAVENOUS PRN
Status: DISCONTINUED | OUTPATIENT
Start: 2024-04-02 | End: 2024-04-02

## 2024-04-02 RX ORDER — HYDROMORPHONE HCL IN WATER/PF 6 MG/30 ML
0.2 PATIENT CONTROLLED ANALGESIA SYRINGE INTRAVENOUS
Status: DISCONTINUED | OUTPATIENT
Start: 2024-04-02 | End: 2024-04-03 | Stop reason: HOSPADM

## 2024-04-02 RX ORDER — NALOXONE HYDROCHLORIDE 0.4 MG/ML
0.2 INJECTION, SOLUTION INTRAMUSCULAR; INTRAVENOUS; SUBCUTANEOUS
Status: DISCONTINUED | OUTPATIENT
Start: 2024-04-02 | End: 2024-04-02 | Stop reason: HOSPADM

## 2024-04-02 RX ORDER — BISACODYL 10 MG
10 SUPPOSITORY, RECTAL RECTAL DAILY PRN
Status: DISCONTINUED | OUTPATIENT
Start: 2024-04-02 | End: 2024-04-03 | Stop reason: HOSPADM

## 2024-04-02 RX ORDER — OXYCODONE HYDROCHLORIDE 5 MG/1
5 TABLET ORAL EVERY 4 HOURS PRN
Qty: 20 TABLET | Refills: 0 | Status: SHIPPED | OUTPATIENT
Start: 2024-04-02 | End: 2024-09-23

## 2024-04-02 RX ORDER — PROCHLORPERAZINE MALEATE 5 MG
5 TABLET ORAL EVERY 6 HOURS PRN
Status: DISCONTINUED | OUTPATIENT
Start: 2024-04-02 | End: 2024-04-03 | Stop reason: HOSPADM

## 2024-04-02 RX ORDER — HYDROXYZINE HYDROCHLORIDE 10 MG/1
10 TABLET, FILM COATED ORAL EVERY 6 HOURS PRN
Status: DISCONTINUED | OUTPATIENT
Start: 2024-04-02 | End: 2024-04-03 | Stop reason: HOSPADM

## 2024-04-02 RX ORDER — HYDROMORPHONE HCL IN WATER/PF 6 MG/30 ML
0.4 PATIENT CONTROLLED ANALGESIA SYRINGE INTRAVENOUS EVERY 5 MIN PRN
Status: DISCONTINUED | OUTPATIENT
Start: 2024-04-02 | End: 2024-04-02 | Stop reason: HOSPADM

## 2024-04-02 RX ORDER — PROPOFOL 10 MG/ML
INJECTION, EMULSION INTRAVENOUS PRN
Status: DISCONTINUED | OUTPATIENT
Start: 2024-04-02 | End: 2024-04-02

## 2024-04-02 RX ORDER — PROPRANOLOL HYDROCHLORIDE 40 MG/1
40 TABLET ORAL 2 TIMES DAILY
Status: DISCONTINUED | OUTPATIENT
Start: 2024-04-02 | End: 2024-04-03 | Stop reason: HOSPADM

## 2024-04-02 RX ORDER — HYDROMORPHONE HCL IN WATER/PF 6 MG/30 ML
0.4 PATIENT CONTROLLED ANALGESIA SYRINGE INTRAVENOUS
Status: DISCONTINUED | OUTPATIENT
Start: 2024-04-02 | End: 2024-04-03 | Stop reason: HOSPADM

## 2024-04-02 RX ORDER — IBUPROFEN 200 MG
200 TABLET ORAL 4 TIMES DAILY PRN
Status: DISCONTINUED | OUTPATIENT
Start: 2024-04-02 | End: 2024-04-02 | Stop reason: ALTCHOICE

## 2024-04-02 RX ORDER — CEFAZOLIN SODIUM/WATER 2 G/20 ML
2 SYRINGE (ML) INTRAVENOUS EVERY 8 HOURS
Qty: 40 ML | Refills: 0 | Status: COMPLETED | OUTPATIENT
Start: 2024-04-02 | End: 2024-04-03

## 2024-04-02 RX ORDER — VENLAFAXINE HYDROCHLORIDE 75 MG/1
75 CAPSULE, EXTENDED RELEASE ORAL 2 TIMES DAILY
Status: DISCONTINUED | OUTPATIENT
Start: 2024-04-02 | End: 2024-04-03 | Stop reason: HOSPADM

## 2024-04-02 RX ORDER — LIDOCAINE HYDROCHLORIDE 20 MG/ML
INJECTION, SOLUTION INFILTRATION; PERINEURAL PRN
Status: DISCONTINUED | OUTPATIENT
Start: 2024-04-02 | End: 2024-04-02

## 2024-04-02 RX ORDER — PROPOFOL 10 MG/ML
INJECTION, EMULSION INTRAVENOUS CONTINUOUS PRN
Status: DISCONTINUED | OUTPATIENT
Start: 2024-04-02 | End: 2024-04-02

## 2024-04-02 RX ORDER — KETOROLAC TROMETHAMINE 15 MG/ML
15 INJECTION, SOLUTION INTRAMUSCULAR; INTRAVENOUS EVERY 6 HOURS
Qty: 4 ML | Refills: 0 | Status: DISCONTINUED | OUTPATIENT
Start: 2024-04-02 | End: 2024-04-03 | Stop reason: HOSPADM

## 2024-04-02 RX ORDER — TRANEXAMIC ACID 650 MG/1
1950 TABLET ORAL ONCE
Status: COMPLETED | OUTPATIENT
Start: 2024-04-02 | End: 2024-04-02

## 2024-04-02 RX ORDER — ONDANSETRON 4 MG/1
4 TABLET, ORALLY DISINTEGRATING ORAL EVERY 30 MIN PRN
Status: DISCONTINUED | OUTPATIENT
Start: 2024-04-02 | End: 2024-04-02 | Stop reason: HOSPADM

## 2024-04-02 RX ORDER — BUPIVACAINE HYDROCHLORIDE 2.5 MG/ML
INJECTION, SOLUTION EPIDURAL; INFILTRATION; INTRACAUDAL
Status: COMPLETED | OUTPATIENT
Start: 2024-04-02 | End: 2024-04-02

## 2024-04-02 RX ORDER — AMOXICILLIN 250 MG
1-2 CAPSULE ORAL 2 TIMES DAILY
Qty: 30 TABLET | Refills: 0 | Status: SHIPPED | OUTPATIENT
Start: 2024-04-02 | End: 2024-09-23

## 2024-04-02 RX ORDER — BUPIVACAINE HYDROCHLORIDE 2.5 MG/ML
INJECTION, SOLUTION INFILTRATION; PERINEURAL PRN
Status: DISCONTINUED | OUTPATIENT
Start: 2024-04-02 | End: 2024-04-02 | Stop reason: HOSPADM

## 2024-04-02 RX ORDER — ONDANSETRON 2 MG/ML
4 INJECTION INTRAMUSCULAR; INTRAVENOUS EVERY 30 MIN PRN
Status: DISCONTINUED | OUTPATIENT
Start: 2024-04-02 | End: 2024-04-02 | Stop reason: HOSPADM

## 2024-04-02 RX ORDER — IBUPROFEN 600 MG/1
600 TABLET, FILM COATED ORAL EVERY 6 HOURS PRN
Qty: 30 TABLET | Refills: 0 | Status: SHIPPED | OUTPATIENT
Start: 2024-04-02 | End: 2024-09-23

## 2024-04-02 RX ORDER — ASPIRIN 81 MG/1
81 TABLET ORAL 2 TIMES DAILY
Status: DISCONTINUED | OUTPATIENT
Start: 2024-04-03 | End: 2024-04-03 | Stop reason: HOSPADM

## 2024-04-02 RX ORDER — NALOXONE HYDROCHLORIDE 0.4 MG/ML
0.4 INJECTION, SOLUTION INTRAMUSCULAR; INTRAVENOUS; SUBCUTANEOUS
Status: DISCONTINUED | OUTPATIENT
Start: 2024-04-02 | End: 2024-04-02 | Stop reason: HOSPADM

## 2024-04-02 RX ORDER — HYDROXYZINE HYDROCHLORIDE 10 MG/1
10 TABLET, FILM COATED ORAL EVERY 6 HOURS PRN
Qty: 30 TABLET | Refills: 0 | Status: SHIPPED | OUTPATIENT
Start: 2024-04-02 | End: 2024-09-23

## 2024-04-02 RX ORDER — CEFAZOLIN SODIUM/WATER 2 G/20 ML
2 SYRINGE (ML) INTRAVENOUS
Status: COMPLETED | OUTPATIENT
Start: 2024-04-02 | End: 2024-04-02

## 2024-04-02 RX ORDER — ONDANSETRON 4 MG/1
4 TABLET, FILM COATED ORAL EVERY 8 HOURS PRN
Qty: 10 TABLET | Refills: 0 | Status: SHIPPED | OUTPATIENT
Start: 2024-04-02

## 2024-04-02 RX ORDER — OXYCODONE HYDROCHLORIDE 5 MG/1
5 TABLET ORAL EVERY 4 HOURS PRN
Status: DISCONTINUED | OUTPATIENT
Start: 2024-04-02 | End: 2024-04-03 | Stop reason: HOSPADM

## 2024-04-02 RX ORDER — FENTANYL CITRATE 50 UG/ML
50 INJECTION, SOLUTION INTRAMUSCULAR; INTRAVENOUS EVERY 5 MIN PRN
Status: DISCONTINUED | OUTPATIENT
Start: 2024-04-02 | End: 2024-04-02 | Stop reason: HOSPADM

## 2024-04-02 RX ORDER — NALOXONE HYDROCHLORIDE 0.4 MG/ML
0.1 INJECTION, SOLUTION INTRAMUSCULAR; INTRAVENOUS; SUBCUTANEOUS
Status: DISCONTINUED | OUTPATIENT
Start: 2024-04-02 | End: 2024-04-02 | Stop reason: HOSPADM

## 2024-04-02 RX ORDER — ACETAMINOPHEN 325 MG/1
650 TABLET ORAL EVERY 4 HOURS PRN
Status: DISCONTINUED | OUTPATIENT
Start: 2024-04-05 | End: 2024-04-03 | Stop reason: HOSPADM

## 2024-04-02 RX ORDER — CELECOXIB 100 MG/1
400 CAPSULE ORAL ONCE
Status: COMPLETED | OUTPATIENT
Start: 2024-04-02 | End: 2024-04-02

## 2024-04-02 RX ORDER — LIDOCAINE 40 MG/G
CREAM TOPICAL
Status: DISCONTINUED | OUTPATIENT
Start: 2024-04-02 | End: 2024-04-02 | Stop reason: HOSPADM

## 2024-04-02 RX ORDER — ARIPIPRAZOLE 5 MG/1
10 TABLET ORAL AT BEDTIME
Status: DISCONTINUED | OUTPATIENT
Start: 2024-04-02 | End: 2024-04-03 | Stop reason: HOSPADM

## 2024-04-02 RX ORDER — LIDOCAINE 40 MG/G
CREAM TOPICAL
Status: DISCONTINUED | OUTPATIENT
Start: 2024-04-02 | End: 2024-04-03 | Stop reason: HOSPADM

## 2024-04-02 RX ORDER — AMOXICILLIN 250 MG
1 CAPSULE ORAL 2 TIMES DAILY
Status: DISCONTINUED | OUTPATIENT
Start: 2024-04-02 | End: 2024-04-03 | Stop reason: HOSPADM

## 2024-04-02 RX ORDER — POLYETHYLENE GLYCOL 3350 17 G/17G
17 POWDER, FOR SOLUTION ORAL DAILY
Status: DISCONTINUED | OUTPATIENT
Start: 2024-04-03 | End: 2024-04-03 | Stop reason: HOSPADM

## 2024-04-02 RX ORDER — ACETAMINOPHEN 325 MG/1
975 TABLET ORAL EVERY 8 HOURS
Status: DISCONTINUED | OUTPATIENT
Start: 2024-04-02 | End: 2024-04-03 | Stop reason: HOSPADM

## 2024-04-02 RX ORDER — HYDROMORPHONE HCL IN WATER/PF 6 MG/30 ML
0.2 PATIENT CONTROLLED ANALGESIA SYRINGE INTRAVENOUS EVERY 5 MIN PRN
Status: DISCONTINUED | OUTPATIENT
Start: 2024-04-02 | End: 2024-04-02 | Stop reason: HOSPADM

## 2024-04-02 RX ORDER — SODIUM CHLORIDE, SODIUM LACTATE, POTASSIUM CHLORIDE, CALCIUM CHLORIDE 600; 310; 30; 20 MG/100ML; MG/100ML; MG/100ML; MG/100ML
INJECTION, SOLUTION INTRAVENOUS CONTINUOUS
Status: DISCONTINUED | OUTPATIENT
Start: 2024-04-02 | End: 2024-04-03

## 2024-04-02 RX ORDER — FENTANYL CITRATE 50 UG/ML
25 INJECTION, SOLUTION INTRAMUSCULAR; INTRAVENOUS EVERY 5 MIN PRN
Status: DISCONTINUED | OUTPATIENT
Start: 2024-04-02 | End: 2024-04-02 | Stop reason: HOSPADM

## 2024-04-02 RX ORDER — ONDANSETRON 2 MG/ML
4 INJECTION INTRAMUSCULAR; INTRAVENOUS EVERY 6 HOURS PRN
Status: DISCONTINUED | OUTPATIENT
Start: 2024-04-02 | End: 2024-04-03 | Stop reason: HOSPADM

## 2024-04-02 RX ORDER — CEFAZOLIN SODIUM/WATER 2 G/20 ML
2 SYRINGE (ML) INTRAVENOUS SEE ADMIN INSTRUCTIONS
Status: DISCONTINUED | OUTPATIENT
Start: 2024-04-02 | End: 2024-04-02 | Stop reason: HOSPADM

## 2024-04-02 RX ADMIN — OXYCODONE HYDROCHLORIDE 5 MG: 5 TABLET ORAL at 15:17

## 2024-04-02 RX ADMIN — OXYCODONE HYDROCHLORIDE 5 MG: 5 TABLET ORAL at 23:36

## 2024-04-02 RX ADMIN — DOCUSATE SODIUM 50 MG AND SENNOSIDES 8.6 MG 1 TABLET: 8.6; 5 TABLET, FILM COATED ORAL at 21:15

## 2024-04-02 RX ADMIN — BUPIVACAINE HYDROCHLORIDE 20 ML: 2.5 INJECTION, SOLUTION EPIDURAL; INFILTRATION; INTRACAUDAL at 11:11

## 2024-04-02 RX ADMIN — ACETAMINOPHEN 975 MG: 325 TABLET, FILM COATED ORAL at 23:36

## 2024-04-02 RX ADMIN — SODIUM CHLORIDE, SODIUM LACTATE, POTASSIUM CHLORIDE, AND CALCIUM CHLORIDE: 600; 310; 30; 20 INJECTION, SOLUTION INTRAVENOUS at 13:21

## 2024-04-02 RX ADMIN — TRANEXAMIC ACID 1950 MG: 650 TABLET ORAL at 10:12

## 2024-04-02 RX ADMIN — SODIUM CHLORIDE, SODIUM LACTATE, POTASSIUM CHLORIDE, AND CALCIUM CHLORIDE 100 ML/HR: 600; 310; 30; 20 INJECTION, SOLUTION INTRAVENOUS at 10:48

## 2024-04-02 RX ADMIN — LIDOCAINE HYDROCHLORIDE 40 MG: 20 INJECTION, SOLUTION INFILTRATION; PERINEURAL at 12:32

## 2024-04-02 RX ADMIN — CELECOXIB 400 MG: 100 CAPSULE ORAL at 10:11

## 2024-04-02 RX ADMIN — KETOROLAC TROMETHAMINE 15 MG: 15 INJECTION, SOLUTION INTRAMUSCULAR; INTRAVENOUS at 21:15

## 2024-04-02 RX ADMIN — OXYCODONE HYDROCHLORIDE 5 MG: 5 TABLET ORAL at 18:54

## 2024-04-02 RX ADMIN — ONDANSETRON HYDROCHLORIDE 4 MG: 2 SOLUTION INTRAMUSCULAR; INTRAVENOUS at 12:37

## 2024-04-02 RX ADMIN — ARIPIPRAZOLE 10 MG: 5 TABLET ORAL at 21:15

## 2024-04-02 RX ADMIN — MIDAZOLAM 2 MG: 1 INJECTION INTRAMUSCULAR; INTRAVENOUS at 11:03

## 2024-04-02 RX ADMIN — ACETAMINOPHEN 975 MG: 325 TABLET, FILM COATED ORAL at 15:36

## 2024-04-02 RX ADMIN — MIDAZOLAM HYDROCHLORIDE 2 MG: 1 INJECTION, SOLUTION INTRAMUSCULAR; INTRAVENOUS at 12:12

## 2024-04-02 RX ADMIN — MEPIVACAINE HYDROCHLORIDE 2.5 ML: 20 INJECTION, SOLUTION INFILTRATION at 12:29

## 2024-04-02 RX ADMIN — Medication 2 G: at 21:15

## 2024-04-02 RX ADMIN — PROPOFOL 140 MCG/KG/MIN: 10 INJECTION, EMULSION INTRAVENOUS at 12:32

## 2024-04-02 RX ADMIN — DEXAMETHASONE SODIUM PHOSPHATE 5 MG: 10 INJECTION, SOLUTION INTRAMUSCULAR; INTRAVENOUS at 11:07

## 2024-04-02 RX ADMIN — PROPRANOLOL HYDROCHLORIDE 40 MG: 40 TABLET ORAL at 21:15

## 2024-04-02 RX ADMIN — DEXAMETHASONE SODIUM PHOSPHATE 5 MG: 10 INJECTION, SOLUTION INTRAMUSCULAR; INTRAVENOUS at 11:11

## 2024-04-02 RX ADMIN — Medication 2 G: at 12:07

## 2024-04-02 RX ADMIN — VENLAFAXINE HYDROCHLORIDE 75 MG: 75 CAPSULE, EXTENDED RELEASE ORAL at 21:15

## 2024-04-02 RX ADMIN — BUPIVACAINE HYDROCHLORIDE 20 ML: 2.5 INJECTION, SOLUTION EPIDURAL; INFILTRATION; INTRACAUDAL; PERINEURAL at 11:07

## 2024-04-02 RX ADMIN — PROPOFOL 80 MG: 10 INJECTION, EMULSION INTRAVENOUS at 12:32

## 2024-04-02 ASSESSMENT — ACTIVITIES OF DAILY LIVING (ADL)
ADLS_ACUITY_SCORE: 37
ADLS_ACUITY_SCORE: 34
ADLS_ACUITY_SCORE: 37
ADLS_ACUITY_SCORE: 37
ADLS_ACUITY_SCORE: 35
ADLS_ACUITY_SCORE: 37
ADLS_ACUITY_SCORE: 35
ADLS_ACUITY_SCORE: 37
ADLS_ACUITY_SCORE: 35

## 2024-04-02 NOTE — OR NURSING
PACU Transfer Note    Daly Perry was transferred to [facility] via bed.  Equipment used for transport:  none.  Accompanied by:  rn  Prescriptions were: none    PACU Respiratory Event Documentation     1) Episodes of Apnea greater than or equal to 10 seconds: no    2) Bradypnea - less than 8 breaths per minute: no    3) Pain score on 0 to 10 scale: 0    4) Pain-sedation mismatch (yes or no): no    5) Repeated 02 desaturation less than 90% (yes or no): no    Anesthesia notified? (yes or no): no    Any of the above events occuring repeatedly in separate 30 minute intervals may be considered recurrent PACU respiratory events.    Patient stable and meets phase 1 discharge criteria for transport from PACU.

## 2024-04-02 NOTE — PLAN OF CARE
Goal Outcome Evaluation:      Patient alert and oriented. CMS intact. Ice to left knee. Consistent pain management. Care ongoing.       Plan of Care Reviewed With: patient, spouse    Overall Patient Progress: improvingOverall Patient Progress: improving    Outcome Evaluation: VSS, afebrile, pain controlled with PRN medications, Voiding adequate urine out, diet advancing appropriately.    Ileana Bishop RN on 4/2/2024 at 5:20 PM

## 2024-04-02 NOTE — PHARMACY-ADMISSION MEDICATION HISTORY
Pharmacist Admission Medication History    Admission medication history is complete. The information provided in this note is only as accurate as the sources available at the time of the update.    Information Source(s): Patient and CareEverywhere/SureScripts via in-person    Pertinent Information: Patient is a very good historian. Despite having a short fill and not using frequently patient wishes to leave atarax, senna-doc, and zofran on the list.  Patient was taking ASA 81mg BID before surgery and is curious if she is to continue at discharge.      Changes made to PTA medication list:  Added: None  Deleted: Ocuvite -> duplicate   Changed: None    Allergies reviewed with patient : yes    Medication History Completed By: Dario Almendarez [facility] 4/2/2024 3:41 PM    PTA Med List   Medication Sig Last Dose    acetaminophen (TYLENOL) 325 MG tablet Take 325 mg by mouth every 4 hours as needed Max acetaminophen dose: 4000 mg in 24 hours 4/1/2024 at 1000    ARIPiprazole (ABILIFY) 10 MG tablet Take 10 mg by mouth daily 4/1/2024 at 1900    aspirin 81 MG EC tablet Take 1 tablet (81 mg) by mouth 2 times daily     calcium carbonate 600 mg-vitamin D 400 units (CALCIUM CARB-CHOLECALCIFEROL) 600-400 MG-UNIT per tablet Take 1 tablet by mouth daily 3/19/2024    Cholecalciferol (VITAMIN D3) 25 MCG (1000 UT) CAPS Take 1 capsule by mouth daily 3/19/2024    hydrOXYzine HCl (ATARAX) 10 MG tablet Take 1 tablet (10 mg) by mouth every 6 hours as needed for itching or anxiety (with pain, moderate pain)     hydrOXYzine HCl (ATARAX) 25 MG tablet Take 1 tablet (25 mg) by mouth every 6 hours as needed for itching More than a month    ibuprofen (ADVIL/MOTRIN) 200 MG tablet Take 200 mg by mouth 4 times daily as needed 3/19/2024    ibuprofen (ADVIL/MOTRIN) 600 MG tablet Take 1 tablet (600 mg) by mouth every 6 hours as needed for mild pain     Multiple Vitamins-Minerals (OCUVITE PO) Take 1 tablet by mouth daily 3/19/2024    ondansetron (ZOFRAN ODT) 4 MG  ODT tab Take 1 tablet (4 mg) by mouth every 8 hours as needed for nausea More than a month    ondansetron (ZOFRAN) 4 MG tablet Take 1 tablet (4 mg) by mouth every 8 hours as needed for nausea     oxyCODONE (ROXICODONE) 5 MG tablet Take 1 tablet (5 mg) by mouth every 4 hours as needed for moderate to severe pain     propranolol (INDERAL) 40 MG tablet Take 1 tablet (40 mg) by mouth 2 times daily Increase in dose 4/2/2024 at 0600    senna-docusate (SENOKOT-S/PERICOLACE) 8.6-50 MG tablet Take 1-2 tablets by mouth 2 times daily Take while on oral narcotics to prevent or treat constipation.     senna-docusate (SENOKOT-S/PERICOLACE) 8.6-50 MG tablet Take 1-2 tablets by mouth 2 times daily More than a month    venlafaxine (EFFEXOR-XR) 75 MG 24 hr capsule Take 75 mg by mouth 2 times daily  4/2/2024 at 0600

## 2024-04-02 NOTE — ANESTHESIA CARE TRANSFER NOTE
Patient: Daly Prery    Procedure: Procedure(s):  ARTHROPLASTY, KNEE, TOTAL       Diagnosis: Chronic pain of left knee [M25.562, G89.29]  Diagnosis Additional Information: No value filed.    Anesthesia Type:   Spinal     Note:    Oropharynx: oropharynx clear of all foreign objects and spontaneously breathing  Level of Consciousness: awake  Oxygen Supplementation: room air    Independent Airway: airway patency satisfactory and stable  Dentition: dentition unchanged  Vital Signs Stable: post-procedure vital signs reviewed and stable    Patient transferred to: PACU    Handoff Report: Identifed the Patient, Identified the Reponsible Provider, Reviewed the pertinent medical history, Discussed the surgical course, Reviewed Intra-OP anesthesia mangement and issues during anesthesia, Set expectations for post-procedure period and Allowed opportunity for questions and acknowledgement of understanding      Vitals:  Vitals Value Taken Time   BP     Temp     Pulse 72 04/02/24 1347   Resp 17 04/02/24 1347   SpO2 94 % 04/02/24 1347   Vitals shown include unfiled device data.    Electronically Signed By: Carolyn Morales  April 2, 2024  1:48 PM

## 2024-04-02 NOTE — ANESTHESIA PREPROCEDURE EVALUATION
Anesthesia Pre-Procedure Evaluation    Patient: Daly Perry   MRN: [id number] : 1950        Procedure : Procedure(s):  ARTHROPLASTY, KNEE, TOTAL          Past Medical History:   Diagnosis Date    Closed left ankle fracture     Cyst of ovary     Hx of right ovarian cyst.    Diverticulosis of large intestine 2011    Endometriosis     growth on ovary s/p oophrectomy    Fibrocystic breast disease 2018    Generalized anxiety disorder     Dysthymia, generalized anxiety    Lichen sclerosus     Vulvar LSEA; asymptomatic    Neck pain, chronic 2018     Improved after long term    Rosacea 2013    Severe major depression with psychotic features (H) 10/25/2016    history of ECT; inpatient for several months; she does not have complete memory of that time    Sleep apnea       Past Surgical History:   Procedure Laterality Date    ARTHROPLASTY KNEE Right 2024    Procedure: ARTHROPLASTY, KNEE, TOTAL;  Surgeon: Parth Hansen MD;  Location: [facility] OR    ARTHROSCOPY KNEE WITH MENISCECTOMY Right 4/15/2022    Procedure: Right Knee Arthoscopy with Partial MEDIAL AND Lateral Meniscectomy and  chondroplasty;  Surgeon: Parth Hansen MD;  Location: [facility] OR    BIOPSY BREAST Right 2008    stereotactic, benign result    CATARACT IOL, RT/LT Bilateral     ,     COLONOSCOPY  2011    Normal; F/U     DILATION AND CURETTAGE  2003    D&C with hysteroscopy and endometrial ablation    OOPHORECTOMY Right 1997    OPEN REDUCTION INTERNAL FIXATION ANKLE Left     RELEASE CARPAL TUNNEL  2010    REMOVE HARDWARE ANKLE Left 2011    VITRECTOMY ANTERIOR Left 2017    Dr. Collins      No Known Allergies   Social History     Tobacco Use    Smoking status: Never    Smokeless tobacco: Never   Substance Use Topics    Alcohol use: Yes     Comment: 1-2 per week      Wt Readings from Last 1 Encounters:   24 86.6 kg (191 lb)        Anesthesia Evaluation   Pt has had prior anesthetic.      "No history of anesthetic complications       ROS/MED HX  ENT/Pulmonary:     (+) sleep apnea, uses CPAP,                                      Neurologic:  - neg neurologic ROS     Cardiovascular:     (+) Dyslipidemia hypertension- -   -  - -                                      METS/Exercise Tolerance: >4 METS    Hematologic:  - neg hematologic  ROS     Musculoskeletal:   (+)  arthritis,             GI/Hepatic:     (+) GERD,                   Renal/Genitourinary:  - neg Renal ROS     Endo:  - neg endo ROS     Psychiatric/Substance Use:     (+) psychiatric history anxiety and depression       Infectious Disease:  - neg infectious disease ROS     Malignancy:  - neg malignancy ROS     Other:  - neg other ROS          Physical Exam    Airway        Mallampati: II   TM distance: > 3 FB   Neck ROM: full   Mouth opening: > 3 cm    Respiratory Devices and Support         Dental       (+) Minor Abnormalities - some fillings, tiny chips      Cardiovascular   cardiovascular exam normal       Rhythm and rate: regular and normal     Pulmonary   pulmonary exam normal        breath sounds clear to auscultation           OUTSIDE LABS:  CBC:   Lab Results   Component Value Date    WBC 5.0 02/14/2024    WBC 4.9 04/05/2022    HGB 14.4 02/14/2024    HGB 13.3 04/05/2022    HCT 42.3 02/14/2024    HCT 39.7 04/05/2022     02/14/2024     04/05/2022     BMP:   Lab Results   Component Value Date     02/14/2024     02/02/2023    POTASSIUM 4.1 02/14/2024    POTASSIUM 4.0 02/02/2023    CHLORIDE 103 02/14/2024    CHLORIDE 104 02/02/2023    CO2 27 02/14/2024    CO2 24 02/02/2023    BUN 19.9 02/14/2024    BUN 22.1 02/02/2023    CR 0.83 02/14/2024    CR 0.61 02/02/2023     (H) 02/21/2024     (H) 02/20/2024     COAGS: No results found for: \"PTT\", \"INR\", \"FIBR\"  POC: No results found for: \"BGM\", \"HCG\", \"HCGS\"  HEPATIC:   Lab Results   Component Value Date    ALBUMIN 4.4 02/14/2024    PROTTOTAL 7.0 02/14/2024    " "ALT 16 02/14/2024    AST 22 02/14/2024    ALKPHOS 95 02/14/2024    BILITOTAL 0.3 02/14/2024     OTHER:   Lab Results   Component Value Date    A1C 5.8 02/14/2024    KYMBERLY 9.9 02/14/2024    MAG 1.9 01/23/2018    TSH 1.65 01/19/2022    T4 0.94 09/05/2013    CRP <1.0 01/19/2022    SED 7 01/19/2022       Anesthesia Plan    ASA Status:  2    NPO Status:  NPO Appropriate    Anesthesia Type: Spinal.   Induction: Propofol.   Maintenance: Balanced.        Consents    Anesthesia Plan(s) and associated risks, benefits, and realistic alternatives discussed. Questions answered and patient/representative(s) expressed understanding.     - Discussed: Risks, Benefits and Alternatives for BOTH SEDATION and the PROCEDURE were discussed     - Discussed with:  Patient      - Extended Intubation/Ventilatory Support Discussed: No.      - Patient is DNR/DNI Status: No     Use of blood products discussed: Yes.     - Discussed with: Patient.     - Consented: consented to blood products     Postoperative Care    Pain management: IV analgesics, Peripheral nerve block (Single Shot), Multi-modal analgesia.   PONV prophylaxis: Ondansetron (or other 5HT-3), Dexamethasone or Solumedrol, Background Propofol Infusion     Comments:               LIANG WELSH CRNA    I have reviewed the pertinent notes and labs in the chart from the past 30 days and (re)examined the patient.  Any updates or changes from those notes are reflected in this note.              # Obesity: Estimated body mass index is 30.83 kg/m  as calculated from the following:    Height as of this encounter: 1.676 m (5' 6\").    Weight as of this encounter: 86.6 kg (191 lb).      "

## 2024-04-02 NOTE — ANESTHESIA PROCEDURE NOTES
IPACK Procedure Note    Pre-Procedure   Staff -        CRNA: Rachel Avila APRN CRNA       Performed By: CRNA       Location: pre-op       Procedure Start/Stop Times: 4/2/2024 11:03 AM and 4/2/2024 11:07 AM       Pre-Anesthestic Checklist: patient identified, IV checked, site marked, risks and benefits discussed, informed consent, monitors and equipment checked, pre-op evaluation, at physician/surgeon's request and post-op pain management  Timeout:       Correct Patient: Yes        Correct Procedure: Yes        Correct Site: Yes        Correct Position: Yes        Correct Laterality: Yes        Site Marked: Yes  Procedure Documentation  Procedure: IPACK       Diagnosis: POST OPERATIVE PAIN CONTROL       Laterality: left       Patient Position: lateral       Patient Prep/Sterile Barriers: sterile gloves, mask       Skin prep: Chloraprep       Local skin infiltrated with 1 mL of 1% lidocaine.        Needle Type: insulated and short bevel       Needle Gauge: 20.        Needle Length (Inches): 6        Ultrasound guided       1. Ultrasound was used to identify targeted nerve, plexus, vascular marker, or fascial plane and place a needle adjacent to it in real-time.       2. Ultrasound was used to visualize the spread of anesthetic in close proximity to the above referenced structure.       3. A permanent image is entered into the patient's record.       4. The visualized anatomic structures appeared normal.       5. There were no apparent abnormal pathologic findings.    Assessment/Narrative         The placement was negative for: blood aspirated, painful injection and site bleeding       Paresthesias: No.       Bolus given via needle..        Secured via.        Insertion/Infusion Method: Single Shot       Complications: none    Medication(s) Administered   Bupivacaine 0.25% PF (Infiltration) - Infiltration   20 mL - 4/2/2024 11:07:00 AM  Dexamethasone 10 mg/mL PF (Perineural) - Perineural   5 mg - 4/2/2024  "11:07:00 AM  Medication Administration Time: 4/2/2024 11:03 AM      FOR [facility] ([facility]) ONLY:   Pain Team Contact information: please page the Pain Team Via [employer]. Search \"Pain\". During daytime hours, please page the attending first. At night please page the resident first.      "

## 2024-04-02 NOTE — INTERVAL H&P NOTE
Brief History of Illness:   Daly Perry is a 73 year old female who was admitted for left knee pain    H&P reviewed and patient examined with no new updates from prior exam

## 2024-04-02 NOTE — BRIEF OP NOTE
[facility]    Brief Operative Note    Pre-operative diagnosis: Chronic pain of left knee [M25.562, G89.29]  Post-operative diagnosis Same as pre-operative diagnosis    Procedure: ARTHROPLASTY, KNEE, TOTAL, Left - Knee    Surgeon: Surgeon(s) and Role:     * Parth Hansen MD - Primary     * Jay Jay Ortiz PA-C - Assisting  Anesthesia: Spinal with Block   Estimated Blood Loss: Less than 50 ml    Drains: None  Specimens: * No specimens in log *  Findings:   None.  Complications: None.  Implants:   Implant Name Type Inv. Item Serial No.  Lot No. LRB No. Used Action   BONE CEMENT SIMPLEX FULL DOSE 6191-1-001 - [id number] Cement, Bone BONE CEMENT SIMPLEX FULL DOSE 6191-1-001  [employer] [id number] Left 2 Implanted   IMP SCR ZIM PSN FEMALE 2.5MM -172-25 - [id number] Metallic Hardware/[place] IMP SCR ZIM PSN FEMALE 2.5MM -595-25  [employer] [id number] Left 1 Used as a Supply   PERSON PS FIXED BEARING ARTICULAR SURFACE LEFT 10MM    [employer] [id number] Left 1 Implanted   IMP COMP PATELLA ZIM ALL POLY 32MM STD -459-32 - [id number] Total Joint Component/Insert IMP COMP PATELLA ZIM ALL POLY 32MM STD -853-32  [employer] [id number] Left 1 Implanted   IMP TIBIAL ZIM PSN NP STM 5DEG SZ DL -853-01 - [id number] Total Joint Component/Insert IMP TIBIAL ZIM PSN NP STM 5DEG SZ DL -502-01  [employer] [id number] Left 1 Implanted   IMP COMP FEM ZIM PSN PS CMT NARROW SZ 8 LT -459-01 - [id number] Total Joint Component/Insert IMP COMP FEM ZIM PSN PS CMT NARROW SZ 8 LT -084-01  [employer] [id number] Left 1 Implanted

## 2024-04-02 NOTE — OP NOTE
Preoperative Diagnosis: Left Knee Osteoarthritis    Postoperative Diagnosis: Left Knee Osteoarthritis    Procedure: Left Total Knee Arthroplasty    Surgeon: Parth Hansen MD    Assistants: Jay Jay VIZCARRA    A skilled first assistant was required for patient positioning, retracting, and carrying out the procedure in a safe and effective manner    Anesthesia:   1) Spinal with Sedation  2) Adductor Canal Femoral Nerve Block  3) Local Injection around surgical site    Findings:    1) Tricompartmental OA   2) Stable TKA with ROM 0-125   3) Central Patella Tracking   4) Adequate hemostasis     Implants:    1) Sophie Person[employer] Size 8N Femoral Component   2) Size D Tibial Base Plate   3) Size 32 Patella   4) 10mm PS Poly Insert    Tourniquet Time: Not Used    Estimated Blood Loss: 100 ml    Specimens: None    Drains: None    Complications: None    Indications:   This is a 73 year old patient with long standing left knee pain.  They have failed nonoperative treatment including use of NSAIDs; Tylenol; injections and exercise.  Given the continued symptoms they wished to proceed with a knee replacement.  The risks including pain, bleeding, infection, deep vein thrombosis, pulmonary embolism, myocardial infarction, component failure, need for revision operation was discussed with the patient.  The surgical consent was signed and surgical site was marked.  All questions regarding postoperative disposition were answered.      Description of Procedure:   The patient was administered a adductor canal femoral nerve block in the preoperative area.  They were then taken to the operating room and placed under spinal anesthesia.  A non-sterile tourniquet was applied and the Left leg was prepped with a Chloraprep wipe and Chloraprep device and was draped in standard fashion.  A timeout was called to identify the correct patient and surgical site.  A midline incision and medial parapatellar arthrotomy was completed.  Adequate medial and  lateral releases were completed.  The patella was everted and 9.0 mm of bone resection was completed using the patella clamp.  The patella was sized to 32 mm and the lug holes were drilled.  The patella protector plate was placed.  The knee was flexed and the intramedullary canal was drilled.  The intramedullary 5 degree Left distal femoral guide was placed.  Standard bone resection was completed.  The posterior referencing femoral sizing guide was placed and the femur was sized to a 8N.  This cutting guide was placed and the 4 chamfer cuts were made.  The ACL, PCL, Medial and Lateral Meniscus were resected.  The tibia was subluxed anteriorly and the posterior retractor was placed.  The extramedullary proximal tibia cutting guide and made 10 mm of bone resection off the less involved lateral compartment.  The flexion and extension gaps were checked and pins were removed.  The Femoral component was placed and the box cut for the posterior stabilizing component was made.  All trial implants were placed and the knee was brought out to extension.  The rotation of the tibial trial was marked.  The knee came out to full extension and flexed to 125 degrees with central patellar tracking.  The tibia was sized to a D and prepared in standard fashion.  The periarticular injection was completed and the bone ends were washed and dried.  Final implants were cemented in place.  Betadine and pulse lavage irrigation was used.   A 10 mm PS poly insert was chosen and confirmed knee ROM and patella tracking.  The arthrotomy was closed with a #1 Vicryl suture in interrupted fashion.  A 0 Vicryl running suture was used in the deep fascia.  The skin was closed with a 2-0 Vicryl and staples.  An Aquacel dressing was applied.  The patient was awakened and transferred to PACU in stable condition.      Postoperative Plan:   Routine TKA protocol (Weightbearing as tolerated, PT, Pain control, DVT prophylaxis with Aspirin).  Anticipate  discharge in 1-2 days.  Follow-up in  2 weeks in [facility].  No X-rays needed.      Physician assistant statement    A skilled first assistant was necessary for completion of the procedure in a technically safe and efficient manner.  He was integral in intraoperative decision making prepping draping wound closure and patient transfers.

## 2024-04-02 NOTE — DISCHARGE INSTRUCTIONS
[facility]  Adult Discharge Orders & Instructions      For 24 hours after surgery:  Get plenty of rest.  A responsible adult must stay with you for at least 24 hours after you leave the hospital.   You may feel lightheaded.  IF so, sit for a few minutes before standing.  Have someone help you get up.   You may have a slight fever. Call the doctor if your fever is over 101 F (38.3 C) (taken under the tongue) or lasts longer than 24 hours.  You may have a dry mouth, a sore throat, muscle aches or trouble sleeping.  These should go away after 24 hours.  Do not make important or legal decisions.  6.   Do not drive or use heavy equipment.  If you have weakness or tingling, don't drive or use heavy equipment until this feeling goes away.                                                                                                                                                                         To contact a doctor, call    [phone]______________   Surgeon Contact Information    If you have questions or concerns related to your procedure please contact your surgeon through [facility] at [phone].

## 2024-04-02 NOTE — ANESTHESIA PROCEDURE NOTES
"Intrathecal injection Procedure Note    Pre-Procedure   Staff -        CRNA: Rachel Avila APRN CRNA       Other Anesthesia Staff: Carolyn Morales       Performed By: CRNA       Location: OR       Procedure Start/Stop Times: 4/2/2024 12:19 PM and 4/2/2024 12:29 PM       Pre-Anesthestic Checklist: patient identified, IV checked, risks and benefits discussed, informed consent, monitors and equipment checked, pre-op evaluation, at physician/surgeon's request and post-op pain management  Timeout:       Correct Patient: Yes        Correct Procedure: Yes        Correct Site: Yes        Correct Position: Yes   Procedure Documentation  Procedure: intrathecal injection       Diagnosis: Primary Anesthesia       Patient Position: sitting       Patient Prep/Sterile Barriers: sterile gloves, mask, patient draped       Skin prep: Chloraprep       Insertion Site: L3-4. (midline approach).       Needle Gauge: 25.        Needle Length (Inches): 3.5        Spinal Needle Type: Buzz tip       Introducer used       Introducer: 20 G       # of attempts: 2 and  # of redirects:  1    Assessment/Narrative         Paresthesias: No.       CSF fluid: clear.    Medication(s) Administered   Medication Administration Time: 4/2/2024 12:19 PM      FOR [facility] ONLY:   Pain Team Contact information: please page the Pain Team Via [employer]. Search \"Pain\". During daytime hours, please page the attending first. At night please page the resident first.      "

## 2024-04-02 NOTE — CONSULTS
"[facility]    Hospitalist Consultation    Date of Admission:  4/2/2024    Assessment & Plan   Daly Perry is a 73 year old female who was admitted on 4/2/2024. I was asked to see the patient for perioperative comanagement.    Clinically Significant Risk Factors Present on Admission                       # Obesity: Estimated body mass index is 30.83 kg/m  as calculated from the following:    Height as of this encounter: 1.676 m (5' 6\").    Weight as of this encounter: 86.6 kg (191 lb).                 Principal Problem:    S/P left total knee arthroplasty    Assessment: Postop day 0 and tolerated the procedure well without difficulty    Plan:   - pain control, wound care, activity restrictions, and asa for dvt ppx per orthopedic surgery  - daily hgb  - follow-up in orthopedics clinic as scheduled for staple removal  - ongoing post surgical physical therapy arranged for discharge with outpatient  -continue home propranolol in the perioperative period     Active Problems:    KB on CPAP    Assessment: stable    Plan: -continue home cpap      DVT Prophylaxis: Pneumatic Compression Devices  Code Status: Full Code    Deon Shay MD    Reason for Consult   Reason for consult: I was asked by Dr. Hansen to evaluate this patient for perioperative comanagement.    Primary Care Physician   Nidhi Baer    Chief Complaint   Knee pain    History is obtained from the patient and chart review    History of Present Illness   Daly Perry is a 73 year old female who presents  for elective left total knee arthroplasty.  Patient had a right knee done a year ago and tolerated it well, pain is currently well-controlled, no shortness of breath cough chest pain nausea vomiting or other complaints.  Eager to start working with therapy and discharge tomorrow.    Past Medical History    I have reviewed this patient's medical history and updated it with pertinent information if needed.   Past Medical History:   Diagnosis " Date    Closed left ankle fracture     Cyst of ovary     Hx of right ovarian cyst.    Diverticulosis of large intestine 03/14/2011    Endometriosis     growth on ovary s/p oophrectomy    Fibrocystic breast disease 01/30/2018    Generalized anxiety disorder     Dysthymia, generalized anxiety    Lichen sclerosus     Vulvar LSEA; asymptomatic    Neck pain, chronic 01/30/2018     Improved after long term    Rosacea 01/04/2013    Severe major depression with psychotic features (H) 10/25/2016    history of ECT; inpatient for several months; she does not have complete memory of that time    Sleep apnea        Past Surgical History   I have reviewed this patient's surgical history and updated it with pertinent information if needed.  Past Surgical History:   Procedure Laterality Date    ARTHROPLASTY KNEE Right 2/20/2024    Procedure: ARTHROPLASTY, KNEE, TOTAL;  Surgeon: Parth Hansen MD;  Location: [facility] OR    ARTHROSCOPY KNEE WITH MENISCECTOMY Right 4/15/2022    Procedure: Right Knee Arthoscopy with Partial MEDIAL AND Lateral Meniscectomy and  chondroplasty;  Surgeon: Parth Hansen MD;  Location: [facility] OR    BIOPSY BREAST Right 07/07/2008    stereotactic, benign result    CATARACT IOL, RT/LT Bilateral     2017, 2018    COLONOSCOPY  03/14/2011    Normal; F/U 2021    DILATION AND CURETTAGE  2003    D&C with hysteroscopy and endometrial ablation    OOPHORECTOMY Right 02/1997    OPEN REDUCTION INTERNAL FIXATION ANKLE Left 2002    RELEASE CARPAL TUNNEL  2010    REMOVE HARDWARE ANKLE Left 06/27/2011    VITRECTOMY ANTERIOR Left 05/22/2017    Dr. Collins       Prior to Admission Medications   Prior to Admission Medications   Prescriptions Last Dose Informant Patient Reported? Taking?   ARIPiprazole (ABILIFY) 10 MG tablet 4/1/2024 at 1900 Self Yes Yes   Sig: Take 10 mg by mouth daily   Cholecalciferol (VITAMIN D3) 25 MCG (1000 UT) CAPS 3/19/2024 Self Yes Yes   Sig: Take 1 capsule by mouth daily   Multiple Vitamins-Minerals  (OCUVITE PO) 3/19/2024 Self Yes Yes   Sig: Take 1 tablet by mouth daily   acetaminophen (TYLENOL) 325 MG tablet 4/1/2024 at 1000 Self Yes Yes   Sig: Take 325 mg by mouth every 4 hours as needed Max acetaminophen dose: 4000 mg in 24 hours   calcium carbonate 600 mg-vitamin D 400 units (CALCIUM CARB-CHOLECALCIFEROL) 600-400 MG-UNIT per tablet 3/19/2024 Self Yes Yes   Sig: Take 1 tablet by mouth daily   hydrOXYzine HCl (ATARAX) 25 MG tablet More than a month Self No Yes   Sig: Take 1 tablet (25 mg) by mouth every 6 hours as needed for itching   ibuprofen (ADVIL/MOTRIN) 200 MG tablet 3/19/2024 Self Yes Yes   Sig: Take 200 mg by mouth 4 times daily as needed   ondansetron (ZOFRAN ODT) 4 MG ODT tab More than a month Self No Yes   Sig: Take 1 tablet (4 mg) by mouth every 8 hours as needed for nausea   propranolol (INDERAL) 40 MG tablet 4/2/2024 at 0600 Self No Yes   Sig: Take 1 tablet (40 mg) by mouth 2 times daily Increase in dose   senna-docusate (SENOKOT-S/PERICOLACE) 8.6-50 MG tablet More than a month Self No Yes   Sig: Take 1-2 tablets by mouth 2 times daily   venlafaxine (EFFEXOR-XR) 75 MG 24 hr capsule 4/2/2024 at 0600 Self Yes Yes   Sig: Take 75 mg by mouth 2 times daily       Facility-Administered Medications: None     Allergies   No Known Allergies    Social History   I have reviewed this patient's social history and updated it with pertinent information if needed. Daly Perry  reports that she has never smoked. She has never used smokeless tobacco. She reports current alcohol use. She reports that she does not use drugs.    Family History   I have reviewed this patient's family history and updated it with pertinent information if needed.   Family History   Problem Relation Age of Onset    Arthritis Mother     Heart Disease Mother     Hypertension Mother     Thyroid Disease Mother     Dementia Mother     Pulmonary Embolism Mother     Heart Disease Father     Other - See Comments Father         Psychiatric illness     Chronic Obstructive Pulmonary Disease Father     Dementia Brother     Colon Cancer Paternal Grandmother         Cancer-colon    No Known Problems Brother     Hypertension Sister     Allergies Sister         food    Breast Cancer No family hx of         Cancer-breast       Review of Systems     Constitutional: normal energy and appetite, no recent sick contacts  Ears, nose, mouth, throat, and face: no mouth sores, dysphagia, or odynophagia  Respiratory: no shortness of breath, cough, or wheezing. No aspiration symptoms.   Cardiovascular: no chest pain,palpitations, orthopnea, increased lower extremity edema, or syncope.   Gastrointestinal: no constipation, diarrhea, nausea, vomiting or abdominal pain.  Genitourinary: no dysuria, hematuria, urgency or frequency.   Heme/lymphatic: no unintentional weight loss or night sweats.  Musculoskeletal: no pain to extremities.  Block is still effective.  Endocrine: not a known diabetic.      Physical Exam   Temp: 97.4  F (36.3  C) Temp src: Tympanic BP: 124/77 Pulse: 62   Resp: 16 SpO2: 96 % O2 Device: None (Room air) Oxygen Delivery: 2 LPM  Vital Signs with Ranges  Temp:  [97.1  F (36.2  C)-97.9  F (36.6  C)] 97.4  F (36.3  C)  Pulse:  [61-68] 62  Resp:  [9-23] 16  BP: ()/(57-81) 124/77  SpO2:  [91 %-100 %] 96 %  191 lbs 0 oz    GENERAL: Talkative, in no apparent distress.  Head: normocephalic and atraumatic  Eyes: anicteric and non-injected sclera  Nose: no rhinorrhea or epistaxis.   Throat: moist mucous membranes with no active oral lesions.  NECK: Supple, jugular venous distension not present.  CARDIOVASCULAR: regular rate and rhythm, no murmurs, rubs, or gallops. Normal S1/S2. No lower extremity edema.   RESPIRATORY: clear to auscultation bilaterally, no wheezes, no crackles.    GI: soft, non-tender, non-distended, normoactive bowel sounds.  MUSCULOSKELETAL: warm and well perfused, 2+ dorsalis pedis pulses.  Left knee dressed with bulky Ace wrap, clean dry and  intact.  CMS intact bilateral feet.  SKIN: no pallor, jaundice or rashes.  NEUROLOGY: AAOx3, follows commands, speech and language without focal deficits.      Data   -Data reviewed today: All pertinent laboratory and imaging results from this encounter were reviewed. .  Recent Labs   Lab 04/02/24  1046   GLC 91       Recent Results (from the past 24 hour(s))   XR Knee Port Left 1/2 Views    Narrative    PROCEDURE: XR KNEE PORT LEFT 1/2 VIEWS 4/2/2024 2:06 PM    HISTORY: Post-Op Total Knee    COMPARISONS: 3/20/2024.    TECHNIQUE: 2 views.    FINDINGS: There is a left knee orthoplasty. Components appear well  seated without acute complication. Postsurgical changes are seen in  the soft tissues. There are anterior skin staples.         Impression    IMPRESSION: Left knee arthroplasty.    MARIAM GONZALES MD         SYSTEM ID:  [id number]

## 2024-04-02 NOTE — OR NURSING
The patient was unable or refused to remove jewelry prior to their surgical procedure. The patient has been instructed on the risk of injury and possible infection. In the event jewelry or piercings are obstructing the surgical process or impeding circulation, the jewelry or piercings may need to be cut off. The surgeon and anesthesia provider have been notified that an item cannot be removed, or that the patient refuses to remove the jewelry or piercing. The surgeon and anesthesia provider will determine if it is in the patient's best interest to proceed with the procedure with the jewelry or piercings remaining in contact with the patient's body.      Earring on, taped

## 2024-04-02 NOTE — ANESTHESIA POSTPROCEDURE EVALUATION
Patient: Daly Perry    Procedure: Procedure(s):  ARTHROPLASTY, KNEE, TOTAL       Anesthesia Type:  Spinal    Note:  Disposition: Outpatient   Postop Pain Control: Uneventful            Sign Out: Well controlled pain   PONV: No   Neuro/Psych: Uneventful            Sign Out: Acceptable/Baseline neuro status   Airway/Respiratory: Uneventful            Sign Out: Acceptable/Baseline resp. status   CV/Hemodynamics: Uneventful            Sign Out: Acceptable CV status; No obvious hypovolemia; No obvious fluid overload   Other NRE: NONE   DID A NON-ROUTINE EVENT OCCUR? No           Last vitals:  Vitals Value Taken Time   /73 04/02/24 1425   Temp 97.9  F (36.6  C) 04/02/24 1405   Pulse 70 04/02/24 1427   Resp 7 04/02/24 1427   SpO2 94 % 04/02/24 1427   Vitals shown include unfiled device data.    Electronically Signed By: LIANG WELSH CRNA  April 2, 2024  2:38 PM

## 2024-04-02 NOTE — ANESTHESIA PROCEDURE NOTES
Adductor canal Procedure Note    Pre-Procedure   Staff -        CRNA: Rachel Avila APRN CRNA       Performed By: CRNA       Location: pre-op       Procedure Start/Stop Times: 4/2/2024 11:08 AM and 4/2/2024 11:11 AM       Pre-Anesthestic Checklist: patient identified, IV checked, site marked, risks and benefits discussed, informed consent, monitors and equipment checked, pre-op evaluation, at physician/surgeon's request and post-op pain management  Timeout:       Correct Patient: Yes        Correct Procedure: Yes        Correct Site: Yes        Correct Position: Yes        Correct Laterality: Yes        Site Marked: Yes  Procedure Documentation  Procedure: Adductor canal       Diagnosis: POST OPERATIVE PAIN CONTROL       Laterality: left       Patient Position: supine       Patient Prep/Sterile Barriers: sterile gloves, mask       Skin prep: Chloraprep       Local skin infiltrated with 1 mL of 1% lidocaine.        Needle Type: insulated and short bevel       Needle Gauge: 20.        Needle Length (Inches): 6        Ultrasound guided       1. Ultrasound was used to identify targeted nerve, plexus, vascular marker, or fascial plane and place a needle adjacent to it in real-time.       2. Ultrasound was used to visualize the spread of anesthetic in close proximity to the above referenced structure.       3. A permanent image is entered into the patient's record.       4. The visualized anatomic structures appeared normal.       5. There were no apparent abnormal pathologic findings.    Assessment/Narrative         The placement was negative for: blood aspirated, painful injection and site bleeding       Paresthesias: No.       Bolus given via needle..        Secured via.        Insertion/Infusion Method: Single Shot       Complications: none    Medication(s) Administered   Bupivacaine 0.25% PF (Infiltration) - Infiltration   20 mL - 4/2/2024 11:11:00 AM  Dexamethasone 10 mg/mL PF (Perineural) - Perineural   5 mg -  "4/2/2024 11:11:00 AM  Medication Administration Time: 4/2/2024 11:08 AM      FOR [facility] ONLY:   Pain Team Contact information: please page the Pain Team Via [employer]. Search \"Pain\". During daytime hours, please page the attending first. At night please page the resident first.      "

## 2024-04-03 ENCOUNTER — APPOINTMENT (OUTPATIENT)
Dept: PHYSICAL THERAPY | Facility: OTHER | Age: 74
End: 2024-04-03
Attending: ORTHOPAEDIC SURGERY
Payer: MEDICARE

## 2024-04-03 ENCOUNTER — APPOINTMENT (OUTPATIENT)
Dept: OCCUPATIONAL THERAPY | Facility: OTHER | Age: 74
End: 2024-04-03
Attending: ORTHOPAEDIC SURGERY
Payer: MEDICARE

## 2024-04-03 VITALS
BODY MASS INDEX: 30.7 KG/M2 | DIASTOLIC BLOOD PRESSURE: 45 MMHG | OXYGEN SATURATION: 95 % | RESPIRATION RATE: 17 BRPM | WEIGHT: 191 LBS | TEMPERATURE: 98.2 F | SYSTOLIC BLOOD PRESSURE: 101 MMHG | HEART RATE: 70 BPM | HEIGHT: 66 IN

## 2024-04-03 LAB
GLUCOSE BLDC GLUCOMTR-MCNC: 130 MG/DL (ref 70–99)
HGB BLD-MCNC: 10.4 G/DL (ref 11.7–15.7)
HOLD SPECIMEN: NORMAL

## 2024-04-03 PROCEDURE — 250N000013 HC RX MED GY IP 250 OP 250 PS 637: Performed by: ORTHOPAEDIC SURGERY

## 2024-04-03 PROCEDURE — 97110 THERAPEUTIC EXERCISES: CPT | Mod: GP

## 2024-04-03 PROCEDURE — 85018 HEMOGLOBIN: CPT | Performed by: ORTHOPAEDIC SURGERY

## 2024-04-03 PROCEDURE — 96376 TX/PRO/DX INJ SAME DRUG ADON: CPT

## 2024-04-03 PROCEDURE — 97535 SELF CARE MNGMENT TRAINING: CPT | Mod: GO | Performed by: OCCUPATIONAL THERAPIST

## 2024-04-03 PROCEDURE — 97116 GAIT TRAINING THERAPY: CPT | Mod: GP

## 2024-04-03 PROCEDURE — 97161 PT EVAL LOW COMPLEX 20 MIN: CPT | Mod: GP

## 2024-04-03 PROCEDURE — 97165 OT EVAL LOW COMPLEX 30 MIN: CPT | Mod: GO | Performed by: OCCUPATIONAL THERAPIST

## 2024-04-03 PROCEDURE — 36415 COLL VENOUS BLD VENIPUNCTURE: CPT | Performed by: ORTHOPAEDIC SURGERY

## 2024-04-03 PROCEDURE — 250N000011 HC RX IP 250 OP 636: Performed by: ORTHOPAEDIC SURGERY

## 2024-04-03 PROCEDURE — 99212 OFFICE O/P EST SF 10 MIN: CPT | Mod: 24 | Performed by: INTERNAL MEDICINE

## 2024-04-03 PROCEDURE — 82962 GLUCOSE BLOOD TEST: CPT | Mod: GZ

## 2024-04-03 RX ADMIN — KETOROLAC TROMETHAMINE 15 MG: 15 INJECTION, SOLUTION INTRAMUSCULAR; INTRAVENOUS at 04:38

## 2024-04-03 RX ADMIN — ACETAMINOPHEN 975 MG: 325 TABLET, FILM COATED ORAL at 08:19

## 2024-04-03 RX ADMIN — OXYCODONE HYDROCHLORIDE 5 MG: 5 TABLET ORAL at 08:24

## 2024-04-03 RX ADMIN — POLYETHYLENE GLYCOL 3350 17 G: 17 POWDER, FOR SOLUTION ORAL at 10:27

## 2024-04-03 RX ADMIN — Medication 2 G: at 04:38

## 2024-04-03 RX ADMIN — ASPIRIN 81 MG: 81 TABLET, COATED ORAL at 10:28

## 2024-04-03 RX ADMIN — KETOROLAC TROMETHAMINE 15 MG: 15 INJECTION, SOLUTION INTRAMUSCULAR; INTRAVENOUS at 10:27

## 2024-04-03 RX ADMIN — PROPRANOLOL HYDROCHLORIDE 40 MG: 40 TABLET ORAL at 10:28

## 2024-04-03 RX ADMIN — VENLAFAXINE HYDROCHLORIDE 75 MG: 75 CAPSULE, EXTENDED RELEASE ORAL at 10:28

## 2024-04-03 RX ADMIN — DOCUSATE SODIUM 50 MG AND SENNOSIDES 8.6 MG 1 TABLET: 8.6; 5 TABLET, FILM COATED ORAL at 10:28

## 2024-04-03 ASSESSMENT — ACTIVITIES OF DAILY LIVING (ADL)
ADLS_ACUITY_SCORE: 36
ADLS_ACUITY_SCORE: 36
ADLS_ACUITY_SCORE: 37
ADLS_ACUITY_SCORE: 35
ADLS_ACUITY_SCORE: 35
ADLS_ACUITY_SCORE: 39
ADLS_ACUITY_SCORE: 35
ADLS_ACUITY_SCORE: 39
ADLS_ACUITY_SCORE: 39
ADLS_ACUITY_SCORE: 35

## 2024-04-03 NOTE — PROGRESS NOTES
DISCHARGE NOTE    Patient discharged to home at 11:20 AM via wheel chair. Accompanied by significant other and staff. Discharge instructions reviewed with patient and spouse, opportunity offered to ask questions. Prescriptions sent to patients preferred pharmacy. All belongings sent with patient.    Ileana Ingram RN

## 2024-04-03 NOTE — PHARMACY - DISCHARGE MEDICATION RECONCILIATION AND EDUCATION
Pharmacy:  Discharge Counseling and Medication Reconciliation    Daly Perry  [id number]  [place] [id number] [phone] (home)   73 year old female  PCP: Nidhi Baer    Allergies: Patient has no known allergies.    Discharge Counseling:    Pharmacist met with patient (and ) today to review the medication portion of the After Visit Summary (with an emphasis on NEW medications) and to address patient's questions/concerns.    Summary of Education: counseled patient on new medications and medication plan at discharge; she just had similar procedure done 6 weeks ago and thus already has some medications at home.    Counseled patient on importance of taking Aspirin 81 mg BID x30 days to help prevent blood clots.    Oxycodone: cautioned on increased drowsiness and advised patient not to drive after taking or combine with alcohol. Also counseled potential for it to cause constipation; patient can use Senna-docusate to prevent/treat if necessary.    Patient reports she still has supply of Ondansetron, Hydroxyzine, and Senna-docusate at home and thus does not need more. I advised her that it would be reasonable to use up remaining supply at home and leave new Rx's on hold at the pharmacy.    Materials Provided:  MedCounselor sheets printed from Clinical Pharmacology on: N/A- patient refused due to recent surgery 6 weeks ago with similar meds prescribed.    Discharge Medication Reconciliation:    It has been determined that the patient has an adequate supply of medications available or which can be obtained from the patient's preferred pharmacy, which she has confirmed as: [facility].    Thank you for the consult.    Roger Hernandez [facility]........April 3, 2024 3:21 PM

## 2024-04-03 NOTE — PROVIDER NOTIFICATION
04/03/24 0825   Valuables   Patient Belongings remains with patient   Patient Belongings Remaining with Patient cell phone/electronics;clothing;vision aids;jewelry;other (see comments)  (walker)   Did you bring any home meds/supplements to the hospital?  No     [facility] will make every effort per our policy to help keep your items safe while in the hospital.  If you choose to keep any items at the bedside, we cannot be held responsible for any items that are lost or broken.      List items sent to safe:none    I have reviewed my belongings list on admission and verify that it is correct.     Patient signature_______________________________  Date/Time_____________________    2nd Staff person if patient unable to sign __________________________  Date/Time ______________________      I have received all my belongings noted above at discharge.    Patient signature________________________________  Date/Time  __________________________

## 2024-04-03 NOTE — DISCHARGE SUMMARY
[facility]    Discharge Summary  Hospitalist    Date of Admission:  4/2/2024  Date of Discharge:  4/3/2024  Discharging Provider: Deon Shay MD  Date of Service (when I saw the patient): 04/03/24    Discharge Diagnoses   Principal Problem:    S/P total knee arthroplasty (4/2/2024)  Active Problems:    KB on CPAP (5/27/2021)      History of Present Illness   Daly Perry is an 73 year old female who presented for elective left total knee arthroplasty.    Hospital Course   Daly Perry was admitted on 4/2/2024.  The following problems were addressed during her hospitalization:      S/P left total knee arthroplasty: Patient discharged on Postop day 1 and tolerated the procedure well without difficulty.  She will continue with routine pain control, wound care, activity restrictions, and asa for dvt ppx per orthopedic surgery.  She will follow-up in orthopedics clinic as scheduled for staple removal.  She will pursue ongoing post surgical physical therapy arranged for discharge with outpatient.     Deon Shay MD    Significant Results and Procedures   Anesthesia:   1) Spinal with Sedation  2) Adductor Canal Femoral Nerve Block  3) Local Injection around surgical site     Findings:               1) Tricompartmental OA              2) Stable TKA with ROM 0-125              3) Central Patella Tracking              4) Adequate hemostasis      Implants:               1) Sophie Persona Size 8N Femoral Component              2) Size D Tibial Base Plate              3) Size 32 Patella              4) 10mm PS Poly Insert    Pending Results   These results will be followed up by NA  Unresulted Labs Ordered in the Past 30 Days of this Admission       No orders found for last 31 day(s).            Code Status   Full Code       Primary Care Physician   Nidhi Baer    Physical Exam   Temp: 98.2  F (36.8  C) Temp src: Tympanic BP: 101/45 Pulse: 70   Resp: 17 SpO2: 95 % O2 Device: None (Room air)    Vitals:     "04/02/24 1024   Weight: 86.6 kg (191 lb)     Vital Signs with Ranges  Temp:  [97.1  F (36.2  C)-98.4  F (36.9  C)] 98.2  F (36.8  C)  Pulse:  [62-78] 70  Resp:  [9-23] 17  BP: ()/(45-81) 101/45  SpO2:  [91 %-97 %] 95 %  I/O last 3 completed shifts:  In: 1240 [P.O.:240; I.V.:1000]  Out: 625 [Urine:625]    GENERAL: Talkative, sitting up in recliner, pleasant and appropriate, in no apparent distress.  CARDIOVASCULAR: regular rate and rhythm, no murmurs, rubs, or gallops. Normal S1/S2. No lower extremity edema.   RESPIRATORY: clear to auscultation bilaterally, no wheezes, no crackles.   GI: soft, non-tender, non-distended, normoactive bowel sounds.  MUSCULOSKELETAL: warm and well perfused, 2+ dorsalis pedis pulses bilaterally.  CMS intact bilateral feet.  Aquacel dressing overlying left knee, clean dry intact with no underlying spotting.  SKIN: no pallor,jaundice, or rashes.  Calves bilaterally soft and symmetric without overlying erythema.    Discharge Disposition   Discharged to home  Condition at discharge: Stable    Consultations This Hospital Stay   HOSPITALIST IP CONSULT  PHYSICAL THERAPY ADULT IP CONSULT  OCCUPATIONAL THERAPY ADULT IP CONSULT    Time Spent on this Encounter   IDeon MD, personally saw the patient today and spent greater than 30 minutes discharging this patient.    Discharge Orders      Reason for your hospital stay    Status Post Left Total Knee Arthroplasty     When to call - Contact Surgeon Team    You may experience symptoms that require follow-up before your scheduled appointment. Refer to the \"Stoplight Tool\" for instructions on when to contact your Surgeon Team if you are concerned about pain control, blood clots, constipation, or if you are unable to urinate.     When to call - Reach out to Urgent Care    If you are not able to reach your Surgeon Team and you need immediate care, go to the Orthopedic Walk-in Clinic or Urgent Care at your Surgeon's office.  Do NOT go to the " Emergency Room unless you have shortness of breath, chest pain, or other signs of a medical emergency.     When to call - Reasons to Call 911    Call 911 immediately if you experience sudden-onset chest pain, arm weakness/numbness, slurred speech, or shortness of breath     Discharge Instruction - Breathing exercises    Perform breathing exercises using your Incentive Spirometer 10 times per hour while awake for 2 weeks.     Symptoms - Fever Management    A low grade fever can be expected after surgery.  Use acetaminophen (TYLENOL) as needed for fever management.  Contact your Surgeon Team if you have a fever greater than 101.5 F, chills, and/or night sweats.     Symptoms - Constipation management    Constipation (hard, dry bowel movements) is expected after surgery due to the combination of being less active, the anesthetic, and the opioid pain medication.  You can do the following to help reduce constipation:  ~  FLUIDS:  Drink clear liquids (water or Gatorade), or juice (apple/prune).  ~  DIET:  Eat a fiber rich diet.    ~  ACTIVITY:  Get up and move around several times a day.  Increase your activity as you are able.  MEDICATIONS:  Reduce the risk of constipation by starting medications before you are constipated.  You can take Miralax   (1 packet as directed) and/or a stool softener (Senokot 1-2 tablets 1-2 times a day).  If you already have constipation and these medications are not working, you can get magnesium citrate and use as directed.  If you continue to have constipation you can try an over the counter suppository or enema.  Call your Surgeon Team if it has been greater than 3 days since your last bowel movement.     Symptoms - Reduced Urine Output    Changes in the amount of fluids you drank before and after surgery may result in problems urinating.  It is important to stay well-hydrated after surgery and drink plenty of water. If it has been greater than 8 hours since you have urinated despite  "drinking plenty of water, call your Surgeon Team.     Activity - Exercises to prevent blood clots    Unless otherwise directed by your Surgeon team, perform the following exercises at least three times per day for the first four weeks after surgery to prevent blood clots in your legs: 1) Point and flex your feet (Ankle Pumps), 2) Move your ankle around in big circles, 3) Wiggle your toes, 4) Walk, even for short distances, several times a day, will help decrease the risk of blood clots.     Comfort and Pain Management - Pain after Surgery    Pain after surgery is normal and expected.  You will have some amount of pain for several weeks after surgery.  Your pain will improve with time.  There are several things you can do to help reduce your pain including: rest, ice, elevation, and using pain medications as needed. Contact your Surgeon Team if you have pain that persists or worsens after surgery despite rest, ice, elevation, and taking your medication(s) as prescribed. Contact your Surgeon Team if you have new numbness, tingling, or weakness in your operative extremity.     Comfort and Pain Management - Swelling after Surgery    Swelling and/or bruising of the surgical extremity is common and may persist for several months after surgery. In addition to frequent icing and elevation, gentle compressive support with an ACE wrap or tubigrip may help with swelling. Apply compression regularly, removing at least twice daily to perform skin checks. Contact your Surgeon Team if your swelling increases and is NOT associated with an increase in your activity level, or if your swelling increases and is associated with redness and pain.     Comfort and Pain Management - LOWER Extremity Elevation    Swelling is expected for several months after surgery. This type of swelling is usually associated with gravity and activity, and can be improved with elevation.   The best way to do this is to get your \"toes above your nose\" by " laying down and placing several pillows lengthwise under your calf and heel. When elevating your leg keep your knee completely straight. Perform this elevation as often as possible especially for the first two weeks after surgery.     Comfort and Pain Management - Cold therapy    Ice can be used to control swelling and discomfort after surgery. Place a thin towel over your operative site and apply the ice pack overtop. Leave ice pack in place for 20 minutes, then remove for 20 minutes. Repeat this 20 minutes on/20 minutes off routine as often as tolerated.     Medication Instructions - Acetaminophen (TYLENOL) Instructions    You were discharged with acetaminophen (TYLENOL) for pain management after surgery. Acetaminophen most effectively manages pain symptoms when it is taken on a schedule without missing doses (every four, six, or eight hours). Your Provider will prescribe a safe daily dose between 3000 - 4000 mg.  Do NOT exceed this daily dose. Most patients use acetaminophen for pain control for the first four weeks after surgery.  You can wean from this medication as your pain decreases.     Medication Instructions - NSAID Instructions    You were discharged with an anti-inflammatory medication for pain management to use in combination with acetaminophen (TYLENOL) and the narcotic pain medication.  Take this medication exactly as directed.  You should only take one anti-inflammatory at a time.  Some common anti-inflammatories include: ibuprofen (ADVIL, MOTRIN), naproxen (ALEVE, NAPROSYN), celecoxib (CELEBREX), meloxicam (MOBIC), ketorolac (TORADOL).  Take this medication with food and water.     Medication Instructions - Opioids - Tapering Instructions    In the first three days following surgery, your symptoms may warrant use of the narcotic pain medication every four to six hours as prescribed. This is normal. As your pain symptoms improve, focus your efforts on decreasing (tapering) use of narcotic  medications. The most successful tapering strategy is to first, decrease the number of tablets you take every 4-6 hours to the minimum prescribed. Then, increase the amount of time between doses.  For example:  First, taper to   or 1 tablet every 4-6 hours.  Then, taper to   or 1 tablet every 6-8 hours.  Then, taper to   or 1 tablet every 8-10 hours.  Then, taper to   or 1 tablet every 10-12 hours.  Then, taper to   or 1 tablet at bedtime.  The bedtime dose can help with comfort during sleep and is typically the last dose to be discontinued after surgery.     Follow Up Care    Follow-up with your Surgeon Team in 2 weeks for wound check.     Activity - Total Knee Arthroplasty     Return to Driving    Return to driving - Driving is NOT permitted until directed by your provider. Under no circumstance are you permitted to drive while using narcotic pain medications.     Dressing / Wound Care - Wound    You have a clean dressing on your surgical wound. Dressing change instructions as follows: dressing will be removed at your follow-up appointment. Contact your Surgeon Team if you have increased redness, warmth around the surgical wound, and/or drainage from the surgical wound.     Dressing / Wound Care - NO Tub Bathing    Tub bathing, swimming, or any other activities that will cause your incision to be submerged in water should be avoided until allowed by your Surgeon.     Medication instructions -  Anticoagulation - aspirin    Take the aspirin as prescribed for a total of four weeks after surgery.  This is given to help minimize your risk of blood clot.     Medication Instructions - Opioid Instructions (Greater than or equal to 65 years)    You were discharged with an opioid medication (hydromorphone, oxycodone, hydrocodone, or tramadol). This medication should only be taken for breakthrough pain that is not controlled with acetaminophen (TYLENOL). If you rate your pain less than 3 you do not need this  medication.  Pain rating 0-3:  You do not need this medication  Pain rating 4-6:  Take 1/2 tablet every 4-6 hours as needed  Pain rating 7-10:  Take 1 tablet every 4-6 hours as needed  Do not exceed 4 tablets per day     NO Precautions    No precautions directed by your Provider.  You may perform range of motion activities as tolerated.     Weight bearing as tolerated    Weight bearing as tolerated on your operative extremity.     Dressing / Wound care - Shower with wound/dressing covered    You must COVER your dressing or incision with saran wrap (or any other non-permeable covering) to allow the incision to remain dry while showering.  You may shower 3 days after surgery as long as the surgical wound stays dry. Continue to cover your dressing or incision for showering until your first office visit.  You are strictly prohibited from soaking   or submerging the surgical wound underwater.     Discharge Instruction - Regular Diet Adult    Return to your pre-surgery diet unless instructed otherwise     Discharge Medications   Discharge Medication List as of 4/3/2024 10:35 AM        START taking these medications    Details   aspirin 81 MG EC tablet Take 1 tablet (81 mg) by mouth 2 times daily, Disp-60 tablet, R-0, E-Prescribe      ondansetron (ZOFRAN) 4 MG tablet Take 1 tablet (4 mg) by mouth every 8 hours as needed for nausea, Disp-10 tablet, R-0, E-Prescribe      oxyCODONE (ROXICODONE) 5 MG tablet Take 1 tablet (5 mg) by mouth every 4 hours as needed for moderate to severe pain, Disp-20 tablet, R-0, E-Prescribe           CONTINUE these medications which have CHANGED    Details   hydrOXYzine HCl (ATARAX) 10 MG tablet Take 1 tablet (10 mg) by mouth every 6 hours as needed for itching or anxiety (with pain, moderate pain), Disp-30 tablet, R-0, E-Prescribe      ibuprofen (ADVIL/MOTRIN) 600 MG tablet Take 1 tablet (600 mg) by mouth every 6 hours as needed for mild pain, Disp-30 tablet, R-0, E-Prescribe      senna-docusate  (SENOKOT-S/PERICOLACE) 8.6-50 MG tablet Take 1-2 tablets by mouth 2 times daily Take while on oral narcotics to prevent or treat constipation., Disp-30 tablet, R-0, E-PrescribeWhile taking narcotics           CONTINUE these medications which have NOT CHANGED    Details   acetaminophen (TYLENOL) 325 MG tablet Take 325 mg by mouth every 4 hours as needed Max acetaminophen dose: 4000 mg in 24 hours, Historical      ARIPiprazole (ABILIFY) 10 MG tablet Take 10 mg by mouth daily, Historical      calcium carbonate 600 mg-vitamin D 400 units (CALCIUM CARB-CHOLECALCIFEROL) 600-400 MG-UNIT per tablet Take 1 tablet by mouth daily, Historical      Cholecalciferol (VITAMIN D3) 25 MCG (1000 UT) CAPS Take 1 capsule by mouth daily, Historical      Multiple Vitamins-Minerals (OCUVITE PO) Take 1 tablet by mouth daily, Historical      propranolol (INDERAL) 40 MG tablet Take 1 tablet (40 mg) by mouth 2 times daily Increase in dose, Disp-180 tablet, R-4, E-Prescribe      venlafaxine (EFFEXOR-XR) 75 MG 24 hr capsule Take 75 mg by mouth 2 times daily , Historical           STOP taking these medications       ondansetron (ZOFRAN ODT) 4 MG ODT tab Comments:   Reason for Stopping:             Allergies   No Known Allergies  Data   Most Recent 3 CBC's:  Recent Labs   Lab Test 04/03/24  0546 02/14/24  0929 04/05/22  0828 01/19/22  1410   WBC  --  5.0 4.9 5.6   HGB 10.4* 14.4 13.3 12.7   MCV  --  95 97 97   PLT  --  317 283 287      Most Recent 3 BMP's:  Recent Labs   Lab Test 04/03/24  0640 04/02/24  1046 02/21/24  0553 02/20/24  0650 02/14/24  0929 02/02/23  1542 04/15/22  0827 04/05/22  0828   NA  --   --   --   --  139 138  --  140   POTASSIUM  --   --   --   --  4.1 4.0  --  4.4   CHLORIDE  --   --   --   --  103 104  --  105   CO2  --   --   --   --  27 24  --  31   BUN  --   --   --   --  19.9 22.1  --  19   CR  --   --   --   --  0.83 0.61  --  0.68   ANIONGAP  --   --   --   --  9 10  --  4   KYMBERLY  --   --   --   --  9.9 9.1  --  9.7    * 91 120*   < > 79 94   < > 92    < > = values in this interval not displayed.     Most Recent 2 LFT's:  Recent Labs   Lab Test 02/14/24  0929 02/02/23  1542   AST 22 23   ALT 16 16   ALKPHOS 95 89   BILITOTAL 0.3 0.3     Most Recent INR's and Anticoagulation Dosing History:  Anticoagulation Dose History           No data to display              Most Recent 3 Troponin's:No lab results found.  Most Recent Cholesterol Panel:  Recent Labs   Lab Test 02/14/24  0929   CHOL 221*   *   HDL 87   TRIG 70     Most Recent 6 Bacteria Isolates From Any Culture (See EPIC Reports for Culture Details):No lab results found.  Most Recent TSH, T4 and A1c Labs:  Recent Labs   Lab Test 02/14/24  0929 02/02/23  1542 01/19/22  1410   TSH  --   --  1.65   A1C 5.8   < >  --     < > = values in this interval not displayed.     Results for orders placed or performed during the hospital encounter of 04/02/24   XR Knee Port Left 1/2 Views    Narrative    PROCEDURE: XR KNEE PORT LEFT 1/2 VIEWS 4/2/2024 2:06 PM    HISTORY: Post-Op Total Knee    COMPARISONS: 3/20/2024.    TECHNIQUE: 2 views.    FINDINGS: There is a left knee orthoplasty. Components appear well  seated without acute complication. Postsurgical changes are seen in  the soft tissues. There are anterior skin staples.         Impression    IMPRESSION: Left knee arthroplasty.    MARIAM GONZALES MD         SYSTEM ID:  [id number]

## 2024-04-03 NOTE — PROGRESS NOTES
04/03/24 0900   Appointment Info   Signing Clinician's Name / Credentials (PT) Lupe Medina DPT   Rehab Comments (PT) pt agreeable and ready to participate, R knee completed 6 weeks ago   Living Environment   People in Home spouse   Current Living Arrangements house   Home Accessibility stairs to enter home   Number of Stairs, Main Entrance 5   Stair Railings, Main Entrance railings safe and in good condition   Transportation Anticipated family or friend will provide   Living Environment Comments pt lives in a home with a single railing and 5 stairs to enter. has her own FWW. had 1st knee completed 6 weeks ago so feels comfortable with mobility. no concerns with going home, starts OP therapy tomorrow.   Self-Care   Usual Activity Tolerance good   Current Activity Tolerance fair   Regular Exercise Yes   Activity/Exercise Type other (see comments)  (OP therapy)   Activity/Exercise/Self-Care Comment pt very agreeable to moblility   General Information   General Observations L LE doing well, mild swelling and non removable dressing   Pain Assessment   Patient Currently in Pain Yes, see Vital Sign flowsheet   Range of Motion (ROM)   ROM Comment 80 deg L knee flexion and lack of 15 deg ext   Strength (Manual Muscle Testing)   Strength Comments lacking antigravity strength for hip flexion and knee ext   Bed Mobility   Comment, (Bed Mobility) min assist for L LE from supine to sitting EOB. no assist for sitting EOB   Gait/Stairs (Locomotion)   Comment, (Gait/Stairs) sit<>stand from bed with CGA to FWW x 2.   Balance   Balance Comments requires UE assist   Clinical Impression   Criteria for Skilled Therapeutic Intervention Evaluation only   PT Diagnosis (PT) gait instability, L LE weakness   Influenced by the following impairments pain, swelling, weakness   Functional limitations due to impairments transfers, stairs, gait, bed mobility   Clinical Presentation (PT Evaluation Complexity) stable   Clinical Presentation  Rationale symptoms consistent with s/p L TKA   Clinical Decision Making (Complexity) low complexity   Planned Therapy Interventions (PT) bed mobility training;gait training;transfer training;home exercise program   Risk & Benefits of therapy have been explained evaluation/treatment results reviewed;risks/benefits reviewed;patient;spouse/significant other   Clinical Impression Comments pt is a 73 year old female referred to skilled PT services following a L TKA resulting in increased pain and weakness. pt is able to transition back to home today and start OP therapy tomorrow with support from her .   PT Total Evaluation Time   PT Eval, Low Complexity Minutes (12932) 15   Physical Therapy Goals   PT Frequency One time eval and treatment only   PT Predicted Duration/Target Date for Goal Attainment 04/03/24   Interventions   Interventions Quick Adds Gait Training;Therapeutic Procedure   Therapeutic Procedure/Exercise   Ther. Procedure: strength, endurance, ROM, flexibillity Minutes (33759) 10   Symptoms Noted During/After Treatment increased pain;fatigue   Treatment Detail/Skilled Intervention 10 reps in seated: small SAQ, knee flexion, quad set, glut set, ankle pumps. 2 min knee dangle to 80 deg flexion   Gait Training   Gait Training Minutes (91485) 20   Symptoms Noted During/After Treatment (Gait Training) fatigue;increased pain   Treatment Detail/Skilled Intervention gait in hallway for 200 feet with FWW and CGA with WBAT and decreased stance time on the L. increased fatigue following. pt very hesitant about stairs due to fear of R LE giving out. step to pattern ascending/descending with single railing up and B railings to descend with good control.   Distance in Feet 200   [place] Level (Gait Training) contact guard   Physical Assistance Level (Gait Training) 1 person assist   Weight Bearing (Gait Training) weight-bearing as tolerated   Assistive Device (Gait Training) rolling walker   Pattern Analysis  (Gait Training) swing-to gait   Stair Railings present on left side   Physical Assist/Nonphysical Assist (Stairs) 1 person assist   Level of [place] (Stairs) contact guard   PT Discharge Planning   PT Plan eval only   PT Discharge Recommendation (DC Rec) home with assist;home with outpatient physical therapy   PT Rationale for DC Rec to optimize function and increase safety   PT Brief overview of current status pt requires CGA for transfers with FWW, gait of 200 feet with CGA and 4 stairs with step to pattern and CGA. pt is ready to d/c to home with OP therapy   Total Session Time   Timed Code Treatment Minutes 30   Total Session Time (sum of timed and untimed services) 45

## 2024-04-03 NOTE — PLAN OF CARE
Goal Outcome Evaluation:    Patient ready for discharge.       Plan of Care Reviewed With: patient, spouse    Overall Patient Progress: improvingOverall Patient Progress: improving    Outcome Evaluation: VSS, afebrile, pain controlled with PRN medications, Voiding adequate urine out, diet advancing appropriately.    Ileana Bishop RN on 4/3/2024 at 11:18 AM

## 2024-04-03 NOTE — PLAN OF CARE
"A&O ,VSS, Ax1 with FWW, pain managed with ice, L knee dressing CDI, 1 dose PRN oxycodone as well as scheduled tylenol and toradol- states pain is more manageable this morning and declined PRN oxycodone, tolerating oral fluids, good urinary output, slept intermittently through the night.     /51 (BP Location: Right arm, Patient Position: Semi-Escamilla's, Cuff Size: Adult Regular)   Pulse 70   Temp 98.3  F (36.8  C) (Tympanic)   Resp 18   Ht 1.676 m (5' 6\")   Wt 86.6 kg (191 lb)   LMP 02/14/2004 (Approximate)   SpO2 95%   BMI 30.83 kg/m      Belkys Vences RN on 4/3/2024 at 6:23 AM    "

## 2024-04-03 NOTE — PROGRESS NOTES
Subjective: The patient is POD #1 s/p L Total Knee Arthroplasty.  The patients pain is controlled fairly well.  They deny shortness of breath, chest pain, nausea or vomiting.  There have been no acute perioperative complications    Objective:   B/P: 122/51, Temp: 98.3, Pulse: 70, Resp: 18    Alert and Orientated x3; No acute distress; Appears comfortable    Knee Exam: dressing/wound is clean, dry, intact without significant drainage.  No erythema or signs of infection.     No evidence of DVT    Distal motor/sensory exam is intact in the superficial peroneal, deep peroneal and tibial nerve distributions.      Normal capillary refill with a palpable dorsalis pedis pulse.    Hemoglobin   Date Value Ref Range Status   04/03/2024 10.4 (L) 11.7 - 15.7 g/dL Final   10/07/2020 13.3 11.7 - 15.7 g/dL Final       Assessment/Plan:     1) POD # 1 S/P L Total Knee Arthroplasty  -Pain Controlled  -PT/OT--ROM and Gait training  -DVT prophylaxis with Aspirin  -Dispo--To home when cleared Medically and by PT  -Follow-up in 2 weeks [facility]  -Hospitalist co-management

## 2024-04-03 NOTE — PROGRESS NOTES
04/03/24 1313   Appointment Info   Signing Clinician's Name / Credentials (OT) Liz Lema, OTR/L   Living Environment   People in Home spouse   Current Living Arrangements house   Home Accessibility stairs to enter home   Number of Stairs, Main Entrance 5   Stair Railings, Main Entrance railings safe and in good condition   Transportation Anticipated family or friend will provide   Self-Care   Usual Activity Tolerance good   Current Activity Tolerance fair   Equipment Currently Used at Home cane, straight;raised toilet seat;walker, rolling;shower chair   Cognitive Status Examination   Orientation Status orientation to person, place and time   Affect/Mental Status (Cognitive) WFL   Follows Commands WFL   Pain Assessment   Patient Currently in Pain Yes, see Vital Sign flowsheet  (reported minimal pain throughout session)   Range of Motion Comprehensive   General Range of Motion no range of motion deficits identified   Coordination   Upper Extremity Coordination No deficits were identified   Bed Mobility   Bed Mobility supine-sit   Supine-Sit [place] (Bed Mobility) contact guard   Transfers   Transfers bed-chair transfer;toilet transfer;shower transfer   Transfer Skill: Bed to Chair/Chair to Bed   Bed-Chair [place] (Transfers) supervision   Assistive Device (Bed-Chair Transfers) rolling walker   Shower Transfer   [place] Level (Shower Transfer) supervision   Assistive Device (Shower Transfer) grab bar, tub rail;walker, front-wheeled;shower chair   Toilet Transfer   Type (Toilet Transfer) sit-stand   [place] Level (Toilet Transfer) supervision   Clinical Impression   Criteria for Skilled Therapeutic Interventions Met (OT) Evaluation only   OT Diagnosis left TKA   Influenced by the following impairments post surgical precautions   Clinical Decision Making Complexity (OT) problem focused assessment/low complexity   Risk & Benefits of therapy have been explained risks/benefits reviewed   OT Total  Evaluation Time   OT Eval, Low Complexity Minutes (33337) 15   OT Goals   Therapy Frequency (OT) One time eval and treatment   Interventions   Interventions Quick Adds Self-Care/Home Management;Therapeutic Activity   Self-Care/Home Management   Self-Care/Home Mgmt/ADL, Compensatory, Meal Prep Minutes (91504) 45   [place] Level (Grooming Training) stand-by assist   Assistance (Grooming Training) supervision   [place] Level (Bathing Training) stand-by assist   Assistance (Bathing Training) supervision   [place] Level (Upper Body Dressing Training) stand-by assist   Assistance (Upper Body Dressing Training) supervision   Lower Body Dressing Training Assistance minimum assist (75% patient effort)   Lower Body Dressing Training Assistance 1 person assist   Assistance (Toilet Training) supervision   OT Discharge Planning   OT Plan d/c home today with spouse assist as needed and outpatient PT   OT Discharge Recommendation (DC Rec) home with assist   OT Rationale for DC Rec Pt has no further OT needs at time of D/C   OT Brief overview of current status Pt has progressed very well, completed full shower and dressing today with very minimal assist with few complaints.   OT Equipment Needed at Discharge   (pt owns all necessary equipment)   Total Session Time   Timed Code Treatment Minutes 45   Total Session Time (sum of timed and untimed services) 60

## 2024-04-04 ENCOUNTER — THERAPY VISIT (OUTPATIENT)
Dept: PHYSICAL THERAPY | Facility: OTHER | Age: 74
End: 2024-04-04
Attending: ORTHOPAEDIC SURGERY
Payer: MEDICARE

## 2024-04-04 ENCOUNTER — PATIENT OUTREACH (OUTPATIENT)
Dept: INTERNAL MEDICINE | Facility: OTHER | Age: 74
End: 2024-04-04
Payer: COMMERCIAL

## 2024-04-04 DIAGNOSIS — G89.29 CHRONIC PAIN OF LEFT KNEE: Primary | ICD-10-CM

## 2024-04-04 DIAGNOSIS — M25.562 PAIN AND SWELLING OF LEFT KNEE: ICD-10-CM

## 2024-04-04 DIAGNOSIS — R29.898 DECREASED STRENGTH OF LOWER EXTREMITY: ICD-10-CM

## 2024-04-04 DIAGNOSIS — M25.562 CHRONIC PAIN OF LEFT KNEE: Primary | ICD-10-CM

## 2024-04-04 DIAGNOSIS — M25.462 PAIN AND SWELLING OF LEFT KNEE: ICD-10-CM

## 2024-04-04 DIAGNOSIS — M25.662 DECREASED ROM OF LEFT KNEE: ICD-10-CM

## 2024-04-04 PROCEDURE — 97161 PT EVAL LOW COMPLEX 20 MIN: CPT | Mod: GP

## 2024-04-04 PROCEDURE — 97110 THERAPEUTIC EXERCISES: CPT | Mod: GP

## 2024-04-04 PROCEDURE — 97016 VASOPNEUMATIC DEVICE THERAPY: CPT | Mod: GP

## 2024-04-04 NOTE — PROGRESS NOTES
PHYSICAL THERAPY EVALUATION  Type of Visit: Evaluation    See electronic medical record for Abuse and Falls Screening details.    Subjective patient is a 73-year-old female who is accompanied by her  today following a left total knee arthroplasty performed on 24 by Dr. Parth Hansen MD.  Patient is also recently undergone a right total knee arthroplasty on 2024.  She reports she is doing well this morning and has been sleeping in bed but had some pain symptoms with any coming fully into extension.  Rates her pain today currently is a 2/10 at the left knee.  She would like to continue to work on the right knee as well to try to gain further knee extension.      Presenting condition or subjective complaint:  History of chronic left knee pain, status post left total knee arthroplasty with decreased range of motion and decreased strength  Date of onset: 24    Relevant medical history:   Right TKA 2024  Dates & types of surgery:  Left TKA 2024      Prior Level of Function  Transfers: Independent  Ambulation: Independent  ADL: Independent    Living Environment  Social support:   Lives with   Type of home:   1 level  Stairs to enter the home:       4 steps with railing  Stairs inside the home:       No  Equipment owned:   Front wheel walker    Employment:    Retired    Patient goals for therapy:  Walk with feeling out of balance and without pain, return to playing pickle ball    Pain assessment: Location: Left knee/Ratin/10     Objective   KNEE EVALUATION  PAIN: Pain Level at Rest: 1/10  Pain Level with Use: 4/10  Pain Location: knee  Pain Quality: Aching and Dull  Pain Frequency: With activity  Pain is Exacerbated By: Walking, certain positions, bending  Pain is Relieved By: cold, otc medications, rest, and sitting  INTEGUMENTARY (edema, incisions):  Midline left knee incision covered with bandage without erythema or drainage  GAIT:  Weightbearing Status: WBAT  Assistive  Device(s): Walker (front wheeled)  Gait Deviations:  Decreased step length, patient is able to do a heel-to-toe pattern intermittently with the left side  ROM:  Supine AROM: Left knee  degrees, right knee 8-120 degrees  STRENGTH:  Decreased of the left lower extremity, also still decree strength of the right with a history of the right total knee arthroplasty and noted difficulty rising out of a chair  TREATMENT: Exercise 15 minutes consisted of supine heel slides x 10 each, quad set x 10 left, short arc quad x 10 left.  Instruction of home exercise program.  Vasopneumatic: NICE unit at the left lower extremity in a supine position with both lower extremities over a bolster elevated, intermittent moderate pressure at cold level 3 for 15 minutes for pain and swelling of the left knee.    Assessment & Plan   CLINICAL IMPRESSIONS  Medical Diagnosis: Chronic pain of left knee    Treatment Diagnosis: Chronic pain of left knee, s/p left TKA, decreased ROM left knee, decreased strength lower extremity   Impression/Assessment: Patient is a 73 year old female with left knee pain complaints, with decreased range of motion and decreased strength status post left total knee arthroplasty.  Recent history of right total knee arthroplasty will be a contributing factor with patient's recovery and would benefit from treatment for both areas to promote return to function.  The following significant findings have been identified: Pain, Decreased ROM/flexibility, Decreased strength, Impaired balance, Edema, and Impaired gait. These impairments interfere with their ability to perform self care tasks, recreational activities, household chores, driving , household mobility, community mobility, and sleep  as compared to previous level of function.     Clinical Decision Making (Complexity):  Clinical Presentation: Stable/Uncomplicated  Clinical Presentation Rationale: based on medical and personal factors listed in PT  evaluation  Clinical Decision Making (Complexity): Low complexity    PLAN OF CARE  Treatment Interventions:  Modalities: Vasoneumatic Device  Interventions: Gait Training, Manual Therapy, Neuromuscular Re-education, Therapeutic Exercise  Focusing on range of motion and strength with return to normal gait mechanics.  Per protocol.    Long Term Goals     PT Goal 1  Goal Identifier: HEP  Goal Description: Patient will be compliant with a home exercise program 5 days a week for appropriate progression of home exercise program for success in physical therapy.  Target Date: 05/16/24  PT Goal 2  Goal Identifier: ROM  Goal Description: Patient will demonstrate left knee flexion to 115 degrees to allow for proper mechanics for reciprocal ambulation on the stairs are safe and independent mobility within the home and community.  PT Goal 3  Goal Identifier: Gait/mobility  Goal Description: Patient will be able to ambulate without assistive device to keep up with her  to be a community ambulator.  Target Date: 06/13/24      Frequency of Treatment: 2x/week  Duration of Treatment: 10 weeks    Education Assessment:        Risks and benefits of evaluation/treatment have been explained.   Patient/Family/caregiver agrees with Plan of Care.     Evaluation Time:     PT Eval, Low Complexity Minutes (76423): 15       Signing Clinician: Cecilia Andino, PT      [facility]                                                                                   OUTPATIENT PHYSICAL THERAPY      PLAN OF TREATMENT FOR OUTPATIENT REHABILITATION   Patient's Last Name, First Name, Daly Cruz YOB: 1950   Provider's Name   [facility]   Medical Record No.  [id number]     Onset Date: 04/02/24  Start of Care Date: 04/04/24     Medical Diagnosis:  Chronic pain of left knee      PT Treatment Diagnosis:  Chronic pain of left knee, s/p left TKA, decreased ROM left  knee, decreased strength lower extremity Plan of Treatment  Frequency/Duration: 2x/week/ 10 weeks    Certification date from 04/04/24 to 06/13/24         See note for plan of treatment details and functional goals     Cecilia Andino, PT                         I CERTIFY THE NEED FOR THESE SERVICES FURNISHED UNDER        THIS PLAN OF TREATMENT AND WHILE UNDER MY CARE     (Physician attestation of this document indicates review and certification of the therapy plan).              Referring Provider:  Ramy Sepulveda    Initial Assessment  See Epic Evaluation- Start of Care Date: 04/04/24

## 2024-04-04 NOTE — TELEPHONE ENCOUNTER
Surgical. Patient discharged with surgical follow-up only. No TCM call required per policy.   Follow Up Care     Follow-up with your Surgeon Team in 2 weeks for wound check.   Richa Kim RN on 4/4/2024 at 7:29 AM

## 2024-04-08 ENCOUNTER — THERAPY VISIT (OUTPATIENT)
Dept: PHYSICAL THERAPY | Facility: OTHER | Age: 74
End: 2024-04-08
Attending: FAMILY MEDICINE
Payer: MEDICARE

## 2024-04-08 DIAGNOSIS — M25.662 DECREASED ROM OF LEFT KNEE: ICD-10-CM

## 2024-04-08 DIAGNOSIS — M25.562 CHRONIC PAIN OF LEFT KNEE: Primary | ICD-10-CM

## 2024-04-08 DIAGNOSIS — G89.29 CHRONIC PAIN OF LEFT KNEE: Primary | ICD-10-CM

## 2024-04-08 DIAGNOSIS — R29.898 DECREASED STRENGTH OF LOWER EXTREMITY: ICD-10-CM

## 2024-04-08 DIAGNOSIS — M25.462 PAIN AND SWELLING OF LEFT KNEE: ICD-10-CM

## 2024-04-08 DIAGNOSIS — M25.562 PAIN AND SWELLING OF LEFT KNEE: ICD-10-CM

## 2024-04-08 PROCEDURE — 97110 THERAPEUTIC EXERCISES: CPT | Mod: GP

## 2024-04-08 PROCEDURE — 97016 VASOPNEUMATIC DEVICE THERAPY: CPT | Mod: GP

## 2024-04-11 ENCOUNTER — THERAPY VISIT (OUTPATIENT)
Dept: PHYSICAL THERAPY | Facility: OTHER | Age: 74
End: 2024-04-11
Attending: ORTHOPAEDIC SURGERY
Payer: MEDICARE

## 2024-04-11 DIAGNOSIS — R29.898 DECREASED STRENGTH OF LOWER EXTREMITY: ICD-10-CM

## 2024-04-11 DIAGNOSIS — M25.562 PAIN AND SWELLING OF LEFT KNEE: ICD-10-CM

## 2024-04-11 DIAGNOSIS — M25.462 PAIN AND SWELLING OF LEFT KNEE: ICD-10-CM

## 2024-04-11 DIAGNOSIS — M25.662 DECREASED ROM OF LEFT KNEE: ICD-10-CM

## 2024-04-11 DIAGNOSIS — M25.562 CHRONIC PAIN OF LEFT KNEE: Primary | ICD-10-CM

## 2024-04-11 DIAGNOSIS — G89.29 CHRONIC PAIN OF LEFT KNEE: Primary | ICD-10-CM

## 2024-04-11 PROCEDURE — 97110 THERAPEUTIC EXERCISES: CPT | Mod: GP

## 2024-04-11 PROCEDURE — 97016 VASOPNEUMATIC DEVICE THERAPY: CPT | Mod: GP

## 2024-04-15 ENCOUNTER — THERAPY VISIT (OUTPATIENT)
Dept: PHYSICAL THERAPY | Facility: OTHER | Age: 74
End: 2024-04-15
Attending: ORTHOPAEDIC SURGERY
Payer: MEDICARE

## 2024-04-15 DIAGNOSIS — M25.462 PAIN AND SWELLING OF LEFT KNEE: ICD-10-CM

## 2024-04-15 DIAGNOSIS — G89.29 CHRONIC PAIN OF LEFT KNEE: Primary | ICD-10-CM

## 2024-04-15 DIAGNOSIS — R29.898 DECREASED STRENGTH OF LOWER EXTREMITY: ICD-10-CM

## 2024-04-15 DIAGNOSIS — M25.562 PAIN AND SWELLING OF LEFT KNEE: ICD-10-CM

## 2024-04-15 DIAGNOSIS — M25.662 DECREASED ROM OF LEFT KNEE: ICD-10-CM

## 2024-04-15 DIAGNOSIS — M25.562 CHRONIC PAIN OF LEFT KNEE: Primary | ICD-10-CM

## 2024-04-15 PROCEDURE — 97110 THERAPEUTIC EXERCISES: CPT | Mod: GP

## 2024-04-15 PROCEDURE — 97016 VASOPNEUMATIC DEVICE THERAPY: CPT | Mod: GP

## 2024-04-17 ENCOUNTER — OFFICE VISIT (OUTPATIENT)
Dept: ORTHOPEDICS | Facility: OTHER | Age: 74
End: 2024-04-17
Attending: ORTHOPAEDIC SURGERY
Payer: MEDICARE

## 2024-04-17 DIAGNOSIS — Z96.652 STATUS POST TOTAL LEFT KNEE REPLACEMENT: Primary | ICD-10-CM

## 2024-04-17 PROCEDURE — G0463 HOSPITAL OUTPT CLINIC VISIT: HCPCS

## 2024-04-17 PROCEDURE — 99024 POSTOP FOLLOW-UP VISIT: CPT | Performed by: ORTHOPAEDIC SURGERY

## 2024-04-17 NOTE — PROGRESS NOTES
SUBJECTIVE:  Patient returns 2 weeks status post left knee replacement.  Patient has been going to therapies at [facility] 2 times per week.  Patient is also using a [employer] tech bike at home.  Patient is here for staple removal at this time.  Patient reports pain has been very well manageable.    ROS: Musculoskeletal and general review of systems are negative, per review of previous clinic questionnaire.  Denies SOB and calf pain.    EXAM: Left knee examination shows incision site to be healing well.  Staples removed and Steri-Strips applied.  Neuro exam otherwise intact.  Flexion tolerated to about 100 degrees.    IMAGING: None    ASSESSMENT: Left total knee replacement    PLAN: Continue PT process for flexibility and strengthening.  Recheck examination in 1 month 3 views left knee.  Taper off walker when comfortable and has recaptured strength.  Continue anti-inflammatories for pain control.    Parth Hansen MD

## 2024-04-18 ENCOUNTER — THERAPY VISIT (OUTPATIENT)
Dept: PHYSICAL THERAPY | Facility: OTHER | Age: 74
End: 2024-04-18
Attending: ORTHOPAEDIC SURGERY
Payer: MEDICARE

## 2024-04-18 DIAGNOSIS — M25.562 PAIN AND SWELLING OF LEFT KNEE: ICD-10-CM

## 2024-04-18 DIAGNOSIS — M25.662 DECREASED ROM OF LEFT KNEE: ICD-10-CM

## 2024-04-18 DIAGNOSIS — G89.29 CHRONIC PAIN OF LEFT KNEE: Primary | ICD-10-CM

## 2024-04-18 DIAGNOSIS — M25.462 PAIN AND SWELLING OF LEFT KNEE: ICD-10-CM

## 2024-04-18 DIAGNOSIS — R29.898 DECREASED STRENGTH OF LOWER EXTREMITY: ICD-10-CM

## 2024-04-18 DIAGNOSIS — M25.562 CHRONIC PAIN OF LEFT KNEE: Primary | ICD-10-CM

## 2024-04-18 PROCEDURE — 97016 VASOPNEUMATIC DEVICE THERAPY: CPT | Mod: GP

## 2024-04-18 PROCEDURE — 97110 THERAPEUTIC EXERCISES: CPT | Mod: GP

## 2024-04-22 ENCOUNTER — THERAPY VISIT (OUTPATIENT)
Dept: PHYSICAL THERAPY | Facility: OTHER | Age: 74
End: 2024-04-22
Attending: ORTHOPAEDIC SURGERY
Payer: MEDICARE

## 2024-04-22 DIAGNOSIS — M25.662 DECREASED ROM OF LEFT KNEE: ICD-10-CM

## 2024-04-22 DIAGNOSIS — G89.29 CHRONIC PAIN OF LEFT KNEE: Primary | ICD-10-CM

## 2024-04-22 DIAGNOSIS — M25.562 CHRONIC PAIN OF LEFT KNEE: Primary | ICD-10-CM

## 2024-04-22 DIAGNOSIS — M25.462 PAIN AND SWELLING OF LEFT KNEE: ICD-10-CM

## 2024-04-22 DIAGNOSIS — R29.898 DECREASED STRENGTH OF LOWER EXTREMITY: ICD-10-CM

## 2024-04-22 DIAGNOSIS — M25.562 PAIN AND SWELLING OF LEFT KNEE: ICD-10-CM

## 2024-04-22 PROCEDURE — 97110 THERAPEUTIC EXERCISES: CPT | Mod: GP

## 2024-04-22 PROCEDURE — 97016 VASOPNEUMATIC DEVICE THERAPY: CPT | Mod: GP

## 2024-04-25 ENCOUNTER — THERAPY VISIT (OUTPATIENT)
Dept: PHYSICAL THERAPY | Facility: OTHER | Age: 74
End: 2024-04-25
Attending: ORTHOPAEDIC SURGERY
Payer: MEDICARE

## 2024-04-25 DIAGNOSIS — M25.562 PAIN AND SWELLING OF LEFT KNEE: ICD-10-CM

## 2024-04-25 DIAGNOSIS — M25.462 PAIN AND SWELLING OF LEFT KNEE: ICD-10-CM

## 2024-04-25 DIAGNOSIS — M25.562 CHRONIC PAIN OF LEFT KNEE: Primary | ICD-10-CM

## 2024-04-25 DIAGNOSIS — G89.29 CHRONIC PAIN OF LEFT KNEE: Primary | ICD-10-CM

## 2024-04-25 DIAGNOSIS — R29.898 DECREASED STRENGTH OF LOWER EXTREMITY: ICD-10-CM

## 2024-04-25 DIAGNOSIS — M25.662 DECREASED ROM OF LEFT KNEE: ICD-10-CM

## 2024-04-25 PROCEDURE — 97110 THERAPEUTIC EXERCISES: CPT | Mod: GP

## 2024-04-25 PROCEDURE — 97116 GAIT TRAINING THERAPY: CPT | Mod: GP

## 2024-04-25 PROCEDURE — 97016 VASOPNEUMATIC DEVICE THERAPY: CPT | Mod: GP

## 2024-04-29 ENCOUNTER — THERAPY VISIT (OUTPATIENT)
Dept: PHYSICAL THERAPY | Facility: OTHER | Age: 74
End: 2024-04-29
Attending: ORTHOPAEDIC SURGERY
Payer: MEDICARE

## 2024-04-29 DIAGNOSIS — R29.898 DECREASED STRENGTH OF LOWER EXTREMITY: ICD-10-CM

## 2024-04-29 DIAGNOSIS — M25.662 DECREASED ROM OF LEFT KNEE: ICD-10-CM

## 2024-04-29 DIAGNOSIS — M25.562 PAIN AND SWELLING OF LEFT KNEE: ICD-10-CM

## 2024-04-29 DIAGNOSIS — G89.29 CHRONIC PAIN OF LEFT KNEE: Primary | ICD-10-CM

## 2024-04-29 DIAGNOSIS — M25.462 PAIN AND SWELLING OF LEFT KNEE: ICD-10-CM

## 2024-04-29 DIAGNOSIS — M25.562 CHRONIC PAIN OF LEFT KNEE: Primary | ICD-10-CM

## 2024-04-29 PROCEDURE — 97016 VASOPNEUMATIC DEVICE THERAPY: CPT | Mod: GP

## 2024-04-29 PROCEDURE — 97110 THERAPEUTIC EXERCISES: CPT | Mod: GP

## 2024-05-02 ENCOUNTER — THERAPY VISIT (OUTPATIENT)
Dept: PHYSICAL THERAPY | Facility: OTHER | Age: 74
End: 2024-05-02
Attending: ORTHOPAEDIC SURGERY
Payer: MEDICARE

## 2024-05-02 DIAGNOSIS — G89.29 CHRONIC PAIN OF LEFT KNEE: Primary | ICD-10-CM

## 2024-05-02 DIAGNOSIS — M25.562 CHRONIC PAIN OF LEFT KNEE: Primary | ICD-10-CM

## 2024-05-02 PROCEDURE — 97110 THERAPEUTIC EXERCISES: CPT | Mod: GP,CQ,KX

## 2024-05-04 ENCOUNTER — HEALTH MAINTENANCE LETTER (OUTPATIENT)
Age: 74
End: 2024-05-04

## 2024-05-07 ENCOUNTER — THERAPY VISIT (OUTPATIENT)
Dept: PHYSICAL THERAPY | Facility: OTHER | Age: 74
End: 2024-05-07
Attending: ORTHOPAEDIC SURGERY
Payer: MEDICARE

## 2024-05-07 DIAGNOSIS — G89.29 CHRONIC PAIN OF LEFT KNEE: Primary | ICD-10-CM

## 2024-05-07 DIAGNOSIS — M25.562 PAIN AND SWELLING OF LEFT KNEE: ICD-10-CM

## 2024-05-07 DIAGNOSIS — M25.662 DECREASED ROM OF LEFT KNEE: ICD-10-CM

## 2024-05-07 DIAGNOSIS — M25.462 PAIN AND SWELLING OF LEFT KNEE: ICD-10-CM

## 2024-05-07 DIAGNOSIS — R29.898 DECREASED STRENGTH OF LOWER EXTREMITY: ICD-10-CM

## 2024-05-07 DIAGNOSIS — M25.562 CHRONIC PAIN OF LEFT KNEE: Primary | ICD-10-CM

## 2024-05-07 PROCEDURE — 97140 MANUAL THERAPY 1/> REGIONS: CPT | Mod: GP,KX

## 2024-05-07 PROCEDURE — 97110 THERAPEUTIC EXERCISES: CPT | Mod: GP,KX

## 2024-05-07 PROCEDURE — 97016 VASOPNEUMATIC DEVICE THERAPY: CPT | Mod: GP,KX

## 2024-05-07 NOTE — PROGRESS NOTES
05/07/24 0500   Appointment Info   Signing clinician's name / credentials Cecilia Andino DPT   Total/Authorized Visits 10   Visits Used 10/10   Medical Diagnosis Chronic pain of left knee   PT Tx Diagnosis Chronic pain of left knee, s/p left TKA, decreased ROM left knee, decreased strength lower extremity   Other pertinent information R TKA done 2/20/24   Progress Note/Certification   Start of Care Date 04/04/24   Onset of illness/injury or Date of Surgery 04/02/24   Therapy Frequency 2x/week   Predicted Duration 10 weeks   Certification date from 04/04/24   Certification date to 06/13/24   Progress Note Completed Date 04/04/24   Supervision   PT Assistant Visit Number 1   PT Goal 1   Goal Identifier HEP   Goal Description Patient will be compliant with a home exercise program 5 days a week for appropriate progression of home exercise program for success in physical therapy.   Goal Progress Good compliance   Target Date 05/16/24   PT Goal 2   Goal Identifier ROM   Goal Description Patient will demonstrate left knee flexion to 115 degrees to allow for proper mechanics for reciprocal ambulation on the stairs are safe and independent mobility within the home and community.   Goal Progress Progressing, PROM 115, AROM 111   Target Date 05/16/24   PT Goal 3   Goal Identifier Gait/mobility   Goal Description Patient will be able to ambulate without assistive device to keep up with her  to be a community ambulator.   Goal Progress Progressed to SEC, lacks endurance   Target Date 06/13/24   Subjective Report   Subjective Report Reports going with SEC now for ambulation as needed. has it with today. Pain from riding in the truck last week has resolved.  Improving overall, but still having trouble getting out of chairs, needed help the other day. Sleeping is going pretty good. Working on stairs at home, reciprocal ascending and step to descending. Pain is manageable, increases with activity and then subsides.  "  Objective Measure 1   Objective Measure Pain   Details 1/10   Objective Measure 2   Objective Measure Mobility/gait   Details SEC   Objective Measure 3   Objective Measure ROM   Details Supine: AROM L Knee 4-111 degrees. PROM L Knee Iegs=082. R 8-121 degrees.   Vasopneumatic Device   Treatment Detail Supine: left large knee sleeve NICE machine, B legs elevated on bolster   Vasopneumatic Pressure medium   Duration 15 min   Temperature Level 4   Patient Response/Progress Favorable, felt good.   Therapeutic Procedure/Exercise   Therapeutic Procedures: strength, endurance, ROM, flexibility minutes (32149) 30   Ther Proc 1 - Details Amb with SEC. NuStep Pro: seat# 10 lvl 4 x 16 mins. Standing: (at rail) gastroc stretch  x 2 each, sit to stand x 3 with vc for mechanics to decrease UE use. Stairs: reciprocal ascending 7\" steps. descending 4\" steps x 4 times.  Leg Press: seat #21 (recline second notch) Dbl 100# 2x15, L 70# x 10. Supine: L knee heel slides x 10  (AROM L 4-111 degrees).   Skilled Intervention Instruction in exercise, repetitions and holds for exercise.   Patient Response/Progress Progressing ROM on L knee, still lacks TKE on R TKA, progressing strength and able to progress step up height. Difficulty with to sit to stand, mechanics.   Manual Therapy   Manual Therapy: Mobilization, MFR, MLD, friction massage minutes (15170) 10   Manual Therapy 1 - Details Supine: patella mobs x 15. STM right distal thigh, STM medial and lateral right knee, ITB, and  fibular head mobs x 10.  STM posterior knee. Knee extension stretch.   Skilled Intervention Technique, pressure   Patient Response/Progress Favorable, felt good, improved mobility at fibular head.   Plan   Home program Per protocol   Plan for next session Per TKA protocol for ROM and strength. Gait training as needed. Continue to work on R knee as needed for full recovery/return to function.   Total Session Time   Timed Code Treatment Minutes 40   Total Treatment " Time (sum of timed and untimed services) 40         PLAN  Continue therapy per current plan of care.    Beginning/End Dates of Progress Note Reporting Period:  04/04/24 to 05/07/2024    Referring Provider:  Parth Hansen

## 2024-05-09 ENCOUNTER — THERAPY VISIT (OUTPATIENT)
Dept: PHYSICAL THERAPY | Facility: OTHER | Age: 74
End: 2024-05-09
Attending: ORTHOPAEDIC SURGERY
Payer: MEDICARE

## 2024-05-09 DIAGNOSIS — M25.562 CHRONIC PAIN OF LEFT KNEE: Primary | ICD-10-CM

## 2024-05-09 DIAGNOSIS — R29.898 DECREASED STRENGTH OF LOWER EXTREMITY: ICD-10-CM

## 2024-05-09 DIAGNOSIS — M25.462 PAIN AND SWELLING OF LEFT KNEE: ICD-10-CM

## 2024-05-09 DIAGNOSIS — M25.562 PAIN AND SWELLING OF LEFT KNEE: ICD-10-CM

## 2024-05-09 DIAGNOSIS — G89.29 CHRONIC PAIN OF LEFT KNEE: Primary | ICD-10-CM

## 2024-05-09 DIAGNOSIS — M25.662 DECREASED ROM OF LEFT KNEE: ICD-10-CM

## 2024-05-09 PROCEDURE — 97016 VASOPNEUMATIC DEVICE THERAPY: CPT | Mod: GP,KX

## 2024-05-09 PROCEDURE — 97140 MANUAL THERAPY 1/> REGIONS: CPT | Mod: GP,KX

## 2024-05-09 PROCEDURE — 97110 THERAPEUTIC EXERCISES: CPT | Mod: GP,KX

## 2024-05-14 ENCOUNTER — THERAPY VISIT (OUTPATIENT)
Dept: PHYSICAL THERAPY | Facility: OTHER | Age: 74
End: 2024-05-14
Attending: ORTHOPAEDIC SURGERY
Payer: MEDICARE

## 2024-05-14 DIAGNOSIS — M25.562 CHRONIC PAIN OF LEFT KNEE: Primary | ICD-10-CM

## 2024-05-14 DIAGNOSIS — G89.29 CHRONIC PAIN OF LEFT KNEE: Primary | ICD-10-CM

## 2024-05-14 DIAGNOSIS — M25.662 DECREASED ROM OF LEFT KNEE: ICD-10-CM

## 2024-05-14 DIAGNOSIS — M25.562 PAIN AND SWELLING OF LEFT KNEE: ICD-10-CM

## 2024-05-14 DIAGNOSIS — R29.898 DECREASED STRENGTH OF LOWER EXTREMITY: ICD-10-CM

## 2024-05-14 DIAGNOSIS — M25.462 PAIN AND SWELLING OF LEFT KNEE: ICD-10-CM

## 2024-05-14 DIAGNOSIS — Z96.652 STATUS POST TOTAL LEFT KNEE REPLACEMENT: Primary | ICD-10-CM

## 2024-05-14 PROCEDURE — 97016 VASOPNEUMATIC DEVICE THERAPY: CPT | Mod: GP,KX

## 2024-05-14 PROCEDURE — 97110 THERAPEUTIC EXERCISES: CPT | Mod: GP,KX

## 2024-05-14 PROCEDURE — 97140 MANUAL THERAPY 1/> REGIONS: CPT | Mod: GP,KX

## 2024-05-14 NOTE — PROGRESS NOTES
05/14/24 0500   Appointment Info   Signing clinician's name / credentials Cecilia Andino DPT   Total/Authorized Visits 12   Visits Used 2/10   Medical Diagnosis Chronic pain of left knee   PT Tx Diagnosis Chronic pain of left knee, s/p left TKA, decreased ROM left knee, decreased strength lower extremity   Other pertinent information R TKA done 2/20/24   Progress Note/Certification   Start of Care Date 04/04/24   Onset of illness/injury or Date of Surgery 04/02/24   Therapy Frequency 2x/week   Predicted Duration 10 weeks   Certification date from 04/04/24   Certification date to 06/13/24   Progress Note Completed Date 05/07/24   Supervision   PT Assistant Visit Number 1   PT Goal 1   Goal Identifier HEP   Goal Description Patient will be compliant with a home exercise program 5 days a week for appropriate progression of home exercise program for success in physical therapy.   Goal Progress Good compliance   Target Date 05/16/24   PT Goal 2   Goal Identifier ROM   Goal Description Patient will demonstrate left knee flexion to 115 degrees to allow for proper mechanics for reciprocal ambulation on the stairs are safe and independent mobility within the home and community.   Goal Progress Progressing, PROM 115, AROM 111   Target Date 05/16/24   PT Goal 3   Goal Identifier Gait/mobility   Goal Description Patient will be able to ambulate without assistive device to keep up with her  to be a community ambulator.   Goal Progress Progressed to SEC, lacks endurance   Target Date 06/13/24   Subjective Report   Subjective Report Reports going without SEC at home, uses it for further distances as needed. Reports discomfort at left knee, especially with getting out of chairs.   Objective Measure 1   Objective Measure Pain   Details 1/10   Objective Measure 2   Objective Measure Mobility/gait   Details SEC   Objective Measure 3   Objective Measure ROM   Details AROM: R Knee 6-125 degrees, L 4-115 degrees. PROM L  knee Flex=118.   Vasopneumatic Device   Vasopneumatic device minutes (64217) 15   Treatment Detail Supine: left large knee sleeve NICE machine, B legs elevated on bolster   Vasopneumatic Pressure medium   Duration 15 min   Temperature Level 4   Patient Response/Progress Favorable, felt good.   Therapeutic Procedure/Exercise   Therapeutic Procedures: strength, endurance, ROM, flexibility minutes (74753) 30   Ther Proc 1 - Details Amb with SEC. NuStep Pro: seat# 10 lvl 4 x 12 mins. Leg Press: seat #21 (recline second notch) Dbl 120# x25, L 70# x 15, R 70# x 15. Stairs reciprocalx 4 (4 steps in succesion). Supine: heel slides x 10each. PROM L knee Flex x 5. AROM: R Knee 6-125 degrees, L 4-115 degrees. PROM L knee Flex=118.   Skilled Intervention Instruction in exercise, repetitions and holds for exercise.   Patient Response/Progress Progressing ROM on L knee, progressing gait   Manual Therapy   Manual Therapy: Mobilization, MFR, MLD, friction massage minutes (80767) 10   Manual Therapy 1 - Details Supine: patella mobs x 15. STM right distal thigh, STM medial and lateral right knee, ITB, and  fibular head mobs x 10.  STM posterior knee. Knee extension stretch L and R.   Skilled Intervention Technique, pressure   Patient Response/Progress Favorable, felt good, improved mobility at fibular head.   Plan   Home program Per protocol   Plan for next session Per TKA protocol for ROM and strength. Gait training as needed. Continue to work on R knee as needed for full recovery/return to function.   Total Session Time   Timed Code Treatment Minutes 40   Total Treatment Time (sum of timed and untimed services) 55         PLAN  Continue therapy per current plan of care.    Beginning/End Dates of Progress Note Reporting Period:  05/07/24 to 05/14/2024    Referring Provider:  Parth Hansen

## 2024-05-15 ENCOUNTER — OFFICE VISIT (OUTPATIENT)
Dept: ORTHOPEDICS | Facility: OTHER | Age: 74
End: 2024-05-15
Attending: ORTHOPAEDIC SURGERY
Payer: MEDICARE

## 2024-05-15 ENCOUNTER — HOSPITAL ENCOUNTER (OUTPATIENT)
Dept: GENERAL RADIOLOGY | Facility: OTHER | Age: 74
Discharge: HOME | End: 2024-05-15
Attending: ORTHOPAEDIC SURGERY
Payer: MEDICARE

## 2024-05-15 DIAGNOSIS — Z96.652 STATUS POST TOTAL LEFT KNEE REPLACEMENT: Primary | ICD-10-CM

## 2024-05-15 DIAGNOSIS — Z96.652 STATUS POST TOTAL LEFT KNEE REPLACEMENT: ICD-10-CM

## 2024-05-15 PROCEDURE — 73562 X-RAY EXAM OF KNEE 3: CPT | Mod: LT

## 2024-05-15 PROCEDURE — 99024 POSTOP FOLLOW-UP VISIT: CPT | Performed by: ORTHOPAEDIC SURGERY

## 2024-05-15 PROCEDURE — G0463 HOSPITAL OUTPT CLINIC VISIT: HCPCS

## 2024-05-15 RX ORDER — AMOXICILLIN 500 MG/1
2000 CAPSULE ORAL DAILY
Qty: 4 CAPSULE | Refills: 2 | Status: SHIPPED | OUTPATIENT
Start: 2024-05-15

## 2024-05-15 NOTE — PROGRESS NOTES
SUBJECTIVE:  Patient returns status post left total knee replacement.  Patient is 6 weeks postsurgical and doing very well at this time.  Patient has therapy set up through the end of June.  Patient overall is pleased with response at this point.    ROS: Musculoskeletal and general review of systems are negative, per review of previous clinic questionnaire.  Denies SOB and calf pain.    EXAM: Left knee examination shows range of motion -4 to about 125.  Ligament examination is otherwise stable.  Knee Tracking is acceptable.  Patient is walking with minimal limp at this time.    IMAGING: 3 views left knee show patient's components to be in stable alignment and position at this current time.    ASSESSMENT: Left total knee replacement.    PLAN: Continue to fine-tune flexibility with physical therapy.  Plan for a recheck around 1 year.  I also put in a prescription for dental prophylaxis as well.    Parth Hansen MD

## 2024-05-16 ENCOUNTER — THERAPY VISIT (OUTPATIENT)
Dept: PHYSICAL THERAPY | Facility: OTHER | Age: 74
End: 2024-05-16
Attending: ORTHOPAEDIC SURGERY
Payer: MEDICARE

## 2024-05-16 DIAGNOSIS — M25.662 DECREASED ROM OF LEFT KNEE: ICD-10-CM

## 2024-05-16 DIAGNOSIS — M25.462 PAIN AND SWELLING OF LEFT KNEE: ICD-10-CM

## 2024-05-16 DIAGNOSIS — G89.29 CHRONIC PAIN OF LEFT KNEE: Primary | ICD-10-CM

## 2024-05-16 DIAGNOSIS — R29.898 DECREASED STRENGTH OF LOWER EXTREMITY: ICD-10-CM

## 2024-05-16 DIAGNOSIS — M25.562 CHRONIC PAIN OF LEFT KNEE: Primary | ICD-10-CM

## 2024-05-16 DIAGNOSIS — M25.562 PAIN AND SWELLING OF LEFT KNEE: ICD-10-CM

## 2024-05-16 PROCEDURE — 97110 THERAPEUTIC EXERCISES: CPT | Mod: GP,KX

## 2024-05-16 PROCEDURE — 97140 MANUAL THERAPY 1/> REGIONS: CPT | Mod: GP,KX

## 2024-05-16 PROCEDURE — 97016 VASOPNEUMATIC DEVICE THERAPY: CPT | Mod: GP,KX

## 2024-05-20 ENCOUNTER — OFFICE VISIT (OUTPATIENT)
Dept: OBGYN | Facility: OTHER | Age: 74
End: 2024-05-20
Payer: COMMERCIAL

## 2024-05-20 VITALS
BODY MASS INDEX: 31.04 KG/M2 | HEART RATE: 74 BPM | SYSTOLIC BLOOD PRESSURE: 154 MMHG | DIASTOLIC BLOOD PRESSURE: 86 MMHG | WEIGHT: 192.3 LBS

## 2024-05-20 DIAGNOSIS — N94.10 DYSPAREUNIA, FEMALE: ICD-10-CM

## 2024-05-20 DIAGNOSIS — L90.0 LICHEN SCLEROSUS: Primary | ICD-10-CM

## 2024-05-20 PROCEDURE — 99204 OFFICE O/P NEW MOD 45 MIN: CPT | Mod: 25 | Performed by: STUDENT IN AN ORGANIZED HEALTH CARE EDUCATION/TRAINING PROGRAM

## 2024-05-20 PROCEDURE — G0463 HOSPITAL OUTPT CLINIC VISIT: HCPCS | Mod: 25 | Performed by: STUDENT IN AN ORGANIZED HEALTH CARE EDUCATION/TRAINING PROGRAM

## 2024-05-20 PROCEDURE — 88305 TISSUE EXAM BY PATHOLOGIST: CPT

## 2024-05-20 PROCEDURE — 250N000009 HC RX 250: Performed by: STUDENT IN AN ORGANIZED HEALTH CARE EDUCATION/TRAINING PROGRAM

## 2024-05-20 PROCEDURE — 56605 BIOPSY OF VULVA/PERINEUM: CPT | Performed by: STUDENT IN AN ORGANIZED HEALTH CARE EDUCATION/TRAINING PROGRAM

## 2024-05-20 RX ORDER — ESTRADIOL 0.1 MG/G
2 CREAM VAGINAL
Qty: 42.5 G | Refills: 4 | Status: SHIPPED | OUTPATIENT
Start: 2024-05-20

## 2024-05-20 RX ORDER — CLOBETASOL PROPIONATE 0.5 MG/G
CREAM TOPICAL DAILY
Qty: 60 G | Refills: 0 | Status: SHIPPED | OUTPATIENT
Start: 2024-05-20

## 2024-05-20 RX ORDER — LIDOCAINE HCL/EPINEPHRINE/PF 2%-1:200K
3 VIAL (ML) INJECTION ONCE
Status: COMPLETED | OUTPATIENT
Start: 2024-05-20 | End: 2024-05-20

## 2024-05-20 RX ORDER — MOMETASONE FUROATE 1 MG/G
CREAM TOPICAL
Qty: 45 G | Refills: 4 | Status: SHIPPED | OUTPATIENT
Start: 2024-05-20

## 2024-05-20 RX ADMIN — LIDOCAINE HYDROCHLORIDE,EPINEPHRINE BITARTRATE 3 ML: 20; .005 INJECTION, SOLUTION EPIDURAL; INFILTRATION; INTRACAUDAL; PERINEURAL at 10:56

## 2024-05-20 ASSESSMENT — PATIENT HEALTH QUESTIONNAIRE - PHQ9
SUM OF ALL RESPONSES TO PHQ QUESTIONS 1-9: 0
10. IF YOU CHECKED OFF ANY PROBLEMS, HOW DIFFICULT HAVE THESE PROBLEMS MADE IT FOR YOU TO DO YOUR WORK, TAKE CARE OF THINGS AT HOME, OR GET ALONG WITH OTHER PEOPLE: NOT DIFFICULT AT ALL
SUM OF ALL RESPONSES TO PHQ QUESTIONS 1-9: 0

## 2024-05-20 NOTE — PROGRESS NOTES
Gynecology Office Visit    Chief Complaint: Vaginal itching    HPI:    Daly Perry is a 73 year old , here for discussion of flare of vulvar itching. She endorses history of biopsy proven lichen sclerosis, but has been intermittently using clobetasol when she has flare of symptoms like this. She notes it has been a few years since she has used clobetasol. She has not used any maintenance steroid cream. She notes occasionally it will have some light spotting when she wipes, but she does not have any pain or vaginal discharge.     She also brings up concern about pain with intercourse and notes that it feels like the vaginal opening has gotten smaller. She has not tried topical estrogen before. She would be interested in this and vaginal dilators after discussion.     OBHx  OB History    Para Term  AB Living   12 4 2 2 8 2   SAB IAB Ectopic Multiple Live Births   2 2 2 2 2      # Outcome Date GA Lbr Gab/2nd Weight Sex Type Anes PTL Lv   12 Molar            11 Molar            10 Ectopic            9 Ectopic            8 SAB            7 SAB            6 IAB            5 IAB            4             3             2 Term            1 Term                GYN history:   She notes she was diagnosed with lichen sclerosis many years ago with a biopsy proven result  Has been using clobetasol off and on, no structured follow up established    Prior endometrial ablation  Prior unilateral oophorectomy    Past medical history:  Past Medical History:   Diagnosis Date    Closed left ankle fracture     Cyst of ovary     Hx of right ovarian cyst.    Diverticulosis of large intestine 2011    Endometriosis     growth on ovary s/p oophrectomy    Fibrocystic breast disease 2018    Generalized anxiety disorder     Dysthymia, generalized anxiety    Lichen sclerosus     Vulvar LSEA; asymptomatic    Neck pain, chronic 2018     Improved after long-term    Rosacea 2013    Severe  major depression with psychotic features (H) 10/25/2016    history of ECT; inpatient for several months; she does not have complete memory of that time    Sleep apnea          Past Surgical History:  Past Surgical History:   Procedure Laterality Date    ARTHROPLASTY KNEE Right 2/20/2024    Procedure: ARTHROPLASTY, KNEE, TOTAL;  Surgeon: Parth Hansen MD;  Location: [facility] OR    ARTHROPLASTY KNEE Left 4/2/2024    Procedure: ARTHROPLASTY, KNEE, TOTAL;  Surgeon: Parth Hansen MD;  Location: [facility] OR    ARTHROSCOPY KNEE WITH MENISCECTOMY Right 4/15/2022    Procedure: Right Knee Arthoscopy with Partial MEDIAL AND Lateral Meniscectomy and  chondroplasty;  Surgeon: Parth Hansen MD;  Location: [facility] OR    BIOPSY BREAST Right 07/07/2008    stereotactic, benign result    CATARACT IOL, RT/LT Bilateral     2017, 2018    COLONOSCOPY  03/14/2011    Normal; F/U 2021    DILATION AND CURETTAGE  2003    D&C with hysteroscopy and endometrial ablation    OOPHORECTOMY Right 02/1997    OPEN REDUCTION INTERNAL FIXATION ANKLE Left 2002    RELEASE CARPAL TUNNEL  2010    REMOVE HARDWARE ANKLE Left 06/27/2011    VITRECTOMY ANTERIOR Left 05/22/2017    Dr. Collins         Medications:  Current Outpatient Medications   Medication Sig Dispense Refill    acetaminophen (TYLENOL) 325 MG tablet Take 325 mg by mouth every 4 hours as needed Max acetaminophen dose: 4000 mg in 24 hours      amoxicillin (AMOXIL) 500 MG capsule Take 4 capsules (2,000 mg) by mouth daily 4 capsule 2    ARIPiprazole (ABILIFY) 10 MG tablet Take 10 mg by mouth daily      aspirin 81 MG EC tablet Take 1 tablet (81 mg) by mouth 2 times daily 60 tablet 0    calcium carbonate 600 mg-vitamin D 400 units (CALCIUM CARB-CHOLECALCIFEROL) 600-400 MG-UNIT per tablet Take 1 tablet by mouth daily      Cholecalciferol (VITAMIN D3) 25 MCG (1000 UT) CAPS Take 1 capsule by mouth daily      hydrOXYzine HCl (ATARAX) 10 MG tablet Take 1 tablet (10 mg) by mouth every 6 hours as needed for itching  or anxiety (with pain, moderate pain) 30 tablet 0    ibuprofen (ADVIL/MOTRIN) 600 MG tablet Take 1 tablet (600 mg) by mouth every 6 hours as needed for mild pain 30 tablet 0    Multiple Vitamins-Minerals (OCUVITE PO) Take 1 tablet by mouth daily      ondansetron (ZOFRAN) 4 MG tablet Take 1 tablet (4 mg) by mouth every 8 hours as needed for nausea 10 tablet 0    oxyCODONE (ROXICODONE) 5 MG tablet Take 1 tablet (5 mg) by mouth every 4 hours as needed for moderate to severe pain 20 tablet 0    propranolol (INDERAL) 40 MG tablet Take 1 tablet (40 mg) by mouth 2 times daily Increase in dose 180 tablet 4    senna-docusate (SENOKOT-S/PERICOLACE) 8.6-50 MG tablet Take 1-2 tablets by mouth 2 times daily Take while on oral narcotics to prevent or treat constipation. 30 tablet 0    venlafaxine (EFFEXOR-XR) 75 MG 24 hr capsule Take 75 mg by mouth 2 times daily        No current facility-administered medications for this visit.       Allergies:     No Known Allergies      Social History:  Social History     Tobacco Use    Smoking status: Never    Smokeless tobacco: Never   Vaping Use    Vaping status: Never Used   Substance Use Topics    Alcohol use: Yes     Comment: 1-2 per week    Drug use: No     Family History:  Family History   Problem Relation Age of Onset    Arthritis Mother     Heart Disease Mother     Hypertension Mother     Thyroid Disease Mother     Dementia Mother     Pulmonary Embolism Mother     Heart Disease Father     Other - See Comments Father         Psychiatric illness    Chronic Obstructive Pulmonary Disease Father     Dementia Brother     Colon Cancer Paternal Grandmother         Cancer-colon    No Known Problems Brother     Hypertension Sister     Allergies Sister         food    Breast Cancer No family hx of         Cancer-breast       ROS:   Respiratory: No shortness of breath, dyspnea on exertion, cough, or hemoptysis  Cardiovascular: negative for palpitations, chest pain, lower extremity edema and  "syncope or near-syncope  Gastrointestinal: negative for, nausea, vomiting and hematemesis  Genitourinary: negative for, dysuria, frequency and urgency  Musculoskeletal: negative for, back pain and muscular weakness  Psychiatric: negative for, anxiety, depression and hallucinations  Hematologic/Lymphatic/Immunologic: negative for, anemia, chills and fever      Physical Exam  BP (!) 152/80   Pulse 74   Wt 87.2 kg (192 lb 4.8 oz)   LMP 2004 (Approximate)   BMI 31.04 kg/m    Gen: Well-appearing, no acute distressed, well-groomed, alert  HEENT: Normocephalic, atraumatic  Cardiovascular: Regular rate, No peripheral edema, normal peripheral circulation  Pulm: Symmetric chest rise, non-labored respirations  Abd: Soft, non-tender, non-distended  Ext: No LE edema, extremities warm and well perfused  Pelvic:  Clitoral villarreal and anterior introitus are thickened with white lichenoid changes, some small nodularity, but no solid masses or ulcerations. This region extends across the clitoral villarreal region about 1 cm in total diameter. The tissue thinning and whitening extends down along the intriotus bilaterally and the labia minora show agglutination bilaterally to the lateral edges. The whitening also extends in a \"key hole\" distribution along the anus. No nodularity or masses noted along posterior forchette or anal region.      Vulvar Biopsy:  We discussed risks and benefits of the vulvar biopsy procedure. She signed consents and we helped her into a lithotomy position. The region was inspected and cleaned with betadine swabs. Lidocaine was injected under the lesion to provide local anesthesia. A 4mm punch biopsy was used to encompass the lesion and provide a sample. The punch biopsy sample was put in a specimen cup and labeled to be sent to pathology for review.        Assessment/Plan  Daly Perry is a 73 year old  female here for follow up of lichen sclerosis. She notes last fall she had a flare and has not had " a follow up or exam for this in many years.    # Dyspareunia: insertional as she notes she feels like the opening has tightened.  -- Vaginal estrogen cream discussed and Valeriy helped to send estrace to [facility]: will use 1-2x weekly  -- Vaginal dilators discussed as well    # Lichen sclerosis  -- Region along bilateral clitoral villarreal with significant thickening, biopsy taken today to ensure no SCC changes  -- Will begin clobetasol taper again (3 month taper marched out for her- see below  -- Discussed maintenance needed with mometasone after clobetasol taper  -- Annual exams recommended for assessment of SCC changes    Lichen sclerosis is a disease that is characterized by changes to the skin usually involving the vulva and occasionally around the anus. It can cause structural skin changes including tissue fusion of the labia majora and minora, tightening and change in caliber of the vaginal introitus. It usually presents with significant vaginal pruritis and occasionally excoriations from scratching the itchy region. Lichen Sclerosis can progress into vaginal intraepithelial neoplasia (AIXA) precancerous lesions and even squamous cell carcinoma if left untreated and unmonitored. The mainstay of treatment is topical steroid ointment or cream. Discussed that the steroid cream is to be applied in a tapered schedule over 12 weeks as follows:  Clobetasol 0.05% cream or ointment  Nightly for 4 weeks  Every other night for 4 weeks  Twice each week for 4 weeks    Symptom improvement should occur by weeks 4-6. If no improvement of symptoms occurs, a biopsy is warranted to ensure it is not another dermatologic change disguised as LS.    Maintenance therapy will be needed throughout life. Maintenance is recommended to prevent progression of disease and any AIXA or SCC changes.  Maintenance with Twice weekly Mometasone Furoate 0.1%  Follow up needed every 12 months once we reach to maintenance therapy stage.        Total amount of time spent during today's encounter including chart prep, face to face, documentation was 50 minutes. 5 minutes in addition were spent with the procedure.    Nuvia Verdugo MD on 5/20/2024 at 10:14 AM       Answers submitted by the patient for this visit:  Patient Health Questionnaire (Submitted on 5/20/2024)  If you checked off any problems, how difficult have these problems made it for you to do your work, take care of things at home, or get along with other people?: Not difficult at all  PHQ9 TOTAL SCORE: 0

## 2024-05-20 NOTE — NURSING NOTE
Pt presents to clinic today for vaginal itching.     Medication Reconciliation: complete  Liane Hendrix LPN

## 2024-05-23 LAB
PATH REPORT.COMMENTS IMP SPEC: NORMAL
PATH REPORT.FINAL DX SPEC: NORMAL
PHOTO IMAGE: NORMAL

## 2024-05-24 ENCOUNTER — THERAPY VISIT (OUTPATIENT)
Dept: PHYSICAL THERAPY | Facility: OTHER | Age: 74
End: 2024-05-24
Attending: ORTHOPAEDIC SURGERY
Payer: MEDICARE

## 2024-05-24 DIAGNOSIS — M25.562 CHRONIC PAIN OF LEFT KNEE: Primary | ICD-10-CM

## 2024-05-24 DIAGNOSIS — G89.29 CHRONIC PAIN OF LEFT KNEE: Primary | ICD-10-CM

## 2024-05-24 PROCEDURE — 97140 MANUAL THERAPY 1/> REGIONS: CPT | Mod: GP,CQ,KX

## 2024-05-24 PROCEDURE — 97110 THERAPEUTIC EXERCISES: CPT | Mod: GP,CQ,KX

## 2024-05-29 ENCOUNTER — THERAPY VISIT (OUTPATIENT)
Dept: PHYSICAL THERAPY | Facility: OTHER | Age: 74
End: 2024-05-29
Attending: ORTHOPAEDIC SURGERY
Payer: MEDICARE

## 2024-05-29 DIAGNOSIS — G89.29 CHRONIC PAIN OF LEFT KNEE: Primary | ICD-10-CM

## 2024-05-29 DIAGNOSIS — M25.562 CHRONIC PAIN OF LEFT KNEE: Primary | ICD-10-CM

## 2024-05-29 PROCEDURE — 97110 THERAPEUTIC EXERCISES: CPT | Mod: GP,CQ,KX

## 2024-06-03 ENCOUNTER — THERAPY VISIT (OUTPATIENT)
Dept: PHYSICAL THERAPY | Facility: OTHER | Age: 74
End: 2024-06-03
Attending: ORTHOPAEDIC SURGERY
Payer: MEDICARE

## 2024-06-03 DIAGNOSIS — M25.562 CHRONIC PAIN OF LEFT KNEE: Primary | ICD-10-CM

## 2024-06-03 DIAGNOSIS — G89.29 CHRONIC PAIN OF LEFT KNEE: Primary | ICD-10-CM

## 2024-06-03 PROCEDURE — 97110 THERAPEUTIC EXERCISES: CPT | Mod: GP,CQ,KX

## 2024-06-11 ENCOUNTER — THERAPY VISIT (OUTPATIENT)
Dept: PHYSICAL THERAPY | Facility: OTHER | Age: 74
End: 2024-06-11
Attending: ORTHOPAEDIC SURGERY
Payer: MEDICARE

## 2024-06-11 DIAGNOSIS — M25.662 DECREASED ROM OF LEFT KNEE: ICD-10-CM

## 2024-06-11 DIAGNOSIS — M25.562 CHRONIC PAIN OF LEFT KNEE: Primary | ICD-10-CM

## 2024-06-11 DIAGNOSIS — M25.562 PAIN AND SWELLING OF LEFT KNEE: ICD-10-CM

## 2024-06-11 DIAGNOSIS — G89.29 CHRONIC PAIN OF LEFT KNEE: Primary | ICD-10-CM

## 2024-06-11 DIAGNOSIS — M25.462 PAIN AND SWELLING OF LEFT KNEE: ICD-10-CM

## 2024-06-11 DIAGNOSIS — Z96.651 S/P TOTAL KNEE ARTHROPLASTY, RIGHT: ICD-10-CM

## 2024-06-11 DIAGNOSIS — R29.898 DECREASED STRENGTH OF LOWER EXTREMITY: ICD-10-CM

## 2024-06-11 PROCEDURE — 97016 VASOPNEUMATIC DEVICE THERAPY: CPT | Mod: GP,KX

## 2024-06-11 PROCEDURE — 97110 THERAPEUTIC EXERCISES: CPT | Mod: GP,KX

## 2024-06-13 NOTE — PROGRESS NOTES
06/11/24 0500   Appointment Info   Signing clinician's name / credentials Cecilia Andino DPT   Total/Authorized Visits 17   Visits Used 5/10   Medical Diagnosis Chronic pain of left knee   PT Tx Diagnosis Chronic pain of left knee, s/p left TKA, decreased ROM left knee, decreased strength lower extremity   Other pertinent information R TKA done 2/20/24   Progress Note/Certification   Start of Care Date 04/04/24   Onset of illness/injury or Date of Surgery 04/02/24   Therapy Frequency 2x/week   Predicted Duration 8 weeks   Certification date from 06/11/24   Certification date to 08/06/24   Progress Note Completed Date 05/14/24   Supervision   PT Assistant Visit Number 4   PT Goal 1   Goal Identifier HEP   Goal Description Patient will be compliant with a home exercise program 5 days a week for appropriate progression of home exercise program for success in physical therapy.   Goal Progress Good compliance   Target Date 05/16/24   PT Goal 2   Goal Identifier ROM   Goal Description Patient will demonstrate left knee flexion to 115 degrees to allow for proper mechanics for reciprocal ambulation on the stairs are safe and independent mobility within the home and community.   Goal Progress Left knee Flex=118, patient uses UE to help pull up the stairs   Target Date 05/16/24   PT Goal 3   Goal Identifier Gait/mobility   Goal Description Patient will be able to ambulate without assistive device to keep up with her  to be a community ambulator.   Goal Progress Progressing, lacks full speed with    Target Date 06/13/24   Subjective Report   Subjective Report Daly reports working on getting up and out of chair without using the UE, was able to get up off the bed today. Does report a fall coming out of the camper yesterday and knee buckled, did land on the knees but was able to get up independently, iced. Was more sore yesterday but better today.  Discussed continuing 1-2 times a week over the next 6 to 8  "weeks for progression to reach goals.   Objective Measure 1   Objective Measure Pain   Details 1/10   Objective Measure 2   Objective Measure Mobility/gait   Details no AD   Objective Measure 3   Objective Measure ROM   Details AROM: R Knee 4-125 degrees, L 2-115 degrees. PROM L knee Flex=118.   Vasopneumatic Device   Vasopneumatic device minutes (54967) 15   Treatment Detail Supine: left large knee sleeve NICE machine, B legs elevated on bolster   Vasopneumatic Pressure medium   Duration 10 min   Temperature Level 4   Patient Response/Progress Favorable, felt good.   Therapeutic Procedure/Exercise   Therapeutic Procedures: strength, endurance, ROM, flexibility minutes (29104) 38   Ther Proc 1 - Details NuStep Pro: seat# 10 lvl 7 x 10 mins (in early to ride nustep) -  Standing: gastroc stretch x 2. Sit-stand x10 with A from UEs from 20\" unable to do without assist. Stairs full flight with reciprocal pattern, use of UE for assist. Leg Press: seat #21 (recline second notch) Dbl 140# x25, L 70# x 15, R 70# x 15.  Instructed in prone knee extension stretch to continue at home.   Skilled Intervention Instruction in exercise, repetitions and holds for exercise.   Patient Response/Progress Progressing well   Plan   Home program Per protocol   Plan for next session Per TKA protocol for ROM and strength. Gait training as needed. Continue to work on R knee as needed for full recovery/return to function.   Total Session Time   Timed Code Treatment Minutes 38   Total Treatment Time (sum of timed and untimed services) 53         [facility]                                                                                   OUTPATIENT PHYSICAL THERAPY    PLAN OF TREATMENT FOR OUTPATIENT REHABILITATION   Patient's Last Name, First Name, Daly Cruz YOB: 1950   Provider's Name   [facility]   Medical Record No.  [id number]     Onset Date: 04/02/24  " Start of Care Date: 04/04/24     Medical Diagnosis:  Chronic pain of left knee      PT Treatment Diagnosis:  Chronic pain of left knee, s/p left TKA, decreased ROM left knee, decreased strength lower extremity Plan of Treatment  Frequency/Duration: 2x/week/ 8 weeks    Certification date from 06/11/24 to 08/06/24         See note for plan of treatment details and functional goals     Cecilia Andino, PT                         I CERTIFY THE NEED FOR THESE SERVICES FURNISHED UNDER        THIS PLAN OF TREATMENT AND WHILE UNDER MY CARE     (Physician attestation of this document indicates review and certification of the therapy plan).              Referring Provider:  Parth Hansen    Initial Assessment  See Epic Evaluation- Start of Care Date: 04/04/24            PLAN  Continue therapy per current plan of care.    Beginning/End Dates of Progress Note Reporting Period:  05/14/24 to 06/11/2024    Referring Provider:  Parth Hansen

## 2024-06-24 ENCOUNTER — THERAPY VISIT (OUTPATIENT)
Dept: PHYSICAL THERAPY | Facility: OTHER | Age: 74
End: 2024-06-24
Attending: ORTHOPAEDIC SURGERY
Payer: MEDICARE

## 2024-06-24 DIAGNOSIS — M25.562 CHRONIC PAIN OF LEFT KNEE: Primary | ICD-10-CM

## 2024-06-24 DIAGNOSIS — G89.29 CHRONIC PAIN OF LEFT KNEE: Primary | ICD-10-CM

## 2024-06-24 DIAGNOSIS — M25.662 DECREASED ROM OF LEFT KNEE: ICD-10-CM

## 2024-06-24 DIAGNOSIS — R29.898 DECREASED STRENGTH OF LOWER EXTREMITY: ICD-10-CM

## 2024-06-24 PROCEDURE — 97110 THERAPEUTIC EXERCISES: CPT | Mod: GP,KX

## 2024-06-24 NOTE — PROGRESS NOTES
06/24/24 0500   Appointment Info   Signing clinician's name / credentials Cecilia Andino DPT   Total/Authorized Visits 18   Visits Used 6/10   Medical Diagnosis Chronic pain of left knee   PT Tx Diagnosis Chronic pain of left knee, s/p left TKA, decreased ROM left knee, decreased strength lower extremity   Other pertinent information R TKA done 2/20/24   Progress Note/Certification   Start of Care Date 04/04/24   Onset of illness/injury or Date of Surgery 04/02/24   Therapy Frequency 2x/week   Predicted Duration 8 weeks   Certification date from 06/11/24   Certification date to 08/06/24   Progress Note Completed Date 05/14/24   Supervision   PT Assistant Visit Number 4   PT Goal 1   Goal Identifier HEP   Goal Description Patient will be compliant with a home exercise program 5 days a week for appropriate progression of home exercise program for success in physical therapy.   Goal Progress Good compliance   Target Date 05/16/24   Date Met 06/24/24   PT Goal 2   Goal Identifier ROM   Goal Description Patient will demonstrate left knee flexion to 115 degrees to allow for proper mechanics for reciprocal ambulation on the stairs are safe and independent mobility within the home and community.   Goal Progress Supine AROM: Left knee 3-123 degrees. R knee 5-125 degrees   Target Date 05/16/24   Date Met 06/24/24   PT Goal 3   Goal Identifier Gait/mobility   Goal Description Patient will be able to ambulate without assistive device to keep up with her  to be a community ambulator.   Goal Progress Progressing, lacks full speed with    Target Date 06/13/24   Subjective Report   Subjective Report Daly reports feels a limp with dipping over to the left, otherwise has increased walk distance. She has contiued to perform her HEP and feels she can continue on her own.   Objective Measure 1   Objective Measure Pain   Details 0/10   Objective Measure 2   Objective Measure Mobility/gait   Details no AD   Objective  Measure 3   Objective Measure ROM   Details Supine AROM: Left knee 3-123 degrees. R knee 5-125 degrees   Therapeutic Procedure/Exercise   Therapeutic Procedures: strength, endurance, ROM, flexibility minutes (46981) 30   Ther Proc 1 - Details NuStep Pro: seat# 10 lvl 7 x 10 mins (in early to ride nustep) Supine AROM heel slides x 10 each. Sidelying Hip Abd followed by clamshell to fatigue each side.  Leg Press: seat #21 (recline second notch) Dbl 140# x25, L 70# x 15, R 70# x 15.   Skilled Intervention Instruction in exercise, repetitions and holds for exercise.   Patient Response/Progress Progressing well, all questions answered.   Plan   Home program Access Code: [email or web address]  URL: [email or web address]  Date: 06/24/2024  Prepared by: Cecilia Andino    Exercises  - Sidelying Hip Abduction  - 1 x daily - 3 x weekly - 2 sets - 10 reps  - Clamshell  - 1 x daily - 3 x weekly - 2 sets - 10 reps   Plan for next session Continue apparently with home exercise program to increase gait speed and further functional activities following her left knee total arthroplasty.  Patient can call with questions or concerns regarding home exercise program.   Total Session Time   Timed Code Treatment Minutes 30   Total Treatment Time (sum of timed and untimed services) 30         DISCHARGE  Reason for Discharge: Patient has met all goals.    Discharge Plan: Patient to continue home program.    Referring Provider:  Parth Hansen

## 2024-06-26 ENCOUNTER — HOSPITAL ENCOUNTER (OUTPATIENT)
Dept: MAMMOGRAPHY | Facility: OTHER | Age: 74
Discharge: HOME | End: 2024-06-26
Attending: INTERNAL MEDICINE | Admitting: INTERNAL MEDICINE
Payer: MEDICARE

## 2024-06-26 DIAGNOSIS — Z12.31 VISIT FOR SCREENING MAMMOGRAM: ICD-10-CM

## 2024-06-26 PROCEDURE — 77063 BREAST TOMOSYNTHESIS BI: CPT

## 2024-09-23 ENCOUNTER — OFFICE VISIT (OUTPATIENT)
Dept: INTERNAL MEDICINE | Facility: OTHER | Age: 74
End: 2024-09-23
Attending: INTERNAL MEDICINE
Payer: COMMERCIAL

## 2024-09-23 VITALS
HEART RATE: 68 BPM | SYSTOLIC BLOOD PRESSURE: 142 MMHG | OXYGEN SATURATION: 95 % | RESPIRATION RATE: 16 BRPM | WEIGHT: 186.8 LBS | DIASTOLIC BLOOD PRESSURE: 82 MMHG | HEIGHT: 66 IN | BODY MASS INDEX: 30.02 KG/M2

## 2024-09-23 DIAGNOSIS — G89.29 CHRONIC PAIN OF LEFT KNEE: ICD-10-CM

## 2024-09-23 DIAGNOSIS — M25.562 CHRONIC PAIN OF LEFT KNEE: ICD-10-CM

## 2024-09-23 DIAGNOSIS — Z23 NEED FOR IMMUNIZATION AGAINST INFLUENZA: ICD-10-CM

## 2024-09-23 DIAGNOSIS — I10 BENIGN ESSENTIAL HYPERTENSION: Primary | ICD-10-CM

## 2024-09-23 PROCEDURE — G0463 HOSPITAL OUTPT CLINIC VISIT: HCPCS | Mod: 25

## 2024-09-23 PROCEDURE — G2211 COMPLEX E/M VISIT ADD ON: HCPCS | Performed by: INTERNAL MEDICINE

## 2024-09-23 PROCEDURE — 90662 IIV NO PRSV INCREASED AG IM: CPT

## 2024-09-23 PROCEDURE — 99214 OFFICE O/P EST MOD 30 MIN: CPT | Performed by: INTERNAL MEDICINE

## 2024-09-23 RX ORDER — LISINOPRIL 10 MG/1
10 TABLET ORAL DAILY
Qty: 90 TABLET | Refills: 1 | Status: SHIPPED | OUTPATIENT
Start: 2024-09-23

## 2024-09-23 ASSESSMENT — PAIN SCALES - GENERAL: PAINLEVEL: NO PAIN (0)

## 2024-09-23 NOTE — PROGRESS NOTES
"  Assessment & Plan     Benign essential hypertension  Blood pressure today is uncontrolled.  We discussed the pros and cons in depth of starting medication versus behavioral changes.  She does feel at this time that it would be beneficial to start treatment.  We discussed various medications and determined that lisinopril would be likely her best bet.  Prescription is sent in.  She will have her labs rechecked in approximately 2 to 3 weeks.  She is to let us know if she continues to have elevated blood pressures, greater than 135/90 at home.  She is to call with any new or changing symptoms.  - lisinopril (ZESTRIL) 10 MG tablet; Take 1 tablet (10 mg) by mouth daily.  - Renal Function Panel; Future    Chronic pain of left knee  Improved at this time.  Continue to follow with orthopedics.    Need for immunization against influenza  - INFLUENZA HIGH DOSE, TRIVALENT, PF (FLUZONE)          BMI  Estimated body mass index is 30.15 kg/m  as calculated from the following:    Height as of this encounter: 1.676 m (5' 6\").    Weight as of this encounter: 84.7 kg (186 lb 12.8 oz).   Weight management plan: Discussed healthy diet and exercise guidelines    Return in about 6 months (around 3/23/2025) for Annual Review with renewal of all medications, BP Recheck, and as needed sooner.    Paulette Kauffman is a 73 year old, presenting for the following health issues:  Hypertension        9/23/2024     2:57 PM   Additional Questions   Roomed by Carlos Lazo LPN     History of Present Illness       Reason for visit:  Blood pressure monitoring  Symptom onset:  More than a month  Symptom intensity:  Moderate  Symptom progression:  Staying the same  Had these symptoms before:  No  What makes it worse:  No  What makes it better:  No   She is taking medications regularly.     Patient reports that she has been checking her blood pressure at home.  It has been on the high normal to elevated side.  It typically ranges between 130 and 160 " "systolic.  Her diastolic blood pressure has been in the 80s to 90s.  She denies any significant headaches.  She has not had any chest pain.    She has history of total knee replacement.  She has recovered fairly well overall.    She would like her flu shot today.      ROS:  Denies any fevers, chills, SOB, chest pain, increased lower extremity edema, changes in bowel or bladder or other concerns.         Objective    BP (!) 142/82   Pulse 68   Resp 16   Ht 1.676 m (5' 6\")   Wt 84.7 kg (186 lb 12.8 oz)   LMP 02/14/2004 (Approximate)   SpO2 95%   Breastfeeding No   BMI 30.15 kg/m    Body mass index is 30.15 kg/m .  Physical Exam   GEN: Vitals reviewed. Healthy appearing. Patient is in no acute distress. Cooperative with exam.  HEENT: Normocephalic atraumatic.  Eyes grossly normal to inspection.  No discharge or erythema, or obvious scleral/conjunctival abnormalities.   CV: Heart regular in rate and rhythm with no murmur.    LUNGS: No audible wheeze, cough, or visible cyanosis.  No visible retractions or increased work of breathing.  Lungs clear to auscultation bilaterally.    ABD:  Obese, nondistended  SKIN: Warm and dry to touch.  Visible skin clear. No significant rash, abnormal pigmentation or lesions.  EXT: No clubbing or cyanosis.  No peripheral edema.        The longitudinal plan of care for the diagnosis(es)/condition(s) as documented were addressed during this visit. Due to the added complexity in care, I will continue to support Daly in the subsequent management and with ongoing continuity of care.    Signed Electronically by: Nidhi Baer DO    "

## 2024-09-23 NOTE — NURSING NOTE
"Chief Complaint   Patient presents with    Hypertension       Initial BP (!) 142/82   Pulse 68   Resp 16   Ht 1.676 m (5' 6\")   Wt 84.7 kg (186 lb 12.8 oz)   LMP 02/14/2004 (Approximate)   SpO2 95%   Breastfeeding No   BMI 30.15 kg/m   Estimated body mass index is 30.15 kg/m  as calculated from the following:    Height as of this encounter: 1.676 m (5' 6\").    Weight as of this encounter: 84.7 kg (186 lb 12.8 oz).  Medication Review: complete    The next two questions are to help us understand your food security.  If you are feeling you need any assistance in this area, we have resources available to support you today.          2/14/2024   SDOH- Food Insecurity   Within the past 12 months, did you worry that your food would run out before you got money to buy more? N   Within the past 12 months, did the food you bought just not last and you didn t have money to get more? N            Health Care Directive:  Patient has a Health Care Directive on file      Yenifer Lazo LPN      "

## 2024-09-25 ENCOUNTER — OFFICE VISIT (OUTPATIENT)
Dept: OBGYN | Facility: OTHER | Age: 74
End: 2024-09-25
Attending: STUDENT IN AN ORGANIZED HEALTH CARE EDUCATION/TRAINING PROGRAM
Payer: COMMERCIAL

## 2024-09-25 VITALS
DIASTOLIC BLOOD PRESSURE: 74 MMHG | BODY MASS INDEX: 30.18 KG/M2 | OXYGEN SATURATION: 97 % | TEMPERATURE: 96.9 F | WEIGHT: 187.8 LBS | HEIGHT: 66 IN | HEART RATE: 71 BPM | SYSTOLIC BLOOD PRESSURE: 118 MMHG | RESPIRATION RATE: 16 BRPM

## 2024-09-25 DIAGNOSIS — L90.0 LICHEN SCLEROSUS: Primary | ICD-10-CM

## 2024-09-25 PROCEDURE — 99213 OFFICE O/P EST LOW 20 MIN: CPT | Performed by: STUDENT IN AN ORGANIZED HEALTH CARE EDUCATION/TRAINING PROGRAM

## 2024-09-25 PROCEDURE — G0463 HOSPITAL OUTPT CLINIC VISIT: HCPCS

## 2024-09-25 ASSESSMENT — PAIN SCALES - GENERAL: PAINLEVEL: NO PAIN (0)

## 2024-09-25 ASSESSMENT — PATIENT HEALTH QUESTIONNAIRE - PHQ9
10. IF YOU CHECKED OFF ANY PROBLEMS, HOW DIFFICULT HAVE THESE PROBLEMS MADE IT FOR YOU TO DO YOUR WORK, TAKE CARE OF THINGS AT HOME, OR GET ALONG WITH OTHER PEOPLE: NOT DIFFICULT AT ALL
SUM OF ALL RESPONSES TO PHQ QUESTIONS 1-9: 0
SUM OF ALL RESPONSES TO PHQ QUESTIONS 1-9: 0

## 2024-09-25 NOTE — PROGRESS NOTES
"Follow-Up Visit    S: Ms. Daly Perry is a 73 year old  here for follow up of lichen sclerosis. She used the clobetasol for 1 month. She now is using mometasone twice weekly. She has no concerns about vulvar symptoms and has not noticed any bleeding or nodularity.     She hasn't started the estrogen or the vaginal dilators yet. She notes she may not need these as she is feeling better with the steroid treatment.    She has also been closely watching her blood pressure. She was recently suggested to start lisinopril, but notes today her BP has been normal. Will continue to closely monitor at home and begin the recommended medication if she has elevated pressures consistently.    O:  /74   Pulse 71   Temp 96.9  F (36.1  C) (Tympanic)   Resp 16   Ht 1.676 m (5' 6\")   Wt 85.2 kg (187 lb 12.8 oz)   LMP 2004 (Approximate)   SpO2 97%   Breastfeeding No   BMI 30.31 kg/m    Gen: Well-appearing, NAD  Pulm: nonlabored  Abd: Soft, non-tender, non-distended  Ext: No LE edema, extremities warm and well perfused    Pelvic: Clitoral villarreal and anterior introitus lichenification more pliable now. No nodularity or ulcerations. Labial lateral agglutination. The whitening \"key hole\" distribution along the anus no longer shows thickening. No nodularity or masses noted along posterior forchette or anal region.     Assessment/Plan  Daly Perry is a 73 year old  female here for follow up of lichen sclerosis.      # Lichen sclerosis- improved.  -- continue on mometasone twice weekly. Can trial once weekly if desired as well.  -- Annual exams recommended for assessment of SCC changes  # Dyspareunia: improved. Has not yet used the vaginal estrogen or dilators as we discussed in the past.       Answers submitted by the patient for this visit:  Patient Health Questionnaire (Submitted on 2024)  If you checked off any problems, how difficult have these problems made it for you to do your work, take care of " things at home, or get along with other people?: Not difficult at all  PHQ9 TOTAL SCORE: 0  General Questionnaire (Submitted on 9/23/2024)  Chief Complaint: Chronic problems general questions HPI Form  How many days per week do you miss taking your medication?: 0  General Concern (Submitted on 9/23/2024)  Chief Complaint: Chronic problems general questions HPI Form  What is the reason for your visit today?: blood pressure monitoring  When did your symptoms begin?: More than a month  How would you describe these symptoms?: Moderate  Are your symptoms:: Staying the same  Have you had these symptoms before?: No  Is there anything that makes you feel worse?: no  Is there anything that makes you feel better?: no      Total amount of time spent during today's encounter including chart prep, face to face, exam and documentation was 25 minutes  Nuvia Verdugo MD on 9/25/2024 at 1:31 PM

## 2024-09-25 NOTE — NURSING NOTE
Patient is here to follow up on lichen sclerosis, no concerns feels if being treated well.     Patient's last menstrual period was 02/14/2004 (approximate).  Medication Reconciliation: complete    Tri Dumont LPN 9/25/2024 12:42 PM       Advance care directive on file? yes  Advance care directive provided to patient? na       Tri Dumont LPN

## 2024-10-08 ENCOUNTER — LAB (OUTPATIENT)
Dept: LAB | Facility: OTHER | Age: 74
End: 2024-10-08
Attending: INTERNAL MEDICINE
Payer: MEDICARE

## 2024-10-08 DIAGNOSIS — I10 BENIGN ESSENTIAL HYPERTENSION: ICD-10-CM

## 2024-10-08 LAB
ALBUMIN SERPL BCG-MCNC: 4.2 G/DL (ref 3.5–5.2)
ANION GAP SERPL CALCULATED.3IONS-SCNC: 13 MMOL/L (ref 7–15)
BUN SERPL-MCNC: 18.4 MG/DL (ref 8–23)
CALCIUM SERPL-MCNC: 9.9 MG/DL (ref 8.8–10.4)
CHLORIDE SERPL-SCNC: 103 MMOL/L (ref 98–107)
CREAT SERPL-MCNC: 0.83 MG/DL (ref 0.51–0.95)
EGFRCR SERPLBLD CKD-EPI 2021: 75 ML/MIN/1.73M2
GLUCOSE SERPL-MCNC: 83 MG/DL (ref 70–99)
HCO3 SERPL-SCNC: 25 MMOL/L (ref 22–29)
PHOSPHATE SERPL-MCNC: 3.2 MG/DL (ref 2.5–4.5)
POTASSIUM SERPL-SCNC: 4.2 MMOL/L (ref 3.4–5.3)
SODIUM SERPL-SCNC: 141 MMOL/L (ref 135–145)

## 2024-10-08 PROCEDURE — 82310 ASSAY OF CALCIUM: CPT | Mod: ZL

## 2024-10-08 PROCEDURE — 36415 COLL VENOUS BLD VENIPUNCTURE: CPT | Mod: ZL

## 2024-11-22 ENCOUNTER — TELEPHONE (OUTPATIENT)
Dept: INTERNAL MEDICINE | Facility: OTHER | Age: 74
End: 2024-11-22
Payer: COMMERCIAL

## 2024-11-22 NOTE — TELEPHONE ENCOUNTER
The pt dropped off a list of her BP readings that included before and after starting the Lisinopril.    The averages of the pre-med were 127//88 with pulses in the high 50's-low 80's.  After starting Lisinopril, the readings went from 103//97 with pulses in the high 50's - mid 70's.  Most of the readings have not changed much between the pre and post med.  If anything, the after med, the readings seem higher.  The pt takes one Lisinopril 10 mg tab at noon.  Yenifer Lazo LPN on 11/22/2024 at 11:42 AM

## 2024-12-04 NOTE — TELEPHONE ENCOUNTER
Please call patient and get some additional blood pressure readings.  If it has continued to be elevated over the holiday I would recommend that she increase her lisinopril to 20 mg daily.  I would also recommend that she see ALIYA or ANAM for blood pressure follow-up in the next couple weeks.

## 2024-12-04 NOTE — TELEPHONE ENCOUNTER
Spoke with Daly and she said she has been sitting down for 5-10 minutes prior to taking her blood pressure and it's been around 116/75. She will continue to monitor.  Norma J. Gosselin, LPN .......  12/4/2024  3:00 PM

## 2025-01-21 ENCOUNTER — TELEPHONE (OUTPATIENT)
Dept: INTERNAL MEDICINE | Facility: OTHER | Age: 75
End: 2025-01-21
Payer: COMMERCIAL

## 2025-01-21 ENCOUNTER — ORDERS ONLY (AUTO-RELEASED) (OUTPATIENT)
Dept: INTERNAL MEDICINE | Facility: OTHER | Age: 75
End: 2025-01-21
Payer: COMMERCIAL

## 2025-01-21 DIAGNOSIS — Z12.11 COLON CANCER SCREENING: Primary | ICD-10-CM

## 2025-01-21 DIAGNOSIS — Z12.11 COLON CANCER SCREENING: ICD-10-CM

## 2025-01-21 NOTE — TELEPHONE ENCOUNTER
Patient is requesting a cologuard be ordered and sent to her.  She states she received a letter saying she is due.    Komal Bello on 1/21/2025 at 1:02 PM

## 2025-02-18 LAB — NONINV COLON CA DNA+OCC BLD SCRN STL QL: NEGATIVE

## 2025-03-18 ENCOUNTER — MYC REFILL (OUTPATIENT)
Dept: INTERNAL MEDICINE | Facility: OTHER | Age: 75
End: 2025-03-18
Payer: COMMERCIAL

## 2025-03-18 DIAGNOSIS — I10 BENIGN ESSENTIAL HYPERTENSION: ICD-10-CM

## 2025-03-18 RX ORDER — LISINOPRIL 10 MG/1
10 TABLET ORAL DAILY
Qty: 90 TABLET | Refills: 1 | Status: SHIPPED | OUTPATIENT
Start: 2025-03-18

## 2025-03-18 NOTE — TELEPHONE ENCOUNTER
Requested Prescriptions   Pending Prescriptions Disp Refills    lisinopril (ZESTRIL) 10 MG tablet 90 tablet 1     Sig: Take 1 tablet (10 mg) by mouth daily.       ACE Inhibitors (Including Combos) Protocol Passed - 3/18/2025  8:54 AM        Passed - Most recent blood pressure under 140/90 in past 12 months- Clinicial or Patient Reported     BP Readings from Last 3 Encounters:   09/25/24 118/74   09/23/24 (!) 142/82   05/20/24 (!) 154/86       No data recorded            Passed - Medication is active on med list and the sig matches. RN to manually verify dose and sig if red X/fail.     If the protocol passes (green check), you do not need to verify med dose and sig.    A prescription matches if they are the same clinical intention.    For Example: once daily and every morning are the same.    The protocol can not identify upper and lower case letters as matching and will fail.     For Example: Take 1 tablet (50 mg) by mouth daily     TAKE 1 TABLET (50 MG) BY MOUTH DAILY    For all fails (red x), verify dose and sig.    If the refill does match what is on file, the RN can still proceed to approve the refill request.       If they do not match, route to the appropriate provider.             Passed - Medication indicated for associated diagnosis     Medication is associated with one or more of the following diagnoses:     Chronic Kidney Disease (CKD)   Coronary Artery Disease (CAD)   Diabetes   Heart Failure (HF)   Hypertension (HTN)   Nephropathy   History of myocarditis   Tachycardia induced cardiomyopathy   STEMI (ST elevation myocardial infarction)   Spontaneous dissection of coronary artery   Status post percutaneous transluminal coronary angioplasty            Passed - Recent (12 mo) or future (90 days) visit within the authorizing provider's specialty     The patient must have completed an in-person or virtual visit within the past 12 months or has a future visit scheduled within the next 90 days with the  authorizing provider s specialty.  Urgent care and e-visits do not qualify as an office visit for this protocol.          Passed - Most recent GFR on file in the past 12 months >30        Passed - Patient is age 18 or older        Passed - No active pregnancy on record        Passed - Normal serum potassium on file in past 12 months     Recent Labs   Lab Test 10/08/24  0844   POTASSIUM 4.2             Passed - No positive pregnancy test within past 12 months             Last Prescription Date:   9/23/2024  Last Fill Qty/Refills:         90, R-1    Last Office Visit:              9/23/2024     Return in about 6 months (around 3/23/2025) for Annual Review with renewal of all medications, BP Recheck, and as needed sooner.     Future Office visit:           8/6/2025 annual exam.   Charline West RN on 3/18/2025 at 8:55 AM

## 2025-04-07 DIAGNOSIS — Z96.651 STATUS POST TOTAL RIGHT KNEE REPLACEMENT: ICD-10-CM

## 2025-04-07 DIAGNOSIS — Z96.652 STATUS POST TOTAL LEFT KNEE REPLACEMENT: Primary | ICD-10-CM

## 2025-04-09 ENCOUNTER — HOSPITAL ENCOUNTER (OUTPATIENT)
Dept: GENERAL RADIOLOGY | Facility: OTHER | Age: 75
Discharge: HOME | End: 2025-04-09
Attending: ORTHOPAEDIC SURGERY
Payer: MEDICARE

## 2025-04-09 ENCOUNTER — OFFICE VISIT (OUTPATIENT)
Dept: ORTHOPEDICS | Facility: OTHER | Age: 75
End: 2025-04-09
Attending: ORTHOPAEDIC SURGERY
Payer: MEDICARE

## 2025-04-09 VITALS — BODY MASS INDEX: 29.41 KG/M2 | WEIGHT: 183 LBS | HEIGHT: 66 IN

## 2025-04-09 DIAGNOSIS — Z96.651 STATUS POST TOTAL RIGHT KNEE REPLACEMENT: ICD-10-CM

## 2025-04-09 DIAGNOSIS — Z96.652 STATUS POST TOTAL LEFT KNEE REPLACEMENT: ICD-10-CM

## 2025-04-09 DIAGNOSIS — Z96.652 STATUS POST TOTAL LEFT KNEE REPLACEMENT: Primary | ICD-10-CM

## 2025-04-09 PROCEDURE — G0463 HOSPITAL OUTPT CLINIC VISIT: HCPCS

## 2025-04-09 PROCEDURE — 73564 X-RAY EXAM KNEE 4 OR MORE: CPT | Mod: 50

## 2025-04-09 ASSESSMENT — PAIN SCALES - GENERAL: PAINLEVEL_OUTOF10: NO PAIN (0)

## 2025-04-09 NOTE — NURSING NOTE
"Chief Complaint   Patient presents with    RECHECK     S/P Left Total Knee Replacement      Patient presents to the clinic today for 1 year recheck following left TKA. Patient is also 1 year &2 months s/p Right TKA .     Amber Giron LPN on 4/9/2025 at 8:52 AM      Initial LMP 02/14/2004 (Approximate)  Estimated body mass index is 30.31 kg/m  as calculated from the following:    Height as of 9/25/24: 1.676 m (5' 6\").    Weight as of 9/25/24: 85.2 kg (187 lb 12.8 oz).  Medication Reconciliation: complete    Amber Giron LPN    "

## 2025-04-09 NOTE — PROGRESS NOTES
SUBJECTIVE:  Daly comes in 1 year postop left total knee arthroplasty.  Patient reports no pain at all to either knee.  States she is doing good overall except going up and down stairs is still a challenge.  Patient does have full range of motion of both knees strengthening conditioning is the biggest concern at this time.    ROS: Musculoskeletal and general review of systems are negative, per review of previous clinic questionnaire.  Denies SOB and calf pain.    EXAM: Good range of motion both right and left knee.  Patella tracking is well also.    IMAGING:       ASSESSMENT: 1 year postop left total knee arthroplasty 2 years postop right total knee arthroplasty    PLAN: Show patient couple of quad strengthening exercises today while she was in the clinic today.  Patient will go back and look through her physical therapy exercise program and start back at that program.  Patient will also get out and start walking a little bit more for conditioning also.  Patient will follow-up as needed we will refill her amoxicillin for dental appointments as needed.    Parth Hansen MD

## 2025-06-10 ENCOUNTER — OFFICE VISIT (OUTPATIENT)
Dept: INTERNAL MEDICINE | Facility: OTHER | Age: 75
End: 2025-06-10
Payer: MEDICARE

## 2025-06-10 VITALS
WEIGHT: 185.6 LBS | OXYGEN SATURATION: 99 % | HEART RATE: 63 BPM | TEMPERATURE: 97 F | BODY MASS INDEX: 29.96 KG/M2 | DIASTOLIC BLOOD PRESSURE: 86 MMHG | SYSTOLIC BLOOD PRESSURE: 118 MMHG | RESPIRATION RATE: 16 BRPM

## 2025-06-10 DIAGNOSIS — R73.03 PREDIABETES: ICD-10-CM

## 2025-06-10 DIAGNOSIS — R42 VERTIGO: Primary | ICD-10-CM

## 2025-06-10 DIAGNOSIS — I10 BENIGN ESSENTIAL HYPERTENSION: ICD-10-CM

## 2025-06-10 DIAGNOSIS — H61.22 IMPACTED CERUMEN OF LEFT EAR: ICD-10-CM

## 2025-06-10 LAB
ALBUMIN SERPL BCG-MCNC: 4.3 G/DL (ref 3.5–5.2)
ALP SERPL-CCNC: 90 U/L (ref 40–150)
ALT SERPL W P-5'-P-CCNC: 19 U/L (ref 0–50)
ANION GAP SERPL CALCULATED.3IONS-SCNC: 12 MMOL/L (ref 7–15)
AST SERPL W P-5'-P-CCNC: 30 U/L (ref 0–45)
BASOPHILS # BLD AUTO: 0.1 10E3/UL (ref 0–0.2)
BASOPHILS NFR BLD AUTO: 1 %
BILIRUB SERPL-MCNC: 0.3 MG/DL
BUN SERPL-MCNC: 16.9 MG/DL (ref 8–23)
CALCIUM SERPL-MCNC: 9.9 MG/DL (ref 8.8–10.4)
CHLORIDE SERPL-SCNC: 103 MMOL/L (ref 98–107)
CHOLEST SERPL-MCNC: 232 MG/DL
CREAT SERPL-MCNC: 0.64 MG/DL (ref 0.51–0.95)
EGFRCR SERPLBLD CKD-EPI 2021: >90 ML/MIN/1.73M2
EOSINOPHIL # BLD AUTO: 0.1 10E3/UL (ref 0–0.7)
EOSINOPHIL NFR BLD AUTO: 1 %
ERYTHROCYTE [DISTWIDTH] IN BLOOD BY AUTOMATED COUNT: 12.8 % (ref 10–15)
EST. AVERAGE GLUCOSE BLD GHB EST-MCNC: 126 MG/DL
FASTING STATUS PATIENT QL REPORTED: NO
FASTING STATUS PATIENT QL REPORTED: NO
GLUCOSE SERPL-MCNC: 89 MG/DL (ref 70–99)
HBA1C MFR BLD: 6 %
HCO3 SERPL-SCNC: 23 MMOL/L (ref 22–29)
HCT VFR BLD AUTO: 42.1 % (ref 35–47)
HDLC SERPL-MCNC: 92 MG/DL
HGB BLD-MCNC: 14.1 G/DL (ref 11.7–15.7)
IMM GRANULOCYTES # BLD: 0 10E3/UL
IMM GRANULOCYTES NFR BLD: 0 %
LDLC SERPL CALC-MCNC: 127 MG/DL
LYMPHOCYTES # BLD AUTO: 1.7 10E3/UL (ref 0.8–5.3)
LYMPHOCYTES NFR BLD AUTO: 32 %
MCH RBC QN AUTO: 31.9 PG (ref 26.5–33)
MCHC RBC AUTO-ENTMCNC: 33.5 G/DL (ref 31.5–36.5)
MCV RBC AUTO: 95 FL (ref 78–100)
MONOCYTES # BLD AUTO: 0.3 10E3/UL (ref 0–1.3)
MONOCYTES NFR BLD AUTO: 6 %
NEUTROPHILS # BLD AUTO: 3.1 10E3/UL (ref 1.6–8.3)
NEUTROPHILS NFR BLD AUTO: 60 %
NONHDLC SERPL-MCNC: 140 MG/DL
NRBC # BLD AUTO: 0 10E3/UL
NRBC BLD AUTO-RTO: 0 /100
PLATELET # BLD AUTO: 277 10E3/UL (ref 150–450)
POTASSIUM SERPL-SCNC: 4.1 MMOL/L (ref 3.4–5.3)
PROT SERPL-MCNC: 7 G/DL (ref 6.4–8.3)
RBC # BLD AUTO: 4.42 10E6/UL (ref 3.8–5.2)
SODIUM SERPL-SCNC: 138 MMOL/L (ref 135–145)
TRIGL SERPL-MCNC: 67 MG/DL
TSH SERPL DL<=0.005 MIU/L-ACNC: 2.23 UIU/ML (ref 0.3–4.2)
WBC # BLD AUTO: 5.2 10E3/UL (ref 4–11)

## 2025-06-10 PROCEDURE — 84443 ASSAY THYROID STIM HORMONE: CPT | Mod: ZL

## 2025-06-10 PROCEDURE — 36415 COLL VENOUS BLD VENIPUNCTURE: CPT | Mod: ZL

## 2025-06-10 PROCEDURE — 82435 ASSAY OF BLOOD CHLORIDE: CPT | Mod: ZL

## 2025-06-10 PROCEDURE — 69209 REMOVE IMPACTED EAR WAX UNI: CPT

## 2025-06-10 PROCEDURE — 80061 LIPID PANEL: CPT | Mod: ZL

## 2025-06-10 PROCEDURE — 85025 COMPLETE CBC W/AUTO DIFF WBC: CPT | Mod: ZL

## 2025-06-10 PROCEDURE — G0463 HOSPITAL OUTPT CLINIC VISIT: HCPCS | Mod: 25

## 2025-06-10 PROCEDURE — 83036 HEMOGLOBIN GLYCOSYLATED A1C: CPT | Mod: ZL

## 2025-06-10 RX ORDER — MECLIZINE HCL 12.5 MG 12.5 MG/1
12.5-25 TABLET ORAL 3 TIMES DAILY PRN
Qty: 30 TABLET | Refills: 1 | Status: SHIPPED | OUTPATIENT
Start: 2025-06-10

## 2025-06-10 ASSESSMENT — ANXIETY QUESTIONNAIRES
2. NOT BEING ABLE TO STOP OR CONTROL WORRYING: NOT AT ALL
GAD7 TOTAL SCORE: 0
GAD7 TOTAL SCORE: 0
7. FEELING AFRAID AS IF SOMETHING AWFUL MIGHT HAPPEN: NOT AT ALL
4. TROUBLE RELAXING: NOT AT ALL
3. WORRYING TOO MUCH ABOUT DIFFERENT THINGS: NOT AT ALL
1. FEELING NERVOUS, ANXIOUS, OR ON EDGE: NOT AT ALL
8. IF YOU CHECKED OFF ANY PROBLEMS, HOW DIFFICULT HAVE THESE MADE IT FOR YOU TO DO YOUR WORK, TAKE CARE OF THINGS AT HOME, OR GET ALONG WITH OTHER PEOPLE?: NOT DIFFICULT AT ALL
6. BECOMING EASILY ANNOYED OR IRRITABLE: NOT AT ALL
7. FEELING AFRAID AS IF SOMETHING AWFUL MIGHT HAPPEN: NOT AT ALL
IF YOU CHECKED OFF ANY PROBLEMS ON THIS QUESTIONNAIRE, HOW DIFFICULT HAVE THESE PROBLEMS MADE IT FOR YOU TO DO YOUR WORK, TAKE CARE OF THINGS AT HOME, OR GET ALONG WITH OTHER PEOPLE: NOT DIFFICULT AT ALL
5. BEING SO RESTLESS THAT IT IS HARD TO SIT STILL: NOT AT ALL
GAD7 TOTAL SCORE: 0

## 2025-06-10 ASSESSMENT — PAIN SCALES - GENERAL: PAINLEVEL_OUTOF10: NO PAIN (0)

## 2025-06-10 ASSESSMENT — ENCOUNTER SYMPTOMS
PALPITATIONS: 0
LIGHT-HEADEDNESS: 0
NAUSEA: 1
SHORTNESS OF BREATH: 0
DIZZINESS: 1
HEADACHES: 0
FEVER: 0
FATIGUE: 0
DYSURIA: 0
SEIZURES: 0
CHILLS: 0

## 2025-06-10 NOTE — NURSING NOTE
"Chief Complaint   Patient presents with    Vertigo     Since March but getting more frequent     She has had vertigo since March. The vertigo is becoming more frequent. She notices it when she sits up or lays down.  Daniella Putnam LPN..................6/10/2025   10:30 AM    Initial /86   Pulse 63   Temp 97  F (36.1  C) (Temporal)   Resp 16   Wt 84.2 kg (185 lb 9.6 oz)   LMP 02/14/2004 (Approximate)   SpO2 99%   Breastfeeding No   BMI 29.96 kg/m   Estimated body mass index is 29.96 kg/m  as calculated from the following:    Height as of 4/9/25: 1.676 m (5' 6\").    Weight as of this encounter: 84.2 kg (185 lb 9.6 oz).  Medication Review: complete    The next two questions are to help us understand your food security.  If you are feeling you need any assistance in this area, we have resources available to support you today.          2/14/2024   SDOH- Food Insecurity   Within the past 12 months, did you worry that your food would run out before you got money to buy more? N   Within the past 12 months, did the food you bought just not last and you didn t have money to get more? N        Data saved with a previous flowsheet row definition         Health Care Directive:  Patient has a Health Care Directive on file      Daniella Putnam LPN      "

## 2025-06-10 NOTE — NURSING NOTE
"Chief Complaint   Patient presents with    Vertigo     Since March but getting more frequent       Initial /86   Pulse 63   Temp 97  F (36.1  C) (Temporal)   Resp 16   Wt 84.2 kg (185 lb 9.6 oz)   LMP 02/14/2004 (Approximate)   SpO2 99%   Breastfeeding No   BMI 29.96 kg/m   Estimated body mass index is 29.96 kg/m  as calculated from the following:    Height as of 4/9/25: 1.676 m (5' 6\").    Weight as of this encounter: 84.2 kg (185 lb 9.6 oz).  Medication Review: complete    The next two questions are to help us understand your food security.  If you are feeling you need any assistance in this area, we have resources available to support you today.          2/14/2024   SDOH- Food Insecurity   Within the past 12 months, did you worry that your food would run out before you got money to buy more? N   Within the past 12 months, did the food you bought just not last and you didn t have money to get more? N        Data saved with a previous flowsheet row definition         Health Care Directive:  Patient has a Health Care Directive on file      Daniella Putnam LPN      "

## 2025-06-10 NOTE — PROGRESS NOTES
"  Assessment & Plan     ICD-10-CM    1. Vertigo  R42 meclizine (ANTIVERT) 12.5 MG tablet     Physical Therapy  Referral      2. Benign essential hypertension  I10 TSH Reflex GH     CBC and Differential     Comprehensive Metabolic Panel     Lipid Panel     Hemoglobin A1c     TSH Reflex GH     CBC and Differential     Comprehensive Metabolic Panel     Lipid Panel     Hemoglobin A1c      3. Prediabetes  R73.03 Hemoglobin A1c     Hemoglobin A1c      4. Impacted cerumen of left ear  H61.22 GA REMOVAL IMPACTED CERUMEN IRRIGATION/LVG UNILAT         Vertigo: We discussed treatment options for this.  She did have severely impacted left ear cerumen which was successfully removed with an ear flush today which could be a contributing factor to her vertigo.  Due to her symptoms being consistent with BPPV, referred to physical therapy near to work on treatment for this.  I also provided her with a small prescription of meclizine to use as needed during episodes.  We did discuss other possible conditions for her symptoms, we proceeded with lab work to rule out other potential causes- no abnormalities to explain her symptoms are noted on labs today. She does not show any red flag features or other neurologic changes indicating her symptoms are related to ischemia. I provided her with some reading material/maneuvers to utilize for vertigo. I instructed her if she develops new or worsening symptoms to present to the ER. I also instructed her she needs to be reevaluated if the physical therapy does not resolve her symptoms.     The longitudinal plan of care for the diagnosis(es)/condition(s) as documented were addressed during this visit. Due to the added complexity in care, I will continue to support Daly in the subsequent management and with ongoing continuity of care.                 BMI  Estimated body mass index is 29.96 kg/m  as calculated from the following:    Height as of 4/9/25: 1.676 m (5' 6\").    Weight as of this " encounter: 84.2 kg (185 lb 9.6 oz).           No follow-ups on file.      LIANG Larios CNP  [facility]    Review of Systems   Constitutional:  Negative for chills, fatigue and fever.   HENT:  Negative for tinnitus.    Respiratory:  Negative for shortness of breath.    Cardiovascular:  Negative for chest pain and palpitations.   Gastrointestinal:  Positive for nausea (during vertigo).   Endocrine: Positive for heat intolerance (hot flashes).   Genitourinary:  Negative for dysuria.   Neurological:  Positive for dizziness. Negative for seizures, syncope, light-headedness and headaches.       Paulette Kauffman is a 74 year old, presenting for the following health issues:  Vertigo (Since March but getting more frequent)    Patient presents to clinic with concerns of new onset vertigo that developed over the last 3 weeks when laying down in bed.  Reports that when he felt like it was spinning, when she went from lying to sitting she also became dizzy.  She has checked her blood pressures at home, no abnormalities noted she states that occasionally these episodes are quite debilitating, resulting in needing to lay in bed.  She will occasionally develop nausea with episodes.  She reports that the vertigo is becoming more frequent and would like to discuss treatment options.      History of Present Illness       Reason for visit:  Dizziness  Symptom onset:  More than a month  Symptom intensity:  Severe  Symptom progression:  Worsening  Had these symptoms before:  No  What makes it worse:  Movement when dizzy  What makes it better:  Laying down but it takes time   She is taking medications regularly.                      Objective    /86   Pulse 63   Temp 97  F (36.1  C) (Temporal)   Resp 16   Wt 84.2 kg (185 lb 9.6 oz)   LMP 02/14/2004 (Approximate)   SpO2 99%   Breastfeeding No   BMI 29.96 kg/m    Body mass index is 29.96 kg/m .  Physical Exam  Vitals reviewed.   HENT:      Right  Ear: Tympanic membrane normal.      Left Ear: There is impacted cerumen.   Eyes:      General: Lids are normal. Vision grossly intact. Gaze aligned appropriately.      Extraocular Movements: Extraocular movements intact.      Right eye: No nystagmus.      Left eye: No nystagmus.   Cardiovascular:      Rate and Rhythm: Normal rate and regular rhythm.      Pulses: Normal pulses.      Heart sounds: Normal heart sounds. No murmur heard.  Pulmonary:      Effort: Pulmonary effort is normal. No respiratory distress.      Breath sounds: Normal breath sounds. No wheezing or rhonchi.   Neurological:      General: No focal deficit present.      Mental Status: She is alert and oriented to person, place, and time. Mental status is at baseline.      Cranial Nerves: No cranial nerve deficit.      Sensory: Sensation is intact.      Motor: No weakness.      Coordination: Coordination normal.      Gait: Gait normal.   Psychiatric:         Mood and Affect: Mood normal.         Behavior: Behavior normal.         Thought Content: Thought content normal.         Judgment: Judgment normal.        Results for orders placed or performed in visit on 06/10/25   TSH Reflex GH     Status: Normal   Result Value Ref Range    TSH 2.23 0.30 - 4.20 uIU/mL   Comprehensive Metabolic Panel     Status: None   Result Value Ref Range    Sodium 138 135 - 145 mmol/L    Potassium 4.1 3.4 - 5.3 mmol/L    Carbon Dioxide (CO2) 23 22 - 29 mmol/L    Anion Gap 12 7 - 15 mmol/L    Urea Nitrogen 16.9 8.0 - 23.0 mg/dL    Creatinine 0.64 0.51 - 0.95 mg/dL    GFR Estimate >90 >60 mL/min/1.73m2    Calcium 9.9 8.8 - 10.4 mg/dL    Chloride 103 98 - 107 mmol/L    Glucose 89 70 - 99 mg/dL    Alkaline Phosphatase 90 40 - 150 U/L    AST 30 0 - 45 U/L    ALT 19 0 - 50 U/L    Protein Total 7.0 6.4 - 8.3 g/dL    Albumin 4.3 3.5 - 5.2 g/dL    Bilirubin Total 0.3 <=1.2 mg/dL    Patient Fasting > 8hrs? No    Lipid Panel     Status: Abnormal   Result Value Ref Range    Cholesterol  232 (H) <200 mg/dL    Triglycerides 67 <150 mg/dL    Direct Measure HDL 92 >=50 mg/dL    LDL Cholesterol Calculated 127 (H) <100 mg/dL    Non HDL Cholesterol 140 (H) <130 mg/dL    Patient Fasting > 8hrs? No     Narrative    Cholesterol  Desirable: < 200 mg/dL  Borderline High: 200 - 239 mg/dL  High: >= 240 mg/dL    Triglycerides  Normal: < 150 mg/dL  Borderline High: 150 - 199 mg/dL  High: 200-499 mg/dL  Very High: >= 500 mg/dL    Direct Measure HDL  Female: >= 50 mg/dL   Male: >= 40 mg/dL    LDL Cholesterol  Desirable: < 100 mg/dL  Above Desirable: 100 - 129 mg/dL   Borderline High: 130 - 159 mg/dL   High:  160 - 189 mg/dL   Very High: >= 190 mg/dL    Non HDL Cholesterol  Desirable: < 130 mg/dL  Above Desirable: 130 - 159 mg/dL  Borderline High: 160 - 189 mg/dL  High: 190 - 219 mg/dL  Very High: >= 220 mg/dL   Hemoglobin A1c     Status: Abnormal   Result Value Ref Range    Estimated Average Glucose 126 (H) <117 mg/dL    Hemoglobin A1C 6.0 (H) <5.7 %   CBC with platelets and differential     Status: None   Result Value Ref Range    WBC Count 5.2 4.0 - 11.0 10e3/uL    RBC Count 4.42 3.80 - 5.20 10e6/uL    Hemoglobin 14.1 11.7 - 15.7 g/dL    Hematocrit 42.1 35.0 - 47.0 %    MCV 95 78 - 100 fL    MCH 31.9 26.5 - 33.0 pg    MCHC 33.5 31.5 - 36.5 g/dL    RDW 12.8 10.0 - 15.0 %    Platelet Count 277 150 - 450 10e3/uL    % Neutrophils 60 %    % Lymphocytes 32 %    % Monocytes 6 %    % Eosinophils 1 %    % Basophils 1 %    % Immature Granulocytes 0 %    NRBCs per 100 WBC 0 <1 /100    Absolute Neutrophils 3.1 1.6 - 8.3 10e3/uL    Absolute Lymphocytes 1.7 0.8 - 5.3 10e3/uL    Absolute Monocytes 0.3 0.0 - 1.3 10e3/uL    Absolute Eosinophils 0.1 0.0 - 0.7 10e3/uL    Absolute Basophils 0.1 0.0 - 0.2 10e3/uL    Absolute Immature Granulocytes 0.0 <=0.4 10e3/uL    Absolute NRBCs 0.0 10e3/uL   CBC and Differential     Status: None    Narrative    The following orders were created for panel order CBC and Differential.  Procedure                                Abnormality         Status                     ---------                               -----------         ------                     CBC with platelets and ...[[id number]]                      Final result                 Please view results for these tests on the individual orders.                   Signed Electronically by: LIANG Larios CNP

## 2025-06-10 NOTE — NURSING NOTE
The ear canal was irrigated withbody-temperature tap water with the jet of water directed superiorly.  The ear canal was then re-examined and cleared of the impaction.  The patient tolerated the procedure well.  Daly David LPN ....................6/10/2025   11:35 AM

## 2025-06-10 NOTE — PATIENT INSTRUCTIONS
- Ear flush today  -Symptoms consistent with BPPV- we will try physical therapy, meclizine for symptoms  - If this continues or worsen- please come back in to be re-evaluated  -Labwork today- I will comment on sony

## 2025-06-11 ENCOUNTER — THERAPY VISIT (OUTPATIENT)
Dept: PHYSICAL THERAPY | Facility: OTHER | Age: 75
End: 2025-06-11
Payer: MEDICARE

## 2025-06-11 DIAGNOSIS — R42 VERTIGO: ICD-10-CM

## 2025-06-11 NOTE — PROGRESS NOTES
PHYSICAL THERAPY EVALUATION  Type of Visit: Evaluation       Fall Risk Screen:  Have you fallen 2 or more times in the past year?: No  Have you fallen and had an injury in the past year?: No  Is patient receiving Physical Therapy Services?: Yes    Subjective         Presenting condition or subjective complaint: Pt is a 74 year old female referred to skilled PT services following an onset of dizziness in march. pt reports she did start on a heart medication and was sitting in a recliner and suddenly got dizzy. pt reports she did rest and woke up without dizziness. pt reports it went away for awhile and recently is getting dizzy when up and moving around and sometimes wakes up from a nap and had dizziness, pt reports she had dizziness after sitting up from having her hair washed. pt reports it does not happen every time. pt reports she did have a lot of ear wax in her ear and was removed yesterday. pt reports when she gets dizziness the room does spin and nausea goes away but room still spins. Pt reports the room spinning will last for a few minutes but needs to rest for half the day. pt reports no changes in hearing or vision with dizziness and did have a migraine but that is a seperate issue.  Date of onset: 04/30/25    Relevant medical history:     Past Medical History:   Diagnosis Date    Closed left ankle fracture     Cyst of ovary     Hx of right ovarian cyst.    Diverticulosis of large intestine 03/14/2011    Endometriosis     growth on ovary s/p oophrectomy    Fibrocystic breast disease 01/30/2018    Generalized anxiety disorder     Dysthymia, generalized anxiety    Lichen sclerosus     Vulvar LSEA; asymptomatic    Neck pain, chronic 01/30/2018     Improved after [facility]    Rosacea 01/04/2013    Severe major depression with psychotic features (H) 10/25/2016    history of ECT; inpatient for several months; she does not have complete memory of that time    Sleep apnea       Dates & types of surgery:       Prior diagnostic imaging/testing results:       Prior therapy history for the same diagnosis, illness or injury: No      Prior Level of Function  Transfers: Independent  Ambulation: Independent  ADL: Independent  IADL: Driving, Finances, Housekeeping, Laundry, Meal preparation, Medication management, Yard work    Living Environment  Social support: With a significant other or spouse   Type of home: House   Stairs to enter the home: Yes 4 Is there a railing: Yes     Ramp: No   Stairs inside the home: Yes 12 Is there a railing: Yes     Help at home: None  Equipment owned:       Employment: No retired  Hobbies/Interests: hanging out with family    Patient goals for therapy: move around without dizziness    Pain assessment: Pain denied     Objective      Cognitive Status Examination  Orientation: Oriented to person, place and time   Level of Consciousness: Alert  Follows Commands and Answers Questions: 100% of the time  Personal Safety and Judgement: Intact  Memory: Intact    GAIT:   Level of [place]: Independent  Assistive Device(s): None  Gait Deviations: WFL  Gait Distance: 50 feet       VESTIBULAR EVALUATION  ADDITIONAL HISTORY:  Description of symptoms: Attacks of dizziness; Likely to fall; Nausea or vomiting; I feel like the room is spinning  Dizzy attacks:   Start: after med change a few months ago   Last attack: within the last day   Frequency of occurrences: variable   Length of attack: seconds  Difficulty hearing: Left ear  Noise in ears? No no noise, had wax removed from L ear yesterday  Alleviates symptoms: rest  Worsens symptoms: quick head motions, supine to sitting  Activities that bring on symptoms: Bending over; Reaching up; Unstable surfaces; Walking in the dark       Pertinent visual history: pt wears glasses to correct for double vision  Pertinent history of current vestibular problem: Anxiety, Depression, Migraines  DHI:      Cervicogenic Screen    Neck ROM Normal     Oculomotor Screen     Ocular ROM Normal   Smooth Pursuit Normal   Saccades Normal   VOR Normal   VOR Cancellation Normal   Head Impulse Test Normal   Convergence Testing Abnormal (this is baseline issue)        Infrared Goggle Exam Vestibular Suppressant in Last 24 Hours? No  Exam Completed With: frenzel lenses   Spontaneous Nystagmus Negative   Gaze Evoked Nystagmus Negative   Head Shake Horizontal Nystagmus Negative    Left Right   Natalie-Hallpike Negative Negative   HSCC Supine Roll Test Negative Negative       Assessment & Plan   CLINICAL IMPRESSIONS  Medical Diagnosis: vertigo    Treatment Diagnosis: R posterior canal BPPV, vertigo   Impression/Assessment: Patient is a 74 year old female with vertigo complaints.  The following significant findings have been identified: Impaired balance, Impaired gait, Instability, Dizziness, and Disequilibrium . These impairments interfere with their ability to perform self care tasks, recreational activities, household chores, household mobility, and community mobility as compared to previous level of function.     Clinical Decision Making (Complexity):  Clinical Presentation: Evolving/Changing  Clinical Presentation Rationale: based on medical and personal factors listed in PT evaluation  Clinical Decision Making (Complexity): Moderate complexity    PLAN OF CARE  Treatment Interventions:  Interventions: Gait Training, Manual Therapy, Neuromuscular Re-education, Therapeutic Activity, Therapeutic Exercise, Canalith Repositioning    Long Term Goals     PT Goal 1  Goal Identifier: bed mobility  Goal Description: Pt will report ability to complete supine<>sit without an increase in dizziness for 3 days to increase tolerance to bed mobility  Target Date: 07/09/25  PT Goal 2  Goal Identifier: Dizziness  Goal Description: Pt will report 50% reduction in dizziness over 3 days to increase safety during daily mobility  Target Date: 08/20/25      Frequency of Treatment: 6 sessions  Duration of Treatment: 12  weeks    Education Assessment:   Learner/Method: Patient    Risks and benefits of evaluation/treatment have been explained.   Patient/Family/caregiver agrees with Plan of Care.     Evaluation Time:     PT Eval, Moderate Complexity Minutes (88333): 30     Signing Clinician: Lupe Medina DPT        [facility]                                                                                   OUTPATIENT PHYSICAL THERAPY      PLAN OF TREATMENT FOR OUTPATIENT REHABILITATION   Patient's Last Name, First Name, Daly Cruz YOB: 1950   Provider's Name   [facility]   Medical Record No.  [id number]     Onset Date: 04/30/25  Start of Care Date: 06/11/25     Medical Diagnosis:  vertigo      PT Treatment Diagnosis:  R posterior canal BPPV, vertigo Plan of Treatment  Frequency/Duration: 6 sessions/ 12 weeks    Certification date from 06/11/25 to 09/03/25         See note for plan of treatment details and functional goals     Lupe Medina DPT                         I CERTIFY THE NEED FOR THESE SERVICES FURNISHED UNDER        THIS PLAN OF TREATMENT AND WHILE UNDER MY CARE     (Physician attestation of this document indicates review and certification of the therapy plan).              Referring Provider:  Angelika Chanel    Initial Assessment  See Epic Evaluation- Start of Care Date: 06/11/25

## 2025-06-12 ENCOUNTER — RESULTS FOLLOW-UP (OUTPATIENT)
Dept: INTERNAL MEDICINE | Facility: OTHER | Age: 75
End: 2025-06-12
Payer: COMMERCIAL

## 2025-06-12 ASSESSMENT — VISUAL ACUITY: OU: 1

## 2025-06-24 ENCOUNTER — THERAPY VISIT (OUTPATIENT)
Dept: PHYSICAL THERAPY | Facility: OTHER | Age: 75
End: 2025-06-24
Payer: MEDICARE

## 2025-06-24 DIAGNOSIS — R42 VERTIGO: Primary | ICD-10-CM

## 2025-07-07 ENCOUNTER — HOSPITAL ENCOUNTER (OUTPATIENT)
Dept: MAMMOGRAPHY | Facility: OTHER | Age: 75
Discharge: HOME | End: 2025-07-07
Attending: INTERNAL MEDICINE | Admitting: INTERNAL MEDICINE
Payer: MEDICARE

## 2025-07-07 DIAGNOSIS — Z12.31 VISIT FOR SCREENING MAMMOGRAM: ICD-10-CM

## 2025-07-07 PROCEDURE — 77067 SCR MAMMO BI INCL CAD: CPT

## 2025-07-17 ENCOUNTER — OFFICE VISIT (OUTPATIENT)
Dept: INTERNAL MEDICINE | Facility: OTHER | Age: 75
End: 2025-07-17
Attending: INTERNAL MEDICINE
Payer: MEDICARE

## 2025-07-17 VITALS
OXYGEN SATURATION: 99 % | DIASTOLIC BLOOD PRESSURE: 62 MMHG | SYSTOLIC BLOOD PRESSURE: 118 MMHG | RESPIRATION RATE: 16 BRPM | WEIGHT: 188.4 LBS | BODY MASS INDEX: 30.41 KG/M2 | HEART RATE: 78 BPM

## 2025-07-17 DIAGNOSIS — R25.1 TREMOR: ICD-10-CM

## 2025-07-17 DIAGNOSIS — L90.0 LICHEN SCLEROSUS: ICD-10-CM

## 2025-07-17 DIAGNOSIS — F33.1 MAJOR DEPRESSIVE DISORDER, RECURRENT EPISODE, MODERATE (H): ICD-10-CM

## 2025-07-17 DIAGNOSIS — I10 BENIGN ESSENTIAL HYPERTENSION: Primary | ICD-10-CM

## 2025-07-17 PROCEDURE — G0463 HOSPITAL OUTPT CLINIC VISIT: HCPCS

## 2025-07-17 RX ORDER — PROPRANOLOL HYDROCHLORIDE 40 MG/1
40 TABLET ORAL 2 TIMES DAILY
Qty: 180 TABLET | Refills: 2 | Status: SHIPPED | OUTPATIENT
Start: 2025-07-17

## 2025-07-17 RX ORDER — LISINOPRIL 10 MG/1
10 TABLET ORAL DAILY
Qty: 90 TABLET | Refills: 2 | Status: SHIPPED | OUTPATIENT
Start: 2025-07-17

## 2025-07-17 ASSESSMENT — ANXIETY QUESTIONNAIRES
4. TROUBLE RELAXING: NOT AT ALL
7. FEELING AFRAID AS IF SOMETHING AWFUL MIGHT HAPPEN: NOT AT ALL
IF YOU CHECKED OFF ANY PROBLEMS ON THIS QUESTIONNAIRE, HOW DIFFICULT HAVE THESE PROBLEMS MADE IT FOR YOU TO DO YOUR WORK, TAKE CARE OF THINGS AT HOME, OR GET ALONG WITH OTHER PEOPLE: NOT DIFFICULT AT ALL
3. WORRYING TOO MUCH ABOUT DIFFERENT THINGS: NOT AT ALL
2. NOT BEING ABLE TO STOP OR CONTROL WORRYING: NOT AT ALL
GAD7 TOTAL SCORE: 0
5. BEING SO RESTLESS THAT IT IS HARD TO SIT STILL: NOT AT ALL
GAD7 TOTAL SCORE: 0
6. BECOMING EASILY ANNOYED OR IRRITABLE: NOT AT ALL
8. IF YOU CHECKED OFF ANY PROBLEMS, HOW DIFFICULT HAVE THESE MADE IT FOR YOU TO DO YOUR WORK, TAKE CARE OF THINGS AT HOME, OR GET ALONG WITH OTHER PEOPLE?: NOT DIFFICULT AT ALL
7. FEELING AFRAID AS IF SOMETHING AWFUL MIGHT HAPPEN: NOT AT ALL
GAD7 TOTAL SCORE: 0
1. FEELING NERVOUS, ANXIOUS, OR ON EDGE: NOT AT ALL

## 2025-07-17 ASSESSMENT — PAIN SCALES - PAIN ENJOYMENT GENERAL ACTIVITY SCALE (PEG)
AVG_PAIN_PASTWEEK: 0
PEG_TOTALSCORE: INCOMPLETE
AVG_PAIN_PASTWEEK: 0 - NO PAIN
INTERFERED_ENJOYMENT_LIFE: 0
INTERFERED_ENJOYMENT_LIFE: 0 - DOES NOT INTERFERE

## 2025-07-17 NOTE — PROGRESS NOTES
"  Assessment & Plan     Benign essential hypertension  - Controlled.  Continue current regimen.    - lisinopril (ZESTRIL) 10 MG tablet; Take 1 tablet (10 mg) by mouth daily.    Major depressive disorder, recurrent episode, moderate (H)  - Controlled.  Continue current regimen.  Continue to follow with mental health    Tremor  Continue with propranolol at this time.  We discussed that this likely is not interfering with her other medications.  If at some point she is interested in dose adjustments we can consider this although at this time she feels everything is stable.  - propranolol (INDERAL) 40 MG tablet; Take 1 tablet (40 mg) by mouth 2 times daily. Increase in dose    Lichen sclerosus  Discussed expiration dates of her topical creams.  She was encouraged to look at the 's expiration date and to have her topicals renewed within a year after the date.    BMI  Estimated body mass index is 30.41 kg/m  as calculated from the following:    Height as of 4/9/25: 1.676 m (5' 6\").    Weight as of this encounter: 85.5 kg (188 lb 6.4 oz).   Weight management plan: Discussed healthy diet and exercise guidelines    Follow-up  Return in about 6 months (around 1/17/2026) for Annual Review with renewal of all medications, and as needed sooner.    Paulette Kauffman is a 74 year old, presenting for the following health issues:  Hypertension        7/17/2025     8:30 AM   Additional Questions   Roomed by LAUREL Gonzalez     History of Present Illness       Hypertension: She presents for follow up of hypertension.  She does check blood pressure  regularly outside of the clinic. Outside blood pressures have been over 140/90. She follows a low salt diet.     She eats 2-3 servings of fruits and vegetables daily.She consumes 0 sweetened beverage(s) daily.She exercises with enough effort to increase her heart rate 20 to 29 minutes per day.  She exercises with enough effort to increase her heart rate 4 days per week.   She is " taking medications regularly.      Patient presents for 6-month follow-up of blood pressure.  She reports that her blood pressures recently have looked really good.  In reviewing her blood pressure readings which she takes once daily her blood pressure has ranged between 96/73 and 146/91.  She reports that recently her blood pressure was up a little bit however it was during the time that she had some vertigo.  She reports that the vertigo has resolved at this time.    She has a history of major depressive disorder.  She continues to follow with psych.  She continues on her chronic medications with no recent changes.  She does continue on propranolol which was originally started for tremor however has also been used for anxiety.  She does question if this is okay.  She does need a refill at this time.    She has a history of lichen sclerosis.  She has several topical creams and is curious if they are still okay to be used.  She did bring in tubes today and we discussed reviewed expiration dates together.      Objective    /62   Pulse 78   Resp 16   Wt 85.5 kg (188 lb 6.4 oz)   LMP 02/14/2004 (Approximate)   SpO2 99%   Breastfeeding No   BMI 30.41 kg/m    Body mass index is 30.41 kg/m .  Physical Exam   GEN: Vitals reviewed. Healthy appearing. Patient is in no acute distress. Cooperative with exam.  HEENT: Normocephalic atraumatic.  Eyes grossly normal to inspection.  No discharge or erythema, or obvious scleral/conjunctival abnormalities.   CV: Heart regular in rate and rhythm with no murmur.    LUNGS: No audible wheeze, cough, or visible cyanosis.  No visible retractions or increased work of breathing.  Lungs clear to auscultation bilaterally.    ABD:  nondistended  SKIN: Warm and dry to touch.  Visible skin clear. No significant rash, abnormal pigmentation or lesions.  EXT: No clubbing or cyanosis.  Trace peripheral edema.        The longitudinal plan of care for the diagnosis(es)/condition(s) as  documented were addressed during this visit. Due to the added complexity in care, I will continue to support Daly in the subsequent management and with ongoing continuity of care.    Signed Electronically by: Nidhi Baer DO

## 2025-07-17 NOTE — NURSING NOTE
"Chief Complaint   Patient presents with    Hypertension       Initial /62   Pulse 78   Resp 16   Wt 85.5 kg (188 lb 6.4 oz)   LMP 02/14/2004 (Approximate)   SpO2 99%   Breastfeeding No   BMI 30.41 kg/m   Estimated body mass index is 30.41 kg/m  as calculated from the following:    Height as of 4/9/25: 1.676 m (5' 6\").    Weight as of this encounter: 85.5 kg (188 lb 6.4 oz).  Medication Review: complete    The next two questions are to help us understand your food security.  If you are feeling you need any assistance in this area, we have resources available to support you today.          2/14/2024   SDOH- Food Insecurity   Within the past 12 months, did you worry that your food would run out before you got money to buy more? N   Within the past 12 months, did the food you bought just not last and you didn t have money to get more? N        Data saved with a previous flowsheet row definition         Health Care Directive:  Patient has a Health Care Directive on file      Yenifer Lazo LPN      "

## (undated) DEVICE — DRAPE TOWEL 17X27" BLUE LF DISP 28700-004

## (undated) DEVICE — BONE CEMENT MIXEVAC III HI VAC KIT  0206-015-000

## (undated) DEVICE — PREP CHLORAPREP 26ML TINTED ORANGE  260815

## (undated) DEVICE — SOL WATER 1500ML

## (undated) DEVICE — DRSG ABDOMINAL PAD UNSTERILE 5X9" 9190

## (undated) DEVICE — PAD WRAP FOAM HOLDER POSITIONER KNEE DISP LF 2629-00

## (undated) DEVICE — DRAPE STERI U 1015

## (undated) DEVICE — SU DERMABOND ADVANCED .7ML DNX12

## (undated) DEVICE — TUBING SUCTION 10'X3/16" N510

## (undated) DEVICE — DRAPE TOP SURGICAL HD 59352

## (undated) DEVICE — DRSG AQUACEL AG EXTRA 4X4.7" 420677

## (undated) DEVICE — STAPLER SKIN 35 WIDE PMW35 6/BX

## (undated) DEVICE — DRSG GAUZE 4X4" 3033

## (undated) DEVICE — SU VICRYL 2-0 CT-1 36" UND J945H

## (undated) DEVICE — GLOVE PROTEXIS PI ORTHO PF 7.5 2D73HT75

## (undated) DEVICE — PAD FLOOR SURGISAFE 46X40" 84610

## (undated) DEVICE — BLADE SAW RECIP STRK 70X12.5X0.80MM 0277-096-277

## (undated) DEVICE — BLADE SAW OSCIL/SAG STRK 25X90X1.27MM 4125-127-090

## (undated) DEVICE — CAST PADDING 6" WEBRIL II UNSTERILE 4519

## (undated) DEVICE — SU VICRYL 1 CTX CR 8X18" J765D

## (undated) DEVICE — COVER TABLE L60 IN 2 TIER BACK STRL 8197-

## (undated) DEVICE — DRSG ADAPTIC 3X3" 2012

## (undated) DEVICE — GLOVE BIOGEL 7 LATEX

## (undated) DEVICE — SLEEVE COMPRESSION SCD KNEE MED 74022

## (undated) DEVICE — SUCTION MANIFOLD NEPTUNE 2 SYS 4 PORT 0702-020-000

## (undated) DEVICE — GLOVE BIOGEL PI INDICATOR 8.0 LF 41680

## (undated) DEVICE — PACK MAJOR ORTHO SOP15MOFCA

## (undated) DEVICE — HOOD T4 PROTECTIVE STERI FACE SHIELD 400-800

## (undated) DEVICE — Device

## (undated) DEVICE — TUBING ARTHROSCOPY PUMP ARTHREX AR-6410

## (undated) DEVICE — PACK KNEE ARTHROSCOPY CUSTOM SOP15KAFCA

## (undated) DEVICE — BNDG ELASTIC 6" DBL LENGTH UNSTERILE 6611-16

## (undated) DEVICE — COVER LIGHT HANDLE LT-F02

## (undated) DEVICE — BUR ARTHREX COOLCUT DISSECTOR 4.0MMX13CM AR-8400DS

## (undated) DEVICE — PEN MARKING SKIN W/LABELS 31145918

## (undated) DEVICE — BNDG ELASTIC 4"X5YDS UNSTERILE 6611-40

## (undated) DEVICE — ESU GROUND PAD ADULT W/CORD E7507

## (undated) DEVICE — GLOVE PROTEXIS POWDER FREE SMT 7.0  2D72PT70X

## (undated) DEVICE — DRILL PIN HEADLESS TROCAR 00-5901-020-00

## (undated) DEVICE — PATELLA REAMING BLADE

## (undated) DEVICE — STRAP STIRRUP W/O SLIP 30187-020

## (undated) DEVICE — PENCIL MEGADYNE TELESCOPING SMOKE EVACUATION 10 FT 251010J

## (undated) DEVICE — CAST PADDING 4" WEBRIL STERILE

## (undated) DEVICE — ESU SUCTION COAG CAUTERY HAND CONTROL 10FR 6" 30-5200

## (undated) DEVICE — TOURNIQUET SGL  BLADDER 30"X4" BLUE 5921030135

## (undated) DEVICE — DECANTER VIAL 2006S

## (undated) DEVICE — SU ETHILON 3-0 PS-2 18" 1669H

## (undated) DEVICE — BLADE REAMER 46MM STAINLESS STEEL 00597909546

## (undated) DEVICE — DRAPE EXTREMITY W/ARMBOARD 29405

## (undated) DEVICE — BUR ARTHREX TORPEDO DISSECTOR 4.0MM CVD AR-8400CTD

## (undated) DEVICE — ABLATOR ARTHREX APOLLO RF ASPIRATING 50DEG AR-9815

## (undated) RX ORDER — TRIAMCINOLONE ACETONIDE 40 MG/ML
INJECTION, SUSPENSION INTRA-ARTICULAR; INTRAMUSCULAR
Status: DISPENSED
Start: 2022-07-26

## (undated) RX ORDER — CEFAZOLIN SODIUM/WATER 2 G/20 ML
SYRINGE (ML) INTRAVENOUS
Status: DISPENSED
Start: 2024-04-02

## (undated) RX ORDER — ONDANSETRON 2 MG/ML
INJECTION INTRAMUSCULAR; INTRAVENOUS
Status: DISPENSED
Start: 2022-04-15

## (undated) RX ORDER — ACETAMINOPHEN 325 MG/1
TABLET ORAL
Status: DISPENSED
Start: 2024-02-20

## (undated) RX ORDER — DEXAMETHASONE SODIUM PHOSPHATE 4 MG/ML
INJECTION, SOLUTION INTRA-ARTICULAR; INTRALESIONAL; INTRAMUSCULAR; INTRAVENOUS; SOFT TISSUE
Status: DISPENSED
Start: 2022-04-15

## (undated) RX ORDER — BUPIVACAINE HYDROCHLORIDE 2.5 MG/ML
INJECTION, SOLUTION EPIDURAL; INFILTRATION; INTRACAUDAL
Status: DISPENSED
Start: 2024-04-02

## (undated) RX ORDER — ONDANSETRON 2 MG/ML
INJECTION INTRAMUSCULAR; INTRAVENOUS
Status: DISPENSED
Start: 2024-04-02

## (undated) RX ORDER — FENTANYL CITRATE-0.9 % NACL/PF 10 MCG/ML
PLASTIC BAG, INJECTION (ML) INTRAVENOUS
Status: DISPENSED
Start: 2022-04-15

## (undated) RX ORDER — PROPOFOL 10 MG/ML
INJECTION, EMULSION INTRAVENOUS
Status: DISPENSED
Start: 2024-02-20

## (undated) RX ORDER — KETOROLAC TROMETHAMINE 30 MG/ML
INJECTION, SOLUTION INTRAMUSCULAR; INTRAVENOUS
Status: DISPENSED
Start: 2022-04-15

## (undated) RX ORDER — ONDANSETRON 2 MG/ML
INJECTION INTRAMUSCULAR; INTRAVENOUS
Status: DISPENSED
Start: 2024-02-20

## (undated) RX ORDER — LIDOCAINE HYDROCHLORIDE 20 MG/ML
INJECTION, SOLUTION EPIDURAL; INFILTRATION; INTRACAUDAL; PERINEURAL
Status: DISPENSED
Start: 2022-04-15

## (undated) RX ORDER — PROPOFOL 10 MG/ML
INJECTION, EMULSION INTRAVENOUS
Status: DISPENSED
Start: 2022-04-15

## (undated) RX ORDER — TRANEXAMIC ACID 650 MG/1
TABLET ORAL
Status: DISPENSED
Start: 2024-02-20

## (undated) RX ORDER — TRANEXAMIC ACID 650 MG/1
TABLET ORAL
Status: DISPENSED
Start: 2024-04-02

## (undated) RX ORDER — ACETAMINOPHEN 325 MG/1
TABLET ORAL
Status: DISPENSED
Start: 2022-04-15

## (undated) RX ORDER — CELECOXIB 100 MG/1
CAPSULE ORAL
Status: DISPENSED
Start: 2024-04-02

## (undated) RX ORDER — LIDOCAINE HYDROCHLORIDE 10 MG/ML
INJECTION, SOLUTION EPIDURAL; INFILTRATION; INTRACAUDAL; PERINEURAL
Status: DISPENSED
Start: 2024-02-20

## (undated) RX ORDER — PROPOFOL 10 MG/ML
INJECTION, EMULSION INTRAVENOUS
Status: DISPENSED
Start: 2024-04-02

## (undated) RX ORDER — LIDOCAINE HYDROCHLORIDE 10 MG/ML
INJECTION, SOLUTION EPIDURAL; INFILTRATION; INTRACAUDAL; PERINEURAL
Status: DISPENSED
Start: 2024-04-02

## (undated) RX ORDER — DEXAMETHASONE SODIUM PHOSPHATE 10 MG/ML
INJECTION, SOLUTION INTRAMUSCULAR; INTRAVENOUS
Status: DISPENSED
Start: 2024-04-02

## (undated) RX ORDER — BUPIVACAINE HYDROCHLORIDE 2.5 MG/ML
INJECTION, SOLUTION INFILTRATION; PERINEURAL
Status: DISPENSED
Start: 2022-04-15

## (undated) RX ORDER — FENTANYL CITRATE 50 UG/ML
INJECTION, SOLUTION INTRAMUSCULAR; INTRAVENOUS
Status: DISPENSED
Start: 2022-04-15

## (undated) RX ORDER — BUPIVACAINE HYDROCHLORIDE 2.5 MG/ML
INJECTION, SOLUTION EPIDURAL; INFILTRATION; INTRACAUDAL
Status: DISPENSED
Start: 2024-02-20

## (undated) RX ORDER — CEFAZOLIN SODIUM/WATER 2 G/20 ML
SYRINGE (ML) INTRAVENOUS
Status: DISPENSED
Start: 2024-02-20

## (undated) RX ORDER — LIDOCAINE HYDROCHLORIDE 10 MG/ML
INJECTION, SOLUTION EPIDURAL; INFILTRATION; INTRACAUDAL; PERINEURAL
Status: DISPENSED
Start: 2022-07-26

## (undated) RX ORDER — CEFAZOLIN SODIUM/WATER 2 G/20 ML
SYRINGE (ML) INTRAVENOUS
Status: DISPENSED
Start: 2022-04-15

## (undated) RX ORDER — CELECOXIB 100 MG/1
CAPSULE ORAL
Status: DISPENSED
Start: 2024-02-20

## (undated) RX ORDER — DEXAMETHASONE SODIUM PHOSPHATE 10 MG/ML
INJECTION, SOLUTION INTRAMUSCULAR; INTRAVENOUS
Status: DISPENSED
Start: 2024-02-20

## (undated) RX ORDER — FENTANYL CITRATE-0.9 % NACL/PF 10 MCG/ML
PLASTIC BAG, INJECTION (ML) INTRAVENOUS
Status: DISPENSED
Start: 2024-02-20